# Patient Record
Sex: MALE | Race: WHITE | Employment: OTHER
[De-identification: names, ages, dates, MRNs, and addresses within clinical notes are randomized per-mention and may not be internally consistent; named-entity substitution may affect disease eponyms.]

---

## 2017-01-03 RX ORDER — RANOLAZINE 500 MG/1
500 TABLET, EXTENDED RELEASE ORAL 2 TIMES DAILY
Qty: 180 TABLET | Refills: 3 | Status: ON HOLD | OUTPATIENT
Start: 2017-01-03 | End: 2017-04-06 | Stop reason: HOSPADM

## 2017-01-20 ENCOUNTER — TELEPHONE (OUTPATIENT)
Dept: CARDIOLOGY | Facility: CLINIC | Age: 62
End: 2017-01-20

## 2017-04-04 ENCOUNTER — HOSPITAL ENCOUNTER (OUTPATIENT)
Age: 62
Setting detail: OBSERVATION
Discharge: HOME | End: 2017-04-07
Attending: FAMILY MEDICINE | Admitting: INTERNAL MEDICINE
Payer: OTHER GOVERNMENT

## 2017-04-04 ENCOUNTER — APPOINTMENT (OUTPATIENT)
Dept: GENERAL RADIOLOGY | Age: 62
End: 2017-04-04
Payer: OTHER GOVERNMENT

## 2017-04-04 DIAGNOSIS — R07.9 CHEST PAIN IN ADULT: Primary | ICD-10-CM

## 2017-04-04 LAB
ABSOLUTE EOS #: 0.1 K/UL (ref 0–0.4)
ABSOLUTE LYMPH #: 1.5 K/UL (ref 0.9–2.5)
ABSOLUTE MONO #: 0.6 K/UL (ref 0–1)
ALBUMIN SERPL-MCNC: 3.6 G/DL (ref 3.5–5.2)
ALBUMIN/GLOBULIN RATIO: ABNORMAL (ref 1–2.5)
ALP BLD-CCNC: 62 U/L (ref 40–129)
ALT SERPL-CCNC: 42 U/L (ref 5–41)
AMYLASE: 52 U/L (ref 28–100)
ANION GAP SERPL CALCULATED.3IONS-SCNC: 12 MMOL/L (ref 9–17)
AST SERPL-CCNC: 38 U/L
BASOPHILS # BLD: 0 % (ref 0–2)
BASOPHILS ABSOLUTE: 0 K/UL (ref 0–0.2)
BILIRUB SERPL-MCNC: 0.17 MG/DL (ref 0.3–1.2)
BILIRUBIN URINE: NEGATIVE
BUN BLDV-MCNC: 20 MG/DL (ref 8–23)
BUN/CREAT BLD: 22 (ref 9–20)
CALCIUM SERPL-MCNC: 8.5 MG/DL (ref 8.6–10.4)
CHLORIDE BLD-SCNC: 101 MMOL/L (ref 98–107)
CO2: 27 MMOL/L (ref 20–31)
COLOR: YELLOW
COMMENT UA: NORMAL
CREAT SERPL-MCNC: 0.91 MG/DL (ref 0.7–1.2)
DIFFERENTIAL TYPE: YES
EOSINOPHILS RELATIVE PERCENT: 2 % (ref 0–5)
GFR AFRICAN AMERICAN: >60 ML/MIN
GFR NON-AFRICAN AMERICAN: >60 ML/MIN
GFR SERPL CREATININE-BSD FRML MDRD: ABNORMAL ML/MIN/{1.73_M2}
GFR SERPL CREATININE-BSD FRML MDRD: ABNORMAL ML/MIN/{1.73_M2}
GLUCOSE BLD-MCNC: 91 MG/DL (ref 70–99)
GLUCOSE URINE: NEGATIVE
HCT VFR BLD CALC: 38.6 % (ref 41–53)
HEMOGLOBIN: 12.9 G/DL (ref 13.5–17.5)
KETONES, URINE: NEGATIVE
LEUKOCYTE ESTERASE, URINE: NEGATIVE
LIPASE: 50 U/L (ref 13–60)
LYMPHOCYTES # BLD: 19 % (ref 13–44)
MCH RBC QN AUTO: 30.5 PG (ref 26–34)
MCHC RBC AUTO-ENTMCNC: 33.5 G/DL (ref 31–37)
MCV RBC AUTO: 91.2 FL (ref 80–100)
MONOCYTES # BLD: 7 % (ref 5–9)
NITRITE, URINE: NEGATIVE
PDW BLD-RTO: 16.3 % (ref 12.1–15.2)
PH UA: 7 (ref 5–8)
PLATELET # BLD: 205 K/UL (ref 140–450)
PLATELET ESTIMATE: ABNORMAL
PMV BLD AUTO: ABNORMAL FL (ref 6–12)
POTASSIUM SERPL-SCNC: 4.4 MMOL/L (ref 3.7–5.3)
PROTEIN UA: NEGATIVE
RBC # BLD: 4.24 M/UL (ref 4.5–5.9)
RBC # BLD: ABNORMAL 10*6/UL
SEG NEUTROPHILS: 72 % (ref 39–75)
SEGMENTED NEUTROPHILS ABSOLUTE COUNT: 5.9 K/UL (ref 2.1–6.5)
SODIUM BLD-SCNC: 140 MMOL/L (ref 135–144)
SPECIFIC GRAVITY UA: 1.01 (ref 1–1.03)
TOTAL PROTEIN: 6.2 G/DL (ref 6.4–8.3)
TROPONIN INTERP: NORMAL
TROPONIN T: <0.03 NG/ML
TURBIDITY: CLEAR
URINE HGB: NEGATIVE
UROBILINOGEN, URINE: NORMAL
WBC # BLD: 8.1 K/UL (ref 3.5–11)
WBC # BLD: ABNORMAL 10*3/UL

## 2017-04-04 PROCEDURE — 96372 THER/PROPH/DIAG INJ SC/IM: CPT

## 2017-04-04 PROCEDURE — G0378 HOSPITAL OBSERVATION PER HR: HCPCS

## 2017-04-04 PROCEDURE — 99285 EMERGENCY DEPT VISIT HI MDM: CPT

## 2017-04-04 PROCEDURE — 71010 XR CHEST PORTABLE: CPT

## 2017-04-04 PROCEDURE — 85025 COMPLETE CBC W/AUTO DIFF WBC: CPT

## 2017-04-04 PROCEDURE — 6360000002 HC RX W HCPCS: Performed by: FAMILY MEDICINE

## 2017-04-04 PROCEDURE — 2580000003 HC RX 258: Performed by: INTERNAL MEDICINE

## 2017-04-04 PROCEDURE — 84484 ASSAY OF TROPONIN QUANT: CPT

## 2017-04-04 PROCEDURE — 6370000000 HC RX 637 (ALT 250 FOR IP): Performed by: INTERNAL MEDICINE

## 2017-04-04 PROCEDURE — 6370000000 HC RX 637 (ALT 250 FOR IP): Performed by: FAMILY MEDICINE

## 2017-04-04 PROCEDURE — 36415 COLL VENOUS BLD VENIPUNCTURE: CPT

## 2017-04-04 PROCEDURE — 6360000002 HC RX W HCPCS: Performed by: INTERNAL MEDICINE

## 2017-04-04 PROCEDURE — 96374 THER/PROPH/DIAG INJ IV PUSH: CPT

## 2017-04-04 PROCEDURE — 80053 COMPREHEN METABOLIC PANEL: CPT

## 2017-04-04 PROCEDURE — 81003 URINALYSIS AUTO W/O SCOPE: CPT

## 2017-04-04 PROCEDURE — 82150 ASSAY OF AMYLASE: CPT

## 2017-04-04 PROCEDURE — 94760 N-INVAS EAR/PLS OXIMETRY 1: CPT

## 2017-04-04 PROCEDURE — 93005 ELECTROCARDIOGRAM TRACING: CPT

## 2017-04-04 PROCEDURE — 83690 ASSAY OF LIPASE: CPT

## 2017-04-04 RX ORDER — ONDANSETRON 2 MG/ML
4 INJECTION INTRAMUSCULAR; INTRAVENOUS EVERY 6 HOURS PRN
Status: DISCONTINUED | OUTPATIENT
Start: 2017-04-04 | End: 2017-04-07 | Stop reason: HOSPADM

## 2017-04-04 RX ORDER — FLUTICASONE PROPIONATE 50 MCG
2 SPRAY, SUSPENSION (ML) NASAL DAILY
Status: DISCONTINUED | OUTPATIENT
Start: 2017-04-04 | End: 2017-04-07 | Stop reason: HOSPADM

## 2017-04-04 RX ORDER — ASPIRIN 81 MG/1
324 TABLET, CHEWABLE ORAL ONCE
Status: COMPLETED | OUTPATIENT
Start: 2017-04-04 | End: 2017-04-04

## 2017-04-04 RX ORDER — ATORVASTATIN CALCIUM 20 MG/1
10 TABLET, FILM COATED ORAL NIGHTLY
Status: DISCONTINUED | OUTPATIENT
Start: 2017-04-04 | End: 2017-04-07 | Stop reason: HOSPADM

## 2017-04-04 RX ORDER — SODIUM CHLORIDE 0.9 % (FLUSH) 0.9 %
10 SYRINGE (ML) INJECTION EVERY 12 HOURS SCHEDULED
Status: DISCONTINUED | OUTPATIENT
Start: 2017-04-04 | End: 2017-04-07 | Stop reason: HOSPADM

## 2017-04-04 RX ORDER — RANOLAZINE 500 MG/1
500 TABLET, EXTENDED RELEASE ORAL 2 TIMES DAILY
Status: DISCONTINUED | OUTPATIENT
Start: 2017-04-04 | End: 2017-04-06

## 2017-04-04 RX ORDER — ONDANSETRON 2 MG/ML
4 INJECTION INTRAMUSCULAR; INTRAVENOUS EVERY 30 MIN PRN
Status: DISCONTINUED | OUTPATIENT
Start: 2017-04-04 | End: 2017-04-07 | Stop reason: HOSPADM

## 2017-04-04 RX ORDER — SODIUM CHLORIDE 0.9 % (FLUSH) 0.9 %
10 SYRINGE (ML) INJECTION PRN
Status: DISCONTINUED | OUTPATIENT
Start: 2017-04-04 | End: 2017-04-07 | Stop reason: HOSPADM

## 2017-04-04 RX ORDER — ASPIRIN 81 MG/1
81 TABLET, CHEWABLE ORAL DAILY
Status: DISCONTINUED | OUTPATIENT
Start: 2017-04-04 | End: 2017-04-07 | Stop reason: HOSPADM

## 2017-04-04 RX ORDER — ATENOLOL 25 MG/1
25 TABLET ORAL DAILY
Status: DISCONTINUED | OUTPATIENT
Start: 2017-04-04 | End: 2017-04-06

## 2017-04-04 RX ORDER — MIRTAZAPINE 15 MG/1
30 TABLET, FILM COATED ORAL NIGHTLY
Status: DISCONTINUED | OUTPATIENT
Start: 2017-04-04 | End: 2017-04-07 | Stop reason: HOSPADM

## 2017-04-04 RX ORDER — NITROGLYCERIN 0.4 MG/1
0.4 TABLET SUBLINGUAL ONCE
Status: COMPLETED | OUTPATIENT
Start: 2017-04-04 | End: 2017-04-04

## 2017-04-04 RX ORDER — ACETAMINOPHEN 325 MG/1
650 TABLET ORAL EVERY 4 HOURS PRN
Status: DISCONTINUED | OUTPATIENT
Start: 2017-04-04 | End: 2017-04-07 | Stop reason: HOSPADM

## 2017-04-04 RX ORDER — ALBUTEROL SULFATE 2.5 MG/3ML
2.5 SOLUTION RESPIRATORY (INHALATION) EVERY 6 HOURS PRN
Status: DISCONTINUED | OUTPATIENT
Start: 2017-04-04 | End: 2017-04-07 | Stop reason: HOSPADM

## 2017-04-04 RX ORDER — FUROSEMIDE 40 MG/1
40 TABLET ORAL DAILY
Status: DISCONTINUED | OUTPATIENT
Start: 2017-04-05 | End: 2017-04-07 | Stop reason: HOSPADM

## 2017-04-04 RX ORDER — FUROSEMIDE 20 MG/1
40 TABLET ORAL DAILY
Status: DISCONTINUED | OUTPATIENT
Start: 2017-04-04 | End: 2017-04-04 | Stop reason: SDUPTHER

## 2017-04-04 RX ORDER — PANTOPRAZOLE SODIUM 40 MG/1
40 TABLET, DELAYED RELEASE ORAL
Status: DISCONTINUED | OUTPATIENT
Start: 2017-04-05 | End: 2017-04-07 | Stop reason: HOSPADM

## 2017-04-04 RX ORDER — CETIRIZINE HYDROCHLORIDE 10 MG/1
5 TABLET ORAL DAILY
Status: DISCONTINUED | OUTPATIENT
Start: 2017-04-04 | End: 2017-04-05

## 2017-04-04 RX ORDER — TRAZODONE HYDROCHLORIDE 50 MG/1
50 TABLET ORAL NIGHTLY
Status: DISCONTINUED | OUTPATIENT
Start: 2017-04-04 | End: 2017-04-05

## 2017-04-04 RX ADMIN — Medication 0.4 MG: at 16:06

## 2017-04-04 RX ADMIN — ENOXAPARIN SODIUM 40 MG: 40 INJECTION SUBCUTANEOUS at 21:36

## 2017-04-04 RX ADMIN — CETIRIZINE HYDROCHLORIDE 5 MG: 10 TABLET, FILM COATED ORAL at 21:22

## 2017-04-04 RX ADMIN — ATENOLOL 25 MG: 25 TABLET ORAL at 21:37

## 2017-04-04 RX ADMIN — FUROSEMIDE 40 MG: 20 TABLET ORAL at 15:04

## 2017-04-04 RX ADMIN — ASPIRIN 81 MG CHEWABLE TABLET 324 MG: 81 TABLET CHEWABLE at 13:28

## 2017-04-04 RX ADMIN — TRAZODONE HYDROCHLORIDE 50 MG: 50 TABLET ORAL at 21:18

## 2017-04-04 RX ADMIN — Medication 10 ML: at 21:35

## 2017-04-04 RX ADMIN — ATORVASTATIN CALCIUM 10 MG: 20 TABLET, FILM COATED ORAL at 21:15

## 2017-04-04 RX ADMIN — MIRTAZAPINE 30 MG: 15 TABLET, FILM COATED ORAL at 21:17

## 2017-04-04 RX ADMIN — RANOLAZINE 500 MG: 500 TABLET, FILM COATED, EXTENDED RELEASE ORAL at 21:38

## 2017-04-04 RX ADMIN — ONDANSETRON 4 MG: 2 INJECTION INTRAMUSCULAR; INTRAVENOUS at 13:28

## 2017-04-04 RX ADMIN — VITAMIN D, TAB 1000IU (100/BT) 2000 UNITS: 25 TAB at 21:19

## 2017-04-04 RX ADMIN — NITROGLYCERIN 1 INCH: 20 OINTMENT TOPICAL at 21:36

## 2017-04-04 RX ADMIN — FLUTICASONE PROPIONATE 2 SPRAY: 50 SPRAY, METERED NASAL at 21:15

## 2017-04-04 ASSESSMENT — PAIN DESCRIPTION - PROGRESSION
CLINICAL_PROGRESSION: GRADUALLY IMPROVING
CLINICAL_PROGRESSION: NOT CHANGED

## 2017-04-04 ASSESSMENT — PAIN DESCRIPTION - LOCATION
LOCATION: ABDOMEN;CHEST
LOCATION: CHEST

## 2017-04-04 ASSESSMENT — PAIN SCALES - GENERAL
PAINLEVEL_OUTOF10: 7
PAINLEVEL_OUTOF10: 7
PAINLEVEL_OUTOF10: 6

## 2017-04-04 ASSESSMENT — PAIN DESCRIPTION - DESCRIPTORS
DESCRIPTORS: ACHING;SHARP
DESCRIPTORS: ACHING;SHARP

## 2017-04-04 ASSESSMENT — PAIN DESCRIPTION - FREQUENCY
FREQUENCY: INTERMITTENT
FREQUENCY: INTERMITTENT

## 2017-04-04 ASSESSMENT — PAIN DESCRIPTION - PAIN TYPE
TYPE: ACUTE PAIN
TYPE: ACUTE PAIN

## 2017-04-04 ASSESSMENT — PAIN DESCRIPTION - ORIENTATION: ORIENTATION: LEFT

## 2017-04-04 ASSESSMENT — PAIN DESCRIPTION - ONSET: ONSET: GRADUAL

## 2017-04-05 LAB
ALBUMIN SERPL-MCNC: 3.1 G/DL (ref 3.5–5.2)
ALBUMIN/GLOBULIN RATIO: ABNORMAL (ref 1–2.5)
ALP BLD-CCNC: 55 U/L (ref 40–129)
ALT SERPL-CCNC: 31 U/L (ref 5–41)
ANION GAP SERPL CALCULATED.3IONS-SCNC: 11 MMOL/L (ref 9–17)
AST SERPL-CCNC: 17 U/L
BILIRUB SERPL-MCNC: 0.26 MG/DL (ref 0.3–1.2)
BUN BLDV-MCNC: 14 MG/DL (ref 8–23)
BUN/CREAT BLD: 16 (ref 9–20)
CALCIUM SERPL-MCNC: 8.4 MG/DL (ref 8.6–10.4)
CHLORIDE BLD-SCNC: 102 MMOL/L (ref 98–107)
CO2: 27 MMOL/L (ref 20–31)
CREAT SERPL-MCNC: 0.88 MG/DL (ref 0.7–1.2)
EKG ATRIAL RATE: 62 BPM
EKG P AXIS: 51 DEGREES
EKG P-R INTERVAL: 192 MS
EKG Q-T INTERVAL: 402 MS
EKG QRS DURATION: 86 MS
EKG QTC CALCULATION (BAZETT): 408 MS
EKG R AXIS: 39 DEGREES
EKG T AXIS: 36 DEGREES
EKG VENTRICULAR RATE: 62 BPM
GFR AFRICAN AMERICAN: >60 ML/MIN
GFR NON-AFRICAN AMERICAN: >60 ML/MIN
GFR SERPL CREATININE-BSD FRML MDRD: ABNORMAL ML/MIN/{1.73_M2}
GFR SERPL CREATININE-BSD FRML MDRD: ABNORMAL ML/MIN/{1.73_M2}
GLUCOSE BLD-MCNC: 85 MG/DL (ref 70–99)
HCT VFR BLD CALC: 38.5 % (ref 41–53)
HEMOGLOBIN: 12.6 G/DL (ref 13.5–17.5)
LV EF: 50 %
LVEF MODALITY: NORMAL
MCH RBC QN AUTO: 30.1 PG (ref 26–34)
MCHC RBC AUTO-ENTMCNC: 32.8 G/DL (ref 31–37)
MCV RBC AUTO: 91.9 FL (ref 80–100)
PDW BLD-RTO: 16.9 % (ref 12.1–15.2)
PLATELET # BLD: 186 K/UL (ref 140–450)
PMV BLD AUTO: ABNORMAL FL (ref 6–12)
POTASSIUM SERPL-SCNC: 4.5 MMOL/L (ref 3.7–5.3)
RBC # BLD: 4.19 M/UL (ref 4.5–5.9)
SODIUM BLD-SCNC: 140 MMOL/L (ref 135–144)
TOTAL PROTEIN: 5.5 G/DL (ref 6.4–8.3)
WBC # BLD: 7.2 K/UL (ref 3.5–11)

## 2017-04-05 PROCEDURE — 96372 THER/PROPH/DIAG INJ SC/IM: CPT

## 2017-04-05 PROCEDURE — 93306 TTE W/DOPPLER COMPLETE: CPT

## 2017-04-05 PROCEDURE — 94761 N-INVAS EAR/PLS OXIMETRY MLT: CPT

## 2017-04-05 PROCEDURE — 6360000002 HC RX W HCPCS: Performed by: INTERNAL MEDICINE

## 2017-04-05 PROCEDURE — 80053 COMPREHEN METABOLIC PANEL: CPT

## 2017-04-05 PROCEDURE — G0378 HOSPITAL OBSERVATION PER HR: HCPCS

## 2017-04-05 PROCEDURE — 36415 COLL VENOUS BLD VENIPUNCTURE: CPT

## 2017-04-05 PROCEDURE — 6370000000 HC RX 637 (ALT 250 FOR IP): Performed by: INTERNAL MEDICINE

## 2017-04-05 PROCEDURE — 85027 COMPLETE CBC AUTOMATED: CPT

## 2017-04-05 PROCEDURE — 2580000003 HC RX 258: Performed by: INTERNAL MEDICINE

## 2017-04-05 RX ORDER — TRAZODONE HYDROCHLORIDE 50 MG/1
100 TABLET ORAL NIGHTLY
Status: DISCONTINUED | OUTPATIENT
Start: 2017-04-05 | End: 2017-04-07 | Stop reason: HOSPADM

## 2017-04-05 RX ORDER — CETIRIZINE HYDROCHLORIDE 10 MG/1
10 TABLET ORAL DAILY
Status: DISCONTINUED | OUTPATIENT
Start: 2017-04-06 | End: 2017-04-07 | Stop reason: HOSPADM

## 2017-04-05 RX ORDER — GABAPENTIN 300 MG/1
900 CAPSULE ORAL 3 TIMES DAILY
Status: DISCONTINUED | OUTPATIENT
Start: 2017-04-05 | End: 2017-04-07 | Stop reason: HOSPADM

## 2017-04-05 RX ORDER — TAMSULOSIN HYDROCHLORIDE 0.4 MG/1
0.4 CAPSULE ORAL DAILY
Status: DISCONTINUED | OUTPATIENT
Start: 2017-04-05 | End: 2017-04-07 | Stop reason: HOSPADM

## 2017-04-05 RX ADMIN — ATENOLOL 25 MG: 25 TABLET ORAL at 09:12

## 2017-04-05 RX ADMIN — CETIRIZINE HYDROCHLORIDE 5 MG: 10 TABLET, FILM COATED ORAL at 09:12

## 2017-04-05 RX ADMIN — ATORVASTATIN CALCIUM 10 MG: 20 TABLET, FILM COATED ORAL at 20:48

## 2017-04-05 RX ADMIN — PANTOPRAZOLE SODIUM 40 MG: 40 TABLET, DELAYED RELEASE ORAL at 06:21

## 2017-04-05 RX ADMIN — GABAPENTIN 900 MG: 300 CAPSULE ORAL at 13:33

## 2017-04-05 RX ADMIN — NITROGLYCERIN 1 INCH: 20 OINTMENT TOPICAL at 06:21

## 2017-04-05 RX ADMIN — NITROGLYCERIN 1 INCH: 20 OINTMENT TOPICAL at 11:43

## 2017-04-05 RX ADMIN — NITROGLYCERIN 1 INCH: 20 OINTMENT TOPICAL at 17:52

## 2017-04-05 RX ADMIN — TRAZODONE HYDROCHLORIDE 100 MG: 50 TABLET ORAL at 20:48

## 2017-04-05 RX ADMIN — FLUTICASONE PROPIONATE 2 SPRAY: 50 SPRAY, METERED NASAL at 09:13

## 2017-04-05 RX ADMIN — GABAPENTIN 900 MG: 300 CAPSULE ORAL at 20:48

## 2017-04-05 RX ADMIN — RANOLAZINE 500 MG: 500 TABLET, FILM COATED, EXTENDED RELEASE ORAL at 20:48

## 2017-04-05 RX ADMIN — VITAMIN D, TAB 1000IU (100/BT) 2000 UNITS: 25 TAB at 09:11

## 2017-04-05 RX ADMIN — Medication 10 ML: at 20:50

## 2017-04-05 RX ADMIN — FUROSEMIDE 40 MG: 40 TABLET ORAL at 09:12

## 2017-04-05 RX ADMIN — MIRTAZAPINE 30 MG: 15 TABLET, FILM COATED ORAL at 20:47

## 2017-04-05 RX ADMIN — ASPIRIN 81 MG CHEWABLE TABLET 81 MG: 81 TABLET CHEWABLE at 09:11

## 2017-04-05 RX ADMIN — RANOLAZINE 500 MG: 500 TABLET, FILM COATED, EXTENDED RELEASE ORAL at 09:11

## 2017-04-05 RX ADMIN — ENOXAPARIN SODIUM 40 MG: 40 INJECTION SUBCUTANEOUS at 09:10

## 2017-04-05 RX ADMIN — TAMSULOSIN HYDROCHLORIDE 0.4 MG: 0.4 CAPSULE ORAL at 11:43

## 2017-04-05 RX ADMIN — Medication 10 ML: at 09:14

## 2017-04-05 ASSESSMENT — PAIN DESCRIPTION - LOCATION
LOCATION: CHEST

## 2017-04-05 ASSESSMENT — PAIN DESCRIPTION - PAIN TYPE
TYPE: ACUTE PAIN

## 2017-04-05 ASSESSMENT — PAIN DESCRIPTION - FREQUENCY
FREQUENCY: CONTINUOUS
FREQUENCY: INTERMITTENT
FREQUENCY: INTERMITTENT

## 2017-04-05 ASSESSMENT — PAIN SCALES - GENERAL
PAINLEVEL_OUTOF10: 5
PAINLEVEL_OUTOF10: 0
PAINLEVEL_OUTOF10: 2
PAINLEVEL_OUTOF10: 7

## 2017-04-05 ASSESSMENT — PAIN DESCRIPTION - DESCRIPTORS
DESCRIPTORS: ACHING

## 2017-04-05 ASSESSMENT — PAIN DESCRIPTION - ORIENTATION
ORIENTATION: LEFT
ORIENTATION: LEFT

## 2017-04-06 LAB
DIRECT EXAM: NEGATIVE
DIRECT EXAM: NORMAL
DIRECT EXAM: NORMAL
Lab: NORMAL
SPECIMEN DESCRIPTION: NORMAL
STATUS: NORMAL

## 2017-04-06 PROCEDURE — 96375 TX/PRO/DX INJ NEW DRUG ADDON: CPT

## 2017-04-06 PROCEDURE — 6360000002 HC RX W HCPCS: Performed by: INTERNAL MEDICINE

## 2017-04-06 PROCEDURE — 96376 TX/PRO/DX INJ SAME DRUG ADON: CPT

## 2017-04-06 PROCEDURE — 96372 THER/PROPH/DIAG INJ SC/IM: CPT

## 2017-04-06 PROCEDURE — 87324 CLOSTRIDIUM AG IA: CPT

## 2017-04-06 PROCEDURE — G0378 HOSPITAL OBSERVATION PER HR: HCPCS

## 2017-04-06 PROCEDURE — 6370000000 HC RX 637 (ALT 250 FOR IP): Performed by: INTERNAL MEDICINE

## 2017-04-06 PROCEDURE — 2580000003 HC RX 258: Performed by: INTERNAL MEDICINE

## 2017-04-06 PROCEDURE — 94761 N-INVAS EAR/PLS OXIMETRY MLT: CPT

## 2017-04-06 RX ORDER — RANOLAZINE 1000 MG/1
500 TABLET, EXTENDED RELEASE ORAL 2 TIMES DAILY
Qty: 60 TABLET | Refills: 1 | Status: SHIPPED | OUTPATIENT
Start: 2017-04-06 | End: 2017-04-20 | Stop reason: SDUPTHER

## 2017-04-06 RX ORDER — SPIRONOLACTONE 25 MG/1
25 TABLET ORAL DAILY
Status: DISCONTINUED | OUTPATIENT
Start: 2017-04-06 | End: 2017-04-07 | Stop reason: HOSPADM

## 2017-04-06 RX ORDER — ATENOLOL 25 MG/1
12.5 TABLET ORAL DAILY
Qty: 30 TABLET | Refills: 3 | Status: SHIPPED | OUTPATIENT
Start: 2017-04-06 | End: 2017-12-21 | Stop reason: ALTCHOICE

## 2017-04-06 RX ORDER — ATENOLOL 25 MG/1
12.5 TABLET ORAL DAILY
Status: DISCONTINUED | OUTPATIENT
Start: 2017-04-07 | End: 2017-04-07 | Stop reason: HOSPADM

## 2017-04-06 RX ORDER — SODIUM CHLORIDE 0.9 % (FLUSH) 0.9 %
10 SYRINGE (ML) INJECTION PRN
Status: DISCONTINUED | OUTPATIENT
Start: 2017-04-07 | End: 2017-04-07 | Stop reason: HOSPADM

## 2017-04-06 RX ORDER — RANOLAZINE 500 MG/1
1000 TABLET, EXTENDED RELEASE ORAL 2 TIMES DAILY
Status: DISCONTINUED | OUTPATIENT
Start: 2017-04-06 | End: 2017-04-07 | Stop reason: HOSPADM

## 2017-04-06 RX ADMIN — ATENOLOL 25 MG: 25 TABLET ORAL at 08:14

## 2017-04-06 RX ADMIN — FLUTICASONE PROPIONATE 2 SPRAY: 50 SPRAY, METERED NASAL at 08:46

## 2017-04-06 RX ADMIN — GABAPENTIN 900 MG: 300 CAPSULE ORAL at 08:46

## 2017-04-06 RX ADMIN — Medication 10 ML: at 11:39

## 2017-04-06 RX ADMIN — CETIRIZINE HYDROCHLORIDE 10 MG: 10 TABLET, FILM COATED ORAL at 08:14

## 2017-04-06 RX ADMIN — RANOLAZINE 1000 MG: 500 TABLET, FILM COATED, EXTENDED RELEASE ORAL at 08:16

## 2017-04-06 RX ADMIN — ACETAMINOPHEN 650 MG: 325 TABLET, FILM COATED ORAL at 08:14

## 2017-04-06 RX ADMIN — TAMSULOSIN HYDROCHLORIDE 0.4 MG: 0.4 CAPSULE ORAL at 08:14

## 2017-04-06 RX ADMIN — TRAZODONE HYDROCHLORIDE 100 MG: 50 TABLET ORAL at 21:28

## 2017-04-06 RX ADMIN — HYDROCORTISONE SODIUM SUCCINATE 100 MG: 100 INJECTION, POWDER, FOR SOLUTION INTRAMUSCULAR; INTRAVENOUS at 21:27

## 2017-04-06 RX ADMIN — ENOXAPARIN SODIUM 40 MG: 40 INJECTION SUBCUTANEOUS at 08:46

## 2017-04-06 RX ADMIN — HYDROCORTISONE SODIUM SUCCINATE 100 MG: 100 INJECTION, POWDER, FOR SOLUTION INTRAMUSCULAR; INTRAVENOUS at 11:39

## 2017-04-06 RX ADMIN — GABAPENTIN 900 MG: 300 CAPSULE ORAL at 13:47

## 2017-04-06 RX ADMIN — FUROSEMIDE 40 MG: 40 TABLET ORAL at 08:14

## 2017-04-06 RX ADMIN — Medication 10 ML: at 21:29

## 2017-04-06 RX ADMIN — VITAMIN D, TAB 1000IU (100/BT) 2000 UNITS: 25 TAB at 08:14

## 2017-04-06 RX ADMIN — ACETAMINOPHEN 650 MG: 325 TABLET, FILM COATED ORAL at 12:26

## 2017-04-06 RX ADMIN — GABAPENTIN 900 MG: 300 CAPSULE ORAL at 21:28

## 2017-04-06 RX ADMIN — ASPIRIN 81 MG CHEWABLE TABLET 81 MG: 81 TABLET CHEWABLE at 08:14

## 2017-04-06 RX ADMIN — PANTOPRAZOLE SODIUM 40 MG: 40 TABLET, DELAYED RELEASE ORAL at 06:56

## 2017-04-06 RX ADMIN — ATORVASTATIN CALCIUM 10 MG: 20 TABLET, FILM COATED ORAL at 21:28

## 2017-04-06 RX ADMIN — RANOLAZINE 1000 MG: 500 TABLET, FILM COATED, EXTENDED RELEASE ORAL at 21:27

## 2017-04-06 RX ADMIN — MIRTAZAPINE 30 MG: 15 TABLET, FILM COATED ORAL at 21:28

## 2017-04-06 RX ADMIN — SPIRONOLACTONE 25 MG: 25 TABLET, FILM COATED ORAL at 08:14

## 2017-04-06 RX ADMIN — Medication 10 ML: at 08:14

## 2017-04-06 ASSESSMENT — PAIN SCALES - GENERAL
PAINLEVEL_OUTOF10: 5
PAINLEVEL_OUTOF10: 3
PAINLEVEL_OUTOF10: 0
PAINLEVEL_OUTOF10: 3
PAINLEVEL_OUTOF10: 0
PAINLEVEL_OUTOF10: 3
PAINLEVEL_OUTOF10: 3

## 2017-04-06 ASSESSMENT — PAIN DESCRIPTION - LOCATION: LOCATION: CHEST

## 2017-04-06 ASSESSMENT — PAIN DESCRIPTION - ORIENTATION: ORIENTATION: LEFT

## 2017-04-06 ASSESSMENT — PAIN DESCRIPTION - PROGRESSION: CLINICAL_PROGRESSION: NOT CHANGED

## 2017-04-06 ASSESSMENT — PAIN DESCRIPTION - PAIN TYPE: TYPE: ACUTE PAIN

## 2017-04-06 ASSESSMENT — PAIN DESCRIPTION - DESCRIPTORS: DESCRIPTORS: ACHING

## 2017-04-07 ENCOUNTER — HOSPITAL ENCOUNTER (OUTPATIENT)
Dept: NUCLEAR MEDICINE | Age: 62
Setting detail: OBSERVATION
Discharge: HOME | End: 2017-04-07
Payer: OTHER GOVERNMENT

## 2017-04-07 ENCOUNTER — HOSPITAL ENCOUNTER (OUTPATIENT)
Dept: NUCLEAR MEDICINE | Age: 62
Discharge: HOME | End: 2017-04-07
Payer: OTHER GOVERNMENT

## 2017-04-07 VITALS
TEMPERATURE: 97.9 F | WEIGHT: 231 LBS | HEIGHT: 69 IN | RESPIRATION RATE: 18 BRPM | SYSTOLIC BLOOD PRESSURE: 150 MMHG | BODY MASS INDEX: 34.21 KG/M2 | DIASTOLIC BLOOD PRESSURE: 70 MMHG | OXYGEN SATURATION: 97 % | HEART RATE: 64 BPM

## 2017-04-07 PROCEDURE — 2580000003 HC RX 258: Performed by: INTERNAL MEDICINE

## 2017-04-07 PROCEDURE — 6360000002 HC RX W HCPCS: Performed by: INTERNAL MEDICINE

## 2017-04-07 PROCEDURE — A9500 TC99M SESTAMIBI: HCPCS | Performed by: INTERNAL MEDICINE

## 2017-04-07 PROCEDURE — 6370000000 HC RX 637 (ALT 250 FOR IP): Performed by: INTERNAL MEDICINE

## 2017-04-07 PROCEDURE — 96376 TX/PRO/DX INJ SAME DRUG ADON: CPT

## 2017-04-07 PROCEDURE — 78452 HT MUSCLE IMAGE SPECT MULT: CPT

## 2017-04-07 PROCEDURE — 96372 THER/PROPH/DIAG INJ SC/IM: CPT

## 2017-04-07 PROCEDURE — 3430000000 HC RX DIAGNOSTIC RADIOPHARMACEUTICAL: Performed by: INTERNAL MEDICINE

## 2017-04-07 PROCEDURE — G0378 HOSPITAL OBSERVATION PER HR: HCPCS

## 2017-04-07 PROCEDURE — 93017 CV STRESS TEST TRACING ONLY: CPT

## 2017-04-07 PROCEDURE — 94761 N-INVAS EAR/PLS OXIMETRY MLT: CPT

## 2017-04-07 RX ORDER — METHYLPREDNISOLONE 4 MG/1
TABLET ORAL
Qty: 1 KIT | Refills: 0 | Status: SHIPPED | OUTPATIENT
Start: 2017-04-07 | End: 2017-04-13

## 2017-04-07 RX ADMIN — Medication 10 ML: at 10:55

## 2017-04-07 RX ADMIN — VITAMIN D, TAB 1000IU (100/BT) 2000 UNITS: 25 TAB at 10:54

## 2017-04-07 RX ADMIN — CETIRIZINE HYDROCHLORIDE 10 MG: 10 TABLET, FILM COATED ORAL at 10:54

## 2017-04-07 RX ADMIN — FLUTICASONE PROPIONATE 2 SPRAY: 50 SPRAY, METERED NASAL at 10:59

## 2017-04-07 RX ADMIN — ATENOLOL 12.5 MG: 25 TABLET ORAL at 10:53

## 2017-04-07 RX ADMIN — ENOXAPARIN SODIUM 40 MG: 40 INJECTION SUBCUTANEOUS at 10:54

## 2017-04-07 RX ADMIN — Medication 10 ML: at 08:40

## 2017-04-07 RX ADMIN — SPIRONOLACTONE 25 MG: 25 TABLET, FILM COATED ORAL at 10:53

## 2017-04-07 RX ADMIN — TAMSULOSIN HYDROCHLORIDE 0.4 MG: 0.4 CAPSULE ORAL at 10:54

## 2017-04-07 RX ADMIN — ASPIRIN 81 MG CHEWABLE TABLET 81 MG: 81 TABLET CHEWABLE at 10:53

## 2017-04-07 RX ADMIN — HYDROCORTISONE SODIUM SUCCINATE 100 MG: 100 INJECTION, POWDER, FOR SOLUTION INTRAMUSCULAR; INTRAVENOUS at 10:55

## 2017-04-07 RX ADMIN — PANTOPRAZOLE SODIUM 40 MG: 40 TABLET, DELAYED RELEASE ORAL at 10:54

## 2017-04-07 RX ADMIN — GABAPENTIN 900 MG: 300 CAPSULE ORAL at 10:54

## 2017-04-07 RX ADMIN — TETRAKIS(2-METHOXYISOBUTYLISOCYANIDE)COPPER(I) TETRAFLUOROBORATE 30 MILLICURIE: 1 INJECTION, POWDER, LYOPHILIZED, FOR SOLUTION INTRAVENOUS at 08:42

## 2017-04-07 RX ADMIN — GABAPENTIN 900 MG: 300 CAPSULE ORAL at 15:05

## 2017-04-07 RX ADMIN — FUROSEMIDE 40 MG: 40 TABLET ORAL at 10:55

## 2017-04-07 RX ADMIN — TETRAKIS(2-METHOXYISOBUTYLISOCYANIDE)COPPER(I) TETRAFLUOROBORATE 10 MILLICURIE: 1 INJECTION, POWDER, LYOPHILIZED, FOR SOLUTION INTRAVENOUS at 07:35

## 2017-04-07 RX ADMIN — HYDROCORTISONE SODIUM SUCCINATE 100 MG: 100 INJECTION, POWDER, FOR SOLUTION INTRAMUSCULAR; INTRAVENOUS at 04:03

## 2017-04-07 RX ADMIN — RANOLAZINE 1000 MG: 500 TABLET, FILM COATED, EXTENDED RELEASE ORAL at 10:53

## 2017-04-07 RX ADMIN — REGADENOSON 0.4 MG: 0.08 INJECTION, SOLUTION INTRAVENOUS at 08:41

## 2017-04-07 ASSESSMENT — PAIN DESCRIPTION - DESCRIPTORS: DESCRIPTORS: SHARP

## 2017-04-07 ASSESSMENT — PAIN DESCRIPTION - PAIN TYPE
TYPE: ACUTE PAIN
TYPE: ACUTE PAIN

## 2017-04-07 ASSESSMENT — PAIN SCALES - GENERAL
PAINLEVEL_OUTOF10: 2
PAINLEVEL_OUTOF10: 1

## 2017-04-07 ASSESSMENT — PAIN DESCRIPTION - LOCATION
LOCATION: CHEST
LOCATION: CHEST

## 2017-04-20 RX ORDER — RANOLAZINE 1000 MG/1
500 TABLET, EXTENDED RELEASE ORAL 2 TIMES DAILY
Qty: 180 TABLET | Refills: 3 | Status: SHIPPED | OUTPATIENT
Start: 2017-04-20 | End: 2017-05-10 | Stop reason: SDUPTHER

## 2017-04-25 ENCOUNTER — HOSPITAL ENCOUNTER (EMERGENCY)
Age: 62
Discharge: HOME | End: 2017-04-25
Attending: EMERGENCY MEDICINE
Payer: MEDICAID

## 2017-04-25 ENCOUNTER — APPOINTMENT (OUTPATIENT)
Dept: GENERAL RADIOLOGY | Age: 62
End: 2017-04-25
Payer: MEDICAID

## 2017-04-25 VITALS
RESPIRATION RATE: 18 BRPM | DIASTOLIC BLOOD PRESSURE: 70 MMHG | TEMPERATURE: 99.6 F | WEIGHT: 213.63 LBS | SYSTOLIC BLOOD PRESSURE: 147 MMHG | HEART RATE: 63 BPM | BODY MASS INDEX: 31.55 KG/M2 | OXYGEN SATURATION: 98 %

## 2017-04-25 DIAGNOSIS — Z86.79 HISTORY OF CORONARY ARTERY DISEASE: ICD-10-CM

## 2017-04-25 DIAGNOSIS — R07.9 CHEST PAIN, UNSPECIFIED TYPE: Primary | ICD-10-CM

## 2017-04-25 LAB
ABSOLUTE EOS #: 0.1 K/UL (ref 0–0.4)
ABSOLUTE LYMPH #: 1.3 K/UL (ref 0.9–2.5)
ABSOLUTE MONO #: 0.4 K/UL (ref 0–1)
ANION GAP SERPL CALCULATED.3IONS-SCNC: 13 MMOL/L (ref 9–17)
BASOPHILS # BLD: 0 %
BASOPHILS ABSOLUTE: 0 K/UL (ref 0–0.2)
BUN BLDV-MCNC: 11 MG/DL (ref 8–23)
BUN/CREAT BLD: 14 (ref 9–20)
CALCIUM SERPL-MCNC: 8.6 MG/DL (ref 8.6–10.4)
CHLORIDE BLD-SCNC: 103 MMOL/L (ref 98–107)
CO2: 24 MMOL/L (ref 20–31)
CREAT SERPL-MCNC: 0.8 MG/DL (ref 0.7–1.2)
DIFFERENTIAL TYPE: YES
EOSINOPHILS RELATIVE PERCENT: 3 %
GFR AFRICAN AMERICAN: >60 ML/MIN
GFR NON-AFRICAN AMERICAN: >60 ML/MIN
GFR SERPL CREATININE-BSD FRML MDRD: NORMAL ML/MIN/{1.73_M2}
GFR SERPL CREATININE-BSD FRML MDRD: NORMAL ML/MIN/{1.73_M2}
GLUCOSE BLD-MCNC: 86 MG/DL (ref 70–99)
HCT VFR BLD CALC: 42.9 % (ref 41–53)
HEMOGLOBIN: 14.1 G/DL (ref 13.5–17.5)
LYMPHOCYTES # BLD: 27 %
MCH RBC QN AUTO: 30.2 PG (ref 26–34)
MCHC RBC AUTO-ENTMCNC: 33 G/DL (ref 31–37)
MCV RBC AUTO: 91.4 FL (ref 80–100)
MONOCYTES # BLD: 8 %
PDW BLD-RTO: 15.6 % (ref 12.1–15.2)
PLATELET # BLD: 155 K/UL (ref 140–450)
PLATELET ESTIMATE: ABNORMAL
PMV BLD AUTO: ABNORMAL FL (ref 6–12)
POTASSIUM SERPL-SCNC: 4.1 MMOL/L (ref 3.7–5.3)
RBC # BLD: 4.69 M/UL (ref 4.5–5.9)
RBC # BLD: ABNORMAL 10*6/UL
SEG NEUTROPHILS: 62 %
SEGMENTED NEUTROPHILS ABSOLUTE COUNT: 3 K/UL (ref 2.1–6.5)
SODIUM BLD-SCNC: 140 MMOL/L (ref 135–144)
TROPONIN INTERP: NORMAL
TROPONIN INTERP: NORMAL
TROPONIN T: <0.03 NG/ML
TROPONIN T: <0.03 NG/ML
WBC # BLD: 4.8 K/UL (ref 3.5–11)
WBC # BLD: ABNORMAL 10*3/UL

## 2017-04-25 PROCEDURE — 84484 ASSAY OF TROPONIN QUANT: CPT

## 2017-04-25 PROCEDURE — 80048 BASIC METABOLIC PNL TOTAL CA: CPT

## 2017-04-25 PROCEDURE — 36415 COLL VENOUS BLD VENIPUNCTURE: CPT

## 2017-04-25 PROCEDURE — 94664 DEMO&/EVAL PT USE INHALER: CPT

## 2017-04-25 PROCEDURE — 6370000000 HC RX 637 (ALT 250 FOR IP)

## 2017-04-25 PROCEDURE — 85025 COMPLETE CBC W/AUTO DIFF WBC: CPT

## 2017-04-25 PROCEDURE — 99285 EMERGENCY DEPT VISIT HI MDM: CPT

## 2017-04-25 PROCEDURE — 71010 XR CHEST PORTABLE: CPT

## 2017-04-25 PROCEDURE — 93005 ELECTROCARDIOGRAM TRACING: CPT

## 2017-04-25 PROCEDURE — 6370000000 HC RX 637 (ALT 250 FOR IP): Performed by: EMERGENCY MEDICINE

## 2017-04-25 RX ORDER — ACETAMINOPHEN 500 MG
500 TABLET ORAL ONCE
Status: DISCONTINUED | OUTPATIENT
Start: 2017-04-25 | End: 2017-04-25

## 2017-04-25 RX ORDER — ACETAMINOPHEN 325 MG/1
325 TABLET ORAL EVERY 6 HOURS PRN
Status: ON HOLD | COMMUNITY
End: 2019-04-23

## 2017-04-25 RX ORDER — ACETAMINOPHEN 500 MG
1000 TABLET ORAL ONCE
Status: COMPLETED | OUTPATIENT
Start: 2017-04-25 | End: 2017-04-25

## 2017-04-25 RX ORDER — ACETAMINOPHEN 500 MG
TABLET ORAL
Status: COMPLETED
Start: 2017-04-25 | End: 2017-04-25

## 2017-04-25 RX ORDER — SIMVASTATIN 5 MG
5 TABLET ORAL DAILY
COMMUNITY
End: 2018-10-16 | Stop reason: ALTCHOICE

## 2017-04-25 RX ORDER — ISOSORBIDE MONONITRATE 30 MG/1
30 TABLET, EXTENDED RELEASE ORAL DAILY
Qty: 30 TABLET | Refills: 0 | Status: SHIPPED | OUTPATIENT
Start: 2017-04-25 | End: 2017-05-16 | Stop reason: ALTCHOICE

## 2017-04-25 RX ORDER — IPRATROPIUM BROMIDE AND ALBUTEROL SULFATE 2.5; .5 MG/3ML; MG/3ML
1 SOLUTION RESPIRATORY (INHALATION) ONCE
Status: COMPLETED | OUTPATIENT
Start: 2017-04-25 | End: 2017-04-25

## 2017-04-25 RX ADMIN — Medication 1000 MG: at 11:33

## 2017-04-25 RX ADMIN — IPRATROPIUM BROMIDE AND ALBUTEROL SULFATE 1 AMPULE: .5; 3 SOLUTION RESPIRATORY (INHALATION) at 15:01

## 2017-04-25 RX ADMIN — ACETAMINOPHEN 1000 MG: 500 TABLET ORAL at 11:33

## 2017-04-25 ASSESSMENT — PAIN DESCRIPTION - ONSET: ONSET: SUDDEN

## 2017-04-25 ASSESSMENT — PAIN DESCRIPTION - FREQUENCY: FREQUENCY: CONTINUOUS

## 2017-04-25 ASSESSMENT — PAIN DESCRIPTION - LOCATION: LOCATION: CHEST

## 2017-04-25 ASSESSMENT — PAIN SCALES - GENERAL: PAINLEVEL_OUTOF10: 6

## 2017-04-25 ASSESSMENT — PAIN DESCRIPTION - ORIENTATION: ORIENTATION: LEFT;UPPER

## 2017-04-25 ASSESSMENT — PAIN DESCRIPTION - DESCRIPTORS: DESCRIPTORS: DULL;SHARP

## 2017-04-25 ASSESSMENT — PAIN DESCRIPTION - PAIN TYPE: TYPE: ACUTE PAIN

## 2017-04-26 LAB
EKG ATRIAL RATE: 60 BPM
EKG P AXIS: 53 DEGREES
EKG P-R INTERVAL: 190 MS
EKG Q-T INTERVAL: 422 MS
EKG QRS DURATION: 86 MS
EKG QTC CALCULATION (BAZETT): 422 MS
EKG R AXIS: 39 DEGREES
EKG T AXIS: 30 DEGREES
EKG VENTRICULAR RATE: 60 BPM

## 2017-05-08 ENCOUNTER — TELEPHONE (OUTPATIENT)
Dept: CARDIOLOGY | Age: 62
End: 2017-05-08

## 2017-05-09 ENCOUNTER — TELEPHONE (OUTPATIENT)
Dept: CARDIOLOGY | Age: 62
End: 2017-05-09

## 2017-05-09 ENCOUNTER — HOSPITAL ENCOUNTER (OUTPATIENT)
Dept: PHYSICAL THERAPY | Age: 62
Setting detail: THERAPIES SERIES
Discharge: HOME | End: 2017-05-09
Payer: COMMERCIAL

## 2017-05-09 PROCEDURE — 97162 PT EVAL MOD COMPLEX 30 MIN: CPT

## 2017-05-09 ASSESSMENT — PAIN SCALES - GENERAL: PAINLEVEL_OUTOF10: 4

## 2017-05-10 RX ORDER — RANOLAZINE 500 MG/1
500 TABLET, EXTENDED RELEASE ORAL 2 TIMES DAILY
Qty: 60 TABLET | Refills: 3 | Status: SHIPPED | OUTPATIENT
Start: 2017-05-10 | End: 2017-09-25 | Stop reason: SDUPTHER

## 2017-05-12 ENCOUNTER — HOSPITAL ENCOUNTER (OUTPATIENT)
Dept: PHYSICAL THERAPY | Age: 62
Setting detail: THERAPIES SERIES
Discharge: HOME | End: 2017-05-12
Payer: COMMERCIAL

## 2017-05-12 PROCEDURE — 97113 AQUATIC THERAPY/EXERCISES: CPT

## 2017-05-12 ASSESSMENT — PAIN SCALES - GENERAL: PAINLEVEL_OUTOF10: 7

## 2017-05-16 ENCOUNTER — HOSPITAL ENCOUNTER (EMERGENCY)
Age: 62
Discharge: HOME | End: 2017-05-16
Attending: EMERGENCY MEDICINE
Payer: COMMERCIAL

## 2017-05-16 VITALS
DIASTOLIC BLOOD PRESSURE: 96 MMHG | RESPIRATION RATE: 16 BRPM | WEIGHT: 200 LBS | HEART RATE: 56 BPM | TEMPERATURE: 97.9 F | BODY MASS INDEX: 29.53 KG/M2 | OXYGEN SATURATION: 99 % | SYSTOLIC BLOOD PRESSURE: 157 MMHG

## 2017-05-16 DIAGNOSIS — J01.00 ACUTE NON-RECURRENT MAXILLARY SINUSITIS: Primary | ICD-10-CM

## 2017-05-16 PROCEDURE — 99283 EMERGENCY DEPT VISIT LOW MDM: CPT

## 2017-05-16 RX ORDER — DOXYCYCLINE 100 MG/1
100 CAPSULE ORAL 2 TIMES DAILY
Qty: 20 CAPSULE | Refills: 0 | Status: SHIPPED | OUTPATIENT
Start: 2017-05-16 | End: 2017-05-25 | Stop reason: ALTCHOICE

## 2017-05-16 RX ORDER — PREDNISONE 20 MG/1
40 TABLET ORAL DAILY
Qty: 10 TABLET | Refills: 0 | Status: SHIPPED | OUTPATIENT
Start: 2017-05-16 | End: 2017-05-21

## 2017-05-16 ASSESSMENT — PAIN DESCRIPTION - PAIN TYPE: TYPE: ACUTE PAIN

## 2017-05-16 ASSESSMENT — PAIN DESCRIPTION - DESCRIPTORS: DESCRIPTORS: CONSTANT;PRESSURE

## 2017-05-16 ASSESSMENT — PAIN DESCRIPTION - LOCATION: LOCATION: HEAD

## 2017-05-16 ASSESSMENT — PAIN SCALES - GENERAL: PAINLEVEL_OUTOF10: 8

## 2017-05-17 ENCOUNTER — HOSPITAL ENCOUNTER (OUTPATIENT)
Dept: PHYSICAL THERAPY | Age: 62
Setting detail: THERAPIES SERIES
Discharge: HOME | End: 2017-05-17
Payer: COMMERCIAL

## 2017-05-17 PROCEDURE — 97113 AQUATIC THERAPY/EXERCISES: CPT

## 2017-05-17 ASSESSMENT — PAIN SCALES - GENERAL: PAINLEVEL_OUTOF10: 5

## 2017-05-19 ENCOUNTER — HOSPITAL ENCOUNTER (OUTPATIENT)
Dept: PHYSICAL THERAPY | Age: 62
Setting detail: THERAPIES SERIES
End: 2017-05-19
Payer: COMMERCIAL

## 2017-05-22 ENCOUNTER — HOSPITAL ENCOUNTER (OUTPATIENT)
Dept: PHYSICAL THERAPY | Age: 62
Setting detail: THERAPIES SERIES
Discharge: HOME | End: 2017-05-22
Payer: COMMERCIAL

## 2017-05-22 PROCEDURE — 97113 AQUATIC THERAPY/EXERCISES: CPT

## 2017-05-22 ASSESSMENT — PAIN SCALES - GENERAL: PAINLEVEL_OUTOF10: 7

## 2017-05-24 ENCOUNTER — HOSPITAL ENCOUNTER (OUTPATIENT)
Dept: PHYSICAL THERAPY | Age: 62
Setting detail: THERAPIES SERIES
Discharge: HOME | End: 2017-05-24
Payer: COMMERCIAL

## 2017-05-25 ENCOUNTER — HOSPITAL ENCOUNTER (EMERGENCY)
Age: 62
Discharge: HOME | End: 2017-05-25
Attending: EMERGENCY MEDICINE
Payer: COMMERCIAL

## 2017-05-25 VITALS
HEART RATE: 58 BPM | DIASTOLIC BLOOD PRESSURE: 94 MMHG | OXYGEN SATURATION: 98 % | SYSTOLIC BLOOD PRESSURE: 152 MMHG | TEMPERATURE: 98.1 F | RESPIRATION RATE: 16 BRPM

## 2017-05-25 DIAGNOSIS — J01.01 ACUTE RECURRENT MAXILLARY SINUSITIS: Primary | ICD-10-CM

## 2017-05-25 PROCEDURE — 6370000000 HC RX 637 (ALT 250 FOR IP): Performed by: EMERGENCY MEDICINE

## 2017-05-25 PROCEDURE — 99283 EMERGENCY DEPT VISIT LOW MDM: CPT

## 2017-05-25 RX ORDER — DOXYCYCLINE 100 MG/1
100 CAPSULE ORAL 2 TIMES DAILY
Qty: 20 CAPSULE | Refills: 0 | Status: SHIPPED | OUTPATIENT
Start: 2017-05-25 | End: 2017-07-05 | Stop reason: ALTCHOICE

## 2017-05-25 RX ADMIN — SALINE NASAL SPRAY 1 SPRAY: 1.5 SOLUTION NASAL at 15:30

## 2017-05-25 ASSESSMENT — PAIN SCALES - GENERAL: PAINLEVEL_OUTOF10: 8

## 2017-05-25 ASSESSMENT — PAIN DESCRIPTION - DESCRIPTORS: DESCRIPTORS: PRESSURE

## 2017-05-25 ASSESSMENT — PAIN DESCRIPTION - LOCATION: LOCATION: HEAD

## 2017-05-26 ENCOUNTER — HOSPITAL ENCOUNTER (OUTPATIENT)
Dept: PHYSICAL THERAPY | Age: 62
Setting detail: THERAPIES SERIES
Discharge: HOME | End: 2017-05-26
Payer: COMMERCIAL

## 2017-05-30 ENCOUNTER — HOSPITAL ENCOUNTER (OUTPATIENT)
Dept: PHYSICAL THERAPY | Age: 62
Setting detail: THERAPIES SERIES
Discharge: HOME | End: 2017-05-30
Payer: COMMERCIAL

## 2017-05-30 PROCEDURE — 97110 THERAPEUTIC EXERCISES: CPT

## 2017-05-30 ASSESSMENT — PAIN SCALES - GENERAL: PAINLEVEL_OUTOF10: 8

## 2017-05-31 ENCOUNTER — HOSPITAL ENCOUNTER (OUTPATIENT)
Dept: PHYSICAL THERAPY | Age: 62
Setting detail: THERAPIES SERIES
Discharge: HOME | End: 2017-05-31
Payer: COMMERCIAL

## 2017-05-31 PROCEDURE — 97113 AQUATIC THERAPY/EXERCISES: CPT

## 2017-05-31 ASSESSMENT — PAIN SCALES - GENERAL: PAINLEVEL_OUTOF10: 7

## 2017-06-02 ENCOUNTER — HOSPITAL ENCOUNTER (OUTPATIENT)
Dept: PHYSICAL THERAPY | Age: 62
Setting detail: THERAPIES SERIES
End: 2017-06-02
Payer: COMMERCIAL

## 2017-06-05 ENCOUNTER — HOSPITAL ENCOUNTER (OUTPATIENT)
Dept: PHYSICAL THERAPY | Age: 62
Setting detail: THERAPIES SERIES
Discharge: HOME | End: 2017-06-05
Payer: COMMERCIAL

## 2017-06-05 PROCEDURE — 97113 AQUATIC THERAPY/EXERCISES: CPT

## 2017-06-05 ASSESSMENT — PAIN SCALES - GENERAL: PAINLEVEL_OUTOF10: 7

## 2017-06-07 ENCOUNTER — HOSPITAL ENCOUNTER (OUTPATIENT)
Dept: PHYSICAL THERAPY | Age: 62
Setting detail: THERAPIES SERIES
Discharge: HOME | End: 2017-06-07
Payer: COMMERCIAL

## 2017-06-07 PROCEDURE — 97113 AQUATIC THERAPY/EXERCISES: CPT

## 2017-06-07 ASSESSMENT — PAIN SCALES - GENERAL: PAINLEVEL_OUTOF10: 8

## 2017-06-09 ENCOUNTER — HOSPITAL ENCOUNTER (OUTPATIENT)
Dept: PHYSICAL THERAPY | Age: 62
Setting detail: THERAPIES SERIES
End: 2017-06-09
Payer: COMMERCIAL

## 2017-06-12 ENCOUNTER — HOSPITAL ENCOUNTER (OUTPATIENT)
Dept: PHYSICAL THERAPY | Age: 62
Setting detail: THERAPIES SERIES
Discharge: HOME | End: 2017-06-12
Payer: COMMERCIAL

## 2017-06-12 PROCEDURE — 97113 AQUATIC THERAPY/EXERCISES: CPT

## 2017-06-12 ASSESSMENT — PAIN SCALES - GENERAL: PAINLEVEL_OUTOF10: 9

## 2017-06-14 ENCOUNTER — HOSPITAL ENCOUNTER (OUTPATIENT)
Dept: PHYSICAL THERAPY | Age: 62
Setting detail: THERAPIES SERIES
Discharge: HOME | End: 2017-06-14
Payer: COMMERCIAL

## 2017-06-16 ENCOUNTER — TELEPHONE (OUTPATIENT)
Dept: CARDIOLOGY | Age: 62
End: 2017-06-16

## 2017-06-16 ENCOUNTER — HOSPITAL ENCOUNTER (OUTPATIENT)
Dept: PHYSICAL THERAPY | Age: 62
Setting detail: THERAPIES SERIES
End: 2017-06-16
Payer: COMMERCIAL

## 2017-06-20 ENCOUNTER — HOSPITAL ENCOUNTER (OUTPATIENT)
Dept: PHYSICAL THERAPY | Age: 62
Setting detail: THERAPIES SERIES
Discharge: HOME | End: 2017-06-20
Payer: COMMERCIAL

## 2017-06-20 PROCEDURE — 97113 AQUATIC THERAPY/EXERCISES: CPT

## 2017-06-20 ASSESSMENT — PAIN SCALES - GENERAL: PAINLEVEL_OUTOF10: 9

## 2017-06-22 ENCOUNTER — HOSPITAL ENCOUNTER (OUTPATIENT)
Dept: PHYSICAL THERAPY | Age: 62
Setting detail: THERAPIES SERIES
Discharge: HOME | End: 2017-06-22
Payer: COMMERCIAL

## 2017-06-22 PROCEDURE — 97113 AQUATIC THERAPY/EXERCISES: CPT

## 2017-06-22 ASSESSMENT — PAIN DESCRIPTION - ORIENTATION: ORIENTATION: LEFT

## 2017-06-22 ASSESSMENT — PAIN SCALES - GENERAL: PAINLEVEL_OUTOF10: 9

## 2017-06-22 ASSESSMENT — PAIN DESCRIPTION - LOCATION: LOCATION: HIP

## 2017-06-27 ENCOUNTER — HOSPITAL ENCOUNTER (OUTPATIENT)
Dept: PHYSICAL THERAPY | Age: 62
Setting detail: THERAPIES SERIES
Discharge: HOME | End: 2017-06-27
Payer: COMMERCIAL

## 2017-06-27 PROCEDURE — 97113 AQUATIC THERAPY/EXERCISES: CPT

## 2017-06-27 ASSESSMENT — PAIN SCALES - GENERAL: PAINLEVEL_OUTOF10: 9

## 2017-06-30 ENCOUNTER — HOSPITAL ENCOUNTER (OUTPATIENT)
Dept: PHYSICAL THERAPY | Age: 62
Setting detail: THERAPIES SERIES
Discharge: HOME | End: 2017-06-30
Payer: COMMERCIAL

## 2017-06-30 PROCEDURE — 97113 AQUATIC THERAPY/EXERCISES: CPT

## 2017-06-30 ASSESSMENT — PAIN SCALES - GENERAL: PAINLEVEL_OUTOF10: 7

## 2017-07-05 ENCOUNTER — HOSPITAL ENCOUNTER (OUTPATIENT)
Age: 62
Discharge: HOME | End: 2017-07-05
Payer: COMMERCIAL

## 2017-07-05 ENCOUNTER — OFFICE VISIT (OUTPATIENT)
Dept: FAMILY MEDICINE CLINIC | Age: 62
End: 2017-07-05
Payer: COMMERCIAL

## 2017-07-05 ENCOUNTER — TELEPHONE (OUTPATIENT)
Dept: CARDIOLOGY CLINIC | Age: 62
End: 2017-07-05

## 2017-07-05 VITALS
DIASTOLIC BLOOD PRESSURE: 72 MMHG | WEIGHT: 218 LBS | BODY MASS INDEX: 32.29 KG/M2 | HEIGHT: 69 IN | SYSTOLIC BLOOD PRESSURE: 138 MMHG | HEART RATE: 53 BPM | OXYGEN SATURATION: 95 %

## 2017-07-05 DIAGNOSIS — I25.10 ASHD (ARTERIOSCLEROTIC HEART DISEASE): Primary | Chronic | ICD-10-CM

## 2017-07-05 DIAGNOSIS — E55.9 VITAMIN D DEFICIENCY: ICD-10-CM

## 2017-07-05 DIAGNOSIS — R39.9 LOWER URINARY TRACT SYMPTOMS (LUTS): ICD-10-CM

## 2017-07-05 DIAGNOSIS — F43.10 PTSD (POST-TRAUMATIC STRESS DISORDER): ICD-10-CM

## 2017-07-05 DIAGNOSIS — I25.10 ASHD (ARTERIOSCLEROTIC HEART DISEASE): Chronic | ICD-10-CM

## 2017-07-05 DIAGNOSIS — R94.39 ABNORMAL CARDIOVASCULAR STRESS TEST: ICD-10-CM

## 2017-07-05 DIAGNOSIS — I20.0 UNSTABLE ANGINA PECTORIS (HCC): ICD-10-CM

## 2017-07-05 DIAGNOSIS — I20.0 UNSTABLE ANGINA PECTORIS (HCC): Primary | ICD-10-CM

## 2017-07-05 LAB
ABSOLUTE EOS #: 0.1 K/UL (ref 0–0.4)
ABSOLUTE LYMPH #: 1.2 K/UL (ref 0.9–2.5)
ABSOLUTE MONO #: 0.3 K/UL (ref 0–1)
ALBUMIN SERPL-MCNC: 4 G/DL (ref 3.5–5.2)
ALBUMIN/GLOBULIN RATIO: ABNORMAL (ref 1–2.5)
ALP BLD-CCNC: 81 U/L (ref 40–129)
ALT SERPL-CCNC: 10 U/L (ref 5–41)
ANION GAP SERPL CALCULATED.3IONS-SCNC: 14 MMOL/L (ref 9–17)
AST SERPL-CCNC: 11 U/L
BASOPHILS # BLD: 1 %
BASOPHILS ABSOLUTE: 0 K/UL (ref 0–0.2)
BILIRUB SERPL-MCNC: 0.29 MG/DL (ref 0.3–1.2)
BUN BLDV-MCNC: 14 MG/DL (ref 8–23)
BUN/CREAT BLD: 15 (ref 9–20)
CALCIUM SERPL-MCNC: 9.2 MG/DL (ref 8.6–10.4)
CHLORIDE BLD-SCNC: 101 MMOL/L (ref 98–107)
CHOLESTEROL/HDL RATIO: 3.3
CHOLESTEROL: 131 MG/DL
CO2: 26 MMOL/L (ref 20–31)
CREAT SERPL-MCNC: 0.91 MG/DL (ref 0.7–1.2)
DIFFERENTIAL TYPE: YES
EOSINOPHILS RELATIVE PERCENT: 3 %
GFR AFRICAN AMERICAN: >60 ML/MIN
GFR NON-AFRICAN AMERICAN: >60 ML/MIN
GFR SERPL CREATININE-BSD FRML MDRD: ABNORMAL ML/MIN/{1.73_M2}
GFR SERPL CREATININE-BSD FRML MDRD: ABNORMAL ML/MIN/{1.73_M2}
GLUCOSE BLD-MCNC: 94 MG/DL (ref 70–99)
HCT VFR BLD CALC: 41.8 % (ref 41–53)
HDLC SERPL-MCNC: 40 MG/DL
HEMOGLOBIN: 13.9 G/DL (ref 13.5–17.5)
LDL CHOLESTEROL: 73 MG/DL (ref 0–130)
LYMPHOCYTES # BLD: 24 %
MAGNESIUM: 2.1 MG/DL (ref 1.6–2.6)
MCH RBC QN AUTO: 30.2 PG (ref 26–34)
MCHC RBC AUTO-ENTMCNC: 33.3 G/DL (ref 31–37)
MCV RBC AUTO: 90.6 FL (ref 80–100)
MONOCYTES # BLD: 7 %
PDW BLD-RTO: 16 % (ref 12.1–15.2)
PLATELET # BLD: 168 K/UL (ref 140–450)
PLATELET ESTIMATE: ABNORMAL
PMV BLD AUTO: ABNORMAL FL (ref 6–12)
POTASSIUM SERPL-SCNC: 4.3 MMOL/L (ref 3.7–5.3)
PROSTATE SPECIFIC ANTIGEN: 0.52 UG/L
RBC # BLD: 4.61 M/UL (ref 4.5–5.9)
RBC # BLD: ABNORMAL 10*6/UL
SEG NEUTROPHILS: 65 %
SEGMENTED NEUTROPHILS ABSOLUTE COUNT: 3.3 K/UL (ref 2.1–6.5)
SODIUM BLD-SCNC: 141 MMOL/L (ref 135–144)
TOTAL PROTEIN: 6.6 G/DL (ref 6.4–8.3)
TRIGL SERPL-MCNC: 89 MG/DL
TSH SERPL DL<=0.05 MIU/L-ACNC: 0.87 MIU/L (ref 0.3–5)
VITAMIN D 25-HYDROXY: 31.4 NG/ML (ref 30–100)
VLDLC SERPL CALC-MCNC: ABNORMAL MG/DL (ref 1–30)
WBC # BLD: 5 K/UL (ref 3.5–11)
WBC # BLD: ABNORMAL 10*3/UL

## 2017-07-05 PROCEDURE — 85025 COMPLETE CBC W/AUTO DIFF WBC: CPT

## 2017-07-05 PROCEDURE — 83735 ASSAY OF MAGNESIUM: CPT

## 2017-07-05 PROCEDURE — 84443 ASSAY THYROID STIM HORMONE: CPT

## 2017-07-05 PROCEDURE — 80053 COMPREHEN METABOLIC PANEL: CPT

## 2017-07-05 PROCEDURE — 99203 OFFICE O/P NEW LOW 30 MIN: CPT | Performed by: FAMILY MEDICINE

## 2017-07-05 PROCEDURE — 93005 ELECTROCARDIOGRAM TRACING: CPT

## 2017-07-05 PROCEDURE — 36415 COLL VENOUS BLD VENIPUNCTURE: CPT

## 2017-07-05 PROCEDURE — 80061 LIPID PANEL: CPT

## 2017-07-05 PROCEDURE — 84153 ASSAY OF PSA TOTAL: CPT

## 2017-07-05 PROCEDURE — 82306 VITAMIN D 25 HYDROXY: CPT

## 2017-07-05 RX ORDER — ALBUTEROL SULFATE 2.5 MG/3ML
2.5 SOLUTION RESPIRATORY (INHALATION) EVERY 6 HOURS PRN
Qty: 60 EACH | Refills: 1 | Status: SHIPPED | OUTPATIENT
Start: 2017-07-05 | End: 2017-09-07 | Stop reason: SDUPTHER

## 2017-07-05 RX ORDER — FINASTERIDE 5 MG/1
5 TABLET, FILM COATED ORAL DAILY
Qty: 30 TABLET | Refills: 3 | Status: SHIPPED | OUTPATIENT
Start: 2017-07-05 | End: 2017-11-19 | Stop reason: SDUPTHER

## 2017-07-05 ASSESSMENT — ENCOUNTER SYMPTOMS
GASTROINTESTINAL NEGATIVE: 1
RESPIRATORY NEGATIVE: 1

## 2017-07-05 ASSESSMENT — PATIENT HEALTH QUESTIONNAIRE - PHQ9
SUM OF ALL RESPONSES TO PHQ QUESTIONS 1-9: 0
1. LITTLE INTEREST OR PLEASURE IN DOING THINGS: 0
2. FEELING DOWN, DEPRESSED OR HOPELESS: 0
SUM OF ALL RESPONSES TO PHQ9 QUESTIONS 1 & 2: 0

## 2017-07-11 LAB
EKG ATRIAL RATE: 51 BPM
EKG P AXIS: 41 DEGREES
EKG P-R INTERVAL: 206 MS
EKG Q-T INTERVAL: 458 MS
EKG QRS DURATION: 90 MS
EKG QTC CALCULATION (BAZETT): 422 MS
EKG R AXIS: 7 DEGREES
EKG T AXIS: 3 DEGREES
EKG VENTRICULAR RATE: 51 BPM

## 2017-08-01 ENCOUNTER — TELEPHONE (OUTPATIENT)
Dept: FAMILY MEDICINE CLINIC | Age: 62
End: 2017-08-01

## 2017-08-01 ENCOUNTER — OFFICE VISIT (OUTPATIENT)
Dept: CARDIOLOGY CLINIC | Age: 62
End: 2017-08-01
Payer: COMMERCIAL

## 2017-08-01 VITALS
DIASTOLIC BLOOD PRESSURE: 80 MMHG | OXYGEN SATURATION: 96 % | WEIGHT: 209 LBS | SYSTOLIC BLOOD PRESSURE: 150 MMHG | BODY MASS INDEX: 30.86 KG/M2 | HEART RATE: 60 BPM

## 2017-08-01 DIAGNOSIS — R94.39 ABNORMAL CARDIOVASCULAR STRESS TEST: ICD-10-CM

## 2017-08-01 DIAGNOSIS — Z12.11 SCREENING FOR COLORECTAL CANCER: Primary | ICD-10-CM

## 2017-08-01 DIAGNOSIS — I20.0 UNSTABLE ANGINA PECTORIS (HCC): ICD-10-CM

## 2017-08-01 DIAGNOSIS — Z12.12 SCREENING FOR COLORECTAL CANCER: Primary | ICD-10-CM

## 2017-08-01 DIAGNOSIS — I25.10 ASHD (ARTERIOSCLEROTIC HEART DISEASE): Primary | Chronic | ICD-10-CM

## 2017-08-01 DIAGNOSIS — I20.8 ANGINA EFFORT: ICD-10-CM

## 2017-08-01 PROCEDURE — 99214 OFFICE O/P EST MOD 30 MIN: CPT | Performed by: INTERNAL MEDICINE

## 2017-08-07 ENCOUNTER — HOSPITAL ENCOUNTER (OUTPATIENT)
Dept: CARDIAC REHAB | Age: 62
Setting detail: THERAPIES SERIES
Discharge: HOME | End: 2017-08-07
Payer: COMMERCIAL

## 2017-08-07 VITALS — HEIGHT: 69 IN | BODY MASS INDEX: 30.9 KG/M2 | WEIGHT: 208.6 LBS

## 2017-08-09 ENCOUNTER — HOSPITAL ENCOUNTER (OUTPATIENT)
Dept: CARDIAC REHAB | Age: 62
Setting detail: THERAPIES SERIES
Discharge: HOME | End: 2017-08-09
Payer: COMMERCIAL

## 2017-08-09 VITALS — HEIGHT: 69 IN

## 2017-08-09 PROCEDURE — 93798 PHYS/QHP OP CAR RHAB W/ECG: CPT

## 2017-08-11 ENCOUNTER — HOSPITAL ENCOUNTER (OUTPATIENT)
Dept: CARDIAC REHAB | Age: 62
Setting detail: THERAPIES SERIES
Discharge: HOME | End: 2017-08-11
Payer: COMMERCIAL

## 2017-08-11 PROCEDURE — 93798 PHYS/QHP OP CAR RHAB W/ECG: CPT

## 2017-08-14 ENCOUNTER — HOSPITAL ENCOUNTER (OUTPATIENT)
Dept: CARDIAC REHAB | Age: 62
Setting detail: THERAPIES SERIES
Discharge: HOME | End: 2017-08-14
Payer: COMMERCIAL

## 2017-08-14 PROCEDURE — 93798 PHYS/QHP OP CAR RHAB W/ECG: CPT

## 2017-08-16 ENCOUNTER — HOSPITAL ENCOUNTER (OUTPATIENT)
Dept: CARDIAC REHAB | Age: 62
Setting detail: THERAPIES SERIES
Discharge: HOME | End: 2017-08-16
Payer: COMMERCIAL

## 2017-08-16 VITALS — HEIGHT: 69 IN

## 2017-08-16 PROCEDURE — 93798 PHYS/QHP OP CAR RHAB W/ECG: CPT

## 2017-08-18 ENCOUNTER — HOSPITAL ENCOUNTER (EMERGENCY)
Age: 62
Discharge: HOME | End: 2017-08-18
Attending: EMERGENCY MEDICINE
Payer: OTHER GOVERNMENT

## 2017-08-18 ENCOUNTER — TELEPHONE (OUTPATIENT)
Dept: FAMILY MEDICINE CLINIC | Age: 62
End: 2017-08-18

## 2017-08-18 ENCOUNTER — HOSPITAL ENCOUNTER (OUTPATIENT)
Dept: CARDIAC REHAB | Age: 62
Setting detail: THERAPIES SERIES
Discharge: HOME | End: 2017-08-18
Payer: COMMERCIAL

## 2017-08-18 VITALS
HEART RATE: 63 BPM | DIASTOLIC BLOOD PRESSURE: 83 MMHG | TEMPERATURE: 98.4 F | WEIGHT: 205 LBS | RESPIRATION RATE: 16 BRPM | SYSTOLIC BLOOD PRESSURE: 159 MMHG | BODY MASS INDEX: 30.27 KG/M2 | OXYGEN SATURATION: 99 %

## 2017-08-18 DIAGNOSIS — M25.552 LEFT HIP PAIN: Primary | ICD-10-CM

## 2017-08-18 PROCEDURE — 99282 EMERGENCY DEPT VISIT SF MDM: CPT

## 2017-08-18 RX ORDER — OXYCODONE HYDROCHLORIDE AND ACETAMINOPHEN 5; 325 MG/1; MG/1
1 TABLET ORAL EVERY 8 HOURS PRN
Qty: 6 TABLET | Refills: 0 | Status: SHIPPED | OUTPATIENT
Start: 2017-08-18 | End: 2017-08-20

## 2017-08-18 ASSESSMENT — PAIN DESCRIPTION - LOCATION: LOCATION: HIP

## 2017-08-18 ASSESSMENT — ENCOUNTER SYMPTOMS
RESPIRATORY NEGATIVE: 1
GASTROINTESTINAL NEGATIVE: 1
ALLERGIC/IMMUNOLOGIC NEGATIVE: 1
EYES NEGATIVE: 1

## 2017-08-18 ASSESSMENT — PAIN DESCRIPTION - PROGRESSION: CLINICAL_PROGRESSION: GRADUALLY WORSENING

## 2017-08-18 ASSESSMENT — PAIN DESCRIPTION - FREQUENCY: FREQUENCY: CONTINUOUS

## 2017-08-18 ASSESSMENT — PAIN DESCRIPTION - PAIN TYPE: TYPE: CHRONIC PAIN;ACUTE PAIN

## 2017-08-18 ASSESSMENT — PAIN SCALES - GENERAL: PAINLEVEL_OUTOF10: 8

## 2017-08-18 ASSESSMENT — PAIN DESCRIPTION - DESCRIPTORS: DESCRIPTORS: ACHING

## 2017-08-18 ASSESSMENT — PAIN DESCRIPTION - ORIENTATION: ORIENTATION: LEFT

## 2017-08-21 ENCOUNTER — HOSPITAL ENCOUNTER (OUTPATIENT)
Dept: CARDIAC REHAB | Age: 62
Setting detail: THERAPIES SERIES
Discharge: HOME | End: 2017-08-21
Payer: COMMERCIAL

## 2017-08-21 VITALS — HEIGHT: 69 IN

## 2017-08-21 PROCEDURE — 93798 PHYS/QHP OP CAR RHAB W/ECG: CPT

## 2017-08-23 ENCOUNTER — HOSPITAL ENCOUNTER (OUTPATIENT)
Dept: CARDIAC REHAB | Age: 62
Setting detail: THERAPIES SERIES
Discharge: HOME | End: 2017-08-23
Payer: COMMERCIAL

## 2017-08-23 ENCOUNTER — OFFICE VISIT (OUTPATIENT)
Dept: FAMILY MEDICINE CLINIC | Age: 62
End: 2017-08-23
Payer: COMMERCIAL

## 2017-08-23 ENCOUNTER — INITIAL CONSULT (OUTPATIENT)
Dept: SURGERY | Age: 62
End: 2017-08-23
Payer: COMMERCIAL

## 2017-08-23 VITALS
TEMPERATURE: 98 F | OXYGEN SATURATION: 97 % | SYSTOLIC BLOOD PRESSURE: 134 MMHG | DIASTOLIC BLOOD PRESSURE: 84 MMHG | WEIGHT: 208 LBS | BODY MASS INDEX: 30.72 KG/M2 | HEART RATE: 54 BPM

## 2017-08-23 VITALS
WEIGHT: 208.6 LBS | HEIGHT: 69 IN | SYSTOLIC BLOOD PRESSURE: 108 MMHG | HEART RATE: 60 BPM | RESPIRATION RATE: 18 BRPM | BODY MASS INDEX: 30.9 KG/M2 | DIASTOLIC BLOOD PRESSURE: 58 MMHG

## 2017-08-23 VITALS — BODY MASS INDEX: 30.8 KG/M2 | HEIGHT: 69 IN

## 2017-08-23 DIAGNOSIS — R19.4 CHANGE IN BOWEL HABITS: Primary | ICD-10-CM

## 2017-08-23 DIAGNOSIS — G89.29 CHRONIC LEFT HIP PAIN: Primary | ICD-10-CM

## 2017-08-23 DIAGNOSIS — M25.552 CHRONIC LEFT HIP PAIN: Primary | ICD-10-CM

## 2017-08-23 PROBLEM — J44.9 CHRONIC OBSTRUCTIVE PULMONARY DISEASE (HCC): Status: ACTIVE | Noted: 2017-08-23

## 2017-08-23 PROBLEM — I21.9 MYOCARDIAL INFARCTION (HCC): Status: ACTIVE | Noted: 2017-08-23

## 2017-08-23 PROBLEM — I10 HYPERTENSION: Status: ACTIVE | Noted: 2017-08-23

## 2017-08-23 PROBLEM — E78.5 HYPERLIPIDEMIA: Status: ACTIVE | Noted: 2017-08-23

## 2017-08-23 PROCEDURE — 99204 OFFICE O/P NEW MOD 45 MIN: CPT | Performed by: SURGERY

## 2017-08-23 PROCEDURE — 93798 PHYS/QHP OP CAR RHAB W/ECG: CPT

## 2017-08-23 PROCEDURE — 99213 OFFICE O/P EST LOW 20 MIN: CPT | Performed by: NURSE PRACTITIONER

## 2017-08-23 RX ORDER — OXYCODONE HYDROCHLORIDE AND ACETAMINOPHEN 5; 325 MG/1; MG/1
1 TABLET ORAL EVERY 8 HOURS PRN
Qty: 42 TABLET | Refills: 0 | Status: SHIPPED | OUTPATIENT
Start: 2017-08-23 | End: 2017-09-06

## 2017-08-28 ENCOUNTER — HOSPITAL ENCOUNTER (OUTPATIENT)
Dept: CARDIAC REHAB | Age: 62
Setting detail: THERAPIES SERIES
Discharge: HOME | End: 2017-08-28
Payer: COMMERCIAL

## 2017-08-30 ENCOUNTER — HOSPITAL ENCOUNTER (OUTPATIENT)
Dept: CARDIAC REHAB | Age: 62
Setting detail: THERAPIES SERIES
Discharge: HOME | End: 2017-08-30
Payer: COMMERCIAL

## 2017-09-01 ENCOUNTER — HOSPITAL ENCOUNTER (OUTPATIENT)
Dept: CARDIAC REHAB | Age: 62
Setting detail: THERAPIES SERIES
Discharge: HOME | End: 2017-09-01
Payer: COMMERCIAL

## 2017-09-05 ENCOUNTER — HOSPITAL ENCOUNTER (OUTPATIENT)
Dept: CARDIAC REHAB | Age: 62
Setting detail: THERAPIES SERIES
Discharge: HOME | End: 2017-09-05
Payer: COMMERCIAL

## 2017-09-07 ENCOUNTER — HOSPITAL ENCOUNTER (OUTPATIENT)
Dept: CARDIAC REHAB | Age: 62
Setting detail: THERAPIES SERIES
Discharge: HOME | End: 2017-09-07
Payer: COMMERCIAL

## 2017-09-07 RX ORDER — ALBUTEROL SULFATE 2.5 MG/3ML
SOLUTION RESPIRATORY (INHALATION)
Qty: 150 VIAL | Refills: 1 | Status: SHIPPED | OUTPATIENT
Start: 2017-09-07 | End: 2017-11-08 | Stop reason: SDUPTHER

## 2017-09-08 ENCOUNTER — HOSPITAL ENCOUNTER (OUTPATIENT)
Dept: CARDIAC REHAB | Age: 62
Setting detail: THERAPIES SERIES
End: 2017-09-08
Payer: COMMERCIAL

## 2017-09-11 ENCOUNTER — HOSPITAL ENCOUNTER (OUTPATIENT)
Dept: CARDIAC REHAB | Age: 62
Setting detail: THERAPIES SERIES
Discharge: HOME | End: 2017-09-11
Payer: OTHER GOVERNMENT

## 2017-09-12 ENCOUNTER — HOSPITAL ENCOUNTER (OUTPATIENT)
Dept: CARDIAC REHAB | Age: 62
Setting detail: THERAPIES SERIES
Discharge: HOME | End: 2017-09-12
Payer: COMMERCIAL

## 2017-09-12 PROCEDURE — 93798 PHYS/QHP OP CAR RHAB W/ECG: CPT

## 2017-09-15 ENCOUNTER — APPOINTMENT (OUTPATIENT)
Dept: GENERAL RADIOLOGY | Age: 62
End: 2017-09-15
Payer: COMMERCIAL

## 2017-09-15 ENCOUNTER — HOSPITAL ENCOUNTER (EMERGENCY)
Age: 62
Discharge: HOME | End: 2017-09-15
Attending: EMERGENCY MEDICINE
Payer: COMMERCIAL

## 2017-09-15 VITALS
SYSTOLIC BLOOD PRESSURE: 144 MMHG | RESPIRATION RATE: 16 BRPM | HEART RATE: 62 BPM | TEMPERATURE: 97.6 F | OXYGEN SATURATION: 96 % | DIASTOLIC BLOOD PRESSURE: 93 MMHG

## 2017-09-15 DIAGNOSIS — M25.552 LEFT HIP PAIN: Primary | ICD-10-CM

## 2017-09-15 PROCEDURE — 6360000002 HC RX W HCPCS: Performed by: EMERGENCY MEDICINE

## 2017-09-15 PROCEDURE — 73502 X-RAY EXAM HIP UNI 2-3 VIEWS: CPT

## 2017-09-15 PROCEDURE — 96372 THER/PROPH/DIAG INJ SC/IM: CPT

## 2017-09-15 PROCEDURE — 99283 EMERGENCY DEPT VISIT LOW MDM: CPT

## 2017-09-15 PROCEDURE — 6370000000 HC RX 637 (ALT 250 FOR IP): Performed by: EMERGENCY MEDICINE

## 2017-09-15 RX ORDER — OXYCODONE HYDROCHLORIDE AND ACETAMINOPHEN 5; 325 MG/1; MG/1
1 TABLET ORAL ONCE
Status: COMPLETED | OUTPATIENT
Start: 2017-09-15 | End: 2017-09-15

## 2017-09-15 RX ORDER — KETOROLAC TROMETHAMINE 30 MG/ML
30 INJECTION, SOLUTION INTRAMUSCULAR; INTRAVENOUS ONCE
Status: COMPLETED | OUTPATIENT
Start: 2017-09-15 | End: 2017-09-15

## 2017-09-15 RX ORDER — OXYCODONE HYDROCHLORIDE AND ACETAMINOPHEN 5; 325 MG/1; MG/1
1 TABLET ORAL EVERY 6 HOURS PRN
Qty: 4 TABLET | Refills: 0 | Status: SHIPPED | OUTPATIENT
Start: 2017-09-15 | End: 2017-09-16

## 2017-09-15 RX ADMIN — KETOROLAC TROMETHAMINE 30 MG: 30 INJECTION, SOLUTION INTRAMUSCULAR at 12:31

## 2017-09-15 RX ADMIN — OXYCODONE HYDROCHLORIDE AND ACETAMINOPHEN 1 TABLET: 5; 325 TABLET ORAL at 13:10

## 2017-09-15 ASSESSMENT — PAIN SCALES - GENERAL
PAINLEVEL_OUTOF10: 9
PAINLEVEL_OUTOF10: 7
PAINLEVEL_OUTOF10: 9
PAINLEVEL_OUTOF10: 7

## 2017-09-15 ASSESSMENT — PAIN DESCRIPTION - LOCATION: LOCATION: HIP

## 2017-09-15 ASSESSMENT — PAIN DESCRIPTION - ONSET: ONSET: ON-GOING

## 2017-09-15 ASSESSMENT — ENCOUNTER SYMPTOMS
GASTROINTESTINAL NEGATIVE: 1
EYES NEGATIVE: 1
RESPIRATORY NEGATIVE: 1
ALLERGIC/IMMUNOLOGIC NEGATIVE: 1

## 2017-09-15 ASSESSMENT — PAIN DESCRIPTION - PROGRESSION: CLINICAL_PROGRESSION: NOT CHANGED

## 2017-09-15 ASSESSMENT — PAIN DESCRIPTION - FREQUENCY: FREQUENCY: CONTINUOUS

## 2017-09-15 ASSESSMENT — PAIN DESCRIPTION - PAIN TYPE: TYPE: CHRONIC PAIN

## 2017-09-15 ASSESSMENT — PAIN DESCRIPTION - ORIENTATION: ORIENTATION: LEFT

## 2017-09-15 ASSESSMENT — PAIN DESCRIPTION - DESCRIPTORS: DESCRIPTORS: SHARP;THROBBING

## 2017-09-18 ENCOUNTER — HOSPITAL ENCOUNTER (OUTPATIENT)
Dept: CARDIAC REHAB | Age: 62
Setting detail: THERAPIES SERIES
Discharge: HOME | End: 2017-09-18
Payer: COMMERCIAL

## 2017-09-18 PROCEDURE — 93798 PHYS/QHP OP CAR RHAB W/ECG: CPT

## 2017-09-18 ASSESSMENT — ENCOUNTER SYMPTOMS
NAUSEA: 1
CHOKING: 0
TROUBLE SWALLOWING: 0
BACK PAIN: 0
ABDOMINAL PAIN: 0
SHORTNESS OF BREATH: 0
DIARRHEA: 1
VOMITING: 0
ABDOMINAL DISTENTION: 1
BLOOD IN STOOL: 0
COUGH: 0
SORE THROAT: 0

## 2017-09-21 ENCOUNTER — HOSPITAL ENCOUNTER (OUTPATIENT)
Dept: CARDIAC REHAB | Age: 62
Setting detail: THERAPIES SERIES
Discharge: HOME | End: 2017-09-21
Payer: COMMERCIAL

## 2017-09-21 PROCEDURE — 93798 PHYS/QHP OP CAR RHAB W/ECG: CPT

## 2017-09-25 ENCOUNTER — HOSPITAL ENCOUNTER (OUTPATIENT)
Dept: CARDIAC REHAB | Age: 62
Setting detail: THERAPIES SERIES
End: 2017-09-25
Payer: COMMERCIAL

## 2017-09-25 ENCOUNTER — APPOINTMENT (OUTPATIENT)
Dept: GENERAL RADIOLOGY | Age: 62
End: 2017-09-25
Payer: COMMERCIAL

## 2017-09-25 ENCOUNTER — HOSPITAL ENCOUNTER (EMERGENCY)
Age: 62
Discharge: HOME | End: 2017-09-25
Attending: FAMILY MEDICINE
Payer: COMMERCIAL

## 2017-09-25 VITALS
OXYGEN SATURATION: 99 % | TEMPERATURE: 98.4 F | WEIGHT: 208 LBS | RESPIRATION RATE: 17 BRPM | HEART RATE: 61 BPM | SYSTOLIC BLOOD PRESSURE: 146 MMHG | DIASTOLIC BLOOD PRESSURE: 79 MMHG | BODY MASS INDEX: 30.72 KG/M2

## 2017-09-25 DIAGNOSIS — R07.9 CHEST PAIN, UNSPECIFIED TYPE: Primary | ICD-10-CM

## 2017-09-25 DIAGNOSIS — I25.10 ASHD (ARTERIOSCLEROTIC HEART DISEASE): Chronic | ICD-10-CM

## 2017-09-25 DIAGNOSIS — I10 ESSENTIAL HYPERTENSION: ICD-10-CM

## 2017-09-25 LAB
ABSOLUTE EOS #: 0.2 K/UL (ref 0–0.4)
ABSOLUTE LYMPH #: 1.4 K/UL (ref 1–4.8)
ABSOLUTE MONO #: 0.4 K/UL (ref 0–1)
ALBUMIN SERPL-MCNC: 4.3 G/DL (ref 3.5–5.2)
ALBUMIN/GLOBULIN RATIO: NORMAL (ref 1–2.5)
ALP BLD-CCNC: 82 U/L (ref 40–129)
ALT SERPL-CCNC: 12 U/L (ref 5–41)
ANION GAP SERPL CALCULATED.3IONS-SCNC: 9 MMOL/L (ref 9–17)
AST SERPL-CCNC: 14 U/L
BASOPHILS # BLD: 0 %
BASOPHILS ABSOLUTE: 0 K/UL (ref 0–0.2)
BILIRUB SERPL-MCNC: 0.4 MG/DL (ref 0.3–1.2)
BNP INTERPRETATION: NORMAL
BUN BLDV-MCNC: 14 MG/DL (ref 8–23)
BUN/CREAT BLD: 16 (ref 9–20)
CALCIUM SERPL-MCNC: 9 MG/DL (ref 8.6–10.4)
CHLORIDE BLD-SCNC: 100 MMOL/L (ref 98–107)
CO2: 29 MMOL/L (ref 20–31)
CREAT SERPL-MCNC: 0.86 MG/DL (ref 0.7–1.2)
DIFFERENTIAL TYPE: YES
EOSINOPHILS RELATIVE PERCENT: 3 %
FOLATE: 8.5 NG/ML
GFR AFRICAN AMERICAN: >60 ML/MIN
GFR NON-AFRICAN AMERICAN: >60 ML/MIN
GFR SERPL CREATININE-BSD FRML MDRD: NORMAL ML/MIN/{1.73_M2}
GFR SERPL CREATININE-BSD FRML MDRD: NORMAL ML/MIN/{1.73_M2}
GLUCOSE BLD-MCNC: 83 MG/DL (ref 70–99)
HCT VFR BLD CALC: 44.1 % (ref 41–53)
HEMOGLOBIN: 14.6 G/DL (ref 13.5–17.5)
LYMPHOCYTES # BLD: 27 %
MAGNESIUM: 2 MG/DL (ref 1.6–2.6)
MCH RBC QN AUTO: 30.1 PG (ref 26–34)
MCHC RBC AUTO-ENTMCNC: 33 G/DL (ref 31–37)
MCV RBC AUTO: 91.1 FL (ref 80–100)
MONOCYTES # BLD: 8 %
PDW BLD-RTO: 15.1 % (ref 12.1–15.2)
PLATELET # BLD: 184 K/UL (ref 140–450)
PLATELET ESTIMATE: NORMAL
PMV BLD AUTO: NORMAL FL (ref 6–12)
POTASSIUM SERPL-SCNC: 4.3 MMOL/L (ref 3.7–5.3)
PRO-BNP: 161 PG/ML
RBC # BLD: 4.84 M/UL (ref 4.5–5.9)
RBC # BLD: NORMAL 10*6/UL
SEG NEUTROPHILS: 62 %
SEGMENTED NEUTROPHILS ABSOLUTE COUNT: 3.2 K/UL (ref 2.1–6.5)
SODIUM BLD-SCNC: 138 MMOL/L (ref 135–144)
TOTAL PROTEIN: 6.9 G/DL (ref 6.4–8.3)
TROPONIN INTERP: NORMAL
TROPONIN T: <0.03 NG/ML
VITAMIN B-12: 498 PG/ML (ref 211–946)
WBC # BLD: 5.1 K/UL (ref 3.5–11)
WBC # BLD: NORMAL 10*3/UL

## 2017-09-25 PROCEDURE — 6370000000 HC RX 637 (ALT 250 FOR IP): Performed by: FAMILY MEDICINE

## 2017-09-25 PROCEDURE — 99285 EMERGENCY DEPT VISIT HI MDM: CPT

## 2017-09-25 PROCEDURE — 82746 ASSAY OF FOLIC ACID SERUM: CPT

## 2017-09-25 PROCEDURE — 83735 ASSAY OF MAGNESIUM: CPT

## 2017-09-25 PROCEDURE — 85025 COMPLETE CBC W/AUTO DIFF WBC: CPT

## 2017-09-25 PROCEDURE — 83880 ASSAY OF NATRIURETIC PEPTIDE: CPT

## 2017-09-25 PROCEDURE — 82607 VITAMIN B-12: CPT

## 2017-09-25 PROCEDURE — 93005 ELECTROCARDIOGRAM TRACING: CPT

## 2017-09-25 PROCEDURE — 80053 COMPREHEN METABOLIC PANEL: CPT

## 2017-09-25 PROCEDURE — 71020 XR CHEST STANDARD TWO VW: CPT

## 2017-09-25 PROCEDURE — 94664 DEMO&/EVAL PT USE INHALER: CPT

## 2017-09-25 PROCEDURE — 84484 ASSAY OF TROPONIN QUANT: CPT

## 2017-09-25 RX ORDER — ATENOLOL 25 MG/1
12.5 TABLET ORAL ONCE
Status: COMPLETED | OUTPATIENT
Start: 2017-09-25 | End: 2017-09-25

## 2017-09-25 RX ORDER — RANOLAZINE 500 MG/1
500 TABLET, EXTENDED RELEASE ORAL EVERY 8 HOURS
Qty: 90 TABLET | Refills: 0 | Status: SHIPPED | OUTPATIENT
Start: 2017-09-25 | End: 2018-02-01 | Stop reason: SDUPTHER

## 2017-09-25 RX ORDER — RANOLAZINE 500 MG/1
500 TABLET, EXTENDED RELEASE ORAL ONCE
Status: COMPLETED | OUTPATIENT
Start: 2017-09-25 | End: 2017-09-25

## 2017-09-25 RX ORDER — IPRATROPIUM BROMIDE AND ALBUTEROL SULFATE 2.5; .5 MG/3ML; MG/3ML
1 SOLUTION RESPIRATORY (INHALATION) ONCE
Status: COMPLETED | OUTPATIENT
Start: 2017-09-25 | End: 2017-09-25

## 2017-09-25 RX ORDER — RANOLAZINE 500 MG/1
500 TABLET, EXTENDED RELEASE ORAL 2 TIMES DAILY
Qty: 180 TABLET | Refills: 3 | Status: SHIPPED | OUTPATIENT
Start: 2017-09-25 | End: 2017-09-25

## 2017-09-25 RX ORDER — NITROGLYCERIN 0.4 MG/1
TABLET SUBLINGUAL
Qty: 25 TABLET | Refills: 3 | Status: ON HOLD | OUTPATIENT
Start: 2017-09-25 | End: 2019-04-23

## 2017-09-25 RX ADMIN — ATENOLOL 12.5 MG: 25 TABLET ORAL at 14:58

## 2017-09-25 RX ADMIN — NITROGLYCERIN 1 INCH: 20 OINTMENT TOPICAL at 14:59

## 2017-09-25 RX ADMIN — RANOLAZINE 500 MG: 500 TABLET, FILM COATED, EXTENDED RELEASE ORAL at 14:58

## 2017-09-25 RX ADMIN — IPRATROPIUM BROMIDE AND ALBUTEROL SULFATE 1 AMPULE: .5; 3 SOLUTION RESPIRATORY (INHALATION) at 13:46

## 2017-09-25 ASSESSMENT — PAIN DESCRIPTION - PAIN TYPE: TYPE: ACUTE PAIN

## 2017-09-25 ASSESSMENT — PAIN DESCRIPTION - ONSET: ONSET: ON-GOING

## 2017-09-25 ASSESSMENT — PAIN SCALES - GENERAL: PAINLEVEL_OUTOF10: 5

## 2017-09-25 ASSESSMENT — PAIN DESCRIPTION - LOCATION: LOCATION: CHEST

## 2017-09-25 ASSESSMENT — PAIN DESCRIPTION - DESCRIPTORS: DESCRIPTORS: TIGHTNESS

## 2017-09-25 ASSESSMENT — PAIN DESCRIPTION - ORIENTATION: ORIENTATION: ANTERIOR

## 2017-09-25 ASSESSMENT — PAIN DESCRIPTION - FREQUENCY: FREQUENCY: CONTINUOUS

## 2017-09-26 ENCOUNTER — HOSPITAL ENCOUNTER (OUTPATIENT)
Dept: CARDIAC REHAB | Age: 62
Setting detail: THERAPIES SERIES
Discharge: HOME | End: 2017-09-26
Payer: COMMERCIAL

## 2017-09-26 ENCOUNTER — OFFICE VISIT (OUTPATIENT)
Dept: SURGERY | Age: 62
End: 2017-09-26
Payer: COMMERCIAL

## 2017-09-26 VITALS — WEIGHT: 208 LBS | RESPIRATION RATE: 16 BRPM | HEART RATE: 68 BPM | BODY MASS INDEX: 30.81 KG/M2 | HEIGHT: 69 IN

## 2017-09-26 DIAGNOSIS — R19.4 CHANGE IN BOWEL HABITS: Primary | ICD-10-CM

## 2017-09-26 LAB
EKG ATRIAL RATE: 59 BPM
EKG P AXIS: 33 DEGREES
EKG P-R INTERVAL: 182 MS
EKG Q-T INTERVAL: 436 MS
EKG QRS DURATION: 84 MS
EKG QTC CALCULATION (BAZETT): 431 MS
EKG R AXIS: 5 DEGREES
EKG T AXIS: -2 DEGREES
EKG VENTRICULAR RATE: 59 BPM

## 2017-09-26 PROCEDURE — 99213 OFFICE O/P EST LOW 20 MIN: CPT | Performed by: SURGERY

## 2017-09-26 PROCEDURE — 93798 PHYS/QHP OP CAR RHAB W/ECG: CPT

## 2017-09-26 NOTE — MR AVS SNAPSHOT
After Visit Summary             Mary Sanabria   2017 3:00 PM   Appointment    Description:  Male : 1955   Provider:  Shanice Tilley MD   Department:  [facility] Surgical Specialist - Cassidy Christine              Your Follow-Up and Future Appointments         Below is a list of your follow-up and future appointments. This may not be a complete list as you may have made appointments directly with providers that we are not aware of or your providers may have made some for you. Please call your providers to confirm appointments. It is important to keep your appointments. Please bring your current insurance card, photo ID, co-pay, and all medication bottles to your appointment. If self-pay, payment is expected at the time of service. Your To-Do List     Future Appointments Provider Department Dept Phone    2017 1:00 PM [place] [facility]AP[facility] [address] [facility] [phone]    10/2/2017 1:00 PM [place] CARD REHAB THERAPIST 2 [facility] [phone]    10/3/2017 1:00 PM [place] CARD REHAB THERAPIST 2 [address] [facility] [phone]    10/5/2017 1:00 PM [place] CARD REHAB THERAPIST 2 [address] [facility] [phone]    10/9/2017 1:00 PM [place] CARD REHAB THERAPIST 2 [address] [place] [address] [phone]    10/10/2017 1:00 PM [place] CARD REHAB THERAPIST 2 [facility] [phone]    10/12/2017 9:20 AM DO Courtney Couch 59 [phone]    Please arrive 15 minutes prior to appointment, bring photo ID and insurance card.     10/12/2017 1:00 PM [address] THERAPIST 2 [facility] CARDIAC REHAB [phone]    10/16/2017 1:00 PM [address] THERAPIST 2 [facility] [phone]    10/16/2017 1:00 PM Justen Foster MD [facility] Cardiology Specialist [phone]    10/17/2017 1:00 PM [place] CARD REHAB THERAPIST 2 [address] [facility] [phone]    10/19/2017 1:00 PM [facility] CARD REHAB THERAPIST [address] Pneumococcal Vaccine - Pneumovax for adults aged 19-64 years with: chronic heart disease, chronic lung disease, diabetes mellitus, alcoholism, chronic liver disease, or cigarette smoking. (1 of 1 - PPSV23) 5/13/1974    Colonoscopy 5/13/2005    Zoster Vaccine 5/13/2015    Yearly Flu Vaccine (1) 9/1/2017    Cholesterol Screening 7/5/2022            MyChart Signup           [employer] allows you to send messages to your doctor, view your test results, renew your prescriptions, schedule appointments, view visit notes, and more. How Do I Sign Up? 1. In your Internet browser, go to [email or web address]  2. Click on the Sign Up Now link in the Sign In box. You will see the New Member Sign Up page. 3. Enter your [employer] Access Code exactly as it appears below. You will not need to use this code after youve completed the sign-up process. If you do not sign up before the expiration date, you must request a new code. [employer] Access Code: [email or web address]  Expires: 10/20/2017  7:29 AM    4. Enter your Social Security Number ([id number]) and Date of Birth (mm/dd/yyyy) as indicated and click Submit. You will be taken to the next sign-up page. 5. Create a [employer] ID. This will be your [employer] login ID and cannot be changed, so think of one that is secure and easy to remember. 6. Create a [employer] password. You can change your password at any time. 7. Enter your Password Reset Question and Answer. This can be used at a later time if you forget your password. 8. Enter your e-mail address. You will receive e-mail notification when new information is available in [address] 9. Click Sign Up. You can now view your medical record. Additional Information  If you have questions, please contact the physician practice where you receive care. Remember, [employer] is NOT to be used for urgent needs. For medical emergencies, dial 911. For questions regarding your [employer] account call [phone].  If you have a clinical question, please call your doctor's office.

## 2017-09-28 ENCOUNTER — HOSPITAL ENCOUNTER (OUTPATIENT)
Dept: CARDIAC REHAB | Age: 62
Setting detail: THERAPIES SERIES
End: 2017-09-28
Payer: COMMERCIAL

## 2017-10-04 DIAGNOSIS — R94.31 ABNORMAL EKG: Primary | ICD-10-CM

## 2017-10-09 ENCOUNTER — HOSPITAL ENCOUNTER (OUTPATIENT)
Dept: CARDIAC REHAB | Age: 62
Setting detail: THERAPIES SERIES
Discharge: HOME | End: 2017-10-09
Payer: COMMERCIAL

## 2017-10-09 PROCEDURE — 93798 PHYS/QHP OP CAR RHAB W/ECG: CPT

## 2017-10-10 NOTE — PROGRESS NOTES
Phase II Cardiac Rehab Individualized Treatment Plan-60 Day     Patient Name: Andres Armstrong  Date of Initial Assessment: 10/10/2017  ACCOUNT #: [de-identified]  Diagnosis: Stable Angina Pectoris  Onset Date: 4/11/17  Referring Physician: James Chris  Risk Stratification: Nicole  Session Number: 11   EXERCISE    Stages of Change:   [] pre-contemplation   [x] Action   [] Contemplate   [] Maintainence   [] Prep   [] Relapse          Exercise Prescription:  Mode: [x] TM [] B [x] STP [x] UBE  [x] R 2-3 MODES PER SESSION  Frequency: 3 DAYS / [address]  Duration: 20-60 MIN / SESSION -- 15-20 MIN PER MODE   Intensity: 78-96 BPM HR / RPE 11-15  Progression: Increase 1-2 levels/week or 1-2 min/week to achieve target HR and RPE 11-13.      [x] Angina with Exertion           MSHR: 108   [x] Resistance Training            Weight (lbs):              70% OF 1 RM                       Reps: 8-15    Hypertension:  [x] Yes  [] No  Resting BP: 102/72  Peak Exercise BP: 132/56  [] Med change? no    Intervention:  Home Exercise:  Type: WALKING / BENCH STEPPING  Duration: 20-40 MIN / SESSION  Frequency: 2-3 DAYS PER WEEK   [x] Resistance Training LINDQUIST THERABANDS    Education:   [x] Equipment [place]  [x] Self pulse   [x] Proper use weights/therabands   [x] S/S to report  [x] Low Na Diet    [x] Warm up/ Cool down  [x] BP Medication    [x] RPE Scale   [x] Understand BP   [x] Ex Safety   [x] Exercise specialist class-Home Exercise       Target Goal:   -Individual Exercise Plan  -BP<140/90 or <130/80 if DM   -Aerobic active 30 + minutes 5-7 days per week    Nutrition    Stages of Change:   [] pre-contemplation   [x] Action   [] Contemplate   [] Maintainence   [] Prep   [] Relapse    Lipids:   7/5/17  Total Cholesterol: 131  Triglycerides: 89  HDL: 40  LDL: 73  [] Med Change? NO    Diabetes:  [x] Yes  [] No  Random BS: 51  HbA1c:5.6-5.8 PER PT. / NO VALUE FOUND IN SYSTEM  [] Med Change?  NO    Weight Management:  Wt Goal: 1-2 lbs/wk / LOSS    Intervention:   [x] Dietitian Consult       [x] Nurse/Patient Discussion     [x] Diet Class           [] Referred to Diabetes Education     Education:  [x] S&S hypo/hyperglycemia  [x] Low fat/low cholesterol diet  [x] Weight loss methods      [x] Relate Diabetes/CAD     [x] Eating heart healthy handout    Target Goal:  -LDL-C<100 if triglycerides are > 200  -LDL-C < 70 for high risk patients  -HbA1c < 7%  -BMI < 25   Education    Stages of Change:    [] pre-contemplation   [x] Action   [] Contemplate   [] Maintainence   [] Prep   [] Relapse    Family support: [x] Yes  [] No    Tobacco use: [] Yes  [x] No    Intervention:  [] Referred to smoking cessation counselor     [] Individual education and counseling  [] Tobacco adjunct  [] Informed of education class schedule     Education:   [x] Risk Factors/Modifications  [x] Psychological aspects  [x] Angina    [x] Medications  [x] CHF      [x] Cardiac A&P    Target Goal:  -Complete cessation of tobacco use (if applicable)  -Continued risk factor modifications  -Recognizing signs/symptoms to report  -Proper use of meds    Psychosocial  Stages of Change:    [] pre-contemplation   [x] Action   [] Contemplate   [] Maintainence   [] Prep   [] Relapse    Intervention:   [x] Psych Consult/ [address] / ON-GOING    [x] Uses stress management skills    [] Physician Referral    [x] Stress management class   [] Med Change? NO    Education:    [x] Coping Techniques   [x] Relaxation techniques   [x] S/S of Depression    Target Goal:  -Assess presence or absence of depression using a valid screening tool. -Maximize coping skills.  -Positive support system.     Preventative Medication:   [x] Aspirin       [x] Beta Blockade      [x] Statin or other lipid lowering agent     [] Clopidogrel   [] ACE Inhibitor   [] Other anticoagulation medications     Fall Risk assess: [x] Yes  [] No REMAINS LOW-MOD RISK  Assistive Device:   [] Cane  [x]

## 2017-10-16 ENCOUNTER — OFFICE VISIT (OUTPATIENT)
Dept: CARDIOLOGY CLINIC | Age: 62
End: 2017-10-16
Payer: COMMERCIAL

## 2017-10-16 ENCOUNTER — TELEPHONE (OUTPATIENT)
Dept: CARDIOLOGY CLINIC | Age: 62
End: 2017-10-16

## 2017-10-16 VITALS
HEART RATE: 59 BPM | WEIGHT: 211 LBS | SYSTOLIC BLOOD PRESSURE: 140 MMHG | OXYGEN SATURATION: 99 % | DIASTOLIC BLOOD PRESSURE: 80 MMHG | BODY MASS INDEX: 31.16 KG/M2

## 2017-10-16 DIAGNOSIS — E55.9 VITAMIN D DEFICIENCY DISEASE: ICD-10-CM

## 2017-10-16 DIAGNOSIS — I10 ESSENTIAL HYPERTENSION: ICD-10-CM

## 2017-10-16 DIAGNOSIS — I21.3 ST ELEVATION MYOCARDIAL INFARCTION (STEMI), UNSPECIFIED ARTERY (HCC): ICD-10-CM

## 2017-10-16 DIAGNOSIS — Z95.1 HISTORY OF CORONARY ARTERY BYPASS SURGERY: ICD-10-CM

## 2017-10-16 DIAGNOSIS — I20.0 UNSTABLE ANGINA PECTORIS (HCC): ICD-10-CM

## 2017-10-16 DIAGNOSIS — E78.5 HYPERLIPIDEMIA, UNSPECIFIED HYPERLIPIDEMIA TYPE: Primary | ICD-10-CM

## 2017-10-16 DIAGNOSIS — I25.10 ASHD (ARTERIOSCLEROTIC HEART DISEASE): Chronic | ICD-10-CM

## 2017-10-16 PROCEDURE — G8484 FLU IMMUNIZE NO ADMIN: HCPCS | Performed by: INTERNAL MEDICINE

## 2017-10-16 PROCEDURE — 3017F COLORECTAL CA SCREEN DOC REV: CPT | Performed by: INTERNAL MEDICINE

## 2017-10-16 PROCEDURE — 99213 OFFICE O/P EST LOW 20 MIN: CPT | Performed by: INTERNAL MEDICINE

## 2017-10-16 PROCEDURE — 1036F TOBACCO NON-USER: CPT | Performed by: INTERNAL MEDICINE

## 2017-10-16 PROCEDURE — G8598 ASA/ANTIPLAT THER USED: HCPCS | Performed by: INTERNAL MEDICINE

## 2017-10-16 PROCEDURE — G8427 DOCREV CUR MEDS BY ELIG CLIN: HCPCS | Performed by: INTERNAL MEDICINE

## 2017-10-16 PROCEDURE — G8417 CALC BMI ABV UP PARAM F/U: HCPCS | Performed by: INTERNAL MEDICINE

## 2017-10-16 NOTE — LETTER
Evans Barber M.D. [address] [place] [address] Zechariah Wells   [phone]          2017          Professor Eze [address] [place] [address]      RE:  Raymond Muñoz.  :  1955    Dear Ender Sinclair:    HISTORY OF PRESENT ILLNESS:  I had the pleasure of seeing Mr. Ladonna Gilmore. I saw Mr. Ladonna Gilmore in our office on 10/16/2017. I trust you received my full H and P from 2017. He has been doing well in cardiac rehab. He has had very little chest discomfort and it seems much more atypical at this point. He will have several hours worth of chest pain, but it will be relieved when he stretches a certain way or moves a certain way. He was seen in the emergency room on 2017 for chest pain that he had had for several days, enzymes were negative. We increased his Ranexa to 500 mg t.i.d. and I am seeing him today in followup. He seems about back to his baseline. He has had no PND, orthopnea, or pedal edema. He is pending having colonoscopy. He has had no syncope, near syncope, lightheadedness, or dizziness. He has received a medical card for marijuana and is anxious to start marijuana. He is using that primarily to help him eat. He has occasional shortness of breath when he first wakes up in the morning. He has tightness in his chest almost all the time, which does not seem to change with activity. He has had no syncope, near syncope, lightheadedness, or dizziness during the day. MEDICATIONS:  At this time are Proventil inhaler 2 puffs every 6 hours p.r.n., aspirin 81 mg daily, Tenormin 25 mg half a tablet daily, vitamin D 2000 units daily, Proscar 5 mg daily, Lasix 40 mg daily, Prevacid 30 mg q.i.d., Claritin 10 mg daily, Remeron 15 mg two tablets nightly, Ranexa 500 mg t.i.d., Zocor 5 mg daily, Desyrel 50 mg two tablets nightly.     PHYSICAL EXAMINATION: VITAL SIGNS:  His blood pressure was 140/80 with a heart rate of 59 and regular. Respiratory rate 18. O2 saturation 99%. Weight 211 pounds. GENERAL:  He is a pleasant 61-year-old gentleman. Denied pain. He was oriented to person, place, and time. Answered questions appropriately. SKIN:  No unusual skin changes. HEENT:  The pupils are equally round and reactive to light and accommodation. Extraocular movements were intact. Mucous membranes were dry. NECK:  No JVD. Good carotid pulses. No carotid bruits. No lymphadenopathy or thyromegaly. CARDIOVASCULAR EXAM:  S1 and S2 were normal.  No S3 or S4. Soft systolic blowing type murmur. No diastolic murmur. PMI was normal.  No lifts, thrusts or pericardial friction rub. LUNGS:  Quite clear to auscultation and percussion. ABDOMEN:  Soft and nontender. Good bowel sounds. EXTREMITIES:  Good femoral pulses. Good pedal pulses. No pedal edema. LABORATORY DATA:  Blood work drawn at his ER visit on 09/25/2017, sodium 138, potassium 4.3, BUN 14, creatinine 0.86, GFR greater than 60. Magnesium 2.0. His calcium was 9.0. Troponin less than 0.03. ALT was 12, AST was 14. White count 5.1, hemoglobin 14.6 with a platelet count of 123,764. EKG showed sinus rhythm with mild sinus bradycardia. IMPRESSION:  1. Chest pain, which I think is noncardiac. 2.  Cleared for colonoscopy from my standpoint with holding aspirin 5 days. 3.  Functional class I to II for angina. 4.  Posttraumatic stress syndrome, followed at [place], [place]. 5.  Sleep apnea, on CPAP mask. PLAN:  1. Can proceed with colonoscopy. 2.  Hold aspirin for five days. 3.  I will see him in one year. DISCUSSION:  Mr. Mak Thakkar overall is doing well. He has chest pain, but is fairly atypical and can last for days. It does not seem to be changed with activity. I told him that he can go back to Ranexa 500 mg twice a day.   If he feels no different to the three times a day, then he can remain on twice a day. If he feels better on t.i.d. dosing, then he can always go back to three times a day dosing. I will see him in one year in followup unless a problem would develop. Thank you very much for allowing me the privilege of seeing Mr. Dunia Clark. Any questions on my thoughts, please do not hesitate to contact me.     Sincerely,        Guera Puente    D: 10/16/2017 13:26:14       T: 10/17/2017 1:04:55     [email or web address]  Job#: [id number]     Doc#: [id number]    CC:  Mike Ramsey MD

## 2017-10-19 NOTE — PROGRESS NOTES
he can always go back to three times a day dosing. I will see him in one year in followup unless a problem would develop. Thank you very much for allowing me the privilege of seeing . Zaida Martinez. Any questions on my thoughts, please do not hesitate to contact me.     Sincerely,        Adalberto Smalls    D: 10/16/2017 13:26:14       T: 10/17/2017 1:04:55     [email or web address]  Job#: [id number]     Doc#: [id number]    CC:  Jae Silva MD

## 2017-10-22 ENCOUNTER — HOSPITAL ENCOUNTER (EMERGENCY)
Age: 62
Discharge: HOME | End: 2017-10-22
Attending: FAMILY MEDICINE
Payer: COMMERCIAL

## 2017-10-22 VITALS
DIASTOLIC BLOOD PRESSURE: 80 MMHG | RESPIRATION RATE: 16 BRPM | OXYGEN SATURATION: 95 % | SYSTOLIC BLOOD PRESSURE: 135 MMHG | HEART RATE: 61 BPM | TEMPERATURE: 98.2 F

## 2017-10-22 DIAGNOSIS — M54.32 LEFT SIDED SCIATICA: Primary | ICD-10-CM

## 2017-10-22 DIAGNOSIS — M25.552 CHRONIC LEFT HIP PAIN: ICD-10-CM

## 2017-10-22 DIAGNOSIS — G89.29 CHRONIC LEFT HIP PAIN: ICD-10-CM

## 2017-10-22 PROCEDURE — 6360000002 HC RX W HCPCS: Performed by: FAMILY MEDICINE

## 2017-10-22 PROCEDURE — 99282 EMERGENCY DEPT VISIT SF MDM: CPT

## 2017-10-22 PROCEDURE — 96372 THER/PROPH/DIAG INJ SC/IM: CPT

## 2017-10-22 RX ORDER — IBUPROFEN 800 MG/1
800 TABLET ORAL EVERY 8 HOURS PRN
Qty: 30 TABLET | Refills: 0 | Status: SHIPPED | OUTPATIENT
Start: 2017-10-22 | End: 2018-01-01

## 2017-10-22 RX ORDER — PREDNISONE 20 MG/1
60 TABLET ORAL DAILY
Qty: 15 TABLET | Refills: 0 | Status: SHIPPED | OUTPATIENT
Start: 2017-10-22 | End: 2017-10-27

## 2017-10-22 RX ORDER — TRIAMCINOLONE ACETONIDE 40 MG/ML
80 INJECTION, SUSPENSION INTRA-ARTICULAR; INTRAMUSCULAR ONCE
Status: COMPLETED | OUTPATIENT
Start: 2017-10-22 | End: 2017-10-22

## 2017-10-22 RX ORDER — KETOROLAC TROMETHAMINE 30 MG/ML
60 INJECTION, SOLUTION INTRAMUSCULAR; INTRAVENOUS ONCE
Status: COMPLETED | OUTPATIENT
Start: 2017-10-22 | End: 2017-10-22

## 2017-10-22 RX ORDER — TRAMADOL HYDROCHLORIDE 50 MG/1
50 TABLET ORAL EVERY 6 HOURS PRN
Qty: 12 TABLET | Refills: 0 | Status: SHIPPED | OUTPATIENT
Start: 2017-10-22 | End: 2017-11-01

## 2017-10-22 RX ADMIN — KETOROLAC TROMETHAMINE 60 MG: 30 INJECTION, SOLUTION INTRAMUSCULAR at 13:39

## 2017-10-22 RX ADMIN — TRIAMCINOLONE ACETONIDE 80 MG: 40 INJECTION, SUSPENSION INTRA-ARTICULAR; INTRAMUSCULAR at 13:39

## 2017-10-22 ASSESSMENT — PAIN SCALES - GENERAL
PAINLEVEL_OUTOF10: 8
PAINLEVEL_OUTOF10: 10
PAINLEVEL_OUTOF10: 10

## 2017-10-22 ASSESSMENT — PAIN DESCRIPTION - ORIENTATION: ORIENTATION: LEFT

## 2017-10-22 ASSESSMENT — PAIN DESCRIPTION - LOCATION: LOCATION: HIP

## 2017-10-22 NOTE — ED PROVIDER NOTES
Nancy 1980  eMERGENCY dEPARTMENT eNCOUnter          [facility]       Chief Complaint   Patient presents with    Hip Pain     L hip pain, ongoing (3-4mos). pt due to have hip replacement at some point       Nurses Notes reviewed and I agree except as noted in the HPI. HISTORY OF PRESENT ILLNESS    Jamin Alvarez is a 58 y.o. male who [facility] of complaint of exacerbation of his chronic left hip pain, ongoing for the past 3-4 months. Patient [facility]. Patient describes a 10 out of 10 pain, described as \"constant sharp toothpick\", and indicates an area on the midline aspect of his left superior buttock. Patient denies the pain radiating down his leg. Denies recent trauma falls denies dysuria denies any heavy lifting pushing or pulling. Nothing stopped his pain     REVIEW OF SYSTEMS     Review of Systems   All other systems reviewed and are negative. PAST MEDICAL HISTORY    has a past medical history of Arthritis; Bronchitis with chronic airway obstruction ([facility]); CAD (coronary artery disease); CHF (congestive heart failure) ([facility]); CTS (carpal tunnel syndrome); Diabetes mellitus ([address] Diabetic neuropathy associated with diabetes mellitus due to underlying condition ([address] Exposure to toxic chemical; GERD (gastroesophageal reflux disease); H/O cardiovascular stress test; H/O echocardiogram; Hip pain, left; Hx of cardiac cath; Hyperlipidemia; Hypertension; MI (myocardial infarction); Pneumonia; PTSD (post-traumatic stress disorder); S/P CABG (coronary artery bypass graft); Sleep apnea; and Ventricular fibrillation ([address] SURGICAL HISTORY      has a past surgical history that includes joint replacement; Tonsillectomy; Wrist surgery; Finger surgery; Carpal tunnel release; Pilonidal cyst excision; Coronary artery bypass graft (10/23/15);  Cardiac surgery; Pilonidal cyst excision; Carpal tunnel release (Right); fracture surgery; Finger amputation (Left); and Colonoscopy (2012). CURRENT MEDICATIONS       Previous Medications    ACETAMINOPHEN (TYLENOL) 325 MG TABLET    Take 325 mg by mouth every 6 hours as needed    ALBUTEROL (PROVENTIL) (2.5 MG/3ML) 0.083% NEBULIZER SOLUTION    inhale contents of 1 vial in nebulizer every 6 hours if needed for wheezing    ASPIRIN 81 MG TABLET    Take 81 mg by mouth daily    ATENOLOL (TENORMIN) 25 MG TABLET    Take 0.5 tablets by mouth daily    CHOLECALCIFEROL (VITAMIN D) 2000 UNITS CAPS CAPSULE    Take 1 capsule by mouth daily    FINASTERIDE (PROSCAR) 5 MG TABLET    Take 1 tablet by mouth daily    FLUTICASONE (FLONASE) 50 MCG/ACT NASAL SPRAY    2 sprays by Nasal route daily    FUROSEMIDE (LASIX) 40 MG TABLET    Take 1 tablet by mouth daily    LANSOPRAZOLE (PREVACID SOLUTAB) 30 MG DISINTEGRATING TABLET    Take 30 mg by mouth 4 times daily    LORATADINE (CLARITIN) 10 MG CAPS    Take 10 mg by mouth daily. MIRTAZAPINE (REMERON) 15 MG TABLET    Take 2 tablets by mouth nightly    NITROGLYCERIN (NITROSTAT) 0.4 MG SL TABLET    up to max of 3 total doses. If no relief after 1 dose, call 911. ONDANSETRON (ZOFRAN ODT) 4 MG DISINTEGRATING TABLET    Take 0.5 tablets by mouth every 4 hours as needed for Nausea or Vomiting    RANOLAZINE (RANEXA) 500 MG EXTENDED RELEASE TABLET    Take 1 tablet by mouth every 8 hours    SIMVASTATIN (ZOCOR) 5 MG TABLET    Take 5 mg by mouth daily    TRAZODONE (DESYREL) 50 MG TABLET    Take 100 mg by mouth nightly        ALLERGIES     is allergic to codeine; pcn [penicillins]; sulfa antibiotics; and vicodin [hydrocodone-acetaminophen]. FAMILY HISTORY     is adopted. He was adopted. Family history is unknown by patient. SOCIAL HISTORY      reports that he quit smoking about 11 years ago. He quit smokeless tobacco use about 17 years ago. He reports that he uses drugs, including Marijuana. He reports that he does not drink alcohol.     PHYSICAL EXAM 16   Temp: 98.2 °F (36.8 °C)   TempSrc: Oral   SpO2: 95%     Patient history and physical exam taken at bedside, discussed patient's symptoms and exam findings, discussed clinical diagnosis of left-sided sciatica as well as chronic left hip pain, discussed will give IM injection ketorolac and steroid, discharged with Motrin 800 and steroid burst, tramadol for pain, close follow-up with PCP, acknowledges    FINAL IMPRESSION      1. Left sided sciatica    2. Chronic left hip pain          DISPOSITION/PLAN   Discharge    PATIENT REFERRED TO:  No follow-up provider specified. DISCHARGE MEDICATIONS:  New Prescriptions    IBUPROFEN (ADVIL;MOTRIN) 800 MG TABLET    Take 1 tablet by mouth every 8 hours as needed for Pain    PREDNISONE (DELTASONE) 20 MG TABLET    Take 3 tablets by mouth daily for 5 days    TRAMADOL (ULTRAM) 50 MG TABLET    Take 1 tablet by mouth every 6 hours as needed for Pain           Summation      Patient Course:  Discharge    ED Medications administered this visit:    Medications   ketorolac (TORADOL) injection 60 mg (60 mg Intramuscular Given 10/22/17 1339)   triamcinolone acetonide (KENALOG-40) injection 80 mg (80 mg Intramuscular Given 10/22/17 1339)       New Prescriptions from this visit:    New Prescriptions    IBUPROFEN (ADVIL;MOTRIN) 800 MG TABLET    Take 1 tablet by mouth every 8 hours as needed for Pain    PREDNISONE (DELTASONE) 20 MG TABLET    Take 3 tablets by mouth daily for 5 days    TRAMADOL (ULTRAM) 50 MG TABLET    Take 1 tablet by mouth every 6 hours as needed for Pain       Follow-up:  No follow-up provider specified. Final Impression:   1. Left sided sciatica    2.  Chronic left hip pain               (Please note that portions of this note were completed with a voice recognition program.  Efforts were made to edit the dictations but occasionally words are mis-transcribed.)    MD Lauren Millard MD  10/23/17 2212

## 2017-10-31 ENCOUNTER — OFFICE VISIT (OUTPATIENT)
Dept: SURGERY | Age: 62
End: 2017-10-31
Payer: COMMERCIAL

## 2017-10-31 VITALS
DIASTOLIC BLOOD PRESSURE: 77 MMHG | HEIGHT: 69 IN | WEIGHT: 211 LBS | HEART RATE: 54 BPM | RESPIRATION RATE: 18 BRPM | BODY MASS INDEX: 31.25 KG/M2 | SYSTOLIC BLOOD PRESSURE: 104 MMHG

## 2017-10-31 DIAGNOSIS — R19.4 CHANGE IN BOWEL HABITS: Primary | ICD-10-CM

## 2017-10-31 PROCEDURE — 99213 OFFICE O/P EST LOW 20 MIN: CPT | Performed by: SURGERY

## 2017-11-07 NOTE — PROGRESS NOTES
Phase II Cardiac Rehab Individualized Treatment Plan-Discharge    Patient Name: Lashay Coffman  Date of Initial Assessment: 8/7/17  ACCOUNT #: [de-identified]  Diagnosis: Stable Angina Pectoris  Onset Date: 4/11/17  Referring Physician: Karel Kim  Risk Stratification: High  Session Number: 11    EXERCISE    Stages of Change:   [] pre-contemplation   [] Action   [] Contemplate   [] Maintainence   [] Prep   [x] Relapse          Exercise Prescription:  Mode: [x] TM [] B [x] STP [x] UBE  [x] R 2-3 MODES PER SESSION  Frequency: 3 DAYS / [address]  Duration: 20-60 MIN / SESSION -- 15-20 MIN PER MODE   Intensity: 78-96 BPM HR / RPE 11-15  Progression: Increase 1-2 levels/week or 1-2 min/week to achieve target HR and RPE 11-13.      [x] Angina with Exertion           MSHR: 108   [x] Resistance Training            Weight (lbs):              70% OF 1 RM                       Reps: 8-15    Hypertension:  [x] Yes  [] No  Resting BP: 102/72  Peak Exercise BP: 132/56  [] Med Change? NO    Intervention:  Home Exercise:  Type: WALKING / BENCH STEPPING  Duration: 20-40 MIN / SESSION  Frequency: 2-3 DAYS PER WEEK   [x] Resistance Training GREY THERABANDS    Depression Screening: NOT DONE D/T PT DOES NOT RETURN  [] Have you been feeling sad. ..down in the dumps? [] Have you lost interest in your job, sports, hobbies, friends? [] Do you often feel tired? [] Do you have trouble sleeping or do you sleep too much? [] Have you been gaining or losing weight? [] Do you often feel down on yourself, that everything is your fault? [] Do you have troubled making decisions or concentrating on your work? [] Do you often feel agitated or like you can barely move? [] Do you ever feel that life isn't worth living?    *If greater than 5 symptoms listed,  notified.     Education:   [] Education Goals met NO        Target Goal:   -Individual Exercise Plan  -BP<140/90 or <130/80 if DM   -Aerobic active 30 + minutes 5-7 days per week    Nutrition    Stages of Change:   [] pre-contemplation   [] Action   [] Contemplate   [] Maintainenc   [] Prep   [x] Relapse    Lipids:   NO CHANGES SINCE LAST DRAW  [] Med Change? NO    Diabetes:  [x] Yes  [] No  FBS: 51  HbA1c:5.8 PER PT  [] Med Change? NO  :   [x] BS in range    Weight Management:  Weight: 214.6 LB  Height: 69 IN (3.07)  BMI: 31.8   Wt Goal: 1-2 lbs/wk LOSS  Special Diet: AHA STEP II AND ADA / NON-COMPLIANT    Intervention:   [] Dietitian Consult       [x] Nurse/Patient Discussion     [x] Diet Class           [] Referred to Diabetes Education     Education:  [] Education Goals met NO    Target Goal:  -LDL-C<100 if triglycerides are > 200  -LDL-C < 70 for high risk patients  -HbA1c < 7%  -BMI < 25   Education    Stages of Change:    [] pre-contemplation   [] Action   [] Contemplate   [] Maintainence   [] Prep   [x] Relapse    Knowledge test score: NOT DONE D/T PT DOES NOT RETURN      Family support: [x] Yes  [] No    Tobacco use: [] Yes  [x] No    Intervention:  [] Referred to smoking cessation counselor     [] Individual education and counseling  [] Tobacco adjunct  [] Informed of education class schedule     Education:   [] Education goals met / NO    Target Goal:  -Complete cessation of tobacco use (if applicable)  -Continued risk factor modifications  -Recognizing signs/symptoms to report  -Proper use of meds    Psychosocial  Stages of Change:    [] pre-contemplation   [] Action   [] Contemplate   [] Maintainence   [] Prep   [x] Relapse    Psychosocial Test:  Tool Used:   Score:NOT DONE D/T PT DOES NOT RETURN  Depression screening score:NOT DONE D/T PT DOES NOT RETURN  []Medication change? NO    Education:    [] Education Goals met    Target Goal:  -Assess presence or absence of depression using a valid screening tool. -Maximize coping skills.  -Positive support system.     Preventative Medication:   [x] Aspirin       [x] Beta Blockade      [x] Statin or other lipid lowering agent

## 2017-11-08 ENCOUNTER — HOSPITAL ENCOUNTER (OUTPATIENT)
Dept: CARDIAC REHAB | Age: 62
Discharge: HOME | End: 2017-11-08

## 2017-11-08 RX ORDER — ALBUTEROL SULFATE 2.5 MG/3ML
SOLUTION RESPIRATORY (INHALATION)
Qty: 120 VIAL | Refills: 1 | Status: ON HOLD | OUTPATIENT
Start: 2017-11-08 | End: 2017-12-24

## 2017-11-10 ENCOUNTER — TELEPHONE (OUTPATIENT)
Dept: FAMILY MEDICINE CLINIC | Age: 62
End: 2017-11-10

## 2017-11-10 RX ORDER — NEBULIZER ACCESSORIES
1 KIT MISCELLANEOUS DAILY PRN
Qty: 1 KIT | Refills: 0 | Status: SHIPPED | OUTPATIENT
Start: 2017-11-10 | End: 2017-11-13 | Stop reason: SDUPTHER

## 2017-11-13 RX ORDER — NEBULIZER ACCESSORIES
1 KIT MISCELLANEOUS DAILY PRN
Qty: 1 KIT | Refills: 0 | Status: SHIPPED | OUTPATIENT
Start: 2017-11-13 | End: 2018-01-05

## 2017-11-13 NOTE — TELEPHONE ENCOUNTER
Please verify with [facility] patient states he called there an they need a new prescription sent    [facility]     Parts for nebulizer (encounter in in 11.10.17)      If [facility] they would like to try [id number] medical    Health Maintenance   Topic Date Due    Hepatitis C screen  1955    HIV screen  05/13/1970    DTaP/Tdap/Td vaccine (1 - Tdap) 05/13/1974    Pneumococcal med risk (1 of 1 - PPSV23) 05/13/1974    Colon cancer screen colonoscopy  05/13/2005    Zostavax vaccine  05/13/2015    Flu vaccine (1) 09/01/2017    Lipid screen  07/05/2022             (applicable per patient's age: Cancer Screenings, Depression Screening, Fall Risk Screening, Immunizations)    LDL Cholesterol (mg/dL)   Date Value   07/05/2017 73     AST (U/L)   Date Value   09/25/2017 14     ALT (U/L)   Date Value   09/25/2017 12     BUN (mg/dL)   Date Value   09/25/2017 14      (goal A1C is < 7)   (goal LDL is <100) need 30-50% reduction from baseline     BP Readings from Last 3 Encounters:   10/31/17 104/77   10/22/17 135/80   10/16/17 (!) 140/80    (goal /80)      All Future Testing planned in [facility]:  Lab Frequency Next Occurrence   Stress Test W Pharm Once 12/16/2016   EKG 12 Lead Once 07/05/2017   Comprehensive Metabolic Panel Once 48/28/1117   Lipid Panel Once 08/01/2018   Magnesium Once 08/01/2018   EKG 12 Lead Once 08/01/2018   TSH with Reflex Once 08/01/2018   CBC Auto Differential Once 08/01/2018   Vitamin D 25 Hydroxy Once 08/01/2018   CBC Auto Differential Once 10/16/2018   TSH with Reflex Once 10/16/2018   Comprehensive Metabolic Panel Once 07/76/6215   Vitamin D 25 Hydroxy Once 10/16/2018   Lipid Panel Once 10/16/2018   Magnesium Once 10/16/2018   EKG 12 Lead Once 10/16/2018   XR CHEST STANDARD (2 VW) Once 10/16/2018       Next Visit Date:  Future Appointments  Date Time Provider April Cutler   1/5/2018 10:00 AM DO Jose Barron MED

## 2017-11-20 ENCOUNTER — ANESTHESIA EVENT (OUTPATIENT)
Dept: OPERATING ROOM | Age: 62
End: 2017-11-20
Payer: COMMERCIAL

## 2017-11-20 RX ORDER — FINASTERIDE 5 MG/1
5 TABLET, FILM COATED ORAL DAILY
Qty: 30 TABLET | Refills: 3 | Status: SHIPPED | OUTPATIENT
Start: 2017-11-20 | End: 2018-03-07 | Stop reason: SDUPTHER

## 2017-11-26 NOTE — PROGRESS NOTES
HPI      Mr Derrek Thompson presents to schedule endoscopy. He is a 57 yo WM presenting for evaluation of diarrhea.  This has been present for months.  Worse with meals.  No fevers nor chills.  No recent weight changes.  No travel.  No sick contacts.  Multitude of medical problems.  Previous colonoscopy at [facility] does not know his family history. [facility] uses marijuana regularly.  No tobacco.       Review of Systems   Constitutional: Positive for fatigue. Negative for activity change, appetite change, chills, fever and unexpected weight change. HENT: Negative for nosebleeds, sneezing, sore throat and trouble swallowing.    Eyes: Negative for visual disturbance. Respiratory: Negative for cough, choking and shortness of breath.    Cardiovascular: Negative for chest pain, palpitations and leg swelling. Gastrointestinal: Positive for abdominal distention, diarrhea and nausea. Negative for abdominal pain, blood in stool and vomiting. Genitourinary: Negative for dysuria, flank pain and hematuria. Musculoskeletal: Positive for arthralgias. Negative for back pain, gait problem and myalgias. Allergic/Immunologic: Negative for immunocompromised state. Neurological: Negative for dizziness, seizures, syncope, weakness and headaches. Hematological: Does not bruise/bleed easily.    Psychiatric/Behavioral: Negative for confusion and sleep disturbance.         Past Medical History           Past Medical History:   Diagnosis Date    Arthritis       WENDY KNEE    Bronchitis with chronic airway obstruction (HCC)      CAD (coronary artery disease)      CHF (congestive heart failure) (HCC)      CTS (carpal tunnel syndrome)       RIGHT    Diabetes mellitus ([facility]      Diabetic neuropathy associated with diabetes mellitus due to underlying condition ([facility]      GERD (gastroesophageal reflux disease)      H/O cardiovascular stress test 07/08/2016     Abnormal.  Moderate perfusion defect of mild to moderate intensity in the inferolateral,inferior and inferoapical regions during stress imaging.  Ef 45%. with regional wall motion abnormalities.  H/O echocardiogram 2/17/16     EF >60%. LV wall thickness is mildly increased. Inferoseptal wall is abnormal in its motion not unusual status post open heart surgery.  Normal aortic valve structure with ild aortic regurgitation.  Hip pain, left      Hx of cardiac cath 07/08/2016     LMCA: Normal 0% stenosis. LAD: Chronic occlusion 100%. Lesion on Mid LAD: 100% stenosis. LCx: single stenosis. Lesion on Mid CX: 80% stenosis. RCA: Lesion on Mid RCA: 70% stenosis.  Hyperlipidemia      Hypertension      MI (myocardial infarction) ([facility] 2015    Pneumonia      PTSD (post-traumatic stress disorder)      S/P CABG (coronary artery bypass graft) 10/23/15    Sleep apnea      Ventricular fibrillation ([facility] [address] 10/18/2015     x 2 DURING HEART ATTACK             Past Surgical History             Past Surgical History:   Procedure Laterality Date    CARDIAC SURGERY         10/21/2015 3 vessel CABG    CARPAL TUNNEL RELEASE        CARPAL TUNNEL RELEASE Right      COL[facility]COPY   2012     [facility]    CORONARY ARTERY BYPASS GRAFT   10/23/15     Dr. Tuyet Hui FINGER AMPUTATION Left       TIP OF MIDDLE FINGER    FINGER SURGERY         left middle finger    FRACTURE SURGERY         left wrist    JOINT REPLACEMENT         right knee    PILONIDAL CYST EXCISION         1974    PILONIDAL CYST EXCISION        TONSILLECTOMY        WRIST SURGERY         left wrist             Family History             Family History   Problem Relation Age of Onset    Adopted: Yes    Family history unknown:  Yes             Allergies:  See list     Current Facility-Administered Medications               Current Outpatient Prescriptions   Medication Sig Dispense Refill    finasteride (PROSCAR) 5 MG tablet Take 1 tablet by mouth daily 30 tablet 3    ranolazine (RANEXA) 500 MG extended release tablet Take 1 tablet by mouth 2 times daily 60 tablet 3    simvastatin (ZOCOR) 5 MG tablet Take 5 mg by mouth daily        acetaminophen (TYLENOL) 325 MG tablet Take 325 mg by mouth every 6 hours as needed        atenolol (TENORMIN) 25 MG tablet Take 0.5 tablets by mouth daily 30 tablet 3    mirtazapine (REMERON) 15 MG tablet Take 2 tablets by mouth nightly 14 tablet 3    ondansetron (ZOFRAN ODT) 4 MG disintegrating tablet Take 0.5 tablets by mouth every 4 hours as needed for Nausea or Vomiting 15 tablet 0    traZODone (DESYREL) 50 MG tablet Take 100 mg by mouth nightly         Cholecalciferol (VITAMIN D) 2000 UNITS CAPS capsule Take 1 capsule by mouth daily        furosemide (LASIX) 40 MG tablet Take 1 tablet by mouth daily 30 tablet 3    lansoprazole (PREVACID SOLUTAB) 30 MG disintegrating tablet Take 30 mg by mouth 4 times daily        aspirin 81 MG tablet Take 81 mg by mouth daily        fluticasone (FLONASE) 50 MCG/ACT nasal spray 2 sprays by Nasal route daily        Loratadine (CLARITIN) 10 MG CAPS Take 10 mg by mouth daily.        albuterol (PROVENTIL) (2.5 MG/3ML) 0.083% nebulizer solution inhale contents of 1 vial in nebulizer every 6 hours if needed for wheezing 150 vial 1      No current facility-administered medications for this visit.             Social History   Social History                Social History    Marital status:        Spouse name: N/A    Number of children: N/A    Years of education: N/A                  Social History Main Topics    Smoking status: Former Smoker       Quit date: 8/23/2006    Smokeless tobacco: Former User       Quit date: 8/1/2000    Alcohol use No    Drug use: Yes       Special: Marijuana         Comment: Pt smokes marijuana therapeutically.  Sexual activity: Not Asked              Other Topics Concern    None      Social History Narrative            Objective:   Physical Exam   Constitutional: He is oriented to person, place, and time.  He appears well-developed and well-nourished. HENT:   Head: Normocephalic and atraumatic. Mouth/Throat: Oropharynx is clear and moist.   Eyes: Conjunctivae and EOM are normal. Pupils are equal, round, and reactive to light. No scleral icterus. Neck: Normal range of motion. Neck supple. No JVD present. No tracheal deviation present. Cardiovascular: Normal rate and regular rhythm.    Pulmonary/Chest: Effort normal and breath sounds normal. No respiratory distress. He exhibits no tenderness. Abdominal: Soft. Bowel sounds are normal. He exhibits no distension and no mass. There is no tenderness. There is no rebound and no guarding. Musculoskeletal: Normal range of motion. He exhibits no edema. Lymphadenopathy:     He has no cervical adenopathy. Neurological: He is alert and oriented to person, place, and time. No cranial nerve deficit. Skin: Skin is warm and dry. No rash noted. No erythema. Psychiatric: He has a normal mood and affect. His behavior is normal. Judgment and thought content normal.   Nursing note and vitals reviewed.     XR Chest Portable   Status: Final result   Order Providers   Authorizing Billing   MD Waqas Barlow MD   Signed by   Signed Date/Time   Phone Pager   Marbin Patrick 4/25/2017 13:17 [phone]     [facility] Radi[facility]   Read Date Phone Pager   Marbin Patrick Apr 25, 2017 [phone]     Narrative   EXAMINATION: Chest radiograph.       CLINICAL INFORMATION: Chest pain.       COMPARISON: 4/4/2017.           FINDINGS: Sternotomy wires and mediastinal clips are unchanged. The heart size    is within normal limits. The lungs demonstrate no infiltrate or pleural    effusion. There is no pneumothorax.  No acute bony abnormality is noted.               Impression       No acute cardiopulmonary process.      7/5/2017 12:45 PM - Luis, [facility] Incoming Lab Results From [facility]   Component Results   Component Value Ref Range & Units Status Collected Lab   WBC 5.0 3.5 - 11.0 k/uL Final 07/05/2017 12:20 PM [facility]   RBC 4.61 4.5 - 5.9 m/uL Final 07/05/2017 12:20 PM [facility]   Hemoglobin 13.9 13.5 - 17.5 g/dL Final 07/05/2017 12:20 PM [facility]   Hematocrit 41.8 41 - 53 % Final 07/05/2017 12:20 PM [facility]   MCV 90.6 80 - 100 fL Final 07/05/2017 12:20 PM [facility]   MCH 30.2 26 - 34 pg Final 07/05/2017 12:20 PM [facility]   MCHC 33.3 31 - 37 g/dL Final 07/05/2017 12:20 PM [facility]   RDW 16.0 (H) 12.1 - 15.2 % Final 07/05/2017 12:20 PM [facility]   Platelets 129 885 - 989 k/uL Final 07/05/2017 12:20 PM [facility]                       Assessment:      1.  Change in bowel habits            Plan:      Will proceed with colonoscopy over the next few weeks.  Risks, benefits, alternatives thoroughly reviewed and accepted by Mr Marlys Prader, including remote risk of GI bleeding, perforation, missed lesions, etc.  Requested abstinence from [facility] use until endoscopy complete.

## 2017-11-27 ENCOUNTER — ANESTHESIA (OUTPATIENT)
Dept: OPERATING ROOM | Age: 62
End: 2017-11-27
Payer: COMMERCIAL

## 2017-11-27 ENCOUNTER — HOSPITAL ENCOUNTER (OUTPATIENT)
Age: 62
Setting detail: OUTPATIENT SURGERY
Discharge: HOME | End: 2017-11-27
Attending: SURGERY | Admitting: SURGERY
Payer: COMMERCIAL

## 2017-11-27 VITALS
RESPIRATION RATE: 16 BRPM | SYSTOLIC BLOOD PRESSURE: 142 MMHG | WEIGHT: 200 LBS | DIASTOLIC BLOOD PRESSURE: 73 MMHG | HEART RATE: 55 BPM | TEMPERATURE: 98.1 F | HEIGHT: 68 IN | OXYGEN SATURATION: 95 % | BODY MASS INDEX: 30.31 KG/M2

## 2017-11-27 VITALS
OXYGEN SATURATION: 98 % | SYSTOLIC BLOOD PRESSURE: 122 MMHG | RESPIRATION RATE: 12 BRPM | TEMPERATURE: 98.2 F | DIASTOLIC BLOOD PRESSURE: 65 MMHG

## 2017-11-27 PROBLEM — R19.4 CHANGE IN BOWEL HABITS: Status: ACTIVE | Noted: 2017-11-27

## 2017-11-27 PROBLEM — R19.5 HEME POSITIVE STOOL: Status: ACTIVE | Noted: 2017-11-27

## 2017-11-27 LAB — GLUCOSE BLD-MCNC: 86 MG/DL (ref 65–99)

## 2017-11-27 PROCEDURE — 6370000000 HC RX 637 (ALT 250 FOR IP): Performed by: NURSE ANESTHETIST, CERTIFIED REGISTERED

## 2017-11-27 PROCEDURE — 6360000002 HC RX W HCPCS: Performed by: NURSE ANESTHETIST, CERTIFIED REGISTERED

## 2017-11-27 PROCEDURE — 2500000003 HC RX 250 WO HCPCS: Performed by: NURSE ANESTHETIST, CERTIFIED REGISTERED

## 2017-11-27 PROCEDURE — 3609012400 HC EGD TRANSORAL BIOPSY SINGLE/MULTIPLE: Performed by: SURGERY

## 2017-11-27 PROCEDURE — 87077 CULTURE AEROBIC IDENTIFY: CPT

## 2017-11-27 PROCEDURE — 3700000001 HC ADD 15 MINUTES (ANESTHESIA): Performed by: SURGERY

## 2017-11-27 PROCEDURE — 82947 ASSAY GLUCOSE BLOOD QUANT: CPT

## 2017-11-27 PROCEDURE — 6370000000 HC RX 637 (ALT 250 FOR IP): Performed by: SURGERY

## 2017-11-27 PROCEDURE — 3700000000 HC ANESTHESIA ATTENDED CARE: Performed by: SURGERY

## 2017-11-27 PROCEDURE — 2580000003 HC RX 258: Performed by: SURGERY

## 2017-11-27 PROCEDURE — 3609010300 HC COLONOSCOPY W/BIOPSY SINGLE/MULTIPLE: Performed by: SURGERY

## 2017-11-27 PROCEDURE — 88305 TISSUE EXAM BY PATHOLOGIST: CPT

## 2017-11-27 PROCEDURE — C1773 RET DEV, INSERTABLE: HCPCS | Performed by: SURGERY

## 2017-11-27 PROCEDURE — 7100000011 HC PHASE II RECOVERY - ADDTL 15 MIN: Performed by: SURGERY

## 2017-11-27 PROCEDURE — 7100000010 HC PHASE II RECOVERY - FIRST 15 MIN: Performed by: SURGERY

## 2017-11-27 RX ORDER — DEXAMETHASONE SODIUM PHOSPHATE 4 MG/ML
INJECTION, SOLUTION INTRA-ARTICULAR; INTRALESIONAL; INTRAMUSCULAR; INTRAVENOUS; SOFT TISSUE PRN
Status: DISCONTINUED | OUTPATIENT
Start: 2017-11-27 | End: 2017-11-27 | Stop reason: SDUPTHER

## 2017-11-27 RX ORDER — SODIUM CHLORIDE 0.9 % (FLUSH) 0.9 %
10 SYRINGE (ML) INJECTION EVERY 12 HOURS SCHEDULED
Status: DISCONTINUED | OUTPATIENT
Start: 2017-11-27 | End: 2017-11-27 | Stop reason: HOSPADM

## 2017-11-27 RX ORDER — SODIUM CHLORIDE, SODIUM LACTATE, POTASSIUM CHLORIDE, CALCIUM CHLORIDE 600; 310; 30; 20 MG/100ML; MG/100ML; MG/100ML; MG/100ML
INJECTION, SOLUTION INTRAVENOUS CONTINUOUS
Status: DISCONTINUED | OUTPATIENT
Start: 2017-11-27 | End: 2017-11-27 | Stop reason: HOSPADM

## 2017-11-27 RX ORDER — MIDAZOLAM HYDROCHLORIDE 1 MG/ML
INJECTION INTRAMUSCULAR; INTRAVENOUS PRN
Status: DISCONTINUED | OUTPATIENT
Start: 2017-11-27 | End: 2017-11-27 | Stop reason: SDUPTHER

## 2017-11-27 RX ORDER — ACETAMINOPHEN 325 MG/1
650 TABLET ORAL EVERY 4 HOURS PRN
Status: DISCONTINUED | OUTPATIENT
Start: 2017-11-27 | End: 2017-11-27 | Stop reason: HOSPADM

## 2017-11-27 RX ORDER — 0.9 % SODIUM CHLORIDE 0.9 %
10 VIAL (ML) INJECTION EVERY 12 HOURS SCHEDULED
Status: DISCONTINUED | OUTPATIENT
Start: 2017-11-27 | End: 2017-11-27 | Stop reason: HOSPADM

## 2017-11-27 RX ORDER — GLYCOPYRROLATE 0.2 MG/ML
INJECTION INTRAMUSCULAR; INTRAVENOUS PRN
Status: DISCONTINUED | OUTPATIENT
Start: 2017-11-27 | End: 2017-11-27 | Stop reason: SDUPTHER

## 2017-11-27 RX ORDER — LIDOCAINE HYDROCHLORIDE 10 MG/ML
INJECTION, SOLUTION EPIDURAL; INFILTRATION; INTRACAUDAL; PERINEURAL PRN
Status: DISCONTINUED | OUTPATIENT
Start: 2017-11-27 | End: 2017-11-27 | Stop reason: SDUPTHER

## 2017-11-27 RX ORDER — SODIUM CHLORIDE 0.9 % (FLUSH) 0.9 %
10 SYRINGE (ML) INJECTION PRN
Status: DISCONTINUED | OUTPATIENT
Start: 2017-11-27 | End: 2017-11-27 | Stop reason: HOSPADM

## 2017-11-27 RX ORDER — ONDANSETRON 2 MG/ML
INJECTION INTRAMUSCULAR; INTRAVENOUS PRN
Status: DISCONTINUED | OUTPATIENT
Start: 2017-11-27 | End: 2017-11-27 | Stop reason: SDUPTHER

## 2017-11-27 RX ORDER — FENTANYL CITRATE 50 UG/ML
INJECTION, SOLUTION INTRAMUSCULAR; INTRAVENOUS PRN
Status: DISCONTINUED | OUTPATIENT
Start: 2017-11-27 | End: 2017-11-27 | Stop reason: SDUPTHER

## 2017-11-27 RX ORDER — PROPOFOL 10 MG/ML
INJECTION, EMULSION INTRAVENOUS CONTINUOUS PRN
Status: DISCONTINUED | OUTPATIENT
Start: 2017-11-27 | End: 2017-11-27 | Stop reason: SDUPTHER

## 2017-11-27 RX ORDER — 0.9 % SODIUM CHLORIDE 0.9 %
10 VIAL (ML) INJECTION PRN
Status: DISCONTINUED | OUTPATIENT
Start: 2017-11-27 | End: 2017-11-27 | Stop reason: HOSPADM

## 2017-11-27 RX ORDER — ONDANSETRON 2 MG/ML
4 INJECTION INTRAMUSCULAR; INTRAVENOUS EVERY 6 HOURS PRN
Status: DISCONTINUED | OUTPATIENT
Start: 2017-11-27 | End: 2017-11-27 | Stop reason: HOSPADM

## 2017-11-27 RX ADMIN — GLYCOPYRROLATE 0.2 MG: 0.2 INJECTION, SOLUTION INTRAMUSCULAR; INTRAVENOUS at 10:00

## 2017-11-27 RX ADMIN — DEXAMETHASONE SODIUM PHOSPHATE 4 MG: 4 INJECTION, SOLUTION INTRAMUSCULAR; INTRAVENOUS at 10:10

## 2017-11-27 RX ADMIN — FENTANYL CITRATE 50 MCG: 50 INJECTION INTRAMUSCULAR; INTRAVENOUS at 10:10

## 2017-11-27 RX ADMIN — FENTANYL CITRATE 25 MCG: 50 INJECTION INTRAMUSCULAR; INTRAVENOUS at 10:15

## 2017-11-27 RX ADMIN — PROPOFOL 75 MCG/KG/MIN: 10 INJECTION, EMULSION INTRAVENOUS at 10:10

## 2017-11-27 RX ADMIN — ONDANSETRON 4 MG: 2 INJECTION INTRAMUSCULAR; INTRAVENOUS at 10:10

## 2017-11-27 RX ADMIN — LIDOCAINE HYDROCHLORIDE 15 ML: 20 SOLUTION ORAL; TOPICAL at 10:10

## 2017-11-27 RX ADMIN — LIDOCAINE HYDROCHLORIDE 40 ML: 10 INJECTION, SOLUTION EPIDURAL; INFILTRATION; INTRACAUDAL; PERINEURAL at 10:10

## 2017-11-27 RX ADMIN — SODIUM CHLORIDE, POTASSIUM CHLORIDE, SODIUM LACTATE AND CALCIUM CHLORIDE: 600; 310; 30; 20 INJECTION, SOLUTION INTRAVENOUS at 08:10

## 2017-11-27 RX ADMIN — MIDAZOLAM 2 MG: 1 INJECTION INTRAMUSCULAR; INTRAVENOUS at 10:00

## 2017-11-27 RX ADMIN — FENTANYL CITRATE 25 MCG: 50 INJECTION INTRAMUSCULAR; INTRAVENOUS at 10:20

## 2017-11-27 ASSESSMENT — PAIN SCALES - GENERAL: PAINLEVEL_OUTOF10: 0

## 2017-11-27 ASSESSMENT — COPD QUESTIONNAIRES: CAT_SEVERITY: MODERATE

## 2017-11-27 NOTE — PROGRESS NOTES

## 2017-11-27 NOTE — ANESTHESIA POSTPROCEDURE EVALUATION
Department of Anesthesiology  Postprocedure Note    Patient: Akbar Garcia  MRN: [id number]  YOB: 1955  Date of evaluation: 11/27/2017  Time:  10:55 AM     Procedure Summary     Date:  11/27/17 [facility] [address] OR    Anesthesia Start:  1007 Anesthesia Stop:  7696    Procedures:       COLONOSCOPY (N/A Abdomen)      EGD BIOPSY (N/A ) Diagnosis:  (HEME POS STOOL)    Surgeon:  Rico Arriola MD Responsible Provider:  Peng Lerner CRNA    Anesthesia Type:  MAC ASA Status:  4          Anesthesia Type: MAC    Saira Phase I: Saira Score: 10    Saira Phase II:      Last vitals: Reviewed and per EMR flowsheets.        Anesthesia Post Evaluation    Patient location during evaluation: bedside  Patient participation: complete - patient participated  Level of consciousness: awake  Pain score: 0  Airway patency: patent  Nausea & Vomiting: no nausea and no vomiting  Complications: no  Cardiovascular status: hemodynamically stable  Respiratory status: acceptable  Hydration status: euvolemic

## 2017-11-27 NOTE — PROGRESS NOTES
Up to bathroom with steady gait and voids qs. States pain is tolerable. Taking water. Wants to go home,.  IV removed and dresses. Dressings dry.

## 2017-11-27 NOTE — ANESTHESIA PRE PROCEDURE
Department of Anesthesiology  Preprocedure Note       Name:  Andres Armstrong   Age:  58 y.o.  :  1955                                          MRN:  [id number]         Date:  2017      Surgeon: Irais Shine):  Binta Hubbard MD    Procedure: Procedure(s):  COLONOSCOPY  EGD ESOPHAGOGASTRODUODENOSCOPY    Medications prior to admission:   Prior to Admission medications    Medication Sig Start Date End Date Taking? Authorizing Provider   finasteride (PROSCAR) 5 MG tablet take 1 tablet by mouth daily 17  Yes Ursula Babb DO   ibuprofen (ADVIL;MOTRIN) 800 MG tablet Take 1 tablet by mouth every 8 hours as needed for Pain 10/22/17  Yes Julio Eddy MD   ranolazine (RANEXA) 500 MG extended release tablet Take 1 tablet by mouth every 8 hours 17  Yes Peng Rhoades MD   simvastatin (ZOCOR) 5 MG tablet Take 5 mg by mouth daily   Yes Historical Provider, MD   acetaminophen (TYLENOL) 325 MG tablet Take 325 mg by mouth every 6 hours as needed   Yes Historical Provider, MD   atenolol (TENORMIN) 25 MG tablet Take 0.5 tablets by mouth daily 17  Yes Kiera Ocampo MD   mirtazapine (REMERON) 15 MG tablet Take 2 tablets by mouth nightly 16  Yes Gonzalo Richards DO   traZODone (DESYREL) 50 MG tablet Take 100 mg by mouth nightly    Yes Historical Provider, MD   Cholecalciferol (VITAMIN D) 2000 UNITS CAPS capsule Take 1 capsule by mouth daily   Yes Historical Provider, MD   furosemide (LASIX) 40 MG tablet Take 1 tablet by mouth daily 3/21/16  Yes Hayley Thompson MD   lansoprazole (PREVACID SOLUTAB) 30 MG disintegrating tablet Take 30 mg by mouth 4 times daily   Yes Historical Provider, MD   fluticasone (FLONASE) 50 MCG/ACT nasal spray 2 sprays by Nasal route daily   Yes Historical Provider, MD   Loratadine (CLARITIN) 10 MG CAPS Take 10 mg by mouth daily.    Yes Historical Provider, MD   Respiratory Therapy Supplies (NEBULIZER/TUBING/MOUTHPIECE) KIT 1 kit by Does not apply route daily as needed (as needed) 11/13/17   Rodolfo Hunter, DO   albuterol (PROVENTIL) (2.5 MG/3ML) 0.083% nebulizer solution inhale contents of 1 vial in nebulizer every 6 hours if needed for wheezing 11/8/17   Rodolfo Hunter, DO   nitroGLYCERIN (NITROSTAT) 0.4 MG SL tablet up to max of 3 total doses. If no relief after 1 dose, call 911. Patient taking differently: up to max of 3 total doses. If no relief after 1 dose, call 911. 9/25/17   Varinder Hussein MD   ondansetron (ZOFRAN ODT) 4 MG disintegrating tablet Take 0.5 tablets by mouth every 4 hours as needed for Nausea or Vomiting  Patient taking differently: Take 2 mg by mouth every 4 hours as needed for Nausea or Vomiting  11/29/16   Varinder Hussein MD   aspirin 81 MG tablet Take 81 mg by mouth daily    Historical Provider, MD       Current medications:    Current Facility-Administered Medications   Medication Dose Route Frequency Provider Last Rate Last Dose    lactated ringers infusion   Intravenous Continuous uLis Deal  mL/hr at 11/27/17 0810      sodium chloride (PF) 0.9 % injection 10 mL  10 mL Intravenous 2 times per day Luis Deal MD        sodium chloride (PF) 0.9 % injection 10 mL  10 mL Intravenous PRN Luis Deal MD           Allergies:     Allergies   Allergen Reactions    Codeine     Pcn [Penicillins]     Sulfa Antibiotics     Vicodin [Hydrocodone-Acetaminophen]        Problem List:    Patient Active Problem List   Diagnosis Code    ASHD (arteriosclerotic heart disease) I25.10    PTSD (post-traumatic stress disorder) F43.10    Angina effort ([facility] [address] I20.8    Unstable angina pectoris (HCC) I20.0    Abnormal cardiovascular stress test R94.39    Chest pain R07.9    History of coronary artery bypass surgery Z95.1    Obstructive apnea G47.33    Myocardial infarction I21.9    Hypertension I10    Hyperlipidemia E78.5    Chronic obstructive pulmonary disease (HCC) J44.9    Change in bowel habits R19.4    Heme positive stool R19.5       Past Medical History:        Diagnosis Date    Arthritis     WENDY KNEE    Bronchitis with chronic airway obstruction (HCC)     CAD (coronary artery disease)     CHF (congestive heart failure) (HCC)     CTS (carpal tunnel syndrome)     RIGHT    Diabetes mellitus ([facility]     Diabetic neuropathy associated with diabetes mellitus due to underlying condition ([facility] [address] [facility]     Exposure to toxic chemical     [employer] base-toxic water exposure    GERD (gastroesophageal reflux disease)     H/O cardiovascular stress test 07/08/2016    Abnormal.  Moderate perfusion defect of mild to moderate intensity in the inferolateral,inferior and inferoapical regions during stress imaging. Ef 45%. with regional wall motion abnormalities.  H/O echocardiogram 2/17/16    EF >60%. LV wall thickness is mildly increased. Inferoseptal wall is abnormal in its motion not unusual status post open heart surgery. Normal aortic valve structure with ild aortic regurgitation.  Hip pain, left     Hx of cardiac cath 07/08/2016    LMCA: Normal 0% stenosis. LAD: Chronic occlusion 100%. Lesion on Mid LAD: 100% stenosis. LCx: single stenosis. Lesion on Mid CX: 80% stenosis. RCA: Lesion on Mid RCA: 70% stenosis.     Hyperlipidemia     Hypertension     MI (myocardial infarction) 2015    Pneumonia     PTSD (post-traumatic stress disorder)     S/P CABG (coronary artery bypass graft) 10/23/15    Sleep apnea     Ventricular fibrillation ([facility] [address] 10/18/2015    x 2 DURING HEART ATTACK       Past Surgical History:        Procedure Laterality Date    CARDIAC SURGERY      10/21/2015 3 vessel CABG    CARPAL TUNNEL RELEASE      CARPAL TUNNEL RELEASE Right     [facility]  2012    [facility]    CORONARY ARTERY BYPASS GRAFT  10/23/15    Dr. Brigida Alford      left middle finger    FRACTURE SURGERY      left wrist    JOINT

## 2017-11-28 LAB
DIRECT EXAM: NEGATIVE
DIRECT EXAM: NORMAL
Lab: NORMAL
SPECIMEN DESCRIPTION: NORMAL
SPECIMEN DESCRIPTION: NORMAL
STATUS: NORMAL
SURGICAL PATHOLOGY REPORT: NORMAL

## 2017-11-28 NOTE — OP NOTE
10:58:40       T: 11/27/2017 14:54:05     [email or web address]  Job#: [id number]     Doc#: [id number]    CC:  Criselda Barakat

## 2017-11-29 ENCOUNTER — HOSPITAL ENCOUNTER (EMERGENCY)
Age: 62
Discharge: HOME | End: 2017-11-29
Attending: EMERGENCY MEDICINE
Payer: COMMERCIAL

## 2017-11-29 VITALS
OXYGEN SATURATION: 96 % | SYSTOLIC BLOOD PRESSURE: 170 MMHG | RESPIRATION RATE: 20 BRPM | DIASTOLIC BLOOD PRESSURE: 81 MMHG | HEART RATE: 58 BPM | BODY MASS INDEX: 30.4 KG/M2 | WEIGHT: 197 LBS | TEMPERATURE: 97.7 F

## 2017-11-29 DIAGNOSIS — F13.939 WITHDRAWAL FROM SEDATIVE, HYPNOTIC, OR ANXIOLYTIC DRUG (HCC): Primary | ICD-10-CM

## 2017-11-29 PROCEDURE — 99283 EMERGENCY DEPT VISIT LOW MDM: CPT

## 2017-11-29 RX ORDER — ONDANSETRON 4 MG/1
4 TABLET, ORALLY DISINTEGRATING ORAL EVERY 8 HOURS PRN
Qty: 10 TABLET | Refills: 0 | Status: SHIPPED | OUTPATIENT
Start: 2017-11-29 | End: 2018-01-29 | Stop reason: SDUPTHER

## 2017-11-29 RX ORDER — MIRTAZAPINE 30 MG/1
30 TABLET, FILM COATED ORAL NIGHTLY
Qty: 14 TABLET | Refills: 0 | Status: SHIPPED | OUTPATIENT
Start: 2017-11-29 | End: 2017-12-21 | Stop reason: ALTCHOICE

## 2017-11-29 ASSESSMENT — ENCOUNTER SYMPTOMS
BACK PAIN: 0
SORE THROAT: 0
COUGH: 0
NAUSEA: 1
EYE PAIN: 0
SHORTNESS OF BREATH: 0
EYE REDNESS: 0
ABDOMINAL PAIN: 0
TROUBLE SWALLOWING: 0
COLOR CHANGE: 0
VOMITING: 1
DIARRHEA: 0

## 2017-11-29 ASSESSMENT — PAIN SCALES - GENERAL: PAINLEVEL_OUTOF10: 8

## 2017-11-29 ASSESSMENT — PAIN DESCRIPTION - PAIN TYPE: TYPE: ACUTE PAIN

## 2017-11-29 ASSESSMENT — PAIN DESCRIPTION - ORIENTATION: ORIENTATION: LEFT

## 2017-11-29 ASSESSMENT — PAIN DESCRIPTION - LOCATION: LOCATION: HEAD;HIP

## 2017-12-08 NOTE — TELEPHONE ENCOUNTER
I spoke with Prema Noland at [facility] , rx was filled and sat for 8 days without a  so it was reshelved.   They will refill tubing, patient was notified to

## 2017-12-21 ENCOUNTER — APPOINTMENT (OUTPATIENT)
Dept: GENERAL RADIOLOGY | Age: 62
DRG: 140 | End: 2017-12-21
Payer: COMMERCIAL

## 2017-12-21 ENCOUNTER — HOSPITAL ENCOUNTER (INPATIENT)
Age: 62
LOS: 3 days | Discharge: HOME | DRG: 140 | End: 2017-12-24
Attending: EMERGENCY MEDICINE | Admitting: INTERNAL MEDICINE
Payer: COMMERCIAL

## 2017-12-21 DIAGNOSIS — J20.9 ACUTE BRONCHITIS, UNSPECIFIED ORGANISM: Primary | ICD-10-CM

## 2017-12-21 DIAGNOSIS — R06.02 SHORTNESS OF BREATH: ICD-10-CM

## 2017-12-21 PROBLEM — J44.1 COPD EXACERBATION (HCC): Status: ACTIVE | Noted: 2017-12-21

## 2017-12-21 LAB
ABSOLUTE EOS #: 0 K/UL (ref 0–0.4)
ABSOLUTE IMMATURE GRANULOCYTE: ABNORMAL K/UL (ref 0–0.3)
ABSOLUTE LYMPH #: 0.8 K/UL (ref 1–4.8)
ABSOLUTE MONO #: 0.4 K/UL (ref 0–1)
ANION GAP SERPL CALCULATED.3IONS-SCNC: 16 MMOL/L (ref 9–17)
BASOPHILS # BLD: 0 % (ref 0–2)
BASOPHILS ABSOLUTE: 0 K/UL (ref 0–0.2)
BUN BLDV-MCNC: 12 MG/DL (ref 8–23)
BUN/CREAT BLD: 16 (ref 9–20)
CALCIUM SERPL-MCNC: 9.3 MG/DL (ref 8.6–10.4)
CHLORIDE BLD-SCNC: 102 MMOL/L (ref 98–107)
CO2: 25 MMOL/L (ref 20–31)
CREAT SERPL-MCNC: 0.76 MG/DL (ref 0.7–1.2)
DIFFERENTIAL TYPE: YES
EKG ATRIAL RATE: 65 BPM
EKG P AXIS: 46 DEGREES
EKG P-R INTERVAL: 186 MS
EKG Q-T INTERVAL: 420 MS
EKG QRS DURATION: 80 MS
EKG QTC CALCULATION (BAZETT): 436 MS
EKG R AXIS: 22 DEGREES
EKG T AXIS: 41 DEGREES
EKG VENTRICULAR RATE: 65 BPM
EOSINOPHILS RELATIVE PERCENT: 1 % (ref 0–5)
GFR AFRICAN AMERICAN: >60 ML/MIN
GFR NON-AFRICAN AMERICAN: >60 ML/MIN
GFR SERPL CREATININE-BSD FRML MDRD: NORMAL ML/MIN/{1.73_M2}
GFR SERPL CREATININE-BSD FRML MDRD: NORMAL ML/MIN/{1.73_M2}
GLUCOSE BLD-MCNC: 95 MG/DL (ref 70–99)
HCT VFR BLD CALC: 47 % (ref 41–53)
HEMOGLOBIN: 15.6 G/DL (ref 13.5–17.5)
IMMATURE GRANULOCYTES: ABNORMAL %
LYMPHOCYTES # BLD: 17 % (ref 13–44)
MCH RBC QN AUTO: 31 PG (ref 26–34)
MCHC RBC AUTO-ENTMCNC: 33.2 G/DL (ref 31–37)
MCV RBC AUTO: 93.3 FL (ref 80–100)
MONOCYTES # BLD: 9 % (ref 5–9)
PDW BLD-RTO: 17.1 % (ref 12.1–15.2)
PLATELET # BLD: 165 K/UL (ref 140–450)
PLATELET ESTIMATE: ABNORMAL
PMV BLD AUTO: ABNORMAL FL (ref 6–12)
POTASSIUM SERPL-SCNC: 4.1 MMOL/L (ref 3.7–5.3)
RBC # BLD: 5.03 M/UL (ref 4.5–5.9)
RBC # BLD: ABNORMAL 10*6/UL
SEG NEUTROPHILS: 73 % (ref 39–75)
SEGMENTED NEUTROPHILS ABSOLUTE COUNT: 3.5 K/UL (ref 2.1–6.5)
SODIUM BLD-SCNC: 143 MMOL/L (ref 135–144)
WBC # BLD: 4.7 K/UL (ref 3.5–11)
WBC # BLD: ABNORMAL 10*3/UL

## 2017-12-21 PROCEDURE — 6370000000 HC RX 637 (ALT 250 FOR IP): Performed by: INTERNAL MEDICINE

## 2017-12-21 PROCEDURE — 36415 COLL VENOUS BLD VENIPUNCTURE: CPT

## 2017-12-21 PROCEDURE — 94664 DEMO&/EVAL PT USE INHALER: CPT

## 2017-12-21 PROCEDURE — 6370000000 HC RX 637 (ALT 250 FOR IP): Performed by: EMERGENCY MEDICINE

## 2017-12-21 PROCEDURE — 2580000003 HC RX 258: Performed by: EMERGENCY MEDICINE

## 2017-12-21 PROCEDURE — 99285 EMERGENCY DEPT VISIT HI MDM: CPT

## 2017-12-21 PROCEDURE — 2580000003 HC RX 258: Performed by: INTERNAL MEDICINE

## 2017-12-21 PROCEDURE — 80048 BASIC METABOLIC PNL TOTAL CA: CPT

## 2017-12-21 PROCEDURE — 71020 XR CHEST STANDARD TWO VW: CPT

## 2017-12-21 PROCEDURE — 94761 N-INVAS EAR/PLS OXIMETRY MLT: CPT

## 2017-12-21 PROCEDURE — 85025 COMPLETE CBC W/AUTO DIFF WBC: CPT

## 2017-12-21 PROCEDURE — 87040 BLOOD CULTURE FOR BACTERIA: CPT

## 2017-12-21 PROCEDURE — 6360000002 HC RX W HCPCS: Performed by: INTERNAL MEDICINE

## 2017-12-21 PROCEDURE — 6360000002 HC RX W HCPCS: Performed by: EMERGENCY MEDICINE

## 2017-12-21 PROCEDURE — 94640 AIRWAY INHALATION TREATMENT: CPT

## 2017-12-21 PROCEDURE — 93005 ELECTROCARDIOGRAM TRACING: CPT

## 2017-12-21 PROCEDURE — 1200000000 HC SEMI PRIVATE

## 2017-12-21 RX ORDER — FUROSEMIDE 40 MG/1
40 TABLET ORAL DAILY
Status: DISCONTINUED | OUTPATIENT
Start: 2017-12-21 | End: 2017-12-25 | Stop reason: HOSPADM

## 2017-12-21 RX ORDER — SODIUM CHLORIDE 0.9 % (FLUSH) 0.9 %
10 SYRINGE (ML) INJECTION EVERY 12 HOURS SCHEDULED
Status: DISCONTINUED | OUTPATIENT
Start: 2017-12-21 | End: 2017-12-25 | Stop reason: HOSPADM

## 2017-12-21 RX ORDER — ONDANSETRON 2 MG/ML
4 INJECTION INTRAMUSCULAR; INTRAVENOUS EVERY 6 HOURS PRN
Status: DISCONTINUED | OUTPATIENT
Start: 2017-12-21 | End: 2017-12-25 | Stop reason: HOSPADM

## 2017-12-21 RX ORDER — ASPIRIN 81 MG/1
81 TABLET ORAL DAILY
Status: DISCONTINUED | OUTPATIENT
Start: 2017-12-21 | End: 2017-12-25 | Stop reason: HOSPADM

## 2017-12-21 RX ORDER — ACETAMINOPHEN 325 MG/1
650 TABLET ORAL EVERY 4 HOURS PRN
Status: DISCONTINUED | OUTPATIENT
Start: 2017-12-21 | End: 2017-12-25 | Stop reason: HOSPADM

## 2017-12-21 RX ORDER — TRAZODONE HYDROCHLORIDE 50 MG/1
100 TABLET ORAL NIGHTLY
Status: DISCONTINUED | OUTPATIENT
Start: 2017-12-21 | End: 2017-12-25 | Stop reason: HOSPADM

## 2017-12-21 RX ORDER — MIRTAZAPINE 30 MG/1
45 TABLET, FILM COATED ORAL NIGHTLY
COMMUNITY
End: 2018-06-08 | Stop reason: SDUPTHER

## 2017-12-21 RX ORDER — ATENOLOL 25 MG/1
25 TABLET ORAL DAILY
Status: DISCONTINUED | OUTPATIENT
Start: 2017-12-21 | End: 2017-12-25 | Stop reason: HOSPADM

## 2017-12-21 RX ORDER — ATENOLOL 25 MG/1
25 TABLET ORAL DAILY
COMMUNITY
End: 2019-09-19 | Stop reason: SDUPTHER

## 2017-12-21 RX ORDER — METHYLPREDNISOLONE SODIUM SUCCINATE 40 MG/ML
40 INJECTION, POWDER, LYOPHILIZED, FOR SOLUTION INTRAMUSCULAR; INTRAVENOUS ONCE
Status: COMPLETED | OUTPATIENT
Start: 2017-12-21 | End: 2017-12-21

## 2017-12-21 RX ORDER — FLUTICASONE PROPIONATE 50 MCG
2 SPRAY, SUSPENSION (ML) NASAL DAILY
Status: DISCONTINUED | OUTPATIENT
Start: 2017-12-21 | End: 2017-12-25 | Stop reason: HOSPADM

## 2017-12-21 RX ORDER — IPRATROPIUM BROMIDE AND ALBUTEROL SULFATE 2.5; .5 MG/3ML; MG/3ML
1 SOLUTION RESPIRATORY (INHALATION) ONCE
Status: COMPLETED | OUTPATIENT
Start: 2017-12-21 | End: 2017-12-21

## 2017-12-21 RX ORDER — IBUPROFEN 200 MG
800 TABLET ORAL EVERY 8 HOURS PRN
Status: DISCONTINUED | OUTPATIENT
Start: 2017-12-21 | End: 2017-12-25 | Stop reason: HOSPADM

## 2017-12-21 RX ORDER — MIRTAZAPINE 15 MG/1
45 TABLET, FILM COATED ORAL NIGHTLY
Status: DISCONTINUED | OUTPATIENT
Start: 2017-12-21 | End: 2017-12-25 | Stop reason: HOSPADM

## 2017-12-21 RX ORDER — PANTOPRAZOLE SODIUM 40 MG/1
40 TABLET, DELAYED RELEASE ORAL DAILY
Status: DISCONTINUED | OUTPATIENT
Start: 2017-12-21 | End: 2017-12-25 | Stop reason: HOSPADM

## 2017-12-21 RX ORDER — LEVOFLOXACIN 5 MG/ML
500 INJECTION, SOLUTION INTRAVENOUS EVERY 24 HOURS
Status: DISCONTINUED | OUTPATIENT
Start: 2017-12-21 | End: 2017-12-25 | Stop reason: HOSPADM

## 2017-12-21 RX ORDER — SIMVASTATIN 10 MG
5 TABLET ORAL NIGHTLY
Status: DISCONTINUED | OUTPATIENT
Start: 2017-12-21 | End: 2017-12-25 | Stop reason: HOSPADM

## 2017-12-21 RX ORDER — RANOLAZINE 500 MG/1
1000 TABLET, EXTENDED RELEASE ORAL 2 TIMES DAILY
Status: DISCONTINUED | OUTPATIENT
Start: 2017-12-21 | End: 2017-12-25 | Stop reason: HOSPADM

## 2017-12-21 RX ORDER — IPRATROPIUM BROMIDE AND ALBUTEROL SULFATE 2.5; .5 MG/3ML; MG/3ML
1 SOLUTION RESPIRATORY (INHALATION)
Status: DISCONTINUED | OUTPATIENT
Start: 2017-12-21 | End: 2017-12-25 | Stop reason: HOSPADM

## 2017-12-21 RX ORDER — SODIUM CHLORIDE 0.9 % (FLUSH) 0.9 %
10 SYRINGE (ML) INJECTION PRN
Status: DISCONTINUED | OUTPATIENT
Start: 2017-12-21 | End: 2017-12-25 | Stop reason: HOSPADM

## 2017-12-21 RX ORDER — METHYLPREDNISOLONE SODIUM SUCCINATE 125 MG/2ML
60 INJECTION, POWDER, LYOPHILIZED, FOR SOLUTION INTRAMUSCULAR; INTRAVENOUS EVERY 8 HOURS
Status: DISCONTINUED | OUTPATIENT
Start: 2017-12-21 | End: 2017-12-22

## 2017-12-21 RX ORDER — FINASTERIDE 5 MG/1
5 TABLET, FILM COATED ORAL DAILY
Status: DISCONTINUED | OUTPATIENT
Start: 2017-12-21 | End: 2017-12-25 | Stop reason: HOSPADM

## 2017-12-21 RX ORDER — GUAIFENESIN 600 MG/1
600 TABLET, EXTENDED RELEASE ORAL 2 TIMES DAILY
Status: DISCONTINUED | OUTPATIENT
Start: 2017-12-21 | End: 2017-12-25 | Stop reason: HOSPADM

## 2017-12-21 RX ORDER — ALBUTEROL SULFATE 2.5 MG/3ML
2.5 SOLUTION RESPIRATORY (INHALATION)
Status: DISCONTINUED | OUTPATIENT
Start: 2017-12-21 | End: 2017-12-25 | Stop reason: HOSPADM

## 2017-12-21 RX ADMIN — Medication 10 ML: at 16:40

## 2017-12-21 RX ADMIN — CEFTRIAXONE 1 G: 1 INJECTION, POWDER, FOR SOLUTION INTRAMUSCULAR; INTRAVENOUS at 13:02

## 2017-12-21 RX ADMIN — IPRATROPIUM BROMIDE AND ALBUTEROL SULFATE 1 AMPULE: .5; 3 SOLUTION RESPIRATORY (INHALATION) at 15:56

## 2017-12-21 RX ADMIN — ATENOLOL 25 MG: 25 TABLET ORAL at 16:41

## 2017-12-21 RX ADMIN — ENOXAPARIN SODIUM 40 MG: 40 INJECTION SUBCUTANEOUS at 16:40

## 2017-12-21 RX ADMIN — TRAZODONE HYDROCHLORIDE 100 MG: 50 TABLET ORAL at 21:12

## 2017-12-21 RX ADMIN — RANOLAZINE 1000 MG: 500 TABLET, FILM COATED, EXTENDED RELEASE ORAL at 21:11

## 2017-12-21 RX ADMIN — IPRATROPIUM BROMIDE AND ALBUTEROL SULFATE 1 AMPULE: .5; 3 SOLUTION RESPIRATORY (INHALATION) at 20:21

## 2017-12-21 RX ADMIN — SIMVASTATIN 5 MG: 10 TABLET, FILM COATED ORAL at 21:11

## 2017-12-21 RX ADMIN — METHYLPREDNISOLONE SODIUM SUCCINATE 60 MG: 125 INJECTION, POWDER, FOR SOLUTION INTRAMUSCULAR; INTRAVENOUS at 21:15

## 2017-12-21 RX ADMIN — FINASTERIDE 5 MG: 5 TABLET, FILM COATED ORAL at 16:52

## 2017-12-21 RX ADMIN — GUAIFENESIN 600 MG: 600 TABLET, EXTENDED RELEASE ORAL at 21:11

## 2017-12-21 RX ADMIN — IPRATROPIUM BROMIDE AND ALBUTEROL SULFATE 1 AMPULE: .5; 3 SOLUTION RESPIRATORY (INHALATION) at 11:23

## 2017-12-21 RX ADMIN — GUAIFENESIN 600 MG: 600 TABLET, EXTENDED RELEASE ORAL at 16:41

## 2017-12-21 RX ADMIN — RANOLAZINE 1000 MG: 500 TABLET, FILM COATED, EXTENDED RELEASE ORAL at 16:40

## 2017-12-21 RX ADMIN — MIRTAZAPINE 45 MG: 15 TABLET, FILM COATED ORAL at 21:11

## 2017-12-21 RX ADMIN — ASPIRIN 81 MG: 81 TABLET, COATED ORAL at 21:11

## 2017-12-21 RX ADMIN — ACETAMINOPHEN 650 MG: 325 TABLET, FILM COATED ORAL at 16:41

## 2017-12-21 RX ADMIN — LEVOFLOXACIN 500 MG: 5 INJECTION, SOLUTION INTRAVENOUS at 16:40

## 2017-12-21 RX ADMIN — FLUTICASONE PROPIONATE 2 SPRAY: 50 SPRAY, METERED NASAL at 16:40

## 2017-12-21 RX ADMIN — PANTOPRAZOLE SODIUM 40 MG: 40 TABLET, DELAYED RELEASE ORAL at 16:41

## 2017-12-21 RX ADMIN — METHYLPREDNISOLONE SODIUM SUCCINATE 40 MG: 40 INJECTION, POWDER, FOR SOLUTION INTRAMUSCULAR; INTRAVENOUS at 13:02

## 2017-12-21 RX ADMIN — Medication 10 ML: at 21:15

## 2017-12-21 RX ADMIN — VITAMIN D, TAB 1000IU (100/BT) 2000 UNITS: 25 TAB at 16:40

## 2017-12-21 ASSESSMENT — PAIN SCALES - GENERAL
PAINLEVEL_OUTOF10: 6
PAINLEVEL_OUTOF10: 2
PAINLEVEL_OUTOF10: 3

## 2017-12-21 ASSESSMENT — PAIN DESCRIPTION - LOCATION
LOCATION: CHEST
LOCATION: HIP

## 2017-12-21 ASSESSMENT — PAIN DESCRIPTION - PAIN TYPE
TYPE: ACUTE PAIN
TYPE: CHRONIC PAIN

## 2017-12-21 ASSESSMENT — PAIN DESCRIPTION - ORIENTATION: ORIENTATION: LEFT

## 2017-12-21 NOTE — ED PROVIDER NOTES
infarction) 2015    Pneumonia     PTSD (post-traumatic stress disorder)     S/P CABG (coronary artery bypass graft) 10/23/15    Sleep apnea     Ventricular fibrillation ([address] 10/18/2015    x 2 DURING HEART ATTACK       SURGICAL HISTORY    Past Surgical History:   Procedure Laterality Date    CARDIAC SURGERY      10/21/2015 3 vessel CABG    CARPAL TUNNEL RELEASE      CARPAL TUNNEL RELEASE Right     [facility]  2012    [facility]    COLONOSCOPY  11/27/2017    CORONARY ARTERY BYPASS GRAFT  10/23/15    Dr. Aury Rodriguez      left middle finger    FRACTURE SURGERY      left wrist    JOINT REPLACEMENT      right knee    PILONIDAL CYST EXCISION      1974    PILONIDAL CYST EXCISION      [facility] COLONOSCOPY W/BIOPSY SINGLE/MULTIPLE N/A 11/27/2017    COLONOSCOPY WITH BIOPSY/ polypectomy performed by Waldo Ledbetter MD at [address] [facility] EGD TRANSORAL BIOPSY SINGLE/MULTIPLE N/A 11/27/2017    EGD BIOPSY performed by Waldo Ledbetter MD at [address] [facility]      left wrist       CURRENT MEDICATIONS    Current Outpatient Rx   Medication Sig Dispense Refill    mirtazapine (REMERON) 30 MG tablet Take 45 mg by mouth nightly      atenolol (TENORMIN) 25 MG tablet Take 25 mg by mouth daily      ondansetron (ZOFRAN ODT) 4 MG disintegrating tablet Take 1 tablet by mouth every 8 hours as needed for Nausea or Vomiting 10 tablet 0    finasteride (PROSCAR) 5 MG tablet take 1 tablet by mouth daily 30 tablet 3    Respiratory Therapy Supplies (NEBULIZER/TUBING/MOUTHPIECE) KIT 1 kit by Does not apply route daily as needed (as needed) 1 kit 0    albuterol (PROVENTIL) (2.5 MG/3ML) 0.083% nebulizer solution inhale contents of 1 vial in nebulizer every 6 hours if needed for wheezing 120 vial 1    ibuprofen (ADVIL;MOTRIN) 800 MG tablet Take 1 tablet by mouth every 8 hours as needed for Pain 30 tablet 0    ranolazine (RANEXA) 500 MG extended release tablet Take 1 tablet by mouth every 8 hours (Patient taking differently: Take 1,000 mg by mouth 2 times daily ) 90 tablet 0    simvastatin (ZOCOR) 5 MG tablet Take 5 mg by mouth daily      acetaminophen (TYLENOL) 325 MG tablet Take 325 mg by mouth every 6 hours as needed      traZODone (DESYREL) 50 MG tablet Take 100 mg by mouth nightly       Cholecalciferol (VITAMIN D) 2000 UNITS CAPS capsule Take 1 capsule by mouth daily      furosemide (LASIX) 40 MG tablet Take 1 tablet by mouth daily 30 tablet 3    lansoprazole (PREVACID SOLUTAB) 30 MG disintegrating tablet Take 30 mg by mouth 4 times daily      aspirin 81 MG tablet Take 81 mg by mouth daily      fluticasone (FLONASE) 50 MCG/ACT nasal spray 2 sprays by Nasal route daily      Loratadine (CLARITIN) 10 MG CAPS Take 10 mg by mouth daily.  nitroGLYCERIN (NITROSTAT) 0.4 MG SL tablet up to max of 3 total doses. If no relief after 1 dose, call 911. (Patient taking differently: up to max of 3 total doses. If no relief after 1 dose, call 911.) 25 tablet 3       ALLERGIES    Allergies   Allergen Reactions    Codeine     Pcn [Penicillins]     Sulfa Antibiotics     Vicodin [Hydrocodone-Acetaminophen]        FAMILY HISTORY    Family History   Problem Relation Age of Onset    Adopted: Yes    Family history unknown: Yes       SOCIAL HISTORY    Social History     Social History    Marital status:      Spouse name: N/A    Number of children: N/A    Years of education: N/A     Social History Main Topics    Smoking status: Former Smoker     Quit date: 8/23/2006    Smokeless tobacco: Former User     Quit date: 8/1/2000    Alcohol use No    Drug use: Yes     Frequency: 1.0 time per week     Types: Marijuana      Comment: Pt smokes marijuana therapeutically.     Sexual activity: Not Asked     Other Topics Concern    None     Social History Narrative    None       REVIEW OF SYSTEMS    Constitutional:  Denies fever, chills, weight loss or weakness   Eyes:  Denies photophobia or discharge   HENT:  Denies sore throat or ear pain   Respiratory:  Positive for congestion And cough. Cardiovascular:  Denies chest pain, palpitations or swelling   GI:  Denies abdominal pain, nausea, vomiting, or diarrhea   Musculoskeletal:  Denies back pain   Skin:  Denies rash   Neurologic:  Denies headache, focal weakness or sensory changes   Endocrine:  Denies polyuria or polydypsia   Lymphatic:  Denies swollen glands   Psychiatric:  Denies depression, suicidal ideation or homicidal ideation   All systems negative except as marked. PHYSICAL EXAM    VITAL SIGNS: BP (!) 187/87   Pulse 67   Temp 99.3 °F (37.4 °C) (Oral)   Resp 17   Ht 5' 9\" (1.753 m)   Wt 195 lb (88.5 kg)   SpO2 93%   BMI 28.80 kg/m²    Constitutional:  Well developed, Well nourished, No acute distress, Non-toxic appearance. HENT:  Normocephalic, Atraumatic, Bilateral external ears normal, Oropharynx moist, No oral exudates, Nose normal. Neck- Normal range of motion, No tenderness, Supple, No stridor. Eyes:  PERRL, EOMI, Conjunctiva normal, No discharge. Respiratory:  Normal breath sounds, No respiratory distress, No wheezing, No chest tenderness. Cardiovascular:  Normal heart rate, Normal rhythm, No murmurs, No rubs, No gallops. GI:  Bowel sounds normal, Soft, No tenderness, No masses, No pulsatile masses. : External genitalia appear normal, No masses or lesions. No discharge. No CVA tenderness. Musculoskeletal:  Intact distal pulses, No edema, No tenderness, No cyanosis, No clubbing. Good range of motion in all major joints. No tenderness to palpation or major deformities noted. Back- No tenderness. Integument:  Warm, Dry, No erythema, No rash. Lymphatic:  No lymphadenopathy noted. Neurologic:  Alert & oriented x 3, Normal motor function, Normal sensory function, No focal deficits noted.    Psychiatric:  Affect normal, Judgment normal, Mood normal.     EKG

## 2017-12-21 NOTE — ED TRIAGE NOTES
List of medications patient is currently taking is complete. Source of medications in list are patient.

## 2017-12-22 LAB
ANION GAP SERPL CALCULATED.3IONS-SCNC: 16 MMOL/L (ref 9–17)
BUN BLDV-MCNC: 14 MG/DL (ref 8–23)
BUN/CREAT BLD: 22 (ref 9–20)
CALCIUM SERPL-MCNC: 9 MG/DL (ref 8.6–10.4)
CHLORIDE BLD-SCNC: 97 MMOL/L (ref 98–107)
CO2: 20 MMOL/L (ref 20–31)
CREAT SERPL-MCNC: 0.65 MG/DL (ref 0.7–1.2)
GFR AFRICAN AMERICAN: >60 ML/MIN
GFR NON-AFRICAN AMERICAN: >60 ML/MIN
GFR SERPL CREATININE-BSD FRML MDRD: ABNORMAL ML/MIN/{1.73_M2}
GFR SERPL CREATININE-BSD FRML MDRD: ABNORMAL ML/MIN/{1.73_M2}
GLUCOSE BLD-MCNC: 136 MG/DL (ref 70–99)
POTASSIUM SERPL-SCNC: 3.5 MMOL/L (ref 3.7–5.3)
SODIUM BLD-SCNC: 133 MMOL/L (ref 135–144)

## 2017-12-22 PROCEDURE — 6360000002 HC RX W HCPCS: Performed by: INTERNAL MEDICINE

## 2017-12-22 PROCEDURE — 94640 AIRWAY INHALATION TREATMENT: CPT

## 2017-12-22 PROCEDURE — 6370000000 HC RX 637 (ALT 250 FOR IP): Performed by: INTERNAL MEDICINE

## 2017-12-22 PROCEDURE — 2580000003 HC RX 258: Performed by: INTERNAL MEDICINE

## 2017-12-22 PROCEDURE — 36415 COLL VENOUS BLD VENIPUNCTURE: CPT

## 2017-12-22 PROCEDURE — 1200000000 HC SEMI PRIVATE

## 2017-12-22 PROCEDURE — 80048 BASIC METABOLIC PNL TOTAL CA: CPT

## 2017-12-22 RX ORDER — METHYLPREDNISOLONE SODIUM SUCCINATE 40 MG/ML
40 INJECTION, POWDER, LYOPHILIZED, FOR SOLUTION INTRAMUSCULAR; INTRAVENOUS EVERY 12 HOURS
Status: DISCONTINUED | OUTPATIENT
Start: 2017-12-22 | End: 2017-12-25 | Stop reason: HOSPADM

## 2017-12-22 RX ORDER — HYDRALAZINE HYDROCHLORIDE 20 MG/ML
10 INJECTION INTRAMUSCULAR; INTRAVENOUS ONCE
Status: COMPLETED | OUTPATIENT
Start: 2017-12-22 | End: 2017-12-22

## 2017-12-22 RX ADMIN — LEVOFLOXACIN 500 MG: 5 INJECTION, SOLUTION INTRAVENOUS at 15:30

## 2017-12-22 RX ADMIN — HYDRALAZINE HYDROCHLORIDE 10 MG: 20 INJECTION INTRAMUSCULAR; INTRAVENOUS at 21:05

## 2017-12-22 RX ADMIN — RANOLAZINE 1000 MG: 500 TABLET, FILM COATED, EXTENDED RELEASE ORAL at 21:04

## 2017-12-22 RX ADMIN — IPRATROPIUM BROMIDE AND ALBUTEROL SULFATE 1 AMPULE: .5; 3 SOLUTION RESPIRATORY (INHALATION) at 22:02

## 2017-12-22 RX ADMIN — MIRTAZAPINE 45 MG: 15 TABLET, FILM COATED ORAL at 22:19

## 2017-12-22 RX ADMIN — IPRATROPIUM BROMIDE AND ALBUTEROL SULFATE 1 AMPULE: .5; 3 SOLUTION RESPIRATORY (INHALATION) at 18:14

## 2017-12-22 RX ADMIN — GUAIFENESIN 600 MG: 600 TABLET, EXTENDED RELEASE ORAL at 21:04

## 2017-12-22 RX ADMIN — FLUTICASONE PROPIONATE 2 SPRAY: 50 SPRAY, METERED NASAL at 09:12

## 2017-12-22 RX ADMIN — GUAIFENESIN 600 MG: 600 TABLET, EXTENDED RELEASE ORAL at 09:11

## 2017-12-22 RX ADMIN — TRAZODONE HYDROCHLORIDE 100 MG: 50 TABLET ORAL at 22:19

## 2017-12-22 RX ADMIN — ENOXAPARIN SODIUM 40 MG: 40 INJECTION SUBCUTANEOUS at 09:11

## 2017-12-22 RX ADMIN — RANOLAZINE 1000 MG: 500 TABLET, FILM COATED, EXTENDED RELEASE ORAL at 09:11

## 2017-12-22 RX ADMIN — FINASTERIDE 5 MG: 5 TABLET, FILM COATED ORAL at 09:11

## 2017-12-22 RX ADMIN — FUROSEMIDE 40 MG: 40 TABLET ORAL at 09:11

## 2017-12-22 RX ADMIN — Medication 10 ML: at 04:31

## 2017-12-22 RX ADMIN — SIMVASTATIN 5 MG: 10 TABLET, FILM COATED ORAL at 22:19

## 2017-12-22 RX ADMIN — METHYLPREDNISOLONE SODIUM SUCCINATE 40 MG: 40 INJECTION, POWDER, FOR SOLUTION INTRAMUSCULAR; INTRAVENOUS at 16:43

## 2017-12-22 RX ADMIN — METHYLPREDNISOLONE SODIUM SUCCINATE 60 MG: 125 INJECTION, POWDER, FOR SOLUTION INTRAMUSCULAR; INTRAVENOUS at 04:29

## 2017-12-22 RX ADMIN — ALBUTEROL SULFATE 2.5 MG: 2.5 SOLUTION RESPIRATORY (INHALATION) at 04:15

## 2017-12-22 RX ADMIN — Medication 10 ML: at 21:05

## 2017-12-22 RX ADMIN — Medication 10 ML: at 09:11

## 2017-12-22 RX ADMIN — VITAMIN D, TAB 1000IU (100/BT) 2000 UNITS: 25 TAB at 09:11

## 2017-12-22 RX ADMIN — Medication 10 ML: at 15:31

## 2017-12-22 RX ADMIN — Medication 10 ML: at 16:43

## 2017-12-22 RX ADMIN — ASPIRIN 81 MG: 81 TABLET, COATED ORAL at 09:11

## 2017-12-22 RX ADMIN — ATENOLOL 25 MG: 25 TABLET ORAL at 09:11

## 2017-12-22 RX ADMIN — IPRATROPIUM BROMIDE AND ALBUTEROL SULFATE 1 AMPULE: .5; 3 SOLUTION RESPIRATORY (INHALATION) at 12:58

## 2017-12-22 RX ADMIN — IBUPROFEN 800 MG: 200 TABLET, FILM COATED ORAL at 21:03

## 2017-12-22 RX ADMIN — PANTOPRAZOLE SODIUM 40 MG: 40 TABLET, DELAYED RELEASE ORAL at 06:10

## 2017-12-22 RX ADMIN — IPRATROPIUM BROMIDE AND ALBUTEROL SULFATE 1 AMPULE: .5; 3 SOLUTION RESPIRATORY (INHALATION) at 08:53

## 2017-12-22 ASSESSMENT — PAIN SCALES - GENERAL
PAINLEVEL_OUTOF10: 6
PAINLEVEL_OUTOF10: 7
PAINLEVEL_OUTOF10: 6
PAINLEVEL_OUTOF10: 2

## 2017-12-22 ASSESSMENT — PAIN DESCRIPTION - ORIENTATION
ORIENTATION: LEFT

## 2017-12-22 ASSESSMENT — PAIN DESCRIPTION - LOCATION
LOCATION: HIP
LOCATION: CHEST
LOCATION: HIP
LOCATION: HIP

## 2017-12-22 ASSESSMENT — PAIN DESCRIPTION - PAIN TYPE
TYPE: CHRONIC PAIN

## 2017-12-22 NOTE — PLAN OF CARE
Problem: Activity Intolerance:  Goal: Ability to tolerate increased activity will improve  Ability to tolerate increased activity will improve   Outcome: Met This Shift  Ambulates 1 lap in gil with  Wheeled walker.  Tolerates with minimal SOB

## 2017-12-22 NOTE — H&P
perfusion defect of mild to moderate intensity in the inferolateral,inferior and inferoapical regions during stress imaging. Ef 45%. with regional wall motion abnormalities.  H/O echocardiogram 2/17/16    EF >60%. LV wall thickness is mildly increased. Inferoseptal wall is abnormal in its motion not unusual status post open heart surgery. Normal aortic valve structure with ild aortic regurgitation.  Hip pain, left     Hx of cardiac cath 07/08/2016    LMCA: Normal 0% stenosis. LAD: Chronic occlusion 100%. Lesion on Mid LAD: 100% stenosis. LCx: single stenosis. Lesion on Mid CX: 80% stenosis. RCA: Lesion on Mid RCA: 70% stenosis.  Hyperlipidemia     Hypertension     MI (myocardial infarction) 2015    Pneumonia     PTSD (post-traumatic stress disorder)     S/P CABG (coronary artery bypass graft) 10/23/15    Sleep apnea     [address] [address] 10/18/2015    x 2 DURING HEART ATTACK       Past Surgical History:        Procedure Laterality Date    CARDIAC SURGERY      10/21/2015 3 vessel CABG    CARPAL TUNNEL RELEASE      CARPAL TUNNEL RELEASE Right     [facility]  2012    [facility]    COLONOSCOPY  11/27/2017    CORONARY ARTERY BYPASS GRAFT  10/23/15    Dr. Lawson Glenn      left middle finger    FRACTURE SURGERY      left wrist    JOINT REPLACEMENT      right knee    PILONIDAL CYST EXCISION      1974    PILONIDAL CYST EXCISION      AL COLONOSCOPY W/BIOPSY SINGLE/MULTIPLE N/A 11/27/2017    COLONOSCOPY WITH BIOPSY/ polypectomy performed by Mei Foster MD at [address] [facility] EGD TRANSORAL BIOPSY SINGLE/MULTIPLE N/A 11/27/2017    EGD BIOPSY performed by Mei Foster MD at [facility]      left wrist       Home Medications:   No current facility-administered medications on file prior to encounter.       Current Outpatient Prescriptions on File Prior to Encounter Medication Sig Dispense Refill    ondansetron (ZOFRAN ODT) 4 MG disintegrating tablet Take 1 tablet by mouth every 8 hours as needed for Nausea or Vomiting 10 tablet 0    finasteride (PROSCAR) 5 MG tablet take 1 tablet by mouth daily 30 tablet 3    Respiratory Therapy Supplies (NEBULIZER/TUBING/MOUTHPIECE) KIT 1 kit by Does not apply route daily as needed (as needed) 1 kit 0    albuterol (PROVENTIL) (2.5 MG/3ML) 0.083% nebulizer solution inhale contents of 1 vial in nebulizer every 6 hours if needed for wheezing 120 vial 1    ibuprofen (ADVIL;MOTRIN) 800 MG tablet Take 1 tablet by mouth every 8 hours as needed for Pain 30 tablet 0    ranolazine (RANEXA) 500 MG extended release tablet Take 1 tablet by mouth every 8 hours (Patient taking differently: Take 1,000 mg by mouth 2 times daily ) 90 tablet 0    simvastatin (ZOCOR) 5 MG tablet Take 5 mg by mouth daily      acetaminophen (TYLENOL) 325 MG tablet Take 325 mg by mouth every 6 hours as needed      traZODone (DESYREL) 50 MG tablet Take 100 mg by mouth nightly       Cholecalciferol (VITAMIN D) 2000 UNITS CAPS capsule Take 1 capsule by mouth daily      furosemide (LASIX) 40 MG tablet Take 1 tablet by mouth daily 30 tablet 3    lansoprazole (PREVACID SOLUTAB) 30 MG disintegrating tablet Take 30 mg by mouth 4 times daily      aspirin 81 MG tablet Take 81 mg by mouth daily      fluticasone (FLONASE) 50 MCG/ACT nasal spray 2 sprays by Nasal route daily      Loratadine (CLARITIN) 10 MG CAPS Take 10 mg by mouth daily.  nitroGLYCERIN (NITROSTAT) 0.4 MG SL tablet up to max of 3 total doses. If no relief after 1 dose, call 911. (Patient taking differently: up to max of 3 total doses. If no relief after 1 dose, call 911.) 25 tablet 3       Allergies:  Codeine; Pcn [penicillins]; Sulfa antibiotics; and Vicodin [hydrocodone-acetaminophen]    Social History:    reports that he quit smoking about 11 years ago. He quit smokeless tobacco use about 17 years ago. Musculoskeletal: normal range of motion, no joint swelling, deformity or tenderness  Neurological: alert, oriented, normal speech, no focal findings or movement disorder noted    Review of Labs and Diagnostic Testing:    Recent Results (from the past 24 hour(s))   EKG 12 lead    Collection Time: 12/21/17 11:15 AM   Result Value Ref Range    Ventricular Rate 65 BPM    Atrial Rate 65 BPM    P-R Interval 186 ms    QRS Duration 80 ms    Q-T Interval 420 ms    QTc Calculation (Bazett) 436 ms    P Axis 46 degrees    R Axis 22 degrees    T Axis 41 degrees   CBC Auto Differential    Collection Time: 12/21/17 11:37 AM   Result Value Ref Range    WBC 4.7 3.5 - 11.0 k/uL    RBC 5.03 4.5 - 5.9 m/uL    Hemoglobin 15.6 13.5 - 17.5 g/dL    Hematocrit 47.0 41 - 53 %    MCV 93.3 80 - 100 fL    MCH 31.0 26 - 34 pg    MCHC 33.2 31 - 37 g/dL    RDW 17.1 (H) 12.1 - 15.2 %    Platelets 974 787 - 598 k/uL    MPV NOT REPORTED 6.0 - 12.0 fL    Differential Type YES     Seg Neutrophils 73 39 - 75 %    Lymphocytes 17 13 - 44 %    Monocytes 9 5 - 9 %    Eosinophils % 1 0 - 5 %    Basophils 0 0 - 2 %    Immature Granulocytes NOT REPORTED 0 %    Segs Absolute 3.50 2.1 - 6.5 k/uL    Absolute Lymph # 0.80 (L) 1.0 - 4.8 k/uL    Absolute Mono # 0.40 0.0 - 1.0 k/uL    Absolute Eos # 0.00 0.0 - 0.4 k/uL    Basophils # 0.00 0.0 - 0.2 k/uL    Absolute Immature Granulocyte NOT REPORTED 0.00 - 0.30 k/uL    WBC Morphology NOT REPORTED     RBC Morphology NOT REPORTED     Platelet Estimate NOT REPORTED    Basic Metabolic Panel    Collection Time: 12/21/17 11:37 AM   Result Value Ref Range    Glucose 95 70 - 99 mg/dL    BUN 12 8 - 23 mg/dL    CREATININE 0.76 0.70 - 1.20 mg/dL    Bun/Cre Ratio 16 9 - 20    Calcium 9.3 8.6 - 10.4 mg/dL    Sodium 143 135 - 144 mmol/L    Potassium 4.1 3.7 - 5.3 mmol/L    Chloride 102 98 - 107 mmol/L    CO2 25 20 - 31 mmol/L    Anion Gap 16 9 - 17 mmol/L    GFR Non-African American >60 >60 mL/min    GFR African American >60 >60 mL/min    GFR Comment          GFR Staging NOT REPORTED        Radiology:     Xr Chest Standard (2 Vw)    Result Date: 12/21/2017  COMPARISON: Chest 9/25/2017. 2 views chest. FINDINGS: Mild emphysematous change, previous sternotomy and heart size slightly enlarged, stable. No failure, pneumonia or effusion. Previous CABG with mild cardiomegaly and emphysema, stable. EKG: Shows normal sinus rhythm, rate 65, no acute changes    Assessment/ Plan:    1. Acute exacerbation of COPD and acute bronchitis - the patient is on IV steroids,  IV Levaquin, frequent nebulizers, Mucinex, he is improving, will taper down IV steroids, anticipating discharge home tomorrow  2. Hypertension uncontrolled - on atenolol , monitor BP  3. History of coronary artery disease, status post CABGx 3 on 10/23/15 by Dr. Ac Warren - asymptomatic, on aspirin, simvastatin, Ranexa  4. PTSD  5. Obstructive  sleep apnea -patient using BiPAP at home at nights  6. Chronic left hip pain      Medical Necessity: Inpatient admission is appropriate for this patient secondary to the need of  IV steroids, IV antibiotics, frequent nebulizers    Estimated length of stay: 2  days. The beneficiary may reasonably be expected to be discharged or transferred to a hospital within 96 hours after admission.     DVT prophylaxis:   [x] Lovenox   [] SCDs   [] SQ Heparin   [] Encourage ambulation, low risk for DVT, no chemical or mechanical    prophylaxis necessary      [] Already on Anticoagulation    Anticipated Disposition upon discharge:   [x] Home   [] Home with Home Health   [] Garrett Vang   [] [address]      Electronically signed by Niles Duval MD on 12/22/2017 at 5:44 AM

## 2017-12-22 NOTE — PLAN OF CARE
Problem: Nutrition  Goal: Optimal nutrition therapy  Outcome: Ongoing  Nutrition Problem: Inadequate oral intake (pta)  Intervention: Food and/or Nutrient Delivery: Continue current diet  Nutritional Goals: PO >75% meals

## 2017-12-23 PROCEDURE — 1200000000 HC SEMI PRIVATE

## 2017-12-23 PROCEDURE — 6370000000 HC RX 637 (ALT 250 FOR IP): Performed by: INTERNAL MEDICINE

## 2017-12-23 PROCEDURE — 94640 AIRWAY INHALATION TREATMENT: CPT

## 2017-12-23 PROCEDURE — 94761 N-INVAS EAR/PLS OXIMETRY MLT: CPT

## 2017-12-23 PROCEDURE — 2580000003 HC RX 258: Performed by: INTERNAL MEDICINE

## 2017-12-23 PROCEDURE — 6360000002 HC RX W HCPCS: Performed by: INTERNAL MEDICINE

## 2017-12-23 RX ORDER — POTASSIUM CHLORIDE 750 MG/1
40 TABLET, FILM COATED, EXTENDED RELEASE ORAL ONCE
Status: COMPLETED | OUTPATIENT
Start: 2017-12-23 | End: 2017-12-23

## 2017-12-23 RX ADMIN — RANOLAZINE 1000 MG: 500 TABLET, FILM COATED, EXTENDED RELEASE ORAL at 22:10

## 2017-12-23 RX ADMIN — METHYLPREDNISOLONE SODIUM SUCCINATE 40 MG: 40 INJECTION, POWDER, FOR SOLUTION INTRAMUSCULAR; INTRAVENOUS at 16:43

## 2017-12-23 RX ADMIN — GUAIFENESIN 600 MG: 600 TABLET, EXTENDED RELEASE ORAL at 08:47

## 2017-12-23 RX ADMIN — IPRATROPIUM BROMIDE AND ALBUTEROL SULFATE 1 AMPULE: .5; 3 SOLUTION RESPIRATORY (INHALATION) at 20:56

## 2017-12-23 RX ADMIN — VITAMIN D, TAB 1000IU (100/BT) 2000 UNITS: 25 TAB at 08:46

## 2017-12-23 RX ADMIN — FUROSEMIDE 40 MG: 40 TABLET ORAL at 08:46

## 2017-12-23 RX ADMIN — RANOLAZINE 1000 MG: 500 TABLET, FILM COATED, EXTENDED RELEASE ORAL at 08:46

## 2017-12-23 RX ADMIN — ATENOLOL 25 MG: 25 TABLET ORAL at 08:46

## 2017-12-23 RX ADMIN — SIMVASTATIN 5 MG: 10 TABLET, FILM COATED ORAL at 22:11

## 2017-12-23 RX ADMIN — IPRATROPIUM BROMIDE AND ALBUTEROL SULFATE 1 AMPULE: .5; 3 SOLUTION RESPIRATORY (INHALATION) at 16:49

## 2017-12-23 RX ADMIN — Medication 10 ML: at 03:39

## 2017-12-23 RX ADMIN — Medication 10 ML: at 08:46

## 2017-12-23 RX ADMIN — ASPIRIN 81 MG: 81 TABLET, COATED ORAL at 08:46

## 2017-12-23 RX ADMIN — LEVOFLOXACIN 500 MG: 5 INJECTION, SOLUTION INTRAVENOUS at 14:25

## 2017-12-23 RX ADMIN — FINASTERIDE 5 MG: 5 TABLET, FILM COATED ORAL at 08:47

## 2017-12-23 RX ADMIN — IPRATROPIUM BROMIDE AND ALBUTEROL SULFATE 1 AMPULE: .5; 3 SOLUTION RESPIRATORY (INHALATION) at 09:45

## 2017-12-23 RX ADMIN — METHYLPREDNISOLONE SODIUM SUCCINATE 40 MG: 40 INJECTION, POWDER, FOR SOLUTION INTRAMUSCULAR; INTRAVENOUS at 03:39

## 2017-12-23 RX ADMIN — IPRATROPIUM BROMIDE AND ALBUTEROL SULFATE 1 AMPULE: .5; 3 SOLUTION RESPIRATORY (INHALATION) at 13:08

## 2017-12-23 RX ADMIN — ENOXAPARIN SODIUM 40 MG: 40 INJECTION SUBCUTANEOUS at 08:46

## 2017-12-23 RX ADMIN — TRAZODONE HYDROCHLORIDE 100 MG: 50 TABLET ORAL at 22:11

## 2017-12-23 RX ADMIN — Medication 10 ML: at 16:43

## 2017-12-23 RX ADMIN — FLUTICASONE PROPIONATE 2 SPRAY: 50 SPRAY, METERED NASAL at 08:47

## 2017-12-23 RX ADMIN — MIRTAZAPINE 45 MG: 15 TABLET, FILM COATED ORAL at 22:11

## 2017-12-23 RX ADMIN — POTASSIUM CHLORIDE 40 MEQ: 750 TABLET, FILM COATED, EXTENDED RELEASE ORAL at 13:55

## 2017-12-23 RX ADMIN — Medication 10 ML: at 22:11

## 2017-12-23 RX ADMIN — Medication 10 ML: at 14:26

## 2017-12-23 RX ADMIN — GUAIFENESIN 600 MG: 600 TABLET, EXTENDED RELEASE ORAL at 22:11

## 2017-12-23 RX ADMIN — PANTOPRAZOLE SODIUM 40 MG: 40 TABLET, DELAYED RELEASE ORAL at 05:51

## 2017-12-23 ASSESSMENT — PAIN DESCRIPTION - PAIN TYPE: TYPE: CHRONIC PAIN

## 2017-12-23 ASSESSMENT — PAIN DESCRIPTION - ORIENTATION: ORIENTATION: LEFT

## 2017-12-23 ASSESSMENT — PAIN DESCRIPTION - LOCATION: LOCATION: HIP

## 2017-12-23 ASSESSMENT — PAIN SCALES - GENERAL: PAINLEVEL_OUTOF10: 6

## 2017-12-23 NOTE — PLAN OF CARE
Problem: Falls - Risk of  Goal: Absence of falls  Outcome: Met This Shift      Problem:  Activity Intolerance:  Goal: Ability to tolerate increased activity will improve  Ability to tolerate increased activity will improve   Outcome: Met This Shift      Problem: Airway Clearance - Ineffective:  Goal: Ability to maintain a clear airway will improve  Ability to maintain a clear airway will improve   Outcome: Met This Shift

## 2017-12-24 VITALS
HEART RATE: 71 BPM | HEIGHT: 69 IN | SYSTOLIC BLOOD PRESSURE: 146 MMHG | BODY MASS INDEX: 29.36 KG/M2 | RESPIRATION RATE: 18 BRPM | TEMPERATURE: 97.9 F | OXYGEN SATURATION: 96 % | DIASTOLIC BLOOD PRESSURE: 88 MMHG | WEIGHT: 198.2 LBS

## 2017-12-24 PROCEDURE — 6370000000 HC RX 637 (ALT 250 FOR IP): Performed by: INTERNAL MEDICINE

## 2017-12-24 PROCEDURE — 2580000003 HC RX 258: Performed by: INTERNAL MEDICINE

## 2017-12-24 PROCEDURE — 94640 AIRWAY INHALATION TREATMENT: CPT

## 2017-12-24 PROCEDURE — 6360000002 HC RX W HCPCS: Performed by: INTERNAL MEDICINE

## 2017-12-24 PROCEDURE — 94761 N-INVAS EAR/PLS OXIMETRY MLT: CPT

## 2017-12-24 RX ORDER — ALBUTEROL SULFATE 2.5 MG/3ML
2.5 SOLUTION RESPIRATORY (INHALATION) 4 TIMES DAILY
Qty: 120 VIAL | Refills: 1 | Status: SHIPPED | OUTPATIENT
Start: 2017-12-24 | End: 2018-07-27 | Stop reason: SDUPTHER

## 2017-12-24 RX ORDER — PREDNISONE 20 MG/1
TABLET ORAL
Qty: 6 TABLET | Refills: 0 | Status: SHIPPED | OUTPATIENT
Start: 2017-12-24 | End: 2018-01-01 | Stop reason: ALTCHOICE

## 2017-12-24 RX ADMIN — FLUTICASONE PROPIONATE 2 SPRAY: 50 SPRAY, METERED NASAL at 08:52

## 2017-12-24 RX ADMIN — Medication 10 ML: at 05:06

## 2017-12-24 RX ADMIN — ASPIRIN 81 MG: 81 TABLET, COATED ORAL at 08:52

## 2017-12-24 RX ADMIN — GUAIFENESIN 600 MG: 600 TABLET, EXTENDED RELEASE ORAL at 08:52

## 2017-12-24 RX ADMIN — IPRATROPIUM BROMIDE AND ALBUTEROL SULFATE 1 AMPULE: .5; 3 SOLUTION RESPIRATORY (INHALATION) at 13:14

## 2017-12-24 RX ADMIN — IPRATROPIUM BROMIDE AND ALBUTEROL SULFATE 1 AMPULE: .5; 3 SOLUTION RESPIRATORY (INHALATION) at 08:57

## 2017-12-24 RX ADMIN — FUROSEMIDE 40 MG: 40 TABLET ORAL at 08:52

## 2017-12-24 RX ADMIN — VITAMIN D, TAB 1000IU (100/BT) 2000 UNITS: 25 TAB at 08:52

## 2017-12-24 RX ADMIN — ATENOLOL 25 MG: 25 TABLET ORAL at 08:52

## 2017-12-24 RX ADMIN — RANOLAZINE 1000 MG: 500 TABLET, FILM COATED, EXTENDED RELEASE ORAL at 08:52

## 2017-12-24 RX ADMIN — ENOXAPARIN SODIUM 40 MG: 40 INJECTION SUBCUTANEOUS at 08:52

## 2017-12-24 RX ADMIN — METHYLPREDNISOLONE SODIUM SUCCINATE 40 MG: 40 INJECTION, POWDER, FOR SOLUTION INTRAMUSCULAR; INTRAVENOUS at 05:06

## 2017-12-24 RX ADMIN — FINASTERIDE 5 MG: 5 TABLET, FILM COATED ORAL at 08:52

## 2017-12-24 RX ADMIN — PANTOPRAZOLE SODIUM 40 MG: 40 TABLET, DELAYED RELEASE ORAL at 05:06

## 2017-12-24 RX ADMIN — Medication 10 ML: at 08:52

## 2017-12-24 ASSESSMENT — PAIN DESCRIPTION - PAIN TYPE
TYPE: CHRONIC PAIN
TYPE: CHRONIC PAIN

## 2017-12-24 ASSESSMENT — PAIN DESCRIPTION - ORIENTATION
ORIENTATION: LEFT
ORIENTATION: LEFT

## 2017-12-24 ASSESSMENT — PAIN SCALES - GENERAL
PAINLEVEL_OUTOF10: 4
PAINLEVEL_OUTOF10: 6

## 2017-12-24 ASSESSMENT — PAIN DESCRIPTION - LOCATION
LOCATION: HIP
LOCATION: HIP

## 2017-12-24 NOTE — PROGRESS NOTES
Pt awake in bed. Assessment and vital signs complete. Pt complains of 6/10 chronic pains and declines intervention. Pt made aware to inform nurse if intervention is needed. Pt agreeable. Pt denies any needs. Call light in reach.
Pt educated on COPD and medications. Questions answered.
Pt made aware of discharge orders. Verbalizes understanding. IV discontinued with catheter intact. Pt states his family will be here around 200 to take him home. Pt requesting to shower. Shower chair in shower for patient, states he will shower before lunch. Denies further needs at this time. Call light within reach.
SHREYA purchased pt walker through [address] Friday follow up note:     Pt plans to d/c to:      [x] Home   [] Home with Home Care    [] SNF/ECF    [] Hospice    Pt will need on d/c:    [x]  No new needs    []  DME     []  Meds Filled     Necessary Notes:
ALKPHOS 82 09/25/2017    AST 14 09/25/2017    ALT 12 09/25/2017    LABGLOM >60 12/22/2017    GFRAA >60 12/22/2017     No results found for: LABA1C  No results found for: EAG    Estimated Intake vs Estimated Needs: Intake Meets Needs    Nutrition Risk Level: Moderate, Low    PO intakes improved from prior week. Weight relatively stable in last month, but has been higher. Reports that weight loss overall intentional.  Denies educational needs.       Nutrition Interventions:   Continue current diet  Continued Inpatient Monitoring, Coordination of Care, Education Not Indicated (prior knowledge)    Nutrition Evaluation:   · Evaluation: Goals set   · Goals: PO >75% meals    · Monitoring: Meal Intake, Pertinent Labs, Weight    Electronically signed by Maxim Matta RD, REFUGIO on 12/22/17 at 11:03 AM    Contact Number: [phone]

## 2017-12-24 NOTE — PLAN OF CARE
Problem: Falls - Risk of  Goal: Absence of falls  Outcome: Met This Shift  No falls this shift - will continue to monitor. Problem:  Activity Intolerance:  Goal: Ability to tolerate increased activity will improve  Ability to tolerate increased activity will improve   Outcome: Met This Shift      Problem: Airway Clearance - Ineffective:  Goal: Ability to maintain a clear airway will improve  Ability to maintain a clear airway will improve   Outcome: Ongoing

## 2017-12-24 NOTE — DISCHARGE SUMMARY
1 tablet by mouth every 8 hours as needed for Nausea or Vomiting             predniSONE (DELTASONE) 20 MG tablet  2 tabs po daily x 2 days, 1 tab po daily x 2 days             ranolazine (RANEXA) 500 MG extended release tablet  Take 1 tablet by mouth every 8 hours             Respiratory Therapy Supplies (NEBULIZER/TUBING/MOUTHPIECE) KIT  1 kit by Does not apply route daily as needed (as needed)             simvastatin (ZOCOR) 5 MG tablet  Take 5 mg by mouth daily             traZODone (DESYREL) 50 MG tablet  Take 100 mg by mouth nightly                  Diet:  DIET CARDIAC; Carb Control: 4 carbs/meal (approximate 1800 kcals/day)    Activity:  Activity as tolerated (Patient may move about with assist as indicated or with supervision.)    Follow-up:  in 1 weeks with Isabel Lara MD,      Physical Exam:    Yaneth Gillette for the past 24 hrs:   BP Temp Temp src Pulse Resp SpO2 Height Weight   12/24/17 0855 - - - - - 96 % - -   12/24/17 0745 (!) 146/88 97.9 °F (36.6 °C) Oral 71 18 96 % - -   12/24/17 0546 - - - - - 97 % - -   12/24/17 0500 (!) 148/88 97.8 °F (36.6 °C) Oral 57 18 97 % - -   12/24/17 0015 117/64 97.9 °F (36.6 °C) Oral 72 18 92 % 5' 9\" (1.753 m) 198 lb 3.2 oz (89.9 kg)   12/23/17 2058 - - - - - 95 % - -   12/23/17 2030 (!) 134/92 98.6 °F (37 °C) Oral 69 18 93 % - -   12/23/17 1650 - - - - - 95 % - -   12/23/17 1630 (!) 142/81 98.2 °F (36.8 °C) Oral 65 18 94 % - -   12/23/17 1310 (!) 149/86 98.2 °F (36.8 °C) Oral 73 18 95 % - -   12/23/17 0945 - - - - - 93 % - -     General Appearance: alert and oriented to person, place and time, in no acute distress  Cardiovascular: normal rate, regular rhythm, normal S1 and S2, 1/6 systolic ejection m  Pulmonary/Chest: Scattered bilateral wheezes and rhonchi, diminished breath sounds, no respiratory distress  Abdomen: soft, non-tender, non-distended, normal bowel sounds   Extremities: no cyanosis, clubbing or edema, pedal pulses 2+   Skin: warm and dry, no rash or erythema,       Hospital Course: The patient is a 55-year-old Man presented to the emergency room complaining of worsening shortness of breath, sore throat, productive with yellowish sputum cough. He was evaluated in the emergency room. His blood pressure was elevated 117/79, he had low-grade fever 99.5, oxygen saturation was 94%, he had a lot of wheezing. He was treated in the emergency room but did not feel better and for this reason was deemed appropriate for admission. He was treated with frequent nebulizers, IV Solu Medrol, IV Levaquin, Mucinex. We gradually tapered down steroids. Jeanie Denis significantly improved, he is able to ambulate in his room without feeling short of breath. He is afebrile, vitals relatively stable, his appetite is good, he tolerates activities. We will discharge him home with tapered dose prednisone.   He is to follow-up with PCP in 10 days    Disposition: home    Condition: Stable    Time Spent: 32 minutes      Electronically signed by Claudia Meier MD on 12/24/2017 at 9:28 AM  Discharging Hospitalist

## 2017-12-27 LAB
CULTURE: NORMAL
Lab: NORMAL
SPECIMEN DESCRIPTION: NORMAL
STATUS: NORMAL

## 2018-01-01 ENCOUNTER — HOSPITAL ENCOUNTER (EMERGENCY)
Age: 63
Discharge: HOME | End: 2018-01-01
Attending: FAMILY MEDICINE
Payer: COMMERCIAL

## 2018-01-01 VITALS
DIASTOLIC BLOOD PRESSURE: 73 MMHG | HEART RATE: 62 BPM | OXYGEN SATURATION: 95 % | SYSTOLIC BLOOD PRESSURE: 104 MMHG | RESPIRATION RATE: 20 BRPM | TEMPERATURE: 99 F

## 2018-01-01 DIAGNOSIS — K29.00 ACUTE GASTRITIS, PRESENCE OF BLEEDING UNSPECIFIED, UNSPECIFIED GASTRITIS TYPE: Primary | ICD-10-CM

## 2018-01-01 LAB
ABSOLUTE EOS #: 0.2 K/UL (ref 0–0.4)
ABSOLUTE IMMATURE GRANULOCYTE: ABNORMAL K/UL (ref 0–0.3)
ABSOLUTE LYMPH #: 1.6 K/UL (ref 1–4.8)
ABSOLUTE MONO #: 0.7 K/UL (ref 0–1)
ALBUMIN SERPL-MCNC: 3.4 G/DL (ref 3.5–5.2)
ALBUMIN/GLOBULIN RATIO: ABNORMAL (ref 1–2.5)
ALP BLD-CCNC: 68 U/L (ref 40–129)
ALT SERPL-CCNC: 14 U/L (ref 5–41)
ANION GAP SERPL CALCULATED.3IONS-SCNC: 10 MMOL/L (ref 9–17)
AST SERPL-CCNC: 13 U/L
BASOPHILS # BLD: 0 % (ref 0–2)
BASOPHILS ABSOLUTE: 0 K/UL (ref 0–0.2)
BILIRUB SERPL-MCNC: 0.19 MG/DL (ref 0.3–1.2)
BUN BLDV-MCNC: 11 MG/DL (ref 8–23)
BUN/CREAT BLD: ABNORMAL (ref 9–20)
CALCIUM SERPL-MCNC: 9.2 MG/DL (ref 8.6–10.4)
CHLORIDE BLD-SCNC: 98 MMOL/L (ref 98–107)
CO2: 31 MMOL/L (ref 20–31)
CREAT SERPL-MCNC: 0.92 MG/DL (ref 0.7–1.2)
DIFFERENTIAL TYPE: YES
EOSINOPHILS RELATIVE PERCENT: 2 % (ref 0–5)
GFR AFRICAN AMERICAN: >60 ML/MIN
GFR NON-AFRICAN AMERICAN: >60 ML/MIN
GFR SERPL CREATININE-BSD FRML MDRD: ABNORMAL ML/MIN/{1.73_M2}
GFR SERPL CREATININE-BSD FRML MDRD: ABNORMAL ML/MIN/{1.73_M2}
GLUCOSE BLD-MCNC: 102 MG/DL (ref 70–99)
HCT VFR BLD CALC: 42.6 % (ref 41–53)
HEMOGLOBIN: 14 G/DL (ref 13.5–17.5)
IMMATURE GRANULOCYTES: ABNORMAL %
LIPASE: 99 U/L (ref 13–60)
LYMPHOCYTES # BLD: 21 % (ref 13–44)
MCH RBC QN AUTO: 31.1 PG (ref 26–34)
MCHC RBC AUTO-ENTMCNC: 32.9 G/DL (ref 31–37)
MCV RBC AUTO: 94.4 FL (ref 80–100)
MONOCYTES # BLD: 10 % (ref 5–9)
PDW BLD-RTO: 16.3 % (ref 12.1–15.2)
PLATELET # BLD: 252 K/UL (ref 140–450)
PLATELET ESTIMATE: ABNORMAL
PMV BLD AUTO: ABNORMAL FL (ref 6–12)
POTASSIUM SERPL-SCNC: 4.5 MMOL/L (ref 3.7–5.3)
RBC # BLD: 4.51 M/UL (ref 4.5–5.9)
RBC # BLD: ABNORMAL 10*6/UL
SEG NEUTROPHILS: 67 % (ref 39–75)
SEGMENTED NEUTROPHILS ABSOLUTE COUNT: 4.8 K/UL (ref 2.1–6.5)
SODIUM BLD-SCNC: 139 MMOL/L (ref 135–144)
TOTAL PROTEIN: 6.1 G/DL (ref 6.4–8.3)
WBC # BLD: 7.3 K/UL (ref 3.5–11)
WBC # BLD: ABNORMAL 10*3/UL

## 2018-01-01 PROCEDURE — 85025 COMPLETE CBC W/AUTO DIFF WBC: CPT

## 2018-01-01 PROCEDURE — S0028 INJECTION, FAMOTIDINE, 20 MG: HCPCS | Performed by: FAMILY MEDICINE

## 2018-01-01 PROCEDURE — 80053 COMPREHEN METABOLIC PANEL: CPT

## 2018-01-01 PROCEDURE — 96374 THER/PROPH/DIAG INJ IV PUSH: CPT

## 2018-01-01 PROCEDURE — 6360000002 HC RX W HCPCS: Performed by: FAMILY MEDICINE

## 2018-01-01 PROCEDURE — 83690 ASSAY OF LIPASE: CPT

## 2018-01-01 PROCEDURE — 96375 TX/PRO/DX INJ NEW DRUG ADDON: CPT

## 2018-01-01 PROCEDURE — 99284 EMERGENCY DEPT VISIT MOD MDM: CPT

## 2018-01-01 PROCEDURE — 2500000003 HC RX 250 WO HCPCS: Performed by: FAMILY MEDICINE

## 2018-01-01 PROCEDURE — 6370000000 HC RX 637 (ALT 250 FOR IP): Performed by: FAMILY MEDICINE

## 2018-01-01 RX ORDER — PANTOPRAZOLE SODIUM 40 MG/1
40 TABLET, DELAYED RELEASE ORAL ONCE
Status: COMPLETED | OUTPATIENT
Start: 2018-01-01 | End: 2018-01-01

## 2018-01-01 RX ORDER — ONDANSETRON 2 MG/ML
4 INJECTION INTRAMUSCULAR; INTRAVENOUS ONCE
Status: COMPLETED | OUTPATIENT
Start: 2018-01-01 | End: 2018-01-01

## 2018-01-01 RX ADMIN — Medication 30 ML: at 00:43

## 2018-01-01 RX ADMIN — FAMOTIDINE 20 MG: 10 INJECTION INTRAVENOUS at 00:43

## 2018-01-01 RX ADMIN — ONDANSETRON 4 MG: 2 INJECTION INTRAMUSCULAR; INTRAVENOUS at 00:42

## 2018-01-01 RX ADMIN — PANTOPRAZOLE SODIUM 40 MG: 40 TABLET, DELAYED RELEASE ORAL at 00:42

## 2018-01-01 ASSESSMENT — PAIN SCALES - GENERAL: PAINLEVEL_OUTOF10: 10

## 2018-01-01 NOTE — ED NOTES
[facility] pharmacy calls. Prevacid ordered by Dr. Jewel Mckoy requires a precert per pts. Insurance. Protonix 40mg P.O. Daily ordered by Dr. Tamica Dickinson.      Lonny Markham RN  01/01/18 0289

## 2018-01-01 NOTE — ED PROVIDER NOTES
Hyperlipidemia     Hypertension     MI (myocardial infarction) 2015    Pneumonia     PTSD (post-traumatic stress disorder)     S/P CABG (coronary artery bypass graft) 10/23/15    Sleep apnea     Ventricular fibrillation ([facility] [address] 10/18/2015    x 2 DURING HEART ATTACK       SURGICAL HISTORY    Past Surgical History:   Procedure Laterality Date    CARDIAC SURGERY      10/21/2015 3 vessel CABG    CARPAL TUNNEL RELEASE      CARPAL TUNNEL RELEASE Right     [facility]  2012    [facility]    COLONOSCOPY  11/27/2017    CORONARY ARTERY BYPASS GRAFT  10/23/15    Dr. Cleve Pat      left middle finger    FRACTURE SURGERY      left wrist    JOINT REPLACEMENT      right knee    PILONIDAL CYST EXCISION      1974    PILONIDAL CYST EXCISION      [place] COLONOSCOPY W/BIOPSY SINGLE/MULTIPLE N/A 11/27/2017    COLONOSCOPY WITH BIOPSY/ polypectomy performed by Mukesh Oconnor MD at [address] [facility] EGD TRANSORAL BIOPSY SINGLE/MULTIPLE N/A 11/27/2017    EGD BIOPSY performed by Mukesh Oconnor MD at [facility]      left wrist       CURRENT MEDICATIONS    Current Outpatient Rx   Medication Sig Dispense Refill    lansoprazole (PREVACID SOLUTAB) 30 MG disintegrating tablet Take 1 tablet by mouth 4 times daily 120 tablet 0    mirtazapine (REMERON) 30 MG tablet Take 45 mg by mouth nightly      atenolol (TENORMIN) 25 MG tablet Take 25 mg by mouth daily      ondansetron (ZOFRAN ODT) 4 MG disintegrating tablet Take 1 tablet by mouth every 8 hours as needed for Nausea or Vomiting 10 tablet 0    finasteride (PROSCAR) 5 MG tablet take 1 tablet by mouth daily 30 tablet 3    ranolazine (RANEXA) 500 MG extended release tablet Take 1 tablet by mouth every 8 hours (Patient taking differently: Take 1,000 mg by mouth 2 times daily ) 90 tablet 0    nitroGLYCERIN (NITROSTAT) 0.4 MG SL tablet up to max of 3 total doses.  If no relief after 1 dose, call 911. (Patient taking differently: up to max of 3 total doses. If no relief after 1 dose, call 911.) 25 tablet 3    simvastatin (ZOCOR) 5 MG tablet Take 5 mg by mouth daily      acetaminophen (TYLENOL) 325 MG tablet Take 325 mg by mouth every 6 hours as needed      traZODone (DESYREL) 50 MG tablet Take 100 mg by mouth nightly       Cholecalciferol (VITAMIN D) 2000 UNITS CAPS capsule Take 1 capsule by mouth daily      furosemide (LASIX) 40 MG tablet Take 1 tablet by mouth daily 30 tablet 3    aspirin 81 MG tablet Take 81 mg by mouth daily      Loratadine (CLARITIN) 10 MG CAPS Take 10 mg by mouth daily.  albuterol (PROVENTIL) (2.5 MG/3ML) 0.083% nebulizer solution Take 3 mLs by nebulization 4 times daily 120 vial 1    Misc. Devices (ROLLER WALKER) MISC 1 each by Does not apply route daily 1 each 0    Respiratory Therapy Supplies (NEBULIZER/TUBING/MOUTHPIECE) KIT 1 kit by Does not apply route daily as needed (as needed) 1 kit 0    fluticasone (FLONASE) 50 MCG/ACT nasal spray 2 sprays by Nasal route daily         ALLERGIES    Allergies   Allergen Reactions    Codeine     Pcn [Penicillins]     Sulfa Antibiotics     Vicodin [Hydrocodone-Acetaminophen]        FAMILY HISTORY    Family History   Problem Relation Age of Onset    Adopted: Yes    Family history unknown: Yes       SOCIAL HISTORY    Social History     Social History    Marital status:      Spouse name: N/A    Number of children: N/A    Years of education: N/A     Social History Main Topics    Smoking status: Former Smoker     Quit date: 8/23/2006    Smokeless tobacco: Former User     Quit date: 8/1/2000    Alcohol use No    Drug use: Yes     Frequency: 1.0 time per week     Types: Marijuana      Comment: Pt smokes marijuana therapeutically.     Sexual activity: Not Asked     Other Topics Concern    None     Social History Narrative    None       REVIEW OF SYSTEMS    Constitutional:  Denies fever, Chest 9/25/2017. 2 views chest. FINDINGS: Mild emphysematous change, previous sternotomy and heart size slightly enlarged, stable. No failure, pneumonia or effusion. Previous CABG with mild cardiomegaly and emphysema, stable. PROCEDURES        Labs  Labs Reviewed   CBC WITH AUTO DIFFERENTIAL - Abnormal; Notable for the following:        Result Value    RDW 16.3 (*)     Monocytes 10 (*)     All other components within normal limits   COMPREHENSIVE METABOLIC PANEL - Abnormal; Notable for the following:     Glucose 102 (*)     Total Bilirubin 0.19 (*)     Total Protein 6.1 (*)     Alb 3.4 (*)     All other components within normal limits   LIPASE - Abnormal; Notable for the following:     Lipase 99 (*)     All other components within normal limits             Summation      Patient Course: 80-year-old male with a history of gastritis who has been out of his Prevacid for one month and was recently placed on Motrin for arthritis. He is having epigastric pain he is treated with GI cocktail Pepcid and Zofran and Protonix. This pain is relieved. He is discharged with a refill on his Prevacid to follow up with his primary care doctor. ED Medications administered this visit:    Medications   famotidine (PEPCID) injection 20 mg (20 mg Intravenous Given 1/1/18 0043)   gi cocktail 30 mL (30 mLs Oral Given 1/1/18 0043)   ondansetron (ZOFRAN) injection 4 mg (4 mg Intravenous Given 1/1/18 0042)   pantoprazole (PROTONIX) tablet 40 mg (40 mg Oral Given 1/1/18 0042)       New Prescriptions from this visit:    Discharge Medication List as of 1/1/2018  1:37 AM          Follow-up:  Vanessa Cano MD  [address] [facility]  [address] [place] [address]  [phone]    In 3 days          Final Impression:   1.  Acute gastritis, presence of bleeding unspecified, unspecified gastritis type               (Please note that portions of this note were completed with a voice recognition program.  Efforts were made to edit

## 2018-01-05 ENCOUNTER — OFFICE VISIT (OUTPATIENT)
Dept: FAMILY MEDICINE CLINIC | Age: 63
End: 2018-01-05
Payer: COMMERCIAL

## 2018-01-05 VITALS
HEIGHT: 69 IN | SYSTOLIC BLOOD PRESSURE: 136 MMHG | WEIGHT: 204 LBS | BODY MASS INDEX: 30.21 KG/M2 | DIASTOLIC BLOOD PRESSURE: 80 MMHG

## 2018-01-05 DIAGNOSIS — I25.10 ASHD (ARTERIOSCLEROTIC HEART DISEASE): Primary | ICD-10-CM

## 2018-01-05 DIAGNOSIS — K21.9 GASTROESOPHAGEAL REFLUX DISEASE WITHOUT ESOPHAGITIS: ICD-10-CM

## 2018-01-05 DIAGNOSIS — M79.89 FOOT SWELLING: ICD-10-CM

## 2018-01-05 DIAGNOSIS — F43.10 PTSD (POST-TRAUMATIC STRESS DISORDER): ICD-10-CM

## 2018-01-05 PROCEDURE — G8427 DOCREV CUR MEDS BY ELIG CLIN: HCPCS | Performed by: FAMILY MEDICINE

## 2018-01-05 PROCEDURE — 1036F TOBACCO NON-USER: CPT | Performed by: FAMILY MEDICINE

## 2018-01-05 PROCEDURE — G8484 FLU IMMUNIZE NO ADMIN: HCPCS | Performed by: FAMILY MEDICINE

## 2018-01-05 PROCEDURE — 99214 OFFICE O/P EST MOD 30 MIN: CPT | Performed by: FAMILY MEDICINE

## 2018-01-05 PROCEDURE — G8598 ASA/ANTIPLAT THER USED: HCPCS | Performed by: FAMILY MEDICINE

## 2018-01-05 PROCEDURE — 1111F DSCHRG MED/CURRENT MED MERGE: CPT | Performed by: FAMILY MEDICINE

## 2018-01-05 PROCEDURE — 3017F COLORECTAL CA SCREEN DOC REV: CPT | Performed by: FAMILY MEDICINE

## 2018-01-05 PROCEDURE — G8417 CALC BMI ABV UP PARAM F/U: HCPCS | Performed by: FAMILY MEDICINE

## 2018-01-05 ASSESSMENT — PATIENT HEALTH QUESTIONNAIRE - PHQ9
1. LITTLE INTEREST OR PLEASURE IN DOING THINGS: 0
2. FEELING DOWN, DEPRESSED OR HOPELESS: 0
SUM OF ALL RESPONSES TO PHQ9 QUESTIONS 1 & 2: 0
SUM OF ALL RESPONSES TO PHQ QUESTIONS 1-9: 0

## 2018-01-05 NOTE — PROGRESS NOTES
Loratadine (CLARITIN) 10 MG CAPS Take 10 mg by mouth daily. Historical Provider, MD        Allergies:       Codeine; Pcn [penicillins]; Sulfa antibiotics; and Vicodin [hydrocodone-acetaminophen]    Social History:     Tobacco:    reports that he quit smoking about 11 years ago. He quit smokeless tobacco use about 17 years ago. Alcohol:      reports that he does not drink alcohol. Drug Use:  reports that he uses drugs, including Marijuana, about 1 time per week. Family History:     Family History   Problem Relation Age of Onset    Adopted: Yes    Family history unknown: Yes       Review of Systems:     Positive and Negative as described in HPI    Review of Systems   Constitutional: Negative. Cardiovascular: Negative. Neurological: Negative. Physical Exam:     Vitals:  /80   Ht 5' 9\" (1.753 m)   Wt 204 lb (92.5 kg)   BMI 30.13 kg/m²   Physical Exam   Constitutional: He is oriented to person, place, and time. He appears well-developed and well-nourished. He is cooperative. HENT:   Right Ear: Hearing and tympanic membrane normal.   Left Ear: Hearing and tympanic membrane normal.   Nose: Nose normal.   Mouth/Throat: Oropharynx is clear and moist and mucous membranes are normal. Normal dentition. Eyes: Conjunctivae and EOM are normal. Pupils are equal, round, and reactive to light. Neck: No thyroid mass and no thyromegaly present. Cardiovascular: Normal rate, regular rhythm, S1 normal and S2 normal.    No murmur heard. 1+ pitting edema of left foot. No edema of right foot. Wearing athletic-type compression stockings. Left DP and PT pulses 2+. No skin lesions or color change. Pulmonary/Chest: Effort normal and breath sounds normal.   Abdominal: Soft. Bowel sounds are normal. There is no hepatosplenomegaly. There is no tenderness. Musculoskeletal:   Muscles of normal tone and bulk. Normal gait. Lymphadenopathy:     He has no cervical adenopathy.    Neurological: He is

## 2018-01-29 RX ORDER — ONDANSETRON 4 MG/1
4 TABLET, ORALLY DISINTEGRATING ORAL EVERY 8 HOURS PRN
Qty: 10 TABLET | Refills: 0 | Status: SHIPPED | OUTPATIENT
Start: 2018-01-29 | End: 2018-03-28 | Stop reason: SDUPTHER

## 2018-02-01 RX ORDER — RANOLAZINE 500 MG/1
500 TABLET, EXTENDED RELEASE ORAL 3 TIMES DAILY
Qty: 90 TABLET | Refills: 11 | Status: SHIPPED | OUTPATIENT
Start: 2018-02-01 | End: 2018-09-17 | Stop reason: SDUPTHER

## 2018-03-07 ENCOUNTER — OFFICE VISIT (OUTPATIENT)
Dept: FAMILY MEDICINE CLINIC | Age: 63
End: 2018-03-07
Payer: COMMERCIAL

## 2018-03-07 VITALS
WEIGHT: 204 LBS | SYSTOLIC BLOOD PRESSURE: 136 MMHG | BODY MASS INDEX: 30.21 KG/M2 | HEIGHT: 69 IN | DIASTOLIC BLOOD PRESSURE: 80 MMHG

## 2018-03-07 DIAGNOSIS — M25.511 ACUTE PAIN OF RIGHT SHOULDER: Primary | ICD-10-CM

## 2018-03-07 DIAGNOSIS — R05.8 PRODUCTIVE COUGH: ICD-10-CM

## 2018-03-07 PROCEDURE — 1036F TOBACCO NON-USER: CPT | Performed by: FAMILY MEDICINE

## 2018-03-07 PROCEDURE — G8417 CALC BMI ABV UP PARAM F/U: HCPCS | Performed by: FAMILY MEDICINE

## 2018-03-07 PROCEDURE — G8598 ASA/ANTIPLAT THER USED: HCPCS | Performed by: FAMILY MEDICINE

## 2018-03-07 PROCEDURE — 3017F COLORECTAL CA SCREEN DOC REV: CPT | Performed by: FAMILY MEDICINE

## 2018-03-07 PROCEDURE — 20610 DRAIN/INJ JOINT/BURSA W/O US: CPT | Performed by: FAMILY MEDICINE

## 2018-03-07 PROCEDURE — G8484 FLU IMMUNIZE NO ADMIN: HCPCS | Performed by: FAMILY MEDICINE

## 2018-03-07 PROCEDURE — G8427 DOCREV CUR MEDS BY ELIG CLIN: HCPCS | Performed by: FAMILY MEDICINE

## 2018-03-07 PROCEDURE — 99214 OFFICE O/P EST MOD 30 MIN: CPT | Performed by: FAMILY MEDICINE

## 2018-03-07 RX ORDER — TRIAMCINOLONE ACETONIDE 40 MG/ML
40 INJECTION, SUSPENSION INTRA-ARTICULAR; INTRAMUSCULAR ONCE
Status: DISCONTINUED | OUTPATIENT
Start: 2018-03-07 | End: 2018-03-07

## 2018-03-07 RX ORDER — LEVOFLOXACIN 500 MG/1
500 TABLET, FILM COATED ORAL DAILY
Qty: 7 TABLET | Refills: 0 | Status: SHIPPED | OUTPATIENT
Start: 2018-03-07 | End: 2018-03-14

## 2018-03-07 RX ORDER — TRIAMCINOLONE ACETONIDE 40 MG/ML
40 INJECTION, SUSPENSION INTRA-ARTICULAR; INTRAMUSCULAR ONCE
Status: COMPLETED | OUTPATIENT
Start: 2018-03-07 | End: 2018-03-07

## 2018-03-07 RX ORDER — FINASTERIDE 5 MG/1
5 TABLET, FILM COATED ORAL DAILY
Qty: 30 TABLET | Refills: 5 | Status: SHIPPED | OUTPATIENT
Start: 2018-03-07 | End: 2018-10-07 | Stop reason: SDUPTHER

## 2018-03-07 RX ADMIN — TRIAMCINOLONE ACETONIDE 40 MG: 40 INJECTION, SUSPENSION INTRA-ARTICULAR; INTRAMUSCULAR at 09:31

## 2018-03-07 ASSESSMENT — ENCOUNTER SYMPTOMS: GASTROINTESTINAL NEGATIVE: 1

## 2018-03-07 NOTE — LETTER
Here are instructions for you to release your VA records to your medical provider for viewing:       Patient instructions for Premium* [employer] account holders: If you are a Premium* [employer] account frankiln, we invite you to Connect Your Docs through the [employer]. Heres how to sign up today to begin securely sharing your health records:   1. To get started, go to [place] and log in.   2. [address]. 3. Scroll to the Health Records row and select VA Health Record Sharing. 4. Log in first to your account or register for an account. 5. Select Manage My Authorizations and Preferences and then select Start Authorization. 6. Accept the terms and conditions (you must click Yes to continue), then select Save and Continue. 7. Deshawn the box and select Sign. You will then be prompted to re-enter your username and password. 8. Finally, click Reauthenticate. (This next step may take a couple of minutes to process.)     If you have technical difficulties, please call [phone] for assistance. Once you have completed these steps, VA will be able to share your health data with non-VA health care providers who participate in the [employer] Exchange. With your VA health data viewable to both [place] and non-VA health care providers, clinicians are able to avoid unnecessary or duplicate tests and procedures. In an emergency, [facility]. Exchange provides clinicians access to real-time health information when and where they need it most. Also, this access reduces the need for you to request and carry paper medical records from one health care provider to another. If you or your non-VA health care provider would like to learn more about this program, go to www.va.gov/VLER.

## 2018-03-07 NOTE — PROGRESS NOTES
mouth daily 3/7/18  Yes Eston Sox, DO   levofloxacin (LEVAQUIN) 500 MG tablet Take 1 tablet by mouth daily for 7 days 3/7/18 3/14/18 Yes Eston Sox, DO   ranolazine (RANEXA) 500 MG extended release tablet Take 1 tablet by mouth three times daily 2/1/18  Yes Jose Lemus MD   ondansetron (ZOFRAN ODT) 4 MG disintegrating tablet Take 1 tablet by mouth every 8 hours as needed for Nausea or Vomiting 1/29/18  Yes Eston Sox, DO   lansoprazole (PREVACID SOLUTAB) 30 MG disintegrating tablet Take 1 tablet by mouth 4 times daily 1/1/18  Yes Meera Mendoza MD   albuterol (PROVENTIL) (2.5 MG/3ML) 0.083% nebulizer solution Take 3 mLs by nebulization 4 times daily 12/24/17  Yes Jolynn Lee MD   mirtazapine (REMERON) 30 MG tablet Take 45 mg by mouth nightly   Yes Historical Provider, MD   atenolol (TENORMIN) 25 MG tablet Take 25 mg by mouth daily   Yes Historical Provider, MD   nitroGLYCERIN (NITROSTAT) 0.4 MG SL tablet up to max of 3 total doses. If no relief after 1 dose, call 911. Patient taking differently: up to max of 3 total doses. If no relief after 1 dose, call 911. 9/25/17  Yes Meera Mendoza MD   simvastatin (ZOCOR) 5 MG tablet Take 5 mg by mouth daily   Yes Historical Provider, MD   acetaminophen (TYLENOL) 325 MG tablet Take 325 mg by mouth every 6 hours as needed   Yes Historical Provider, MD   traZODone (DESYREL) 50 MG tablet Take 100 mg by mouth nightly    Yes Historical Provider, MD   Cholecalciferol (VITAMIN D) 2000 UNITS CAPS capsule Take 1 capsule by mouth daily   Yes Historical Provider, MD   furosemide (LASIX) 40 MG tablet Take 1 tablet by mouth daily 3/21/16  Yes Armand Hong MD   aspirin 81 MG tablet Take 81 mg by mouth daily   Yes Historical Provider, MD   fluticasone (FLONASE) 50 MCG/ACT nasal spray 2 sprays by Nasal route daily   Yes Historical Provider, MD   Loratadine (CLARITIN) 10 MG CAPS Take 10 mg by mouth daily.    Yes Historical Provider, MD

## 2018-03-26 ENCOUNTER — OFFICE VISIT (OUTPATIENT)
Dept: FAMILY MEDICINE CLINIC | Age: 63
End: 2018-03-26
Payer: COMMERCIAL

## 2018-03-26 VITALS
WEIGHT: 206 LBS | DIASTOLIC BLOOD PRESSURE: 64 MMHG | BODY MASS INDEX: 30.51 KG/M2 | HEIGHT: 69 IN | SYSTOLIC BLOOD PRESSURE: 110 MMHG

## 2018-03-26 DIAGNOSIS — M79.18 PAIN OF RIGHT DELTOID: Primary | ICD-10-CM

## 2018-03-26 DIAGNOSIS — R05.8 PRODUCTIVE COUGH: ICD-10-CM

## 2018-03-26 PROCEDURE — G8417 CALC BMI ABV UP PARAM F/U: HCPCS | Performed by: FAMILY MEDICINE

## 2018-03-26 PROCEDURE — G8484 FLU IMMUNIZE NO ADMIN: HCPCS | Performed by: FAMILY MEDICINE

## 2018-03-26 PROCEDURE — 3017F COLORECTAL CA SCREEN DOC REV: CPT | Performed by: FAMILY MEDICINE

## 2018-03-26 PROCEDURE — G8598 ASA/ANTIPLAT THER USED: HCPCS | Performed by: FAMILY MEDICINE

## 2018-03-26 PROCEDURE — G8427 DOCREV CUR MEDS BY ELIG CLIN: HCPCS | Performed by: FAMILY MEDICINE

## 2018-03-26 PROCEDURE — 1036F TOBACCO NON-USER: CPT | Performed by: FAMILY MEDICINE

## 2018-03-26 PROCEDURE — 99213 OFFICE O/P EST LOW 20 MIN: CPT | Performed by: FAMILY MEDICINE

## 2018-03-26 ASSESSMENT — PATIENT HEALTH QUESTIONNAIRE - PHQ9
2. FEELING DOWN, DEPRESSED OR HOPELESS: 0
SUM OF ALL RESPONSES TO PHQ QUESTIONS 1-9: 0
SUM OF ALL RESPONSES TO PHQ9 QUESTIONS 1 & 2: 0
1. LITTLE INTEREST OR PLEASURE IN DOING THINGS: 0

## 2018-03-26 NOTE — PROGRESS NOTES
DARIO Medrano DO   lansoprazole (PREVACID SOLUTAB) 30 MG disintegrating tablet Take 1 tablet by mouth 4 times daily 1/1/18   Aguila Coreas MD   albuterol (PROVENTIL) (2.5 MG/3ML) 0.083% nebulizer solution Take 3 mLs by nebulization 4 times daily 12/24/17   Melida Hogan MD   mirtazapine (REMERON) 30 MG tablet Take 45 mg by mouth nightly    Historical Provider, MD   atenolol (TENORMIN) 25 MG tablet Take 25 mg by mouth daily    Historical Provider, MD   nitroGLYCERIN (NITROSTAT) 0.4 MG SL tablet up to max of 3 total doses. If no relief after 1 dose, call 911. Patient taking differently: up to max of 3 total doses. If no relief after 1 dose, call 911. 9/25/17   Aguila Coreas MD   simvastatin (ZOCOR) 5 MG tablet Take 5 mg by mouth daily    Historical Provider, MD   acetaminophen (TYLENOL) 325 MG tablet Take 325 mg by mouth every 6 hours as needed    Historical Provider, MD   traZODone (DESYREL) 50 MG tablet Take 100 mg by mouth nightly     Historical Provider, MD   Cholecalciferol (VITAMIN D) 2000 UNITS CAPS capsule Take 1 capsule by mouth daily    Historical Provider, MD   furosemide (LASIX) 40 MG tablet Take 1 tablet by mouth daily 3/21/16   Lisa Zhang MD   aspirin 81 MG tablet Take 81 mg by mouth daily    Historical Provider, MD   fluticasone (FLONASE) 50 MCG/ACT nasal spray 2 sprays by Nasal route daily    Historical Provider, MD   Loratadine (CLARITIN) 10 MG CAPS Take 10 mg by mouth daily. Historical Provider, MD        Allergies:       Codeine; Pcn [penicillins]; Sulfa antibiotics; and Vicodin [hydrocodone-acetaminophen]    Social History:     Tobacco:    reports that he quit smoking about 11 years ago. He quit smokeless tobacco use about 17 years ago. Alcohol:      reports that he does not drink alcohol. Drug Use:  reports that he uses drugs, including Marijuana, about 1 time per week. Family History:     Family History   Problem Relation Age of Onset    Adopted:  Yes    Family

## 2018-03-26 NOTE — PATIENT INSTRUCTIONS
SURVEY:    You may be receiving a survey from [employer] regarding your visit today. Please complete the survey to enable us to provide the highest quality of care to you and your family. If you cannot score us as very good on any question, please call the office to discuss how we could have made your experience exceptional.     Thank you.

## 2018-03-28 ENCOUNTER — TELEPHONE (OUTPATIENT)
Dept: FAMILY MEDICINE CLINIC | Age: 63
End: 2018-03-28

## 2018-03-28 RX ORDER — ONDANSETRON 4 MG/1
4 TABLET, ORALLY DISINTEGRATING ORAL EVERY 8 HOURS PRN
Qty: 10 TABLET | Refills: 0 | Status: SHIPPED | OUTPATIENT
Start: 2018-03-28 | End: 2018-05-16 | Stop reason: SDUPTHER

## 2018-03-28 NOTE — TELEPHONE ENCOUNTER
disorder)     Angina effort ([facility] [address]     Unstable angina pectoris (HCC)     Abnormal cardiovascular stress test     Chest pain     History of coronary artery bypass surgery     Obstructive apnea     Myocardial infarction     Hypertension     Hyperlipidemia     Chronic obstructive pulmonary disease ([facility] [address]     Change in bowel habits     Heme positive stool     COPD exacerbation ([facility] [address]

## 2018-05-02 ENCOUNTER — OFFICE VISIT (OUTPATIENT)
Dept: FAMILY MEDICINE CLINIC | Age: 63
End: 2018-05-02
Payer: COMMERCIAL

## 2018-05-02 VITALS
SYSTOLIC BLOOD PRESSURE: 110 MMHG | BODY MASS INDEX: 29.92 KG/M2 | HEIGHT: 69 IN | WEIGHT: 202 LBS | DIASTOLIC BLOOD PRESSURE: 64 MMHG

## 2018-05-02 DIAGNOSIS — M54.12 CERVICAL RADICULOPATHY: Primary | ICD-10-CM

## 2018-05-02 DIAGNOSIS — J44.9 CHRONIC OBSTRUCTIVE PULMONARY DISEASE, UNSPECIFIED COPD TYPE (HCC): ICD-10-CM

## 2018-05-02 PROCEDURE — G8926 SPIRO NO PERF OR DOC: HCPCS | Performed by: FAMILY MEDICINE

## 2018-05-02 PROCEDURE — G8427 DOCREV CUR MEDS BY ELIG CLIN: HCPCS | Performed by: FAMILY MEDICINE

## 2018-05-02 PROCEDURE — 3023F SPIROM DOC REV: CPT | Performed by: FAMILY MEDICINE

## 2018-05-02 PROCEDURE — G8598 ASA/ANTIPLAT THER USED: HCPCS | Performed by: FAMILY MEDICINE

## 2018-05-02 PROCEDURE — 3017F COLORECTAL CA SCREEN DOC REV: CPT | Performed by: FAMILY MEDICINE

## 2018-05-02 PROCEDURE — G8417 CALC BMI ABV UP PARAM F/U: HCPCS | Performed by: FAMILY MEDICINE

## 2018-05-02 PROCEDURE — 1036F TOBACCO NON-USER: CPT | Performed by: FAMILY MEDICINE

## 2018-05-02 PROCEDURE — 99213 OFFICE O/P EST LOW 20 MIN: CPT | Performed by: FAMILY MEDICINE

## 2018-05-02 ASSESSMENT — PATIENT HEALTH QUESTIONNAIRE - PHQ9
1. LITTLE INTEREST OR PLEASURE IN DOING THINGS: 0
2. FEELING DOWN, DEPRESSED OR HOPELESS: 0
SUM OF ALL RESPONSES TO PHQ QUESTIONS 1-9: 0
SUM OF ALL RESPONSES TO PHQ9 QUESTIONS 1 & 2: 0

## 2018-05-06 ASSESSMENT — ENCOUNTER SYMPTOMS: RESPIRATORY NEGATIVE: 1

## 2018-05-10 ENCOUNTER — HOSPITAL ENCOUNTER (OUTPATIENT)
Dept: MRI IMAGING | Age: 63
Discharge: HOME | End: 2018-05-12
Payer: COMMERCIAL

## 2018-05-10 DIAGNOSIS — M54.12 CERVICAL RADICULOPATHY: ICD-10-CM

## 2018-05-10 PROCEDURE — 72141 MRI NECK SPINE W/O DYE: CPT

## 2018-05-11 ENCOUNTER — TELEPHONE (OUTPATIENT)
Dept: FAMILY MEDICINE CLINIC | Age: 63
End: 2018-05-11

## 2018-05-11 DIAGNOSIS — M48.02 CERVICAL STENOSIS OF SPINAL CANAL: Primary | ICD-10-CM

## 2018-05-11 DIAGNOSIS — G95.9 CERVICAL MYELOPATHY (HCC): ICD-10-CM

## 2018-05-16 RX ORDER — ONDANSETRON 4 MG/1
4 TABLET, ORALLY DISINTEGRATING ORAL EVERY 8 HOURS PRN
Qty: 10 TABLET | Refills: 1 | Status: SHIPPED | OUTPATIENT
Start: 2018-05-16 | End: 2018-08-06 | Stop reason: SDUPTHER

## 2018-06-08 RX ORDER — MIRTAZAPINE 30 MG/1
45 TABLET, FILM COATED ORAL NIGHTLY
Qty: 30 TABLET | Refills: 0 | Status: SHIPPED | OUTPATIENT
Start: 2018-06-08 | End: 2019-03-04 | Stop reason: SDUPTHER

## 2018-07-30 RX ORDER — ALBUTEROL SULFATE 2.5 MG/3ML
SOLUTION RESPIRATORY (INHALATION)
Qty: 360 ML | Refills: 1 | Status: ON HOLD | OUTPATIENT
Start: 2018-07-30 | End: 2019-04-23

## 2018-08-06 RX ORDER — ONDANSETRON 4 MG/1
4 TABLET, ORALLY DISINTEGRATING ORAL EVERY 8 HOURS PRN
Qty: 10 TABLET | Refills: 1 | Status: SHIPPED | OUTPATIENT
Start: 2018-08-06 | End: 2018-10-16 | Stop reason: ALTCHOICE

## 2018-08-06 NOTE — TELEPHONE ENCOUNTER
Patient asking for a new script for Zofran for nausea - patient uses [facility] Maintenance   Topic Date Due    Hepatitis C screen  1955    HIV screen  05/13/1970    DTaP/Tdap/Td vaccine (1 - Tdap) 05/13/1974    Pneumococcal med risk (1 of 1 - PPSV23) 05/13/1974    Diabetes screen  05/13/1995    Shingles Vaccine (1 of 2 - 2 Dose Series) 05/13/2005    Flu vaccine (1) 09/01/2018    Lipid screen  07/05/2022    Colon cancer screen colonoscopy  11/27/2027             (applicable per patient's age: Cancer Screenings, Depression Screening, Fall Risk Screening, Immunizations)    LDL Cholesterol (mg/dL)   Date Value   07/05/2017 73     AST (U/L)   Date Value   01/01/2018 13     ALT (U/L)   Date Value   01/01/2018 14     BUN (mg/dL)   Date Value   01/01/2018 11      (goal A1C is < 7)   (goal LDL is <100) need 30-50% reduction from baseline     BP Readings from Last 3 Encounters:   05/02/18 110/64   03/26/18 110/64   03/07/18 136/80    (goal /80)      All Future Testing planned in [facility]:  Lab Frequency Next Occurrence   CBC Auto Differential Once 10/16/2018   TSH with Reflex Once 10/16/2018   Comprehensive Metabolic Panel Once 45/51/3636   Vitamin D 25 Hydroxy Once 10/16/2018   Lipid Panel Once 10/16/2018   Magnesium Once 10/16/2018   EKG 12 Lead Once 10/16/2018   XR CHEST STANDARD (2 VW) Once 10/16/2018       Next Visit Date:  Future Appointments  Date Time Provider April Cutler   8/22/2018 9:20 AM DO Rima Suarez [facility]   10/8/2018 1:00 PM Jim Schwartz MD Luis Pollen W            Patient Active Problem List:     ASHD (arteriosclerotic heart disease)     PTSD (post-traumatic stress disorder)     Angina effort ([address]     Unstable angina pectoris (HCC)     Abnormal cardiovascular stress test     Chest pain     History of coronary artery bypass surgery     Obstructive apnea     Myocardial infarction [facility])     Hypertension     Hyperlipidemia     Chronic obstructive

## 2018-08-27 ENCOUNTER — TELEPHONE (OUTPATIENT)
Dept: CARDIOLOGY CLINIC | Age: 63
End: 2018-08-27

## 2018-08-27 NOTE — LETTER
[address] [facility] CARDIOLOGY SPECIALIST  [address] [address]  Dept: [phone]  Dept Fax: [phone]          8/27/18      To Whom it May Concern: In my opinion, from a cardiology standpoint, Natividad Montgomery is currently taking  Ranexa 500 mg three times a day for Angina. If you have any questions, please feel free to call me at [phone].     Sincerely,          Lulú Huffman M.D.

## 2018-08-31 ENCOUNTER — OFFICE VISIT (OUTPATIENT)
Dept: FAMILY MEDICINE CLINIC | Age: 63
End: 2018-08-31
Payer: COMMERCIAL

## 2018-08-31 VITALS
WEIGHT: 212 LBS | HEIGHT: 69 IN | SYSTOLIC BLOOD PRESSURE: 110 MMHG | BODY MASS INDEX: 31.4 KG/M2 | DIASTOLIC BLOOD PRESSURE: 64 MMHG

## 2018-08-31 DIAGNOSIS — S68.119S TRAUMATIC AMPUTATION OF FINGER WITHOUT COMPLICATION, SEQUELA (HCC): ICD-10-CM

## 2018-08-31 DIAGNOSIS — M79.89 LEG SWELLING: Primary | ICD-10-CM

## 2018-08-31 DIAGNOSIS — R39.11 URINARY HESITANCY: ICD-10-CM

## 2018-08-31 DIAGNOSIS — G95.9 CERVICAL MYELOPATHY (HCC): ICD-10-CM

## 2018-08-31 DIAGNOSIS — K21.9 GASTROESOPHAGEAL REFLUX DISEASE WITHOUT ESOPHAGITIS: ICD-10-CM

## 2018-08-31 DIAGNOSIS — R19.7 INTERMITTENT DIARRHEA: ICD-10-CM

## 2018-08-31 PROCEDURE — G8427 DOCREV CUR MEDS BY ELIG CLIN: HCPCS | Performed by: FAMILY MEDICINE

## 2018-08-31 PROCEDURE — G8598 ASA/ANTIPLAT THER USED: HCPCS | Performed by: FAMILY MEDICINE

## 2018-08-31 PROCEDURE — 1036F TOBACCO NON-USER: CPT | Performed by: FAMILY MEDICINE

## 2018-08-31 PROCEDURE — 99214 OFFICE O/P EST MOD 30 MIN: CPT | Performed by: FAMILY MEDICINE

## 2018-08-31 PROCEDURE — G8417 CALC BMI ABV UP PARAM F/U: HCPCS | Performed by: FAMILY MEDICINE

## 2018-08-31 PROCEDURE — 3017F COLORECTAL CA SCREEN DOC REV: CPT | Performed by: FAMILY MEDICINE

## 2018-08-31 RX ORDER — FINASTERIDE 5 MG/1
5 TABLET, FILM COATED ORAL DAILY
Qty: 30 TABLET | Refills: 5 | Status: CANCELLED | OUTPATIENT
Start: 2018-08-31

## 2018-08-31 RX ORDER — TERAZOSIN 2 MG/1
2 CAPSULE ORAL NIGHTLY
Qty: 30 CAPSULE | Refills: 3 | Status: SHIPPED | OUTPATIENT
Start: 2018-08-31 | End: 2019-02-08 | Stop reason: SDUPTHER

## 2018-08-31 ASSESSMENT — PATIENT HEALTH QUESTIONNAIRE - PHQ9
SUM OF ALL RESPONSES TO PHQ9 QUESTIONS 1 & 2: 0
SUM OF ALL RESPONSES TO PHQ QUESTIONS 1-9: 0
SUM OF ALL RESPONSES TO PHQ QUESTIONS 1-9: 0
1. LITTLE INTEREST OR PLEASURE IN DOING THINGS: 0
2. FEELING DOWN, DEPRESSED OR HOPELESS: 0

## 2018-08-31 NOTE — PATIENT INSTRUCTIONS
SURVEY:    You may be receiving a survey from [employer] regarding your visit today. Please complete the survey to enable us to provide the highest quality of care to you and your family. If you cannot score us as very good on any question, please call the office to discuss how we could have made your experience exceptional.     Thank you.

## 2018-08-31 NOTE — PROGRESS NOTES
Name: Claire Dewitt  : 1955         Chief Complaint:     Chief Complaint   Patient presents with    Hypertension    Hyperlipidemia    COPD       History of Present Illness:      Claire Dewitt is a 61 y.o.  male who presents with Hypertension; Hyperlipidemia; and COPD      HPI     ann leg and foot swelling. Staying in a hotel right now, moved out of appt because door couldn't lock anymore, then later also found out there was black mold in the apartment. Hopefully new place in about 2 wks. In the hotel only has a fridge and microwave, limited cooking and admits too much sodium. Not very active which has also contributed to leg swelling. Will ride bike today. States compliance with lasix 40 mg daily. C/o stomach trouble. Diarrhea for a couple days and then goes away. Stomach pills help. Pt and  trying to get VA to pay for testing to see what his gastro problems are since it affects his disability pay. Intermittent bad abd pain at times, which he thinks he could be d/t water purification kits used out in field or contaminated water at [place]. Problems started 2-3 yrs after discharge from [facility]. Prevacid helps. Memory lapses which have gotten worse over the yrs and he r/t PTSD and  exposures. R deltoid spasms and pain. Missed NS appt but is still having radicular symptoms in RUE, weakness, pain that woke him from sleep this morning. Numbness at times. Urination changed, on proscar daily found self unable to urinate about 6 wks ago, so he cut back to taking it twice a wk and now is doing better. Loves water, drinks a lot of it. Had nausea with flomax in the past and burning with urination. Doesn't think he's ever been on prazosin for nightmare or any other similar med. L middle finger traumatically amputated in the 1970s. For a long time would have a spicule grow out of the center, would cut it off and it would bleed, but that doesn't happen anymore.      Past Medical FRACTURE SURGERY      left wrist    JOINT REPLACEMENT      right knee    PILONIDAL CYST EXCISION      1974    PILONIDAL CYST EXCISION      [place] COLONOSCOPY W/BIOPSY SINGLE/MULTIPLE N/A 11/27/2017    COLONOSCOPY WITH BIOPSY/ polypectomy performed by Henry Mistry MD at [address] [facility] EGD TRANSORAL BIOPSY SINGLE/MULTIPLE N/A 11/27/2017    EGD BIOPSY performed by Henry Mistry MD at [facility]      left wrist        Medications:       Prior to Admission medications    Medication Sig Start Date End Date Taking? Authorizing Provider   terazosin (HYTRIN) 2 MG capsule Take 1 capsule by mouth nightly 8/31/18  Yes Ally Almeida DO   ondansetron (ZOFRAN ODT) 4 MG disintegrating tablet Take 1 tablet by mouth every 8 hours as needed for Nausea or Vomiting 8/6/18  Yes Neftaly Sharma MD   albuterol (PROVENTIL) (2.5 MG/3ML) 0.083% nebulizer solution inhale contents of 1 vial in nebulizer four times a day 7/30/18  Yes Ally Almeida DO   mirtazapine (REMERON) 30 MG tablet Take 1.5 tablets by mouth nightly 6/8/18  Yes Ally Almeida DO   finasteride (PROSCAR) 5 MG tablet Take 1 tablet by mouth daily 3/7/18  Yes Ally Almeida DO   ranolazine (RANEXA) 500 MG extended release tablet Take 1 tablet by mouth three times daily 2/1/18  Yes Chelle Lopez MD   lansoprazole (PREVACID SOLUTAB) 30 MG disintegrating tablet Take 1 tablet by mouth 4 times daily 1/1/18  Yes Mariya Johnson MD   atenolol (TENORMIN) 25 MG tablet Take 25 mg by mouth daily   Yes Historical Provider, MD   nitroGLYCERIN (NITROSTAT) 0.4 MG SL tablet up to max of 3 total doses. If no relief after 1 dose, call 911. Patient taking differently: up to max of 3 total doses.  If no relief after 1 dose, call 911. 9/25/17  Yes Mariya Johnson MD   simvastatin (ZOCOR) 5 MG tablet Take 5 mg by mouth daily   Yes Historical Provider, MD   acetaminophen (TYLENOL) 325 MG tablet Take 325 mg by mouth every 6 hours as needed   Yes Historical Provider, MD   traZODone (DESYREL) 50 MG tablet Take 100 mg by mouth nightly    Yes Historical Provider, MD   Cholecalciferol (VITAMIN D) 2000 UNITS CAPS capsule Take 1 capsule by mouth daily   Yes Historical Provider, MD   furosemide (LASIX) 40 MG tablet Take 1 tablet by mouth daily 3/21/16  Yes Dahlia Gilmore MD   aspirin 81 MG tablet Take 81 mg by mouth daily   Yes Historical Provider, MD   fluticasone (FLONASE) 50 MCG/ACT nasal spray 2 sprays by Nasal route daily   Yes Historical Provider, MD   Loratadine (CLARITIN) 10 MG CAPS Take 10 mg by mouth daily. Yes Historical Provider, MD        Allergies:       Codeine; Pcn [penicillins]; Sulfa antibiotics; and Vicodin [hydrocodone-acetaminophen]    Social History:     Tobacco:    reports that he quit smoking about 12 years ago. His smoking use included Cigarettes. He has a 19.00 pack-year smoking history. He quit smokeless tobacco use about 18 years ago. Alcohol:      reports that he does not drink alcohol. Drug Use:  reports that he uses drugs, including Marijuana, about 1 time per week. Family History:     Family History   Problem Relation Age of Onset    Adopted: Yes    Family history unknown: Yes       Review of Systems:     Positive and Negative as described in HPI    Review of Systems   Constitutional: Negative. HENT: Negative. Respiratory: Negative. Physical Exam:     Vitals:  /64   Ht 5' 9\" (1.753 m)   Wt 212 lb (96.2 kg)   BMI 31.31 kg/m²   Physical Exam   Constitutional: He is oriented to person, place, and time. He appears well-developed and well-nourished. No distress. Eyes: Conjunctivae and EOM are normal.   Cardiovascular: Normal rate, regular rhythm, normal heart sounds and intact distal pulses. 1+ pitting edema ann distal legs and ankles   Pulmonary/Chest: Effort normal and breath sounds normal.   Abdominal: Soft. Bowel sounds are normal. He exhibits no distension. There is no tenderness. 9/3/2018 at 7:55 PM   [facility]  [facility] PRIMARY CARE [address] [phone]  Dept: [phone]

## 2018-09-03 ASSESSMENT — ENCOUNTER SYMPTOMS: RESPIRATORY NEGATIVE: 1

## 2018-09-17 ENCOUNTER — TELEPHONE (OUTPATIENT)
Dept: CARDIOLOGY CLINIC | Age: 63
End: 2018-09-17

## 2018-09-17 RX ORDER — RANOLAZINE 500 MG/1
500 TABLET, EXTENDED RELEASE ORAL 3 TIMES DAILY
Qty: 90 TABLET | Refills: 11 | Status: SHIPPED | OUTPATIENT
Start: 2018-09-17 | End: 2018-10-31 | Stop reason: DRUGHIGH

## 2018-09-17 NOTE — TELEPHONE ENCOUNTER
[facility] called stating patients prescription that was called in, in February is now 7 months old and they need a script called in for Renexa 500MG 3 times daily. Script sent to pharmacy.

## 2018-09-18 ENCOUNTER — TELEPHONE (OUTPATIENT)
Dept: FAMILY MEDICINE CLINIC | Age: 63
End: 2018-09-18

## 2018-10-08 RX ORDER — FINASTERIDE 5 MG/1
TABLET, FILM COATED ORAL
Qty: 30 TABLET | Refills: 5 | Status: SHIPPED | OUTPATIENT
Start: 2018-10-08 | End: 2018-10-16 | Stop reason: ALTCHOICE

## 2018-10-12 ENCOUNTER — TELEPHONE (OUTPATIENT)
Dept: FAMILY MEDICINE CLINIC | Age: 63
End: 2018-10-12

## 2018-10-12 DIAGNOSIS — J44.1 COPD EXACERBATION (HCC): Primary | ICD-10-CM

## 2018-10-12 DIAGNOSIS — J44.1 COPD EXACERBATION (HCC): ICD-10-CM

## 2018-10-12 RX ORDER — NEBULIZER ACCESSORIES
KIT MISCELLANEOUS
Qty: 1 KIT | Refills: 0 | Status: ON HOLD | OUTPATIENT
Start: 2018-10-12 | End: 2019-04-23

## 2018-10-12 RX ORDER — NEBULIZER ACCESSORIES
KIT MISCELLANEOUS
Qty: 1 KIT | Refills: 0 | Status: SHIPPED | OUTPATIENT
Start: 2018-10-12 | End: 2018-10-12 | Stop reason: SDUPTHER

## 2018-10-15 ENCOUNTER — TELEPHONE (OUTPATIENT)
Dept: FAMILY MEDICINE CLINIC | Age: 63
End: 2018-10-15

## 2018-10-16 ENCOUNTER — HOSPITAL ENCOUNTER (EMERGENCY)
Age: 63
Discharge: ANOTHER HOSPITAL | End: 2018-10-16
Attending: FAMILY MEDICINE
Payer: COMMERCIAL

## 2018-10-16 ENCOUNTER — APPOINTMENT (OUTPATIENT)
Dept: GENERAL RADIOLOGY | Age: 63
End: 2018-10-16
Payer: COMMERCIAL

## 2018-10-16 VITALS
WEIGHT: 200 LBS | OXYGEN SATURATION: 93 % | HEIGHT: 69 IN | HEART RATE: 65 BPM | TEMPERATURE: 98.5 F | RESPIRATION RATE: 17 BRPM | BODY MASS INDEX: 29.62 KG/M2 | DIASTOLIC BLOOD PRESSURE: 64 MMHG | SYSTOLIC BLOOD PRESSURE: 123 MMHG

## 2018-10-16 DIAGNOSIS — I20.0 UNSTABLE ANGINA (HCC): Primary | ICD-10-CM

## 2018-10-16 LAB
ABSOLUTE EOS #: 0.1 K/UL (ref 0–0.4)
ABSOLUTE IMMATURE GRANULOCYTE: NORMAL K/UL (ref 0–0.3)
ABSOLUTE LYMPH #: 1 K/UL (ref 1–4.8)
ABSOLUTE MONO #: 0.4 K/UL (ref 0–1)
ANION GAP SERPL CALCULATED.3IONS-SCNC: 14 MMOL/L (ref 9–17)
BASOPHILS # BLD: 0 % (ref 0–2)
BASOPHILS ABSOLUTE: 0 K/UL (ref 0–0.2)
BNP INTERPRETATION: NORMAL
BUN BLDV-MCNC: 15 MG/DL (ref 8–23)
BUN/CREAT BLD: 18 (ref 9–20)
CALCIUM SERPL-MCNC: 9.8 MG/DL (ref 8.6–10.4)
CHLORIDE BLD-SCNC: 98 MMOL/L (ref 98–107)
CO2: 26 MMOL/L (ref 20–31)
CREAT SERPL-MCNC: 0.85 MG/DL (ref 0.7–1.2)
DIFFERENTIAL TYPE: YES
EOSINOPHILS RELATIVE PERCENT: 2 % (ref 0–5)
GFR AFRICAN AMERICAN: >60 ML/MIN
GFR NON-AFRICAN AMERICAN: >60 ML/MIN
GFR SERPL CREATININE-BSD FRML MDRD: NORMAL ML/MIN/{1.73_M2}
GFR SERPL CREATININE-BSD FRML MDRD: NORMAL ML/MIN/{1.73_M2}
GLUCOSE BLD-MCNC: 87 MG/DL (ref 70–99)
HCT VFR BLD CALC: 48.7 % (ref 41–53)
HEMOGLOBIN: 15.7 G/DL (ref 13.5–17.5)
IMMATURE GRANULOCYTES: NORMAL %
INR BLD: 1.1
LYMPHOCYTES # BLD: 17 % (ref 13–44)
MCH RBC QN AUTO: 29.5 PG (ref 26–34)
MCHC RBC AUTO-ENTMCNC: 32.3 G/DL (ref 31–37)
MCV RBC AUTO: 91.3 FL (ref 80–100)
MONOCYTES # BLD: 7 % (ref 5–9)
NRBC AUTOMATED: NORMAL PER 100 WBC
PDW BLD-RTO: 15 % (ref 12.1–15.2)
PLATELET # BLD: 209 K/UL (ref 140–450)
PLATELET ESTIMATE: NORMAL
PMV BLD AUTO: NORMAL FL (ref 6–12)
POTASSIUM SERPL-SCNC: 3.9 MMOL/L (ref 3.7–5.3)
PRO-BNP: 128 PG/ML
PROTHROMBIN TIME: 10.4 SEC (ref 9–11.6)
RBC # BLD: 5.34 M/UL (ref 4.5–5.9)
RBC # BLD: NORMAL 10*6/UL
SEG NEUTROPHILS: 74 % (ref 39–75)
SEGMENTED NEUTROPHILS ABSOLUTE COUNT: 4.6 K/UL (ref 2.1–6.5)
SODIUM BLD-SCNC: 138 MMOL/L (ref 135–144)
TROPONIN INTERP: NORMAL
TROPONIN INTERP: NORMAL
TROPONIN T: <0.03 NG/ML
TROPONIN T: <0.03 NG/ML
WBC # BLD: 6.2 K/UL (ref 3.5–11)
WBC # BLD: NORMAL 10*3/UL

## 2018-10-16 PROCEDURE — 99285 EMERGENCY DEPT VISIT HI MDM: CPT

## 2018-10-16 PROCEDURE — 96372 THER/PROPH/DIAG INJ SC/IM: CPT

## 2018-10-16 PROCEDURE — 6370000000 HC RX 637 (ALT 250 FOR IP): Performed by: FAMILY MEDICINE

## 2018-10-16 PROCEDURE — 94640 AIRWAY INHALATION TREATMENT: CPT

## 2018-10-16 PROCEDURE — 80048 BASIC METABOLIC PNL TOTAL CA: CPT

## 2018-10-16 PROCEDURE — 83880 ASSAY OF NATRIURETIC PEPTIDE: CPT

## 2018-10-16 PROCEDURE — 96374 THER/PROPH/DIAG INJ IV PUSH: CPT

## 2018-10-16 PROCEDURE — 84484 ASSAY OF TROPONIN QUANT: CPT

## 2018-10-16 PROCEDURE — 85610 PROTHROMBIN TIME: CPT

## 2018-10-16 PROCEDURE — 6360000002 HC RX W HCPCS: Performed by: FAMILY MEDICINE

## 2018-10-16 PROCEDURE — 71045 X-RAY EXAM CHEST 1 VIEW: CPT

## 2018-10-16 PROCEDURE — 93005 ELECTROCARDIOGRAM TRACING: CPT

## 2018-10-16 PROCEDURE — 36415 COLL VENOUS BLD VENIPUNCTURE: CPT

## 2018-10-16 PROCEDURE — 85025 COMPLETE CBC W/AUTO DIFF WBC: CPT

## 2018-10-16 RX ORDER — AMOXICILLIN 250 MG
1 CAPSULE ORAL 2 TIMES DAILY PRN
COMMUNITY
End: 2019-03-05 | Stop reason: SDUPTHER

## 2018-10-16 RX ORDER — GABAPENTIN 300 MG/1
900 CAPSULE ORAL 3 TIMES DAILY PRN
Status: ON HOLD | COMMUNITY
End: 2019-04-23

## 2018-10-16 RX ORDER — ONDANSETRON 2 MG/ML
4 INJECTION INTRAMUSCULAR; INTRAVENOUS ONCE
Status: COMPLETED | OUTPATIENT
Start: 2018-10-16 | End: 2018-10-16

## 2018-10-16 RX ORDER — IBUPROFEN 600 MG/1
600 TABLET ORAL EVERY 8 HOURS PRN
COMMUNITY
End: 2019-05-11 | Stop reason: ALTCHOICE

## 2018-10-16 RX ORDER — IPRATROPIUM BROMIDE AND ALBUTEROL SULFATE 2.5; .5 MG/3ML; MG/3ML
1 SOLUTION RESPIRATORY (INHALATION) ONCE
Status: COMPLETED | OUTPATIENT
Start: 2018-10-16 | End: 2018-10-16

## 2018-10-16 RX ADMIN — ENOXAPARIN SODIUM 90 MG: 100 INJECTION SUBCUTANEOUS at 15:36

## 2018-10-16 RX ADMIN — ONDANSETRON 4 MG: 2 INJECTION INTRAMUSCULAR; INTRAVENOUS at 12:15

## 2018-10-16 RX ADMIN — IPRATROPIUM BROMIDE AND ALBUTEROL SULFATE 1 AMPULE: .5; 3 SOLUTION RESPIRATORY (INHALATION) at 16:49

## 2018-10-16 ASSESSMENT — PAIN SCALES - GENERAL: PAINLEVEL_OUTOF10: 8

## 2018-10-16 ASSESSMENT — PAIN DESCRIPTION - LOCATION: LOCATION: CHEST

## 2018-10-16 ASSESSMENT — PAIN DESCRIPTION - PAIN TYPE: TYPE: ACUTE PAIN

## 2018-10-17 LAB
EKG ATRIAL RATE: 60 BPM
EKG ATRIAL RATE: 66 BPM
EKG P AXIS: 21 DEGREES
EKG P AXIS: 35 DEGREES
EKG P-R INTERVAL: 206 MS
EKG P-R INTERVAL: 210 MS
EKG Q-T INTERVAL: 434 MS
EKG Q-T INTERVAL: 448 MS
EKG QRS DURATION: 82 MS
EKG QRS DURATION: 86 MS
EKG QTC CALCULATION (BAZETT): 448 MS
EKG QTC CALCULATION (BAZETT): 454 MS
EKG R AXIS: 12 DEGREES
EKG R AXIS: 13 DEGREES
EKG T AXIS: -3 DEGREES
EKG VENTRICULAR RATE: 60 BPM
EKG VENTRICULAR RATE: 66 BPM

## 2018-10-23 RX ORDER — ONDANSETRON 4 MG/1
4 TABLET, ORALLY DISINTEGRATING ORAL EVERY 8 HOURS PRN
Qty: 10 TABLET | Refills: 1 | Status: SHIPPED | OUTPATIENT
Start: 2018-10-23 | End: 2019-03-14 | Stop reason: SDUPTHER

## 2018-10-30 ENCOUNTER — OFFICE VISIT (OUTPATIENT)
Dept: FAMILY MEDICINE CLINIC | Age: 63
End: 2018-10-30
Payer: COMMERCIAL

## 2018-10-30 VITALS
WEIGHT: 208 LBS | SYSTOLIC BLOOD PRESSURE: 136 MMHG | DIASTOLIC BLOOD PRESSURE: 80 MMHG | HEIGHT: 69 IN | BODY MASS INDEX: 30.81 KG/M2

## 2018-10-30 DIAGNOSIS — R07.89 CHEST WALL PAIN: ICD-10-CM

## 2018-10-30 DIAGNOSIS — I20.9 ANGINA PECTORIS (HCC): Primary | ICD-10-CM

## 2018-10-30 DIAGNOSIS — Z09 HOSPITAL DISCHARGE FOLLOW-UP: ICD-10-CM

## 2018-10-30 DIAGNOSIS — M48.02 CERVICAL STENOSIS OF SPINE: ICD-10-CM

## 2018-10-30 DIAGNOSIS — M54.12 CHRONIC CERVICAL RADICULOPATHY: ICD-10-CM

## 2018-10-30 PROCEDURE — G8427 DOCREV CUR MEDS BY ELIG CLIN: HCPCS | Performed by: FAMILY MEDICINE

## 2018-10-30 PROCEDURE — 3017F COLORECTAL CA SCREEN DOC REV: CPT | Performed by: FAMILY MEDICINE

## 2018-10-30 PROCEDURE — G8484 FLU IMMUNIZE NO ADMIN: HCPCS | Performed by: FAMILY MEDICINE

## 2018-10-30 PROCEDURE — G8598 ASA/ANTIPLAT THER USED: HCPCS | Performed by: FAMILY MEDICINE

## 2018-10-30 PROCEDURE — 1036F TOBACCO NON-USER: CPT | Performed by: FAMILY MEDICINE

## 2018-10-30 PROCEDURE — 99214 OFFICE O/P EST MOD 30 MIN: CPT | Performed by: FAMILY MEDICINE

## 2018-10-30 PROCEDURE — G8417 CALC BMI ABV UP PARAM F/U: HCPCS | Performed by: FAMILY MEDICINE

## 2018-10-30 RX ORDER — ATORVASTATIN CALCIUM 20 MG/1
TABLET, FILM COATED ORAL
Refills: 0 | COMMUNITY
Start: 2018-10-17 | End: 2018-12-06 | Stop reason: SDUPTHER

## 2018-10-30 ASSESSMENT — PATIENT HEALTH QUESTIONNAIRE - PHQ9
SUM OF ALL RESPONSES TO PHQ QUESTIONS 1-9: 0
1. LITTLE INTEREST OR PLEASURE IN DOING THINGS: 0
SUM OF ALL RESPONSES TO PHQ9 QUESTIONS 1 & 2: 0
2. FEELING DOWN, DEPRESSED OR HOPELESS: 0
SUM OF ALL RESPONSES TO PHQ QUESTIONS 1-9: 0

## 2018-10-30 NOTE — PROGRESS NOTES
Name: Rupali Kohler  : 1955         Chief Complaint:     Chief Complaint   Patient presents with    Follow-Up from Hospital    Chest Pain       History of Present Illness:      Rupali Kohler is a 61 y.o.  male who presents with Follow-Up from Hospital and Chest Pain      HPI     F/u from hospitalization at [facility] 10/[facility]- for chest pain. Pain had felt like a bad toothache, then once in a while sharp twinges, would last a minute or two and then go away. Still has a bad \"numbing feeling\" and tightness just to L of sternum. Worse with getting upset. Finds also that he gets tired out and sore if he rides bike too long. Can ride for about 5 minutes, walk for a few minutes, then able to get back on the bike again. Trouble for about the past 6 wks. Pt wonders if some of his trouble may be d/t gas leak in the hotel where he's been staying. Does have a tender spot on the L chest but it doesn't reproduce the exertional pain he gets. Once in a while sharp pain L midaxillary line where he says he had \"a pericardial rub\" years ago. Pain does bother him during the night. On ranexa 500 mg TID (says he had previously been on QID). Still having trouble with neck. Can't get transportation to [place] but probably can get a ride to [place].      Past Medical History:     Past Medical History:   Diagnosis Date    Arthritis     WENDY KNEE    Bronchitis with chronic airway obstruction (HCC)     CAD (coronary artery disease)     CHF (congestive heart failure) (HCC)     CTS (carpal tunnel syndrome)     RIGHT    Diabetes mellitus ([facility]     Diabetic neuropathy associated with diabetes mellitus due to underlying condition ([facility]     Exposure to toxic chemical     [employer]-toxic water exposure    GERD (gastroesophageal reflux disease)     H/O cardiovascular stress test 2016    Abnormal.  Moderate perfusion defect of mild to moderate intensity in the inferolateral,inferior and 10/30/18  Yes Radha Riveras, DO   ranolazine (RANEXA) 1000 MG extended release tablet Take 1 tablet by mouth 2 times daily 10/31/18   Radha Riveras, DO   atorvastatin (LIPITOR) 20 MG tablet TAKE 1 TABLET BY MOUTH EVERY DAY 10/17/18   Historical Provider, MD   Respiratory Therapy Supplies (FULL KIT NEBULIZER SET) MISC use EVERY 4 HOURS AS NEEDED for shortness of breath 10/15/18   Historical Provider, MD   ondansetron (ZOFRAN ODT) 4 MG disintegrating tablet Take 1 tablet by mouth every 8 hours as needed for Nausea or Vomiting 10/23/18   Radha Riveras, DO   ibuprofen (ADVIL;MOTRIN) 600 MG tablet Take 600 mg by mouth every 8 hours as needed for Pain    Historical Provider, MD   gabapentin (NEURONTIN) 300 MG capsule Take 900 mg by mouth 3 times daily. Judythe Layer Historical Provider, MD   senna-docusate (PERICOLACE) 8.6-50 MG per tablet Take 1 tablet by mouth 2 times daily as needed for Constipation    Historical Provider, MD   Respiratory Therapy Supplies (NEBULIZER/TUBING/MOUTHPIECE) KIT Tubing and mouth piece, use with nebulizer every 4 hours as needed for SOB 10/12/18   Radha Riveras, DO   lansoprazole (PREVACID SOLUTAB) 30 MG disintegrating tablet Take 1 tablet by mouth 4 times daily for 14 days 9/18/18 10/2/18  Radha Riveras, DO   terazosin (HYTRIN) 2 MG capsule Take 1 capsule by mouth nightly 8/31/18   Radha Riveras, DO   albuterol (PROVENTIL) (2.5 MG/3ML) 0.083% nebulizer solution inhale contents of 1 vial in nebulizer four times a day 7/30/18   Radha Fines, DO   mirtazapine (REMERON) 30 MG tablet Take 1.5 tablets by mouth nightly 6/8/18   Radha Riveras, DO   atenolol (TENORMIN) 25 MG tablet Take 25 mg by mouth daily    Historical Provider, MD   nitroGLYCERIN (NITROSTAT) 0.4 MG SL tablet up to max of 3 total doses. If no relief after 1 dose, call 911. Patient taking differently: up to max of 3 total doses.  If no relief after 1 dose, call 911. 9/25/17   Kadeem Shaikh MD   acetaminophen

## 2018-10-31 ENCOUNTER — TELEPHONE (OUTPATIENT)
Dept: FAMILY MEDICINE CLINIC | Age: 63
End: 2018-10-31

## 2018-10-31 DIAGNOSIS — I20.8 STABLE ANGINA (HCC): Primary | ICD-10-CM

## 2018-10-31 DIAGNOSIS — I25.10 ASHD (ARTERIOSCLEROTIC HEART DISEASE): Chronic | ICD-10-CM

## 2018-10-31 RX ORDER — RANOLAZINE 1000 MG/1
1000 TABLET, EXTENDED RELEASE ORAL 2 TIMES DAILY
Qty: 60 TABLET | Refills: 1 | Status: SHIPPED | OUTPATIENT
Start: 2018-10-31 | End: 2019-02-23 | Stop reason: ALTCHOICE

## 2018-11-01 PROBLEM — R19.4 CHANGE IN BOWEL HABITS: Status: RESOLVED | Noted: 2017-11-27 | Resolved: 2018-11-01

## 2018-11-01 PROBLEM — R19.5 HEME POSITIVE STOOL: Status: RESOLVED | Noted: 2017-11-27 | Resolved: 2018-11-01

## 2018-11-01 PROBLEM — R07.9 CHEST PAIN: Status: RESOLVED | Noted: 2017-04-04 | Resolved: 2018-11-01

## 2018-11-01 PROBLEM — J44.1 COPD EXACERBATION (HCC): Status: RESOLVED | Noted: 2017-12-21 | Resolved: 2018-11-01

## 2018-11-01 PROBLEM — I21.9 MYOCARDIAL INFARCTION (HCC): Status: RESOLVED | Noted: 2017-08-23 | Resolved: 2018-11-01

## 2018-11-01 ASSESSMENT — ENCOUNTER SYMPTOMS: GASTROINTESTINAL NEGATIVE: 1

## 2018-12-06 ENCOUNTER — TELEPHONE (OUTPATIENT)
Dept: FAMILY MEDICINE CLINIC | Age: 63
End: 2018-12-06

## 2018-12-06 DIAGNOSIS — Z13.220 LIPID SCREENING: ICD-10-CM

## 2018-12-06 DIAGNOSIS — R53.83 FATIGUE, UNSPECIFIED TYPE: ICD-10-CM

## 2018-12-06 DIAGNOSIS — J44.9 CHRONIC OBSTRUCTIVE PULMONARY DISEASE, UNSPECIFIED COPD TYPE (HCC): Primary | ICD-10-CM

## 2018-12-06 DIAGNOSIS — E55.9 VITAMIN D DEFICIENCY: ICD-10-CM

## 2018-12-06 DIAGNOSIS — I10 HYPERTENSION, UNSPECIFIED TYPE: Primary | ICD-10-CM

## 2018-12-06 RX ORDER — ATORVASTATIN CALCIUM 20 MG/1
TABLET, FILM COATED ORAL
Qty: 30 TABLET | Refills: 5 | Status: SHIPPED | OUTPATIENT
Start: 2018-12-06 | End: 2021-06-01

## 2018-12-06 NOTE — TELEPHONE ENCOUNTER
lipitor v20 mg    [facility]   Topic Date Due    Hepatitis C screen  1955    HIV screen  05/13/1970    DTaP/Tdap/Td vaccine (1 - Tdap) 05/13/1974    Pneumococcal med risk (1 of 1 - PPSV23) 05/13/1974    Shingles Vaccine (1 of 2 - 2 Dose Series) 05/13/2005    Flu vaccine (1) 09/01/2018    Lipid screen  07/05/2022    Colon cancer screen colonoscopy  11/27/2027             (applicable per patient's age: Cancer Screenings, Depression Screening, Fall Risk Screening, Immunizations)    LDL Cholesterol (mg/dL)   Date Value   07/05/2017 73     AST (U/L)   Date Value   01/01/2018 13     ALT (U/L)   Date Value   01/01/2018 14     BUN (mg/dL)   Date Value   10/16/2018 15      (goal A1C is < 7)   (goal LDL is <100) need 30-50% reduction from baseline     BP Readings from Last 3 Encounters:   10/30/18 136/80   10/16/18 123/64   08/31/18 110/64    (goal /80)      All Future Testing planned in [facility]:  Lab Frequency Next Occurrence   NM MYOCARDIAL SPECT REST EXERCISE OR RX Once 12/15/2018       Next Visit Date:  Future Appointments  Date Time Provider April Cutler   12/11/2018 10:30 AM MD Markell Odom [facility]            Patient Active Problem List:     ASHD (arteriosclerotic heart disease)     PTSD (post-traumatic stress disorder)     History of coronary artery bypass surgery     Obstructive apnea     Hypertension     Hyperlipidemia     Chronic obstructive pulmonary disease ([facility]

## 2018-12-06 NOTE — TELEPHONE ENCOUNTER
Renew lipitor      Last visit:  10/30/2018  Next Visit Date:  Future Appointments  Date Time Provider April Cutler   12/11/2018 10:30 AM Fidelina Li MD [facility]     Last Med refill:    Medication List:  Prior to Admission medications    Medication Sig Start Date End Date Taking? Authorizing Provider   ranolazine (RANEXA) 1000 MG extended release tablet Take 1 tablet by mouth 2 times daily 10/31/18   Alvino Valles DO   atorvastatin (LIPITOR) 20 MG tablet TAKE 1 TABLET BY MOUTH EVERY DAY 10/17/18   Historical Provider, MD   Respiratory Therapy Supplies (FULL KIT NEBULIZER SET) MISC use EVERY 4 HOURS AS NEEDED for shortness of breath 10/15/18   Historical Provider, MD   tiotropium (Pepper Large) 18 MCG inhalation capsule Inhale 1 capsule into the lungs daily 10/30/18   Alvino Valles DO   ondansetron (ZOFRAN ODT) 4 MG disintegrating tablet Take 1 tablet by mouth every 8 hours as needed for Nausea or Vomiting 10/23/18   Alvino Valles DO   ibuprofen (ADVIL;MOTRIN) 600 MG tablet Take 600 mg by mouth every 8 hours as needed for Pain    Historical Provider, MD   gabapentin (NEURONTIN) 300 MG capsule Take 900 mg by mouth 3 times daily. Christopher Fear     Historical Provider, MD   senna-docusate (PERICOLACE) 8.6-50 MG per tablet Take 1 tablet by mouth 2 times daily as needed for Constipation    Historical Provider, MD   Respiratory Therapy Supplies (NEBULIZER/TUBING/MOUTHPIECE) KIT Tubing and mouth piece, use with nebulizer every 4 hours as needed for SOB 10/12/18   Alvino Valles DO   lansoprazole (PREVACID SOLUTAB) 30 MG disintegrating tablet Take 1 tablet by mouth 4 times daily for 14 days 9/18/18 10/2/18  Alvino Valles DO   terazosin (HYTRIN) 2 MG capsule Take 1 capsule by mouth nightly 8/31/18   Alvino Valles DO   albuterol (PROVENTIL) (2.5 MG/3ML) 0.083% nebulizer solution inhale contents of 1 vial in nebulizer four times a day 7/30/18   Alvino Valles DO   mirtazapine (REMERON) 30 MG

## 2018-12-07 RX ORDER — NEBULIZER ACCESSORIES
EACH MISCELLANEOUS
Qty: 1 EACH | Refills: 1 | Status: ON HOLD | OUTPATIENT
Start: 2018-12-07 | End: 2019-04-23

## 2018-12-17 ENCOUNTER — HOSPITAL ENCOUNTER (OUTPATIENT)
Age: 63
Discharge: HOME | End: 2018-12-17
Payer: COMMERCIAL

## 2018-12-17 ENCOUNTER — OFFICE VISIT (OUTPATIENT)
Dept: CARDIOLOGY CLINIC | Age: 63
End: 2018-12-17
Payer: COMMERCIAL

## 2018-12-17 ENCOUNTER — HOSPITAL ENCOUNTER (OUTPATIENT)
Dept: GENERAL RADIOLOGY | Age: 63
Discharge: HOME | End: 2018-12-19
Payer: COMMERCIAL

## 2018-12-17 ENCOUNTER — HOSPITAL ENCOUNTER (OUTPATIENT)
Age: 63
Discharge: HOME | End: 2018-12-19
Payer: COMMERCIAL

## 2018-12-17 VITALS
DIASTOLIC BLOOD PRESSURE: 70 MMHG | BODY MASS INDEX: 33.37 KG/M2 | SYSTOLIC BLOOD PRESSURE: 130 MMHG | WEIGHT: 226 LBS | OXYGEN SATURATION: 98 % | HEART RATE: 60 BPM

## 2018-12-17 DIAGNOSIS — I10 HYPERTENSION, UNSPECIFIED TYPE: ICD-10-CM

## 2018-12-17 DIAGNOSIS — Z95.1 HISTORY OF CORONARY ARTERY BYPASS SURGERY: ICD-10-CM

## 2018-12-17 DIAGNOSIS — E55.9 VITAMIN D DEFICIENCY: ICD-10-CM

## 2018-12-17 DIAGNOSIS — E78.5 HYPERLIPIDEMIA, UNSPECIFIED HYPERLIPIDEMIA TYPE: ICD-10-CM

## 2018-12-17 DIAGNOSIS — J44.9 CHRONIC OBSTRUCTIVE PULMONARY DISEASE, UNSPECIFIED COPD TYPE (HCC): ICD-10-CM

## 2018-12-17 DIAGNOSIS — R07.9 CHEST PAIN, UNSPECIFIED TYPE: ICD-10-CM

## 2018-12-17 DIAGNOSIS — E55.9 VITAMIN D DEFICIENCY DISEASE: ICD-10-CM

## 2018-12-17 DIAGNOSIS — Z13.220 LIPID SCREENING: ICD-10-CM

## 2018-12-17 DIAGNOSIS — I10 ESSENTIAL HYPERTENSION: Primary | ICD-10-CM

## 2018-12-17 DIAGNOSIS — R53.83 FATIGUE, UNSPECIFIED TYPE: ICD-10-CM

## 2018-12-17 LAB
ABSOLUTE EOS #: 0.2 K/UL (ref 0–0.4)
ABSOLUTE IMMATURE GRANULOCYTE: ABNORMAL K/UL (ref 0–0.3)
ABSOLUTE LYMPH #: 1.4 K/UL (ref 1–4.8)
ABSOLUTE MONO #: 0.4 K/UL (ref 0–1)
ALBUMIN SERPL-MCNC: 4.4 G/DL (ref 3.5–5.2)
ALBUMIN/GLOBULIN RATIO: ABNORMAL (ref 1–2.5)
ALP BLD-CCNC: 86 U/L (ref 40–129)
ALT SERPL-CCNC: 10 U/L (ref 5–41)
ANION GAP SERPL CALCULATED.3IONS-SCNC: 10 MMOL/L (ref 9–17)
AST SERPL-CCNC: 11 U/L
BASOPHILS # BLD: 0 % (ref 0–2)
BASOPHILS ABSOLUTE: 0 K/UL (ref 0–0.2)
BILIRUB SERPL-MCNC: 0.23 MG/DL (ref 0.3–1.2)
BUN BLDV-MCNC: 19 MG/DL (ref 8–23)
BUN/CREAT BLD: 21 (ref 9–20)
CALCIUM SERPL-MCNC: 9.5 MG/DL (ref 8.6–10.4)
CHLORIDE BLD-SCNC: 100 MMOL/L (ref 98–107)
CHOLESTEROL/HDL RATIO: 2.9
CHOLESTEROL: 155 MG/DL
CO2: 27 MMOL/L (ref 20–31)
CREAT SERPL-MCNC: 0.92 MG/DL (ref 0.7–1.2)
DIFFERENTIAL TYPE: YES
EKG ATRIAL RATE: 59 BPM
EKG P AXIS: 35 DEGREES
EKG P-R INTERVAL: 216 MS
EKG Q-T INTERVAL: 424 MS
EKG QRS DURATION: 88 MS
EKG QTC CALCULATION (BAZETT): 419 MS
EKG R AXIS: 12 DEGREES
EKG T AXIS: 18 DEGREES
EKG VENTRICULAR RATE: 59 BPM
EOSINOPHILS RELATIVE PERCENT: 4 % (ref 0–5)
GFR AFRICAN AMERICAN: >60 ML/MIN
GFR NON-AFRICAN AMERICAN: >60 ML/MIN
GFR SERPL CREATININE-BSD FRML MDRD: ABNORMAL ML/MIN/{1.73_M2}
GFR SERPL CREATININE-BSD FRML MDRD: ABNORMAL ML/MIN/{1.73_M2}
GLUCOSE BLD-MCNC: 96 MG/DL (ref 70–99)
HCT VFR BLD CALC: 42.9 % (ref 41–53)
HDLC SERPL-MCNC: 54 MG/DL
HEMOGLOBIN: 14.1 G/DL (ref 13.5–17.5)
IMMATURE GRANULOCYTES: ABNORMAL %
LDL CHOLESTEROL: 93 MG/DL (ref 0–130)
LYMPHOCYTES # BLD: 23 % (ref 13–44)
MAGNESIUM: 2.1 MG/DL (ref 1.6–2.6)
MCH RBC QN AUTO: 29.6 PG (ref 26–34)
MCHC RBC AUTO-ENTMCNC: 32.9 G/DL (ref 31–37)
MCV RBC AUTO: 89.9 FL (ref 80–100)
MONOCYTES # BLD: 6 % (ref 5–9)
NRBC AUTOMATED: ABNORMAL PER 100 WBC
PATIENT FASTING?: YES
PDW BLD-RTO: 15.5 % (ref 12.1–15.2)
PLATELET # BLD: 204 K/UL (ref 140–450)
PLATELET ESTIMATE: ABNORMAL
PMV BLD AUTO: ABNORMAL FL (ref 6–12)
POTASSIUM SERPL-SCNC: 4.9 MMOL/L (ref 3.7–5.3)
RBC # BLD: 4.77 M/UL (ref 4.5–5.9)
RBC # BLD: ABNORMAL 10*6/UL
SEG NEUTROPHILS: 67 % (ref 39–75)
SEGMENTED NEUTROPHILS ABSOLUTE COUNT: 4 K/UL (ref 2.1–6.5)
SODIUM BLD-SCNC: 137 MMOL/L (ref 135–144)
TOTAL PROTEIN: 7.3 G/DL (ref 6.4–8.3)
TRIGL SERPL-MCNC: 39 MG/DL
TSH SERPL DL<=0.05 MIU/L-ACNC: 0.89 MIU/L (ref 0.3–5)
VITAMIN D 25-HYDROXY: 46.1 NG/ML (ref 30–100)
VLDLC SERPL CALC-MCNC: NORMAL MG/DL (ref 1–30)
WBC # BLD: 6 K/UL (ref 3.5–11)
WBC # BLD: ABNORMAL 10*3/UL

## 2018-12-17 PROCEDURE — 36415 COLL VENOUS BLD VENIPUNCTURE: CPT

## 2018-12-17 PROCEDURE — 83735 ASSAY OF MAGNESIUM: CPT

## 2018-12-17 PROCEDURE — G8428 CUR MEDS NOT DOCUMENT: HCPCS | Performed by: INTERNAL MEDICINE

## 2018-12-17 PROCEDURE — 93005 ELECTROCARDIOGRAM TRACING: CPT

## 2018-12-17 PROCEDURE — 1036F TOBACCO NON-USER: CPT | Performed by: INTERNAL MEDICINE

## 2018-12-17 PROCEDURE — G8484 FLU IMMUNIZE NO ADMIN: HCPCS | Performed by: INTERNAL MEDICINE

## 2018-12-17 PROCEDURE — G8598 ASA/ANTIPLAT THER USED: HCPCS | Performed by: INTERNAL MEDICINE

## 2018-12-17 PROCEDURE — 84443 ASSAY THYROID STIM HORMONE: CPT

## 2018-12-17 PROCEDURE — 82306 VITAMIN D 25 HYDROXY: CPT

## 2018-12-17 PROCEDURE — 71046 X-RAY EXAM CHEST 2 VIEWS: CPT

## 2018-12-17 PROCEDURE — 3023F SPIROM DOC REV: CPT | Performed by: INTERNAL MEDICINE

## 2018-12-17 PROCEDURE — 80053 COMPREHEN METABOLIC PANEL: CPT

## 2018-12-17 PROCEDURE — G8417 CALC BMI ABV UP PARAM F/U: HCPCS | Performed by: INTERNAL MEDICINE

## 2018-12-17 PROCEDURE — 3017F COLORECTAL CA SCREEN DOC REV: CPT | Performed by: INTERNAL MEDICINE

## 2018-12-17 PROCEDURE — 99214 OFFICE O/P EST MOD 30 MIN: CPT | Performed by: INTERNAL MEDICINE

## 2018-12-17 PROCEDURE — 85025 COMPLETE CBC W/AUTO DIFF WBC: CPT

## 2018-12-17 PROCEDURE — G8926 SPIRO NO PERF OR DOC: HCPCS | Performed by: INTERNAL MEDICINE

## 2018-12-17 PROCEDURE — 80061 LIPID PANEL: CPT

## 2018-12-17 RX ORDER — SPIRONOLACTONE 25 MG/1
25 TABLET ORAL DAILY
Qty: 30 TABLET | Refills: 11 | Status: ON HOLD | OUTPATIENT
Start: 2018-12-17 | End: 2019-04-25 | Stop reason: HOSPADM

## 2018-12-17 NOTE — LETTER
Neurontin 300 mg 3 tablets t.i.d., Prevacid q.i.d., Remeron 30 mg nightly, Ranexa 1000 mg b.i.d., Hytrin 2 mg nightly, Spiriva daily, Desyrel 50 mg 2 tablets nightly. PAST MEDICAL HISTORY:  1.  Wrist surgery many years ago. 2.  Tonsillectomy. 3.  Right knee replacement many years ago. 4.  Coronary artery bypass surgery as stated previously. 5.  He has sleep apnea, on a CPAP mask. 6.  He has PTSD from being in [facility].  7.  He does have cervical stenosis from a neck injury when he was in the service. FAMILY HISTORY:  No significant heart disease in his family. SOCIAL HISTORY:  He is 61years old, , one son in [facility]. Quit smoking 20 years ago. Does not drink alcohol. Again, he has posttraumatic stress syndrome. He rides a bike. His living conditions are rather poor with him being in an apartment that was found to have extensive black mold. The owner, who happens to be in the same [employer] as Mr. Nilda Javier, did not wish to treat the mold and therefore, he moved out into the hotel behind [employer]. There was a gas leak in his hotel that was eventually fixed. He is looking for an apartment now. It is rather stressful for him and his wife. REVIEW OF SYSTEMS:  Cardiac as above. Other systems reviewed including constitutional, eyes, ears, nose and throat, cardiovascular, respiratory, GI, , musculoskeletal, integumentary, neurologic, psychiatric, endocrine, hematologic, and allergic/immunologic and are negative except for what is described above. He has chronic mild shortness of breath. He has had some increased edema in both lower extremities. PHYSICAL EXAMINATION:  VITAL SIGNS:  His blood pressure was 130/70 with a heart rate of 60 and regular. Respiratory rate 18. O2 saturation 98%. Weight 226 pounds. GENERAL:  He is a pleasant 70-year-old gentleman. Denied pain. He was oriented to person, place and time. Answered questions appropriately. SKIN:  No unusual skin changes. HEENT:  The pupils are equally round and intact. Mucous membranes were dry. NECK:  No JVD. Good carotid pulses. No carotid bruits. No lymphadenopathy or thyromegaly. CARDIOVASCULAR EXAM:  S1 and S2 were normal.  No S3 or S4. Soft systolic blowing type murmur. No diastolic murmur. PMI was normal.  No lift, thrust, or pericardial friction rub. LUNGS:  Quite clear to auscultation and percussion. ABDOMEN:  Soft and nontender. Good bowel sounds. EXTREMITIES:  Good femoral pulses. Good pedal pulses. He had mild pedal edema. Skin was warm and dry. No calf tenderness. Nail beds pink. Good cap refill. PULSES:  Bilateral symmetrical radial, brachial and carotid pulses. No carotid bruits. Good femoral and pedal pulses. NEUROLOGIC EXAM:  Within normal limits. PSYCHIATRIC EXAM:  Within normal limits. LABORATORY DATA:  From today, sodium 137, potassium 4.9, BUN 19, creatinine 0.92, GFR was 60. Magnesium 2.1, calcium 9.5. Cholesterol 155 with an HDL of 54, LDL 93, triglycerides 39, ALT was 10, AST was 11. TSH 0.89. Vitamin D 46.1. White count 6.0, hemoglobin 14.1 with a platelet count 332,823. His EKG showed sinus rhythm with old anterior myocardial infarction with nonspecific ST changes, no change from previous EKGs. Echocardiogram on 10/17/2018 showed normal LV function, EF 55% to 60% with mild diastolic dysfunction. IMPRESSION:  1. Recent hospitalization for chest pain in [place] on 10/16/2018, with negative cardiac workup and normal LV function, EF 55%. 2.  Coronary artery disease. 3.  Anterior myocardial infarction on 10/18/2015, with a catheterization showing 99% OM branch of the circumflex with thrombus, 70% to 75% LAD, 70% RCA, EF of 50%. 4.  Bypass surgery by Adelaida Nelson on 10/23/2015 with LIMA to the LAD, a vein graft to the OM and a vein graft to the right coronary artery. 5.  Normal LV function, EF of 55% to 60%. 6.  Abnormal Cardiolite stress test showing inferolateral ischemia in 2017, which fit with his anatomy. 7.  Sleep apnea, on a CPAP mask. 8.  Posttraumatic stress syndrome, followed at [place], [place]. 9.  Pending cervical disc surgery by Dr. Alfred Biswas in [place]. 10.  Hypertension, well controlled. 11.  Hyperlipidemia, well controlled. 12.  Mild edema. PLAN:  1. Add Aldactone 25 mg daily to his regimen. 2.  Lexiscan Cardiolite stress test with a Chem-7 in 1 week to make sure his BUN, creatinine, and potassium are okay. 3.  If his stress test is unchanged, he would be cleared for cervical disc surgery. DISCUSSION:  Mr. Melany Benson had very atypical chest pain. It was pleuritic when he was kept overnight in the hospital and he had no EKG changes or troponin elevation. I think that it was noncardiac. He does have mild edema, and I will add Aldactone 25 mg daily. He does have normal LV function, EF of 55% to 60%. Because of his pending cervical disc disease, I will repeat a Lexiscan Cardiolite stress test.  He cannot walk a treadmill. Therefore, we will do the Blinda Drea. If this is unchanged, then he would be cleared for cervical disc surgery by Dr. Alfred Biswas. I will plan on seeing him in 1 year. I will bring him back if there is any change in his [address] [place] [address] stress test.    Thank you very much for allowing me the privilege of seeing Mr. Melany Benson. If you have any questions on my thoughts, please do not hesitate to contact me.     Sincerely,        Henry White    D: 12/18/2018 4:29:17     T: 12/18/2018 6:31:34     [id number]  Job#: [id number]   Doc#: [id number]

## 2018-12-20 NOTE — PROGRESS NOTES
Felipe Salazar M.D. [address] [place] [address] [address] [place] [address]  [phone]        2018        Chidi Albarran,   [address] [address]    RE:   Bharath Lee  :  1955    Dear Dr. Yvon Pedro:    CHIEF COMPLAINT:  1. Chest pain. 2.  Coronary artery disease. 3.  Hypertension. 4.  Recent hospitalization on 10/16/2018 for chest pain in [facility] with no bump in troponin and no change in EKGs. HISTORY OF PRESENT ILLNESS:  I had the pleasure of seeing Mr. Maria Luisa Martinez in our office on 2018. He is a pleasant 60-year-old gentleman who had myocardial infarction on 10/18/2015. While being loaded in the helicopter, he had ventricular fibrillation and was defibrillated. He was transferred to [facility] where he had emergency cardiac catheterization, which showed 99% disease in the OM branch of the circumflex with thrombus, 70% to 75% proximal LAD, 70% RCA with an EF of 50%. Aortic balloon pump was placed, and he had coronary artery bypass surgery by Dr. Arin Strong on 10/23/2015 with LIMA to the LAD, a vein graft to the OM and a vein graft to the distal right coronary artery. He had another catheterization on 2016 because of chest pain. The LAD was occluded, circumflex had 80%, right coronary artery had 70%, vein graft to the OM was patent, vein graft to the right coronary artery was patent, LIMA was patent to the LAD, and EF 55% to 60%. He was treated medically. He has had several hospitalizations for atypical chest discomfort. His stress test on 2017 showed inferior wall defect consistent with ischemia, consistent with his coronary artery anatomy. I saw him on , and at that time, he was having mild chest discomfort. We placed him on full medical therapy and in cardiac rehab. He did well in rehab and his chest pain resolved.   His energy level was about at baseline and I felt his chest pain

## 2019-01-04 ENCOUNTER — APPOINTMENT (OUTPATIENT)
Dept: ULTRASOUND IMAGING | Age: 64
End: 2019-01-04
Payer: COMMERCIAL

## 2019-01-04 ENCOUNTER — HOSPITAL ENCOUNTER (EMERGENCY)
Age: 64
Discharge: HOME | End: 2019-01-04
Attending: FAMILY MEDICINE
Payer: COMMERCIAL

## 2019-01-04 VITALS
DIASTOLIC BLOOD PRESSURE: 90 MMHG | HEART RATE: 60 BPM | TEMPERATURE: 98.1 F | BODY MASS INDEX: 32.92 KG/M2 | WEIGHT: 222.3 LBS | OXYGEN SATURATION: 99 % | SYSTOLIC BLOOD PRESSURE: 145 MMHG | HEIGHT: 69 IN | RESPIRATION RATE: 18 BRPM

## 2019-01-04 DIAGNOSIS — N43.3 RIGHT HYDROCELE: ICD-10-CM

## 2019-01-04 DIAGNOSIS — N45.1 RIGHT EPIDIDYMITIS: Primary | ICD-10-CM

## 2019-01-04 LAB
-: ABNORMAL
AMORPHOUS: ABNORMAL
BACTERIA: ABNORMAL
BILIRUBIN URINE: NEGATIVE
CASTS UA: ABNORMAL /LPF
COLOR: YELLOW
COMMENT UA: ABNORMAL
CRYSTALS, UA: ABNORMAL /HPF
EPITHELIAL CELLS UA: ABNORMAL /HPF
GLUCOSE URINE: NEGATIVE
KETONES, URINE: NEGATIVE
LEUKOCYTE ESTERASE, URINE: ABNORMAL
MUCUS: ABNORMAL
NITRITE, URINE: NEGATIVE
OTHER OBSERVATIONS UA: ABNORMAL
PH UA: 5 (ref 5–8)
PROTEIN UA: ABNORMAL
RBC UA: ABNORMAL /HPF (ref 0–2)
RENAL EPITHELIAL, UA: ABNORMAL /HPF
SPECIFIC GRAVITY UA: 1.02 (ref 1–1.03)
TRICHOMONAS: ABNORMAL
TURBIDITY: CLEAR
URINE HGB: NEGATIVE
UROBILINOGEN, URINE: NORMAL
WBC UA: ABNORMAL /HPF
YEAST: ABNORMAL

## 2019-01-04 PROCEDURE — 81001 URINALYSIS AUTO W/SCOPE: CPT

## 2019-01-04 PROCEDURE — 99284 EMERGENCY DEPT VISIT MOD MDM: CPT

## 2019-01-04 PROCEDURE — 93976 VASCULAR STUDY: CPT

## 2019-01-04 RX ORDER — LEVOFLOXACIN 500 MG/1
500 TABLET, FILM COATED ORAL DAILY
Qty: 10 TABLET | Refills: 0 | Status: SHIPPED | OUTPATIENT
Start: 2019-01-04 | End: 2019-01-14

## 2019-01-04 RX ORDER — ONDANSETRON 4 MG/1
4 TABLET, ORALLY DISINTEGRATING ORAL EVERY 4 HOURS PRN
Qty: 10 TABLET | Refills: 1 | Status: SHIPPED | OUTPATIENT
Start: 2019-01-04 | End: 2019-01-25 | Stop reason: SDUPTHER

## 2019-01-04 ASSESSMENT — PAIN DESCRIPTION - PAIN TYPE: TYPE: ACUTE PAIN

## 2019-01-04 ASSESSMENT — PAIN SCALES - GENERAL: PAINLEVEL_OUTOF10: 10

## 2019-01-04 ASSESSMENT — PAIN SCALES - WONG BAKER: WONGBAKER_NUMERICALRESPONSE: 10

## 2019-01-04 ASSESSMENT — PAIN DESCRIPTION - FREQUENCY: FREQUENCY: CONTINUOUS

## 2019-01-04 ASSESSMENT — PAIN DESCRIPTION - LOCATION: LOCATION: GROIN

## 2019-01-08 ENCOUNTER — HOSPITAL ENCOUNTER (OUTPATIENT)
Age: 64
Discharge: HOME | End: 2019-01-10
Payer: COMMERCIAL

## 2019-01-08 ENCOUNTER — HOSPITAL ENCOUNTER (OUTPATIENT)
Age: 64
Discharge: HOME | End: 2019-01-08
Payer: COMMERCIAL

## 2019-01-08 ENCOUNTER — TELEPHONE (OUTPATIENT)
Dept: CARDIOLOGY CLINIC | Age: 64
End: 2019-01-08

## 2019-01-08 ENCOUNTER — HOSPITAL ENCOUNTER (OUTPATIENT)
Dept: GENERAL RADIOLOGY | Age: 64
Discharge: HOME | End: 2019-01-10
Payer: COMMERCIAL

## 2019-01-08 DIAGNOSIS — E78.5 HYPERLIPIDEMIA, UNSPECIFIED HYPERLIPIDEMIA TYPE: ICD-10-CM

## 2019-01-08 DIAGNOSIS — J44.9 CHRONIC OBSTRUCTIVE PULMONARY DISEASE, UNSPECIFIED COPD TYPE (HCC): ICD-10-CM

## 2019-01-08 DIAGNOSIS — E55.9 VITAMIN D DEFICIENCY DISEASE: ICD-10-CM

## 2019-01-08 DIAGNOSIS — R07.9 CHEST PAIN, UNSPECIFIED TYPE: ICD-10-CM

## 2019-01-08 DIAGNOSIS — Z95.1 HISTORY OF CORONARY ARTERY BYPASS SURGERY: ICD-10-CM

## 2019-01-08 DIAGNOSIS — I10 ESSENTIAL HYPERTENSION: ICD-10-CM

## 2019-01-08 LAB
ANION GAP SERPL CALCULATED.3IONS-SCNC: 12 MMOL/L (ref 9–17)
BUN BLDV-MCNC: 15 MG/DL (ref 8–23)
BUN/CREAT BLD: 14 (ref 9–20)
CALCIUM SERPL-MCNC: 9.6 MG/DL (ref 8.6–10.4)
CHLORIDE BLD-SCNC: 94 MMOL/L (ref 98–107)
CO2: 28 MMOL/L (ref 20–31)
CREAT SERPL-MCNC: 1.1 MG/DL (ref 0.7–1.2)
GFR AFRICAN AMERICAN: >60 ML/MIN
GFR NON-AFRICAN AMERICAN: >60 ML/MIN
GFR SERPL CREATININE-BSD FRML MDRD: ABNORMAL ML/MIN/{1.73_M2}
GFR SERPL CREATININE-BSD FRML MDRD: ABNORMAL ML/MIN/{1.73_M2}
GLUCOSE BLD-MCNC: 94 MG/DL (ref 70–99)
PATIENT FASTING?: NO
POTASSIUM SERPL-SCNC: 5.1 MMOL/L (ref 3.7–5.3)
SODIUM BLD-SCNC: 134 MMOL/L (ref 135–144)

## 2019-01-08 PROCEDURE — 71046 X-RAY EXAM CHEST 2 VIEWS: CPT

## 2019-01-08 PROCEDURE — 80048 BASIC METABOLIC PNL TOTAL CA: CPT

## 2019-01-08 PROCEDURE — 36415 COLL VENOUS BLD VENIPUNCTURE: CPT

## 2019-01-25 ENCOUNTER — APPOINTMENT (OUTPATIENT)
Dept: GENERAL RADIOLOGY | Age: 64
End: 2019-01-25
Payer: COMMERCIAL

## 2019-01-25 ENCOUNTER — HOSPITAL ENCOUNTER (EMERGENCY)
Age: 64
Discharge: HOME | End: 2019-01-25
Attending: FAMILY MEDICINE
Payer: COMMERCIAL

## 2019-01-25 VITALS
BODY MASS INDEX: 31.1 KG/M2 | WEIGHT: 210 LBS | SYSTOLIC BLOOD PRESSURE: 128 MMHG | TEMPERATURE: 98.8 F | OXYGEN SATURATION: 97 % | HEIGHT: 69 IN | RESPIRATION RATE: 17 BRPM | DIASTOLIC BLOOD PRESSURE: 78 MMHG | HEART RATE: 59 BPM

## 2019-01-25 DIAGNOSIS — R07.9 CHEST PAIN IN ADULT: Primary | ICD-10-CM

## 2019-01-25 LAB
ABSOLUTE EOS #: 0.2 K/UL (ref 0–0.4)
ABSOLUTE IMMATURE GRANULOCYTE: ABNORMAL K/UL (ref 0–0.3)
ABSOLUTE LYMPH #: 1.3 K/UL (ref 1–4.8)
ABSOLUTE MONO #: 0.4 K/UL (ref 0–1)
ALBUMIN SERPL-MCNC: 4.7 G/DL (ref 3.5–5.2)
ALBUMIN/GLOBULIN RATIO: ABNORMAL (ref 1–2.5)
ALP BLD-CCNC: 96 U/L (ref 40–129)
ALT SERPL-CCNC: 14 U/L (ref 5–41)
ANION GAP SERPL CALCULATED.3IONS-SCNC: 14 MMOL/L (ref 9–17)
AST SERPL-CCNC: 16 U/L
BASOPHILS # BLD: 0 % (ref 0–2)
BASOPHILS ABSOLUTE: 0 K/UL (ref 0–0.2)
BILIRUB SERPL-MCNC: 0.45 MG/DL (ref 0.3–1.2)
BILIRUBIN URINE: NEGATIVE
BUN BLDV-MCNC: 10 MG/DL (ref 8–23)
BUN/CREAT BLD: 10 (ref 9–20)
CALCIUM SERPL-MCNC: 10 MG/DL (ref 8.6–10.4)
CHLORIDE BLD-SCNC: 96 MMOL/L (ref 98–107)
CO2: 28 MMOL/L (ref 20–31)
COLOR: YELLOW
COMMENT UA: NORMAL
CREAT SERPL-MCNC: 1.01 MG/DL (ref 0.7–1.2)
DIFFERENTIAL TYPE: YES
EKG ATRIAL RATE: 55 BPM
EKG P AXIS: 29 DEGREES
EKG P-R INTERVAL: 218 MS
EKG Q-T INTERVAL: 440 MS
EKG QRS DURATION: 86 MS
EKG QTC CALCULATION (BAZETT): 420 MS
EKG R AXIS: 20 DEGREES
EKG T AXIS: 3 DEGREES
EKG VENTRICULAR RATE: 55 BPM
EOSINOPHILS RELATIVE PERCENT: 4 % (ref 0–5)
GFR AFRICAN AMERICAN: >60 ML/MIN
GFR NON-AFRICAN AMERICAN: >60 ML/MIN
GFR SERPL CREATININE-BSD FRML MDRD: ABNORMAL ML/MIN/{1.73_M2}
GFR SERPL CREATININE-BSD FRML MDRD: ABNORMAL ML/MIN/{1.73_M2}
GLUCOSE BLD-MCNC: 102 MG/DL (ref 70–99)
GLUCOSE URINE: NEGATIVE
HCT VFR BLD CALC: 49.5 % (ref 41–53)
HEMOGLOBIN: 16 G/DL (ref 13.5–17.5)
IMMATURE GRANULOCYTES: ABNORMAL %
KETONES, URINE: NEGATIVE
LEUKOCYTE ESTERASE, URINE: NEGATIVE
LYMPHOCYTES # BLD: 29 % (ref 13–44)
MCH RBC QN AUTO: 29.4 PG (ref 26–34)
MCHC RBC AUTO-ENTMCNC: 32.4 G/DL (ref 31–37)
MCV RBC AUTO: 90.9 FL (ref 80–100)
MONOCYTES # BLD: 9 % (ref 5–9)
NITRITE, URINE: NEGATIVE
NRBC AUTOMATED: ABNORMAL PER 100 WBC
PDW BLD-RTO: 15.3 % (ref 12.1–15.2)
PH UA: 8 (ref 5–8)
PLATELET # BLD: 200 K/UL (ref 140–450)
PLATELET ESTIMATE: ABNORMAL
PMV BLD AUTO: ABNORMAL FL (ref 6–12)
POTASSIUM SERPL-SCNC: 4.5 MMOL/L (ref 3.7–5.3)
PROTEIN UA: NEGATIVE
RBC # BLD: 5.44 M/UL (ref 4.5–5.9)
RBC # BLD: ABNORMAL 10*6/UL
SEG NEUTROPHILS: 58 % (ref 39–75)
SEGMENTED NEUTROPHILS ABSOLUTE COUNT: 2.7 K/UL (ref 2.1–6.5)
SODIUM BLD-SCNC: 138 MMOL/L (ref 135–144)
SPECIFIC GRAVITY UA: 1.01 (ref 1–1.03)
TOTAL PROTEIN: 7.6 G/DL (ref 6.4–8.3)
TROPONIN INTERP: NORMAL
TROPONIN INTERP: NORMAL
TROPONIN T: <0.03 NG/ML
TROPONIN T: <0.03 NG/ML
TROPONIN, HIGH SENSITIVITY: NORMAL NG/L (ref 0–22)
TROPONIN, HIGH SENSITIVITY: NORMAL NG/L (ref 0–22)
TURBIDITY: CLEAR
URINE HGB: NEGATIVE
UROBILINOGEN, URINE: NORMAL
WBC # BLD: 4.7 K/UL (ref 3.5–11)
WBC # BLD: ABNORMAL 10*3/UL

## 2019-01-25 PROCEDURE — 93005 ELECTROCARDIOGRAM TRACING: CPT

## 2019-01-25 PROCEDURE — 84484 ASSAY OF TROPONIN QUANT: CPT

## 2019-01-25 PROCEDURE — 81003 URINALYSIS AUTO W/O SCOPE: CPT

## 2019-01-25 PROCEDURE — 99285 EMERGENCY DEPT VISIT HI MDM: CPT

## 2019-01-25 PROCEDURE — 85025 COMPLETE CBC W/AUTO DIFF WBC: CPT

## 2019-01-25 PROCEDURE — 71045 X-RAY EXAM CHEST 1 VIEW: CPT

## 2019-01-25 PROCEDURE — 2580000003 HC RX 258: Performed by: FAMILY MEDICINE

## 2019-01-25 PROCEDURE — 6370000000 HC RX 637 (ALT 250 FOR IP): Performed by: FAMILY MEDICINE

## 2019-01-25 PROCEDURE — 36415 COLL VENOUS BLD VENIPUNCTURE: CPT

## 2019-01-25 PROCEDURE — 80053 COMPREHEN METABOLIC PANEL: CPT

## 2019-01-25 RX ORDER — ISOSORBIDE MONONITRATE 30 MG/1
15 TABLET, EXTENDED RELEASE ORAL DAILY
Qty: 30 TABLET | Refills: 1 | Status: SHIPPED | OUTPATIENT
Start: 2019-01-25 | End: 2019-03-12 | Stop reason: SDUPTHER

## 2019-01-25 RX ORDER — ASPIRIN 81 MG/1
243 TABLET, CHEWABLE ORAL ONCE
Status: COMPLETED | OUTPATIENT
Start: 2019-01-25 | End: 2019-01-25

## 2019-01-25 RX ORDER — SODIUM CHLORIDE 9 MG/ML
INJECTION, SOLUTION INTRAVENOUS CONTINUOUS
Status: DISCONTINUED | OUTPATIENT
Start: 2019-01-25 | End: 2019-01-25 | Stop reason: HOSPADM

## 2019-01-25 RX ADMIN — SODIUM CHLORIDE: 9 INJECTION, SOLUTION INTRAVENOUS at 14:24

## 2019-01-25 RX ADMIN — ASPIRIN 81 MG 243 MG: 81 TABLET ORAL at 14:24

## 2019-01-25 ASSESSMENT — PAIN DESCRIPTION - LOCATION: LOCATION: CHEST

## 2019-01-25 ASSESSMENT — PAIN DESCRIPTION - PAIN TYPE: TYPE: ACUTE PAIN

## 2019-01-25 ASSESSMENT — PAIN SCALES - GENERAL
PAINLEVEL_OUTOF10: 5
PAINLEVEL_OUTOF10: 8

## 2019-01-25 ASSESSMENT — HEART SCORE: ECG: 1

## 2019-01-28 ENCOUNTER — APPOINTMENT (OUTPATIENT)
Dept: CT IMAGING | Age: 64
End: 2019-01-28
Payer: COMMERCIAL

## 2019-01-28 ENCOUNTER — CARE COORDINATION (OUTPATIENT)
Dept: CARE COORDINATION | Age: 64
End: 2019-01-28

## 2019-01-28 ENCOUNTER — HOSPITAL ENCOUNTER (EMERGENCY)
Age: 64
Discharge: ANOTHER HOSPITAL | End: 2019-01-28
Attending: FAMILY MEDICINE
Payer: COMMERCIAL

## 2019-01-28 ENCOUNTER — APPOINTMENT (OUTPATIENT)
Dept: GENERAL RADIOLOGY | Age: 64
End: 2019-01-28
Payer: COMMERCIAL

## 2019-01-28 VITALS
DIASTOLIC BLOOD PRESSURE: 74 MMHG | HEIGHT: 69 IN | RESPIRATION RATE: 13 BRPM | SYSTOLIC BLOOD PRESSURE: 109 MMHG | WEIGHT: 205 LBS | TEMPERATURE: 98.5 F | HEART RATE: 60 BPM | OXYGEN SATURATION: 93 % | BODY MASS INDEX: 30.36 KG/M2

## 2019-01-28 DIAGNOSIS — R29.898 LUE WEAKNESS: ICD-10-CM

## 2019-01-28 DIAGNOSIS — I20.0 UNSTABLE ANGINA (HCC): Primary | ICD-10-CM

## 2019-01-28 LAB
ABSOLUTE EOS #: 0.3 K/UL (ref 0–0.4)
ABSOLUTE IMMATURE GRANULOCYTE: ABNORMAL K/UL (ref 0–0.3)
ABSOLUTE LYMPH #: 1.7 K/UL (ref 1–4.8)
ABSOLUTE MONO #: 0.6 K/UL (ref 0–1)
ANION GAP SERPL CALCULATED.3IONS-SCNC: 10 MMOL/L (ref 9–17)
BASOPHILS # BLD: 0 % (ref 0–2)
BASOPHILS ABSOLUTE: 0 K/UL (ref 0–0.2)
BNP INTERPRETATION: NORMAL
BUN BLDV-MCNC: 18 MG/DL (ref 8–23)
BUN/CREAT BLD: 17 (ref 9–20)
CALCIUM SERPL-MCNC: 9.7 MG/DL (ref 8.6–10.4)
CHLORIDE BLD-SCNC: 103 MMOL/L (ref 98–107)
CO2: 24 MMOL/L (ref 20–31)
CREAT SERPL-MCNC: 1.09 MG/DL (ref 0.7–1.2)
DIFFERENTIAL TYPE: YES
EOSINOPHILS RELATIVE PERCENT: 4 % (ref 0–5)
GFR AFRICAN AMERICAN: >60 ML/MIN
GFR NON-AFRICAN AMERICAN: >60 ML/MIN
GFR SERPL CREATININE-BSD FRML MDRD: NORMAL ML/MIN/{1.73_M2}
GFR SERPL CREATININE-BSD FRML MDRD: NORMAL ML/MIN/{1.73_M2}
GLUCOSE BLD-MCNC: 98 MG/DL (ref 70–99)
HCT VFR BLD CALC: 48.3 % (ref 41–53)
HEMOGLOBIN: 15.8 G/DL (ref 13.5–17.5)
IMMATURE GRANULOCYTES: ABNORMAL %
INR BLD: 1.1
LYMPHOCYTES # BLD: 24 % (ref 13–44)
MCH RBC QN AUTO: 29.7 PG (ref 26–34)
MCHC RBC AUTO-ENTMCNC: 32.7 G/DL (ref 31–37)
MCV RBC AUTO: 90.6 FL (ref 80–100)
MONOCYTES # BLD: 8 % (ref 5–9)
NRBC AUTOMATED: ABNORMAL PER 100 WBC
PDW BLD-RTO: 15.8 % (ref 12.1–15.2)
PLATELET # BLD: 190 K/UL (ref 140–450)
PLATELET ESTIMATE: ABNORMAL
PMV BLD AUTO: ABNORMAL FL (ref 6–12)
POTASSIUM SERPL-SCNC: 4.8 MMOL/L (ref 3.7–5.3)
PRO-BNP: 138 PG/ML
PROTHROMBIN TIME: 10.3 SEC (ref 9–11.6)
RBC # BLD: 5.32 M/UL (ref 4.5–5.9)
RBC # BLD: ABNORMAL 10*6/UL
SEG NEUTROPHILS: 64 % (ref 39–75)
SEGMENTED NEUTROPHILS ABSOLUTE COUNT: 4.6 K/UL (ref 2.1–6.5)
SODIUM BLD-SCNC: 137 MMOL/L (ref 135–144)
TROPONIN INTERP: NORMAL
TROPONIN INTERP: NORMAL
TROPONIN T: <0.03 NG/ML
TROPONIN T: <0.03 NG/ML
TROPONIN, HIGH SENSITIVITY: NORMAL NG/L (ref 0–22)
TROPONIN, HIGH SENSITIVITY: NORMAL NG/L (ref 0–22)
WBC # BLD: 7.2 K/UL (ref 3.5–11)
WBC # BLD: ABNORMAL 10*3/UL

## 2019-01-28 PROCEDURE — 85610 PROTHROMBIN TIME: CPT

## 2019-01-28 PROCEDURE — 85025 COMPLETE CBC W/AUTO DIFF WBC: CPT

## 2019-01-28 PROCEDURE — 70450 CT HEAD/BRAIN W/O DYE: CPT

## 2019-01-28 PROCEDURE — 71045 X-RAY EXAM CHEST 1 VIEW: CPT

## 2019-01-28 PROCEDURE — 6360000002 HC RX W HCPCS: Performed by: FAMILY MEDICINE

## 2019-01-28 PROCEDURE — 83880 ASSAY OF NATRIURETIC PEPTIDE: CPT

## 2019-01-28 PROCEDURE — 84484 ASSAY OF TROPONIN QUANT: CPT

## 2019-01-28 PROCEDURE — 36415 COLL VENOUS BLD VENIPUNCTURE: CPT

## 2019-01-28 PROCEDURE — 80048 BASIC METABOLIC PNL TOTAL CA: CPT

## 2019-01-28 PROCEDURE — 93005 ELECTROCARDIOGRAM TRACING: CPT

## 2019-01-28 PROCEDURE — 99285 EMERGENCY DEPT VISIT HI MDM: CPT

## 2019-01-28 PROCEDURE — 6370000000 HC RX 637 (ALT 250 FOR IP): Performed by: FAMILY MEDICINE

## 2019-01-28 PROCEDURE — 96374 THER/PROPH/DIAG INJ IV PUSH: CPT

## 2019-01-28 RX ORDER — ASPIRIN 81 MG/1
324 TABLET, CHEWABLE ORAL ONCE
Status: DISCONTINUED | OUTPATIENT
Start: 2019-01-28 | End: 2019-01-29 | Stop reason: HOSPADM

## 2019-01-28 RX ORDER — ONDANSETRON 2 MG/ML
4 INJECTION INTRAMUSCULAR; INTRAVENOUS ONCE
Status: COMPLETED | OUTPATIENT
Start: 2019-01-28 | End: 2019-01-28

## 2019-01-28 RX ADMIN — ONDANSETRON 4 MG: 2 INJECTION INTRAMUSCULAR; INTRAVENOUS at 20:39

## 2019-01-28 RX ADMIN — NITROGLYCERIN 0.5 INCH: 20 OINTMENT TOPICAL at 22:21

## 2019-01-28 ASSESSMENT — PAIN DESCRIPTION - PAIN TYPE: TYPE: ACUTE PAIN

## 2019-01-28 ASSESSMENT — PAIN SCALES - GENERAL: PAINLEVEL_OUTOF10: 9

## 2019-01-28 ASSESSMENT — PAIN DESCRIPTION - DIRECTION: RADIATING_TOWARDS: LEFT ARM AND BACK

## 2019-01-28 ASSESSMENT — PAIN DESCRIPTION - LOCATION: LOCATION: CHEST

## 2019-01-28 ASSESSMENT — PAIN DESCRIPTION - PROGRESSION: CLINICAL_PROGRESSION: NOT CHANGED

## 2019-01-28 ASSESSMENT — PAIN - FUNCTIONAL ASSESSMENT: PAIN_FUNCTIONAL_ASSESSMENT: PREVENTS OR INTERFERES SOME ACTIVE ACTIVITIES AND ADLS

## 2019-01-28 ASSESSMENT — PAIN DESCRIPTION - FREQUENCY: FREQUENCY: INTERMITTENT

## 2019-01-28 ASSESSMENT — PAIN DESCRIPTION - DESCRIPTORS: DESCRIPTORS: SHARP;SPASM

## 2019-01-28 ASSESSMENT — PAIN DESCRIPTION - ONSET: ONSET: SUDDEN

## 2019-01-29 ENCOUNTER — TELEPHONE (OUTPATIENT)
Dept: CARDIOLOGY CLINIC | Age: 64
End: 2019-01-29

## 2019-01-29 LAB
EKG ATRIAL RATE: 61 BPM
EKG ATRIAL RATE: 63 BPM
EKG P AXIS: 16 DEGREES
EKG P AXIS: 34 DEGREES
EKG P-R INTERVAL: 198 MS
EKG P-R INTERVAL: 204 MS
EKG Q-T INTERVAL: 424 MS
EKG Q-T INTERVAL: 426 MS
EKG QRS DURATION: 86 MS
EKG QRS DURATION: 90 MS
EKG QTC CALCULATION (BAZETT): 426 MS
EKG QTC CALCULATION (BAZETT): 435 MS
EKG R AXIS: -2 DEGREES
EKG R AXIS: 26 DEGREES
EKG T AXIS: 12 DEGREES
EKG T AXIS: 7 DEGREES
EKG VENTRICULAR RATE: 61 BPM
EKG VENTRICULAR RATE: 63 BPM

## 2019-01-29 ASSESSMENT — HEART SCORE: ECG: 1

## 2019-02-08 RX ORDER — ALBUTEROL SULFATE 2.5 MG/3ML
2.5 SOLUTION RESPIRATORY (INHALATION) 4 TIMES DAILY
Qty: 120 VIAL | Refills: 1 | Status: SHIPPED | OUTPATIENT
Start: 2019-02-08 | End: 2019-03-28 | Stop reason: SDUPTHER

## 2019-02-08 RX ORDER — TERAZOSIN 2 MG/1
2 CAPSULE ORAL NIGHTLY
Qty: 30 CAPSULE | Refills: 3 | Status: ON HOLD | OUTPATIENT
Start: 2019-02-08 | End: 2019-04-25 | Stop reason: HOSPADM

## 2019-02-08 RX ORDER — ONDANSETRON 4 MG/1
4 TABLET, ORALLY DISINTEGRATING ORAL EVERY 4 HOURS PRN
Qty: 10 TABLET | Refills: 1 | Status: SHIPPED | OUTPATIENT
Start: 2019-02-08 | End: 2019-02-23 | Stop reason: ALTCHOICE

## 2019-02-12 ENCOUNTER — TELEPHONE (OUTPATIENT)
Dept: CARDIOLOGY CLINIC | Age: 64
End: 2019-02-12

## 2019-02-23 ENCOUNTER — APPOINTMENT (OUTPATIENT)
Dept: GENERAL RADIOLOGY | Age: 64
End: 2019-02-23
Payer: COMMERCIAL

## 2019-02-23 ENCOUNTER — HOSPITAL ENCOUNTER (EMERGENCY)
Age: 64
Discharge: ANOTHER HOSPITAL | End: 2019-02-23
Attending: FAMILY MEDICINE
Payer: COMMERCIAL

## 2019-02-23 ENCOUNTER — HOSPITAL ENCOUNTER (INPATIENT)
Age: 64
LOS: 3 days | Discharge: HOME | DRG: 198 | End: 2019-02-26
Attending: INTERNAL MEDICINE | Admitting: INTERNAL MEDICINE
Payer: COMMERCIAL

## 2019-02-23 VITALS
SYSTOLIC BLOOD PRESSURE: 100 MMHG | TEMPERATURE: 98 F | BODY MASS INDEX: 30.36 KG/M2 | DIASTOLIC BLOOD PRESSURE: 56 MMHG | HEART RATE: 66 BPM | OXYGEN SATURATION: 95 % | WEIGHT: 205 LBS | RESPIRATION RATE: 13 BRPM | HEIGHT: 69 IN

## 2019-02-23 DIAGNOSIS — I20.0 UNSTABLE ANGINA (HCC): Primary | ICD-10-CM

## 2019-02-23 LAB
ABSOLUTE EOS #: 0.3 K/UL (ref 0–0.4)
ABSOLUTE IMMATURE GRANULOCYTE: ABNORMAL K/UL (ref 0–0.3)
ABSOLUTE LYMPH #: 1.2 K/UL (ref 1–4.8)
ABSOLUTE MONO #: 0.4 K/UL (ref 0–1)
ALBUMIN SERPL-MCNC: 4.4 G/DL (ref 3.5–5.2)
ALBUMIN/GLOBULIN RATIO: ABNORMAL (ref 1–2.5)
ALP BLD-CCNC: 94 U/L (ref 40–129)
ALT SERPL-CCNC: 17 U/L (ref 5–41)
ANION GAP SERPL CALCULATED.3IONS-SCNC: 12 MMOL/L (ref 9–17)
AST SERPL-CCNC: 16 U/L
BASOPHILS # BLD: 0 % (ref 0–2)
BASOPHILS ABSOLUTE: 0 K/UL (ref 0–0.2)
BILIRUB SERPL-MCNC: 0.62 MG/DL (ref 0.3–1.2)
BILIRUBIN URINE: NEGATIVE
BUN BLDV-MCNC: 12 MG/DL (ref 8–23)
BUN/CREAT BLD: 10 (ref 9–20)
CALCIUM SERPL-MCNC: 9.3 MG/DL (ref 8.6–10.4)
CHLORIDE BLD-SCNC: 100 MMOL/L (ref 98–107)
CO2: 26 MMOL/L (ref 20–31)
COLOR: YELLOW
COMMENT UA: NORMAL
CREAT SERPL-MCNC: 1.19 MG/DL (ref 0.7–1.2)
DIFFERENTIAL TYPE: YES
EOSINOPHILS RELATIVE PERCENT: 4 % (ref 0–5)
GFR AFRICAN AMERICAN: >60 ML/MIN
GFR NON-AFRICAN AMERICAN: >60 ML/MIN
GFR SERPL CREATININE-BSD FRML MDRD: ABNORMAL ML/MIN/{1.73_M2}
GFR SERPL CREATININE-BSD FRML MDRD: ABNORMAL ML/MIN/{1.73_M2}
GLUCOSE BLD-MCNC: 110 MG/DL (ref 70–99)
GLUCOSE BLD-MCNC: 84 MG/DL (ref 75–110)
GLUCOSE URINE: NEGATIVE
HCT VFR BLD CALC: 46.2 % (ref 41–53)
HEMOGLOBIN: 15.1 G/DL (ref 13.5–17.5)
IMMATURE GRANULOCYTES: ABNORMAL %
INR BLD: 1
KETONES, URINE: NEGATIVE
LEUKOCYTE ESTERASE, URINE: NEGATIVE
LYMPHOCYTES # BLD: 19 % (ref 13–44)
MCH RBC QN AUTO: 29.5 PG (ref 26–34)
MCHC RBC AUTO-ENTMCNC: 32.6 G/DL (ref 31–37)
MCV RBC AUTO: 90.5 FL (ref 80–100)
MONOCYTES # BLD: 7 % (ref 5–9)
NITRITE, URINE: NEGATIVE
NRBC AUTOMATED: ABNORMAL PER 100 WBC
PARTIAL THROMBOPLASTIN TIME: 26.4 SEC (ref 21–33)
PDW BLD-RTO: 16.1 % (ref 12.1–15.2)
PH UA: 6 (ref 5–8)
PLATELET # BLD: 215 K/UL (ref 140–450)
PLATELET ESTIMATE: ABNORMAL
PMV BLD AUTO: ABNORMAL FL (ref 6–12)
POTASSIUM SERPL-SCNC: 4.2 MMOL/L (ref 3.7–5.3)
PROTEIN UA: NEGATIVE
PROTHROMBIN TIME: 9.9 SEC (ref 9–11.6)
RBC # BLD: 5.1 M/UL (ref 4.5–5.9)
RBC # BLD: ABNORMAL 10*6/UL
SEG NEUTROPHILS: 70 % (ref 39–75)
SEGMENTED NEUTROPHILS ABSOLUTE COUNT: 4.3 K/UL (ref 2.1–6.5)
SODIUM BLD-SCNC: 138 MMOL/L (ref 135–144)
SPECIFIC GRAVITY UA: 1.01 (ref 1–1.03)
TOTAL PROTEIN: 7.2 G/DL (ref 6.4–8.3)
TROPONIN INTERP: NORMAL
TROPONIN T: <0.03 NG/ML
TROPONIN T: <0.03 NG/ML
TROPONIN T: NORMAL NG/ML
TROPONIN, HIGH SENSITIVITY: 17 NG/L (ref 0–22)
TROPONIN, HIGH SENSITIVITY: NORMAL NG/L (ref 0–22)
TROPONIN, HIGH SENSITIVITY: NORMAL NG/L (ref 0–22)
TURBIDITY: CLEAR
URINE HGB: NEGATIVE
UROBILINOGEN, URINE: NORMAL
WBC # BLD: 6.3 K/UL (ref 3.5–11)
WBC # BLD: ABNORMAL 10*3/UL

## 2019-02-23 PROCEDURE — 2500000003 HC RX 250 WO HCPCS: Performed by: NURSE PRACTITIONER

## 2019-02-23 PROCEDURE — 71045 X-RAY EXAM CHEST 1 VIEW: CPT

## 2019-02-23 PROCEDURE — 84484 ASSAY OF TROPONIN QUANT: CPT

## 2019-02-23 PROCEDURE — 36415 COLL VENOUS BLD VENIPUNCTURE: CPT

## 2019-02-23 PROCEDURE — 85025 COMPLETE CBC W/AUTO DIFF WBC: CPT

## 2019-02-23 PROCEDURE — 85730 THROMBOPLASTIN TIME PARTIAL: CPT

## 2019-02-23 PROCEDURE — 6370000000 HC RX 637 (ALT 250 FOR IP): Performed by: NURSE PRACTITIONER

## 2019-02-23 PROCEDURE — 82947 ASSAY GLUCOSE BLOOD QUANT: CPT

## 2019-02-23 PROCEDURE — 80053 COMPREHEN METABOLIC PANEL: CPT

## 2019-02-23 PROCEDURE — 85610 PROTHROMBIN TIME: CPT

## 2019-02-23 PROCEDURE — 96375 TX/PRO/DX INJ NEW DRUG ADDON: CPT

## 2019-02-23 PROCEDURE — 99285 EMERGENCY DEPT VISIT HI MDM: CPT

## 2019-02-23 PROCEDURE — 96374 THER/PROPH/DIAG INJ IV PUSH: CPT

## 2019-02-23 PROCEDURE — 96372 THER/PROPH/DIAG INJ SC/IM: CPT

## 2019-02-23 PROCEDURE — 2580000003 HC RX 258: Performed by: NURSE PRACTITIONER

## 2019-02-23 PROCEDURE — 81003 URINALYSIS AUTO W/O SCOPE: CPT

## 2019-02-23 PROCEDURE — 93005 ELECTROCARDIOGRAM TRACING: CPT

## 2019-02-23 PROCEDURE — 2060000000 HC ICU INTERMEDIATE R&B

## 2019-02-23 PROCEDURE — 6360000002 HC RX W HCPCS: Performed by: FAMILY MEDICINE

## 2019-02-23 PROCEDURE — 6370000000 HC RX 637 (ALT 250 FOR IP): Performed by: FAMILY MEDICINE

## 2019-02-23 RX ORDER — SODIUM CHLORIDE 0.9 % (FLUSH) 0.9 %
10 SYRINGE (ML) INJECTION PRN
Status: CANCELLED | OUTPATIENT
Start: 2019-02-23

## 2019-02-23 RX ORDER — ISOSORBIDE MONONITRATE 30 MG/1
15 TABLET, EXTENDED RELEASE ORAL DAILY
Status: CANCELLED | OUTPATIENT
Start: 2019-02-23

## 2019-02-23 RX ORDER — RANOLAZINE 500 MG/1
500 TABLET, EXTENDED RELEASE ORAL 3 TIMES DAILY
Status: DISCONTINUED | OUTPATIENT
Start: 2019-02-23 | End: 2019-02-27 | Stop reason: HOSPADM

## 2019-02-23 RX ORDER — ALBUTEROL SULFATE 2.5 MG/3ML
2.5 SOLUTION RESPIRATORY (INHALATION) EVERY 6 HOURS PRN
Status: CANCELLED | OUTPATIENT
Start: 2019-02-23

## 2019-02-23 RX ORDER — RANOLAZINE 500 MG/1
1000 TABLET, FILM COATED, EXTENDED RELEASE ORAL 2 TIMES DAILY
Refills: 1 | COMMUNITY
Start: 2019-01-23 | End: 2020-04-28 | Stop reason: SDUPTHER

## 2019-02-23 RX ORDER — ATORVASTATIN CALCIUM 20 MG/1
40 TABLET, FILM COATED ORAL NIGHTLY
Status: CANCELLED | OUTPATIENT
Start: 2019-02-23

## 2019-02-23 RX ORDER — RANOLAZINE 500 MG/1
500 TABLET, EXTENDED RELEASE ORAL 3 TIMES DAILY
Status: CANCELLED | OUTPATIENT
Start: 2019-02-23

## 2019-02-23 RX ORDER — ATENOLOL 25 MG/1
25 TABLET ORAL DAILY
Status: DISCONTINUED | OUTPATIENT
Start: 2019-02-24 | End: 2019-02-27 | Stop reason: HOSPADM

## 2019-02-23 RX ORDER — DEXTROSE, SODIUM CHLORIDE, AND POTASSIUM CHLORIDE 5; .45; .15 G/100ML; G/100ML; G/100ML
INJECTION INTRAVENOUS CONTINUOUS
Status: DISCONTINUED | OUTPATIENT
Start: 2019-02-23 | End: 2019-02-24

## 2019-02-23 RX ORDER — AMINOPHYLLINE DIHYDRATE 25 MG/ML
50 INJECTION, SOLUTION INTRAVENOUS
Status: DISCONTINUED | OUTPATIENT
Start: 2019-02-23 | End: 2019-02-23

## 2019-02-23 RX ORDER — ASPIRIN 81 MG/1
81 TABLET, CHEWABLE ORAL DAILY
Status: CANCELLED | OUTPATIENT
Start: 2019-02-24

## 2019-02-23 RX ORDER — MIRTAZAPINE 15 MG/1
45 TABLET, FILM COATED ORAL NIGHTLY
Status: CANCELLED | OUTPATIENT
Start: 2019-02-23

## 2019-02-23 RX ORDER — ONDANSETRON 2 MG/ML
4 INJECTION INTRAMUSCULAR; INTRAVENOUS EVERY 6 HOURS PRN
Status: DISCONTINUED | OUTPATIENT
Start: 2019-02-23 | End: 2019-02-27 | Stop reason: HOSPADM

## 2019-02-23 RX ORDER — FLUTICASONE PROPIONATE 50 MCG
2 SPRAY, SUSPENSION (ML) NASAL DAILY
Status: DISCONTINUED | OUTPATIENT
Start: 2019-02-24 | End: 2019-02-27 | Stop reason: HOSPADM

## 2019-02-23 RX ORDER — ATORVASTATIN CALCIUM 20 MG/1
20 TABLET, FILM COATED ORAL DAILY
Status: DISCONTINUED | OUTPATIENT
Start: 2019-02-24 | End: 2019-02-24

## 2019-02-23 RX ORDER — AMINOPHYLLINE DIHYDRATE 25 MG/ML
100 INJECTION, SOLUTION INTRAVENOUS
Status: DISCONTINUED | OUTPATIENT
Start: 2019-02-23 | End: 2019-02-23

## 2019-02-23 RX ORDER — ALBUTEROL SULFATE 2.5 MG/3ML
2.5 SOLUTION RESPIRATORY (INHALATION)
Status: DISCONTINUED | OUTPATIENT
Start: 2019-02-23 | End: 2019-02-24

## 2019-02-23 RX ORDER — SODIUM CHLORIDE 0.9 % (FLUSH) 0.9 %
10 SYRINGE (ML) INJECTION EVERY 12 HOURS SCHEDULED
Status: DISCONTINUED | OUTPATIENT
Start: 2019-02-23 | End: 2019-02-27 | Stop reason: HOSPADM

## 2019-02-23 RX ORDER — POTASSIUM CHLORIDE 7.45 MG/ML
10 INJECTION INTRAVENOUS PRN
Status: DISCONTINUED | OUTPATIENT
Start: 2019-02-23 | End: 2019-02-27 | Stop reason: HOSPADM

## 2019-02-23 RX ORDER — DOXAZOSIN 2 MG/1
2 TABLET ORAL DAILY
Status: DISCONTINUED | OUTPATIENT
Start: 2019-02-24 | End: 2019-02-27 | Stop reason: HOSPADM

## 2019-02-23 RX ORDER — DOXAZOSIN 2 MG/1
2 TABLET ORAL DAILY
Status: CANCELLED | OUTPATIENT
Start: 2019-02-23

## 2019-02-23 RX ORDER — MORPHINE SULFATE 4 MG/ML
2 INJECTION, SOLUTION INTRAMUSCULAR; INTRAVENOUS ONCE
Status: COMPLETED | OUTPATIENT
Start: 2019-02-23 | End: 2019-02-23

## 2019-02-23 RX ORDER — ASPIRIN 81 MG/1
324 TABLET, CHEWABLE ORAL ONCE
Status: DISCONTINUED | OUTPATIENT
Start: 2019-02-23 | End: 2019-02-27 | Stop reason: HOSPADM

## 2019-02-23 RX ORDER — ONDANSETRON 2 MG/ML
4 INJECTION INTRAMUSCULAR; INTRAVENOUS ONCE
Status: COMPLETED | OUTPATIENT
Start: 2019-02-23 | End: 2019-02-23

## 2019-02-23 RX ORDER — ATENOLOL 25 MG/1
25 TABLET ORAL DAILY
Status: CANCELLED | OUTPATIENT
Start: 2019-02-23

## 2019-02-23 RX ORDER — SPIRONOLACTONE 25 MG/1
25 TABLET ORAL DAILY
Status: DISCONTINUED | OUTPATIENT
Start: 2019-02-24 | End: 2019-02-27 | Stop reason: HOSPADM

## 2019-02-23 RX ORDER — GABAPENTIN 300 MG/1
900 CAPSULE ORAL 3 TIMES DAILY
Status: CANCELLED | OUTPATIENT
Start: 2019-02-23

## 2019-02-23 RX ORDER — POTASSIUM CHLORIDE 20MEQ/15ML
40 LIQUID (ML) ORAL PRN
Status: DISCONTINUED | OUTPATIENT
Start: 2019-02-23 | End: 2019-02-27 | Stop reason: HOSPADM

## 2019-02-23 RX ORDER — TRAZODONE HYDROCHLORIDE 50 MG/1
100 TABLET ORAL NIGHTLY
Status: CANCELLED | OUTPATIENT
Start: 2019-02-23

## 2019-02-23 RX ORDER — ATORVASTATIN CALCIUM 20 MG/1
1 TABLET, FILM COATED ORAL DAILY
Status: CANCELLED | OUTPATIENT
Start: 2019-02-23

## 2019-02-23 RX ORDER — SPIRONOLACTONE 25 MG/1
25 TABLET ORAL DAILY
Status: CANCELLED | OUTPATIENT
Start: 2019-02-23

## 2019-02-23 RX ORDER — 0.9 % SODIUM CHLORIDE 0.9 %
10 VIAL (ML) INJECTION PRN
Status: DISCONTINUED | OUTPATIENT
Start: 2019-02-23 | End: 2019-02-23

## 2019-02-23 RX ORDER — TRAZODONE HYDROCHLORIDE 100 MG/1
100 TABLET ORAL NIGHTLY
Status: DISCONTINUED | OUTPATIENT
Start: 2019-02-23 | End: 2019-02-27 | Stop reason: HOSPADM

## 2019-02-23 RX ORDER — POTASSIUM CHLORIDE 20 MEQ/1
40 TABLET, EXTENDED RELEASE ORAL PRN
Status: DISCONTINUED | OUTPATIENT
Start: 2019-02-23 | End: 2019-02-27 | Stop reason: HOSPADM

## 2019-02-23 RX ORDER — FUROSEMIDE 20 MG/1
40 TABLET ORAL DAILY
Status: CANCELLED | OUTPATIENT
Start: 2019-02-23

## 2019-02-23 RX ORDER — ONDANSETRON 2 MG/ML
4 INJECTION INTRAMUSCULAR; INTRAVENOUS EVERY 6 HOURS PRN
Status: CANCELLED | OUTPATIENT
Start: 2019-02-23

## 2019-02-23 RX ORDER — GABAPENTIN 300 MG/1
900 CAPSULE ORAL 3 TIMES DAILY PRN
Status: DISCONTINUED | OUTPATIENT
Start: 2019-02-23 | End: 2019-02-27 | Stop reason: HOSPADM

## 2019-02-23 RX ORDER — MAGNESIUM SULFATE 1 G/100ML
1 INJECTION INTRAVENOUS PRN
Status: DISCONTINUED | OUTPATIENT
Start: 2019-02-23 | End: 2019-02-27 | Stop reason: HOSPADM

## 2019-02-23 RX ORDER — ISOSORBIDE MONONITRATE 30 MG/1
15 TABLET, EXTENDED RELEASE ORAL DAILY
Status: DISCONTINUED | OUTPATIENT
Start: 2019-02-24 | End: 2019-02-27 | Stop reason: HOSPADM

## 2019-02-23 RX ORDER — NITROGLYCERIN 0.4 MG/1
0.4 TABLET SUBLINGUAL EVERY 5 MIN PRN
Status: DISCONTINUED | OUTPATIENT
Start: 2019-02-23 | End: 2019-02-27 | Stop reason: HOSPADM

## 2019-02-23 RX ORDER — SODIUM CHLORIDE 0.9 % (FLUSH) 0.9 %
10 SYRINGE (ML) INJECTION PRN
Status: DISCONTINUED | OUTPATIENT
Start: 2019-02-23 | End: 2019-02-27 | Stop reason: HOSPADM

## 2019-02-23 RX ORDER — SODIUM CHLORIDE 0.9 % (FLUSH) 0.9 %
10 SYRINGE (ML) INJECTION EVERY 12 HOURS SCHEDULED
Status: CANCELLED | OUTPATIENT
Start: 2019-02-23

## 2019-02-23 RX ORDER — FUROSEMIDE 40 MG/1
40 TABLET ORAL DAILY
Status: DISCONTINUED | OUTPATIENT
Start: 2019-02-24 | End: 2019-02-27 | Stop reason: HOSPADM

## 2019-02-23 RX ADMIN — ENOXAPARIN SODIUM 90 MG: 100 INJECTION SUBCUTANEOUS at 13:09

## 2019-02-23 RX ADMIN — NITROGLYCERIN 0.5 INCH: 20 OINTMENT TOPICAL at 10:20

## 2019-02-23 RX ADMIN — MORPHINE SULFATE 2 MG: 4 INJECTION, SOLUTION INTRAMUSCULAR; INTRAVENOUS at 08:52

## 2019-02-23 RX ADMIN — POTASSIUM CHLORIDE, DEXTROSE MONOHYDRATE AND SODIUM CHLORIDE: 150; 5; 450 INJECTION, SOLUTION INTRAVENOUS at 22:26

## 2019-02-23 RX ADMIN — TRAZODONE HYDROCHLORIDE 100 MG: 100 TABLET ORAL at 22:31

## 2019-02-23 RX ADMIN — RANOLAZINE 500 MG: 500 TABLET, FILM COATED, EXTENDED RELEASE ORAL at 22:28

## 2019-02-23 RX ADMIN — ONDANSETRON 4 MG: 2 INJECTION, SOLUTION INTRAMUSCULAR; INTRAVENOUS at 08:52

## 2019-02-23 RX ADMIN — MIRTAZAPINE 45 MG: 30 TABLET, FILM COATED ORAL at 22:27

## 2019-02-23 RX ADMIN — Medication 10 ML: at 22:31

## 2019-02-23 ASSESSMENT — PAIN DESCRIPTION - ORIENTATION
ORIENTATION: LEFT
ORIENTATION: LEFT
ORIENTATION: UPPER;LEFT

## 2019-02-23 ASSESSMENT — PAIN DESCRIPTION - FREQUENCY: FREQUENCY: CONTINUOUS

## 2019-02-23 ASSESSMENT — PAIN DESCRIPTION - PAIN TYPE
TYPE: ACUTE PAIN
TYPE: ACUTE PAIN

## 2019-02-23 ASSESSMENT — PAIN SCALES - GENERAL
PAINLEVEL_OUTOF10: 8
PAINLEVEL_OUTOF10: 5
PAINLEVEL_OUTOF10: 7
PAINLEVEL_OUTOF10: 5

## 2019-02-23 ASSESSMENT — PAIN DESCRIPTION - LOCATION
LOCATION: CHEST

## 2019-02-23 ASSESSMENT — PAIN DESCRIPTION - ONSET: ONSET: ON-GOING

## 2019-02-23 ASSESSMENT — PAIN DESCRIPTION - DESCRIPTORS
DESCRIPTORS: PRESSURE
DESCRIPTORS: PRESSURE

## 2019-02-24 PROBLEM — R07.2 PRECORDIAL PAIN: Status: ACTIVE | Noted: 2017-04-04

## 2019-02-24 LAB
ALBUMIN SERPL-MCNC: 3.6 G/DL (ref 3.5–5.2)
ALBUMIN/GLOBULIN RATIO: 1.4 (ref 1–2.5)
ALP BLD-CCNC: 86 U/L (ref 40–129)
ALT SERPL-CCNC: 15 U/L (ref 5–41)
ANION GAP SERPL CALCULATED.3IONS-SCNC: 11 MMOL/L (ref 9–17)
AST SERPL-CCNC: 13 U/L
BILIRUB SERPL-MCNC: 0.55 MG/DL (ref 0.3–1.2)
BNP INTERPRETATION: NORMAL
BUN BLDV-MCNC: 10 MG/DL (ref 8–23)
BUN/CREAT BLD: ABNORMAL (ref 9–20)
CALCIUM SERPL-MCNC: 8.3 MG/DL (ref 8.6–10.4)
CHLORIDE BLD-SCNC: 100 MMOL/L (ref 98–107)
CHOLESTEROL/HDL RATIO: 3.1
CHOLESTEROL: 107 MG/DL
CO2: 23 MMOL/L (ref 20–31)
CREAT SERPL-MCNC: 0.97 MG/DL (ref 0.7–1.2)
EKG ATRIAL RATE: 65 BPM
EKG P AXIS: 43 DEGREES
EKG P-R INTERVAL: 214 MS
EKG Q-T INTERVAL: 416 MS
EKG QRS DURATION: 86 MS
EKG QTC CALCULATION (BAZETT): 432 MS
EKG R AXIS: 29 DEGREES
EKG T AXIS: 26 DEGREES
EKG VENTRICULAR RATE: 65 BPM
GFR AFRICAN AMERICAN: >60 ML/MIN
GFR NON-AFRICAN AMERICAN: >60 ML/MIN
GFR SERPL CREATININE-BSD FRML MDRD: ABNORMAL ML/MIN/{1.73_M2}
GFR SERPL CREATININE-BSD FRML MDRD: ABNORMAL ML/MIN/{1.73_M2}
GLUCOSE BLD-MCNC: 106 MG/DL (ref 70–99)
GLUCOSE BLD-MCNC: 109 MG/DL (ref 75–110)
GLUCOSE BLD-MCNC: 140 MG/DL (ref 75–110)
GLUCOSE BLD-MCNC: 86 MG/DL (ref 75–110)
GLUCOSE BLD-MCNC: 92 MG/DL (ref 75–110)
HCT VFR BLD CALC: 41.7 % (ref 40.7–50.3)
HDLC SERPL-MCNC: 35 MG/DL
HEMOGLOBIN: 13.8 G/DL (ref 13–17)
LDL CHOLESTEROL: 53 MG/DL (ref 0–130)
MAGNESIUM: 2 MG/DL (ref 1.6–2.6)
MCH RBC QN AUTO: 30.1 PG (ref 25.2–33.5)
MCHC RBC AUTO-ENTMCNC: 33.1 G/DL (ref 28.4–34.8)
MCV RBC AUTO: 91 FL (ref 82.6–102.9)
NRBC AUTOMATED: 0 PER 100 WBC
PDW BLD-RTO: 15 % (ref 11.8–14.4)
PLATELET # BLD: 179 K/UL (ref 138–453)
PMV BLD AUTO: 9.6 FL (ref 8.1–13.5)
POTASSIUM SERPL-SCNC: 4.3 MMOL/L (ref 3.7–5.3)
PRO-BNP: 36 PG/ML
RBC # BLD: 4.58 M/UL (ref 4.21–5.77)
SODIUM BLD-SCNC: 134 MMOL/L (ref 135–144)
TOTAL PROTEIN: 6.1 G/DL (ref 6.4–8.3)
TRIGL SERPL-MCNC: 93 MG/DL
TROPONIN INTERP: NORMAL
TROPONIN T: NORMAL NG/ML
TROPONIN, HIGH SENSITIVITY: 20 NG/L (ref 0–22)
VLDLC SERPL CALC-MCNC: ABNORMAL MG/DL (ref 1–30)
WBC # BLD: 5.1 K/UL (ref 3.5–11.3)

## 2019-02-24 PROCEDURE — 80061 LIPID PANEL: CPT

## 2019-02-24 PROCEDURE — 80053 COMPREHEN METABOLIC PANEL: CPT

## 2019-02-24 PROCEDURE — 84484 ASSAY OF TROPONIN QUANT: CPT

## 2019-02-24 PROCEDURE — 83735 ASSAY OF MAGNESIUM: CPT

## 2019-02-24 PROCEDURE — 2060000000 HC ICU INTERMEDIATE R&B

## 2019-02-24 PROCEDURE — 93005 ELECTROCARDIOGRAM TRACING: CPT

## 2019-02-24 PROCEDURE — 6370000000 HC RX 637 (ALT 250 FOR IP): Performed by: NURSE PRACTITIONER

## 2019-02-24 PROCEDURE — 2580000003 HC RX 258: Performed by: NURSE PRACTITIONER

## 2019-02-24 PROCEDURE — 36415 COLL VENOUS BLD VENIPUNCTURE: CPT

## 2019-02-24 PROCEDURE — 94760 N-INVAS EAR/PLS OXIMETRY 1: CPT

## 2019-02-24 PROCEDURE — 85027 COMPLETE CBC AUTOMATED: CPT

## 2019-02-24 PROCEDURE — 6370000000 HC RX 637 (ALT 250 FOR IP): Performed by: INTERNAL MEDICINE

## 2019-02-24 PROCEDURE — 6360000002 HC RX W HCPCS: Performed by: NURSE PRACTITIONER

## 2019-02-24 PROCEDURE — 94640 AIRWAY INHALATION TREATMENT: CPT

## 2019-02-24 PROCEDURE — 82947 ASSAY GLUCOSE BLOOD QUANT: CPT

## 2019-02-24 PROCEDURE — 83880 ASSAY OF NATRIURETIC PEPTIDE: CPT

## 2019-02-24 PROCEDURE — 99222 1ST HOSP IP/OBS MODERATE 55: CPT | Performed by: INTERNAL MEDICINE

## 2019-02-24 RX ORDER — ATORVASTATIN CALCIUM 40 MG/1
40 TABLET, FILM COATED ORAL DAILY
Status: DISCONTINUED | OUTPATIENT
Start: 2019-02-25 | End: 2019-02-27 | Stop reason: HOSPADM

## 2019-02-24 RX ORDER — ASPIRIN 81 MG/1
81 TABLET ORAL DAILY
Status: DISCONTINUED | OUTPATIENT
Start: 2019-02-25 | End: 2019-02-27 | Stop reason: HOSPADM

## 2019-02-24 RX ORDER — ALBUTEROL SULFATE 2.5 MG/3ML
2.5 SOLUTION RESPIRATORY (INHALATION) EVERY 6 HOURS PRN
Status: DISCONTINUED | OUTPATIENT
Start: 2019-02-24 | End: 2019-02-27 | Stop reason: HOSPADM

## 2019-02-24 RX ORDER — ACETAMINOPHEN 325 MG/1
650 TABLET ORAL EVERY 4 HOURS PRN
Status: DISCONTINUED | OUTPATIENT
Start: 2019-02-24 | End: 2019-02-27 | Stop reason: HOSPADM

## 2019-02-24 RX ADMIN — RANOLAZINE 500 MG: 500 TABLET, FILM COATED, EXTENDED RELEASE ORAL at 15:18

## 2019-02-24 RX ADMIN — TIOTROPIUM BROMIDE 18 MCG: 18 CAPSULE ORAL; RESPIRATORY (INHALATION) at 09:11

## 2019-02-24 RX ADMIN — ATENOLOL 25 MG: 25 TABLET ORAL at 09:18

## 2019-02-24 RX ADMIN — Medication 10 ML: at 22:18

## 2019-02-24 RX ADMIN — TRAZODONE HYDROCHLORIDE 100 MG: 100 TABLET ORAL at 22:18

## 2019-02-24 RX ADMIN — Medication 10 ML: at 09:19

## 2019-02-24 RX ADMIN — ENOXAPARIN SODIUM 40 MG: 40 INJECTION SUBCUTANEOUS at 09:18

## 2019-02-24 RX ADMIN — SPIRONOLACTONE 25 MG: 25 TABLET ORAL at 09:18

## 2019-02-24 RX ADMIN — RANOLAZINE 500 MG: 500 TABLET, FILM COATED, EXTENDED RELEASE ORAL at 09:18

## 2019-02-24 RX ADMIN — RANOLAZINE 500 MG: 500 TABLET, FILM COATED, EXTENDED RELEASE ORAL at 22:17

## 2019-02-24 RX ADMIN — DOXAZOSIN 2 MG: 2 TABLET ORAL at 09:18

## 2019-02-24 RX ADMIN — FLUTICASONE PROPIONATE 2 SPRAY: 50 SPRAY, METERED NASAL at 09:22

## 2019-02-24 RX ADMIN — VITAMIN D, TAB 1000IU (100/BT) 2000 UNITS: 25 TAB at 09:18

## 2019-02-24 RX ADMIN — ISOSORBIDE MONONITRATE 15 MG: 30 TABLET ORAL at 09:18

## 2019-02-24 RX ADMIN — MIRTAZAPINE 45 MG: 30 TABLET, FILM COATED ORAL at 23:42

## 2019-02-24 RX ADMIN — FUROSEMIDE 40 MG: 40 TABLET ORAL at 09:18

## 2019-02-24 RX ADMIN — ACETAMINOPHEN 650 MG: 325 TABLET ORAL at 15:18

## 2019-02-25 ENCOUNTER — APPOINTMENT (OUTPATIENT)
Dept: NUCLEAR MEDICINE | Age: 64
DRG: 198 | End: 2019-02-25
Attending: INTERNAL MEDICINE
Payer: COMMERCIAL

## 2019-02-25 LAB
GLUCOSE BLD-MCNC: 101 MG/DL (ref 75–110)
GLUCOSE BLD-MCNC: 102 MG/DL (ref 75–110)
GLUCOSE BLD-MCNC: 141 MG/DL (ref 75–110)
LV EF: 54 %
LV EF: 65 %
LVEF MODALITY: NORMAL
LVEF MODALITY: NORMAL

## 2019-02-25 PROCEDURE — 93017 CV STRESS TEST TRACING ONLY: CPT | Performed by: NURSE PRACTITIONER

## 2019-02-25 PROCEDURE — 6360000002 HC RX W HCPCS: Performed by: INTERNAL MEDICINE

## 2019-02-25 PROCEDURE — 94761 N-INVAS EAR/PLS OXIMETRY MLT: CPT

## 2019-02-25 PROCEDURE — 6370000000 HC RX 637 (ALT 250 FOR IP): Performed by: INTERNAL MEDICINE

## 2019-02-25 PROCEDURE — 6370000000 HC RX 637 (ALT 250 FOR IP): Performed by: NURSE PRACTITIONER

## 2019-02-25 PROCEDURE — A9500 TC99M SESTAMIBI: HCPCS | Performed by: INTERNAL MEDICINE

## 2019-02-25 PROCEDURE — 3430000000 HC RX DIAGNOSTIC RADIOPHARMACEUTICAL: Performed by: INTERNAL MEDICINE

## 2019-02-25 PROCEDURE — 78452 HT MUSCLE IMAGE SPECT MULT: CPT

## 2019-02-25 PROCEDURE — 2060000000 HC ICU INTERMEDIATE R&B

## 2019-02-25 PROCEDURE — 93306 TTE W/DOPPLER COMPLETE: CPT

## 2019-02-25 PROCEDURE — 2580000003 HC RX 258: Performed by: INTERNAL MEDICINE

## 2019-02-25 PROCEDURE — 94640 AIRWAY INHALATION TREATMENT: CPT

## 2019-02-25 PROCEDURE — 6360000002 HC RX W HCPCS: Performed by: NURSE PRACTITIONER

## 2019-02-25 PROCEDURE — 99232 SBSQ HOSP IP/OBS MODERATE 35: CPT | Performed by: INTERNAL MEDICINE

## 2019-02-25 PROCEDURE — 82947 ASSAY GLUCOSE BLOOD QUANT: CPT

## 2019-02-25 RX ORDER — METOPROLOL TARTRATE 5 MG/5ML
2.5 INJECTION INTRAVENOUS PRN
Status: DISCONTINUED | OUTPATIENT
Start: 2019-02-25 | End: 2019-02-25 | Stop reason: SDUPTHER

## 2019-02-25 RX ORDER — 0.9 % SODIUM CHLORIDE 0.9 %
10 VIAL (ML) INJECTION PRN
Status: ACTIVE | OUTPATIENT
Start: 2019-02-25 | End: 2019-02-26

## 2019-02-25 RX ORDER — NITROGLYCERIN 0.4 MG/1
0.4 TABLET SUBLINGUAL EVERY 5 MIN PRN
Status: DISCONTINUED | OUTPATIENT
Start: 2019-02-25 | End: 2019-02-25 | Stop reason: SDUPTHER

## 2019-02-25 RX ORDER — AMINOPHYLLINE DIHYDRATE 25 MG/ML
100 INJECTION, SOLUTION INTRAVENOUS
Status: DISCONTINUED | OUTPATIENT
Start: 2019-02-25 | End: 2019-02-25

## 2019-02-25 RX ORDER — SODIUM CHLORIDE 0.9 % (FLUSH) 0.9 %
10 SYRINGE (ML) INJECTION PRN
Status: DISCONTINUED | OUTPATIENT
Start: 2019-02-25 | End: 2019-02-27 | Stop reason: HOSPADM

## 2019-02-25 RX ORDER — NITROGLYCERIN 0.4 MG/1
0.4 TABLET SUBLINGUAL EVERY 5 MIN PRN
Status: DISCONTINUED | OUTPATIENT
Start: 2019-02-25 | End: 2019-02-25

## 2019-02-25 RX ORDER — SODIUM CHLORIDE 9 MG/ML
50 INJECTION, SOLUTION INTRAVENOUS ONCE
Status: DISCONTINUED | OUTPATIENT
Start: 2019-02-25 | End: 2019-02-25 | Stop reason: SDUPTHER

## 2019-02-25 RX ORDER — SODIUM CHLORIDE 0.9 % (FLUSH) 0.9 %
10 SYRINGE (ML) INJECTION PRN
Status: DISCONTINUED | OUTPATIENT
Start: 2019-02-25 | End: 2019-02-25 | Stop reason: SDUPTHER

## 2019-02-25 RX ORDER — AMINOPHYLLINE DIHYDRATE 25 MG/ML
100 INJECTION, SOLUTION INTRAVENOUS
Status: DISCONTINUED | OUTPATIENT
Start: 2019-02-25 | End: 2019-02-25 | Stop reason: SDUPTHER

## 2019-02-25 RX ORDER — BISACODYL 10 MG
10 SUPPOSITORY, RECTAL RECTAL DAILY PRN
Status: DISCONTINUED | OUTPATIENT
Start: 2019-02-25 | End: 2019-02-27 | Stop reason: HOSPADM

## 2019-02-25 RX ORDER — METOPROLOL TARTRATE 5 MG/5ML
2.5 INJECTION INTRAVENOUS PRN
Status: DISCONTINUED | OUTPATIENT
Start: 2019-02-25 | End: 2019-02-25

## 2019-02-25 RX ORDER — DOCUSATE SODIUM 100 MG/1
100 CAPSULE, LIQUID FILLED ORAL 3 TIMES DAILY
Status: DISCONTINUED | OUTPATIENT
Start: 2019-02-25 | End: 2019-02-27 | Stop reason: HOSPADM

## 2019-02-25 RX ORDER — AMINOPHYLLINE DIHYDRATE 25 MG/ML
50 INJECTION, SOLUTION INTRAVENOUS
Status: DISCONTINUED | OUTPATIENT
Start: 2019-02-25 | End: 2019-02-25

## 2019-02-25 RX ORDER — SODIUM CHLORIDE 0.9 % (FLUSH) 0.9 %
10 SYRINGE (ML) INJECTION PRN
Status: DISCONTINUED | OUTPATIENT
Start: 2019-02-25 | End: 2019-02-25

## 2019-02-25 RX ORDER — AMINOPHYLLINE DIHYDRATE 25 MG/ML
100 INJECTION, SOLUTION INTRAVENOUS
Status: COMPLETED | OUTPATIENT
Start: 2019-02-25 | End: 2019-02-25

## 2019-02-25 RX ORDER — SODIUM CHLORIDE 9 MG/ML
INJECTION, SOLUTION INTRAVENOUS ONCE
Status: DISCONTINUED | OUTPATIENT
Start: 2019-02-25 | End: 2019-02-25

## 2019-02-25 RX ADMIN — DOXAZOSIN 2 MG: 2 TABLET ORAL at 14:44

## 2019-02-25 RX ADMIN — FUROSEMIDE 40 MG: 40 TABLET ORAL at 14:44

## 2019-02-25 RX ADMIN — DESMOPRESSIN ACETATE 40 MG: 0.2 TABLET ORAL at 14:44

## 2019-02-25 RX ADMIN — TETRAKIS(2-METHOXYISOBUTYLISOCYANIDE)COPPER(I) TETRAFLUOROBORATE 43 MILLICURIE: 1 INJECTION, POWDER, LYOPHILIZED, FOR SOLUTION INTRAVENOUS at 11:03

## 2019-02-25 RX ADMIN — SPIRONOLACTONE 25 MG: 25 TABLET ORAL at 14:44

## 2019-02-25 RX ADMIN — RANOLAZINE 500 MG: 500 TABLET, FILM COATED, EXTENDED RELEASE ORAL at 14:44

## 2019-02-25 RX ADMIN — SODIUM CHLORIDE, PRESERVATIVE FREE 10 ML: 5 INJECTION INTRAVENOUS at 07:45

## 2019-02-25 RX ADMIN — ASPIRIN 81 MG: 81 TABLET ORAL at 14:42

## 2019-02-25 RX ADMIN — REGADENOSON 0.4 MG: 0.08 INJECTION, SOLUTION INTRAVENOUS at 11:02

## 2019-02-25 RX ADMIN — MIRTAZAPINE 45 MG: 30 TABLET, FILM COATED ORAL at 22:17

## 2019-02-25 RX ADMIN — TETRAKIS(2-METHOXYISOBUTYLISOCYANIDE)COPPER(I) TETRAFLUOROBORATE 15 MILLICURIE: 1 INJECTION, POWDER, LYOPHILIZED, FOR SOLUTION INTRAVENOUS at 07:45

## 2019-02-25 RX ADMIN — SODIUM CHLORIDE, PRESERVATIVE FREE 10 ML: 5 INJECTION INTRAVENOUS at 11:03

## 2019-02-25 RX ADMIN — TIOTROPIUM BROMIDE 18 MCG: 18 CAPSULE ORAL; RESPIRATORY (INHALATION) at 08:10

## 2019-02-25 RX ADMIN — DOCUSATE SODIUM 100 MG: 100 CAPSULE, LIQUID FILLED ORAL at 21:00

## 2019-02-25 RX ADMIN — FLUTICASONE PROPIONATE 2 SPRAY: 50 SPRAY, METERED NASAL at 14:46

## 2019-02-25 RX ADMIN — Medication 10 ML: at 10:55

## 2019-02-25 RX ADMIN — RANOLAZINE 500 MG: 500 TABLET, FILM COATED, EXTENDED RELEASE ORAL at 21:00

## 2019-02-25 RX ADMIN — DOCUSATE SODIUM 100 MG: 100 CAPSULE, LIQUID FILLED ORAL at 17:50

## 2019-02-25 RX ADMIN — ENOXAPARIN SODIUM 40 MG: 40 INJECTION SUBCUTANEOUS at 14:46

## 2019-02-25 RX ADMIN — ISOSORBIDE MONONITRATE 15 MG: 30 TABLET ORAL at 14:42

## 2019-02-25 RX ADMIN — TRAZODONE HYDROCHLORIDE 100 MG: 100 TABLET ORAL at 22:18

## 2019-02-25 RX ADMIN — VITAMIN D, TAB 1000IU (100/BT) 2000 UNITS: 25 TAB at 14:45

## 2019-02-25 RX ADMIN — ATENOLOL 25 MG: 25 TABLET ORAL at 14:43

## 2019-02-25 RX ADMIN — AMINOPHYLLINE 100 MG: 25 INJECTION, SOLUTION INTRAVENOUS at 11:17

## 2019-02-25 ASSESSMENT — PAIN SCALES - GENERAL: PAINLEVEL_OUTOF10: 0

## 2019-02-26 VITALS
HEIGHT: 69 IN | HEART RATE: 56 BPM | WEIGHT: 215.17 LBS | SYSTOLIC BLOOD PRESSURE: 100 MMHG | RESPIRATION RATE: 19 BRPM | TEMPERATURE: 98.5 F | OXYGEN SATURATION: 99 % | BODY MASS INDEX: 31.87 KG/M2 | DIASTOLIC BLOOD PRESSURE: 64 MMHG

## 2019-02-26 LAB
ABSOLUTE EOS #: 0.23 K/UL (ref 0–0.44)
ABSOLUTE IMMATURE GRANULOCYTE: <0.03 K/UL (ref 0–0.3)
ABSOLUTE LYMPH #: 1.52 K/UL (ref 1.1–3.7)
ABSOLUTE MONO #: 0.62 K/UL (ref 0.1–1.2)
ANION GAP SERPL CALCULATED.3IONS-SCNC: 11 MMOL/L (ref 9–17)
BASOPHILS # BLD: 0 % (ref 0–2)
BASOPHILS ABSOLUTE: <0.03 K/UL (ref 0–0.2)
BUN BLDV-MCNC: 18 MG/DL (ref 8–23)
BUN/CREAT BLD: ABNORMAL (ref 9–20)
CALCIUM SERPL-MCNC: 8.8 MG/DL (ref 8.6–10.4)
CHLORIDE BLD-SCNC: 101 MMOL/L (ref 98–107)
CO2: 21 MMOL/L (ref 20–31)
CREAT SERPL-MCNC: 1.06 MG/DL (ref 0.7–1.2)
DIFFERENTIAL TYPE: ABNORMAL
EOSINOPHILS RELATIVE PERCENT: 4 % (ref 1–4)
GFR AFRICAN AMERICAN: >60 ML/MIN
GFR NON-AFRICAN AMERICAN: >60 ML/MIN
GFR SERPL CREATININE-BSD FRML MDRD: ABNORMAL ML/MIN/{1.73_M2}
GFR SERPL CREATININE-BSD FRML MDRD: ABNORMAL ML/MIN/{1.73_M2}
GLUCOSE BLD-MCNC: 130 MG/DL (ref 75–110)
GLUCOSE BLD-MCNC: 82 MG/DL (ref 75–110)
GLUCOSE BLD-MCNC: 94 MG/DL (ref 70–99)
HCT VFR BLD CALC: 42.1 % (ref 40.7–50.3)
HEMOGLOBIN: 13.8 G/DL (ref 13–17)
IMMATURE GRANULOCYTES: 0 %
LYMPHOCYTES # BLD: 28 % (ref 24–43)
MAGNESIUM: 2.1 MG/DL (ref 1.6–2.6)
MCH RBC QN AUTO: 30.2 PG (ref 25.2–33.5)
MCHC RBC AUTO-ENTMCNC: 32.8 G/DL (ref 28.4–34.8)
MCV RBC AUTO: 92.1 FL (ref 82.6–102.9)
MONOCYTES # BLD: 11 % (ref 3–12)
NRBC AUTOMATED: 0 PER 100 WBC
PDW BLD-RTO: 15.4 % (ref 11.8–14.4)
PLATELET # BLD: 175 K/UL (ref 138–453)
PLATELET ESTIMATE: ABNORMAL
PMV BLD AUTO: 10 FL (ref 8.1–13.5)
POTASSIUM SERPL-SCNC: 4.3 MMOL/L (ref 3.7–5.3)
RBC # BLD: 4.57 M/UL (ref 4.21–5.77)
RBC # BLD: ABNORMAL 10*6/UL
SEG NEUTROPHILS: 57 % (ref 36–65)
SEGMENTED NEUTROPHILS ABSOLUTE COUNT: 3.02 K/UL (ref 1.5–8.1)
SODIUM BLD-SCNC: 133 MMOL/L (ref 135–144)
WBC # BLD: 5.4 K/UL (ref 3.5–11.3)
WBC # BLD: ABNORMAL 10*3/UL

## 2019-02-26 PROCEDURE — 83735 ASSAY OF MAGNESIUM: CPT

## 2019-02-26 PROCEDURE — 6370000000 HC RX 637 (ALT 250 FOR IP): Performed by: NURSE PRACTITIONER

## 2019-02-26 PROCEDURE — 94640 AIRWAY INHALATION TREATMENT: CPT

## 2019-02-26 PROCEDURE — 6370000000 HC RX 637 (ALT 250 FOR IP): Performed by: INTERNAL MEDICINE

## 2019-02-26 PROCEDURE — 80048 BASIC METABOLIC PNL TOTAL CA: CPT

## 2019-02-26 PROCEDURE — 6360000002 HC RX W HCPCS: Performed by: NURSE PRACTITIONER

## 2019-02-26 PROCEDURE — 85025 COMPLETE CBC W/AUTO DIFF WBC: CPT

## 2019-02-26 PROCEDURE — 94761 N-INVAS EAR/PLS OXIMETRY MLT: CPT

## 2019-02-26 PROCEDURE — 36415 COLL VENOUS BLD VENIPUNCTURE: CPT

## 2019-02-26 PROCEDURE — 82947 ASSAY GLUCOSE BLOOD QUANT: CPT

## 2019-02-26 PROCEDURE — 99239 HOSP IP/OBS DSCHRG MGMT >30: CPT | Performed by: INTERNAL MEDICINE

## 2019-02-26 RX ORDER — IBUPROFEN 600 MG/1
600 TABLET ORAL EVERY 6 HOURS PRN
Status: DISCONTINUED | OUTPATIENT
Start: 2019-02-26 | End: 2019-02-27 | Stop reason: HOSPADM

## 2019-02-26 RX ADMIN — FUROSEMIDE 40 MG: 40 TABLET ORAL at 09:10

## 2019-02-26 RX ADMIN — ATENOLOL 25 MG: 25 TABLET ORAL at 09:11

## 2019-02-26 RX ADMIN — ENOXAPARIN SODIUM 40 MG: 40 INJECTION SUBCUTANEOUS at 09:10

## 2019-02-26 RX ADMIN — FLUTICASONE PROPIONATE 2 SPRAY: 50 SPRAY, METERED NASAL at 09:11

## 2019-02-26 RX ADMIN — IBUPROFEN 600 MG: 600 TABLET, FILM COATED ORAL at 09:08

## 2019-02-26 RX ADMIN — TIOTROPIUM BROMIDE 18 MCG: 18 CAPSULE ORAL; RESPIRATORY (INHALATION) at 08:13

## 2019-02-26 RX ADMIN — ISOSORBIDE MONONITRATE 15 MG: 30 TABLET ORAL at 09:09

## 2019-02-26 RX ADMIN — VITAMIN D, TAB 1000IU (100/BT) 2000 UNITS: 25 TAB at 09:08

## 2019-02-26 RX ADMIN — SPIRONOLACTONE 25 MG: 25 TABLET ORAL at 11:32

## 2019-02-26 RX ADMIN — DESMOPRESSIN ACETATE 40 MG: 0.2 TABLET ORAL at 09:08

## 2019-02-26 RX ADMIN — RANOLAZINE 500 MG: 500 TABLET, FILM COATED, EXTENDED RELEASE ORAL at 09:09

## 2019-02-26 RX ADMIN — DOXAZOSIN 2 MG: 2 TABLET ORAL at 09:08

## 2019-02-26 RX ADMIN — ASPIRIN 81 MG: 81 TABLET ORAL at 09:09

## 2019-02-26 ASSESSMENT — PAIN SCALES - GENERAL
PAINLEVEL_OUTOF10: 0
PAINLEVEL_OUTOF10: 4

## 2019-02-27 LAB
EKG ATRIAL RATE: 56 BPM
EKG ATRIAL RATE: 60 BPM
EKG P AXIS: 34 DEGREES
EKG P AXIS: 36 DEGREES
EKG P-R INTERVAL: 210 MS
EKG P-R INTERVAL: 210 MS
EKG Q-T INTERVAL: 438 MS
EKG Q-T INTERVAL: 460 MS
EKG QRS DURATION: 88 MS
EKG QRS DURATION: 88 MS
EKG QTC CALCULATION (BAZETT): 438 MS
EKG QTC CALCULATION (BAZETT): 443 MS
EKG R AXIS: 16 DEGREES
EKG R AXIS: 18 DEGREES
EKG T AXIS: 11 DEGREES
EKG T AXIS: 20 DEGREES
EKG VENTRICULAR RATE: 56 BPM
EKG VENTRICULAR RATE: 60 BPM

## 2019-03-04 ENCOUNTER — TELEPHONE (OUTPATIENT)
Dept: FAMILY MEDICINE CLINIC | Age: 64
End: 2019-03-04

## 2019-03-04 RX ORDER — MIRTAZAPINE 30 MG/1
45 TABLET, FILM COATED ORAL NIGHTLY
Qty: 30 TABLET | Refills: 0 | Status: SHIPPED | OUTPATIENT
Start: 2019-03-04 | End: 2019-04-22 | Stop reason: SDUPTHER

## 2019-03-05 RX ORDER — AMOXICILLIN 250 MG
1 CAPSULE ORAL 2 TIMES DAILY PRN
Qty: 60 TABLET | Refills: 5 | Status: ON HOLD | OUTPATIENT
Start: 2019-03-05 | End: 2019-04-23

## 2019-03-06 ENCOUNTER — TELEPHONE (OUTPATIENT)
Dept: FAMILY MEDICINE CLINIC | Age: 64
End: 2019-03-06

## 2019-03-06 RX ORDER — PANTOPRAZOLE SODIUM 40 MG/1
40 TABLET, DELAYED RELEASE ORAL
Qty: 30 TABLET | Refills: 5 | Status: SHIPPED | OUTPATIENT
Start: 2019-03-06 | End: 2022-05-23

## 2019-03-12 ENCOUNTER — OFFICE VISIT (OUTPATIENT)
Dept: FAMILY MEDICINE CLINIC | Age: 64
End: 2019-03-12
Payer: COMMERCIAL

## 2019-03-12 VITALS
DIASTOLIC BLOOD PRESSURE: 70 MMHG | BODY MASS INDEX: 32.58 KG/M2 | HEIGHT: 69 IN | HEART RATE: 60 BPM | SYSTOLIC BLOOD PRESSURE: 128 MMHG | WEIGHT: 220 LBS

## 2019-03-12 DIAGNOSIS — I20.9 ANGINA PECTORIS (HCC): Primary | ICD-10-CM

## 2019-03-12 DIAGNOSIS — Z09 HOSPITAL DISCHARGE FOLLOW-UP: ICD-10-CM

## 2019-03-12 DIAGNOSIS — Z63.0 MARITAL STRESS: ICD-10-CM

## 2019-03-12 PROCEDURE — 1111F DSCHRG MED/CURRENT MED MERGE: CPT | Performed by: FAMILY MEDICINE

## 2019-03-12 PROCEDURE — 1036F TOBACCO NON-USER: CPT | Performed by: FAMILY MEDICINE

## 2019-03-12 PROCEDURE — 3017F COLORECTAL CA SCREEN DOC REV: CPT | Performed by: FAMILY MEDICINE

## 2019-03-12 PROCEDURE — G8598 ASA/ANTIPLAT THER USED: HCPCS | Performed by: FAMILY MEDICINE

## 2019-03-12 PROCEDURE — 99214 OFFICE O/P EST MOD 30 MIN: CPT | Performed by: FAMILY MEDICINE

## 2019-03-12 PROCEDURE — G8427 DOCREV CUR MEDS BY ELIG CLIN: HCPCS | Performed by: FAMILY MEDICINE

## 2019-03-12 PROCEDURE — G8417 CALC BMI ABV UP PARAM F/U: HCPCS | Performed by: FAMILY MEDICINE

## 2019-03-12 PROCEDURE — G8484 FLU IMMUNIZE NO ADMIN: HCPCS | Performed by: FAMILY MEDICINE

## 2019-03-12 RX ORDER — ISOSORBIDE MONONITRATE 30 MG/1
30 TABLET, EXTENDED RELEASE ORAL DAILY
Qty: 30 TABLET | Refills: 5 | Status: SHIPPED | OUTPATIENT
Start: 2019-03-12 | End: 2019-04-21

## 2019-03-12 RX ORDER — ALBUTEROL SULFATE 90 UG/1
2 AEROSOL, METERED RESPIRATORY (INHALATION) EVERY 4 HOURS PRN
Qty: 1 INHALER | Refills: 2 | Status: SHIPPED | OUTPATIENT
Start: 2019-03-12 | End: 2019-08-10 | Stop reason: SDUPTHER

## 2019-03-12 RX ORDER — SILDENAFIL CITRATE 20 MG/1
40-60 TABLET ORAL DAILY PRN
Qty: 20 TABLET | Refills: 0 | Status: ON HOLD | OUTPATIENT
Start: 2019-03-12 | End: 2019-04-23

## 2019-03-12 SDOH — SOCIAL STABILITY - SOCIAL INSECURITY: PROBLEMS IN RELATIONSHIP WITH SPOUSE OR PARTNER: Z63.0

## 2019-03-12 ASSESSMENT — ENCOUNTER SYMPTOMS: GASTROINTESTINAL NEGATIVE: 1

## 2019-03-12 ASSESSMENT — PATIENT HEALTH QUESTIONNAIRE - PHQ9
SUM OF ALL RESPONSES TO PHQ QUESTIONS 1-9: 0
1. LITTLE INTEREST OR PLEASURE IN DOING THINGS: 0
SUM OF ALL RESPONSES TO PHQ QUESTIONS 1-9: 0
2. FEELING DOWN, DEPRESSED OR HOPELESS: 0
SUM OF ALL RESPONSES TO PHQ9 QUESTIONS 1 & 2: 0

## 2019-03-13 ENCOUNTER — TELEPHONE (OUTPATIENT)
Dept: FAMILY MEDICINE CLINIC | Age: 64
End: 2019-03-13

## 2019-03-14 RX ORDER — ONDANSETRON 4 MG/1
4 TABLET, ORALLY DISINTEGRATING ORAL EVERY 8 HOURS PRN
Qty: 10 TABLET | Refills: 2 | Status: ON HOLD | OUTPATIENT
Start: 2019-03-14 | End: 2019-04-23

## 2019-03-18 ENCOUNTER — HOSPITAL ENCOUNTER (EMERGENCY)
Age: 64
Discharge: HOME | End: 2019-03-18
Attending: EMERGENCY MEDICINE
Payer: COMMERCIAL

## 2019-03-18 ENCOUNTER — APPOINTMENT (OUTPATIENT)
Dept: GENERAL RADIOLOGY | Age: 64
End: 2019-03-18
Payer: COMMERCIAL

## 2019-03-18 VITALS
RESPIRATION RATE: 17 BRPM | SYSTOLIC BLOOD PRESSURE: 139 MMHG | HEIGHT: 69 IN | DIASTOLIC BLOOD PRESSURE: 61 MMHG | BODY MASS INDEX: 33.47 KG/M2 | HEART RATE: 52 BPM | OXYGEN SATURATION: 95 % | WEIGHT: 226 LBS | TEMPERATURE: 97.6 F

## 2019-03-18 DIAGNOSIS — R20.2 ARM PARESTHESIA, LEFT: Primary | ICD-10-CM

## 2019-03-18 DIAGNOSIS — R07.89 ATYPICAL CHEST PAIN: ICD-10-CM

## 2019-03-18 LAB
ABSOLUTE EOS #: 0.3 K/UL (ref 0–0.4)
ABSOLUTE IMMATURE GRANULOCYTE: ABNORMAL K/UL (ref 0–0.3)
ABSOLUTE LYMPH #: 1.5 K/UL (ref 1–4.8)
ABSOLUTE MONO #: 0.5 K/UL (ref 0–1)
ALBUMIN SERPL-MCNC: 4.5 G/DL (ref 3.5–5.2)
ALBUMIN/GLOBULIN RATIO: ABNORMAL (ref 1–2.5)
ALP BLD-CCNC: 79 U/L (ref 40–129)
ALT SERPL-CCNC: 19 U/L (ref 5–41)
ANION GAP SERPL CALCULATED.3IONS-SCNC: 12 MMOL/L (ref 9–17)
AST SERPL-CCNC: 14 U/L
BASOPHILS # BLD: 0 % (ref 0–2)
BASOPHILS ABSOLUTE: 0 K/UL (ref 0–0.2)
BILIRUB SERPL-MCNC: 0.38 MG/DL (ref 0.3–1.2)
BILIRUBIN URINE: NEGATIVE
BUN BLDV-MCNC: 13 MG/DL (ref 8–23)
BUN/CREAT BLD: 13 (ref 9–20)
CALCIUM SERPL-MCNC: 9 MG/DL (ref 8.6–10.4)
CHLORIDE BLD-SCNC: 99 MMOL/L (ref 98–107)
CO2: 25 MMOL/L (ref 20–31)
COLOR: YELLOW
COMMENT UA: NORMAL
CREAT SERPL-MCNC: 1.02 MG/DL (ref 0.7–1.2)
DIFFERENTIAL TYPE: YES
EOSINOPHILS RELATIVE PERCENT: 5 % (ref 0–5)
GFR AFRICAN AMERICAN: >60 ML/MIN
GFR NON-AFRICAN AMERICAN: >60 ML/MIN
GFR SERPL CREATININE-BSD FRML MDRD: ABNORMAL ML/MIN/{1.73_M2}
GFR SERPL CREATININE-BSD FRML MDRD: ABNORMAL ML/MIN/{1.73_M2}
GLUCOSE BLD-MCNC: 100 MG/DL (ref 70–99)
GLUCOSE URINE: NEGATIVE
HCT VFR BLD CALC: 42 % (ref 41–53)
HEMOGLOBIN: 14 G/DL (ref 13.5–17.5)
IMMATURE GRANULOCYTES: ABNORMAL %
KETONES, URINE: NEGATIVE
LEUKOCYTE ESTERASE, URINE: NEGATIVE
LYMPHOCYTES # BLD: 25 % (ref 13–44)
MCH RBC QN AUTO: 30.2 PG (ref 26–34)
MCHC RBC AUTO-ENTMCNC: 33.3 G/DL (ref 31–37)
MCV RBC AUTO: 90.5 FL (ref 80–100)
MONOCYTES # BLD: 9 % (ref 5–9)
NITRITE, URINE: NEGATIVE
NRBC AUTOMATED: ABNORMAL PER 100 WBC
PDW BLD-RTO: 16.4 % (ref 12.1–15.2)
PH UA: 5 (ref 5–8)
PLATELET # BLD: 218 K/UL (ref 140–450)
PLATELET ESTIMATE: ABNORMAL
PMV BLD AUTO: ABNORMAL FL (ref 6–12)
POTASSIUM SERPL-SCNC: 4.3 MMOL/L (ref 3.7–5.3)
PROTEIN UA: NEGATIVE
RBC # BLD: 4.65 M/UL (ref 4.5–5.9)
RBC # BLD: ABNORMAL 10*6/UL
SEG NEUTROPHILS: 61 % (ref 39–75)
SEGMENTED NEUTROPHILS ABSOLUTE COUNT: 3.7 K/UL (ref 2.1–6.5)
SODIUM BLD-SCNC: 136 MMOL/L (ref 135–144)
SPECIFIC GRAVITY UA: 1.01 (ref 1–1.03)
TOTAL PROTEIN: 7 G/DL (ref 6.4–8.3)
TROPONIN INTERP: NORMAL
TROPONIN INTERP: NORMAL
TROPONIN T: <0.03 NG/ML
TROPONIN T: <0.03 NG/ML
TROPONIN, HIGH SENSITIVITY: NORMAL NG/L (ref 0–22)
TROPONIN, HIGH SENSITIVITY: NORMAL NG/L (ref 0–22)
TURBIDITY: CLEAR
URINE HGB: NEGATIVE
UROBILINOGEN, URINE: NORMAL
WBC # BLD: 6 K/UL (ref 3.5–11)
WBC # BLD: ABNORMAL 10*3/UL

## 2019-03-18 PROCEDURE — 93005 ELECTROCARDIOGRAM TRACING: CPT

## 2019-03-18 PROCEDURE — 36415 COLL VENOUS BLD VENIPUNCTURE: CPT

## 2019-03-18 PROCEDURE — 6360000002 HC RX W HCPCS: Performed by: EMERGENCY MEDICINE

## 2019-03-18 PROCEDURE — 96375 TX/PRO/DX INJ NEW DRUG ADDON: CPT

## 2019-03-18 PROCEDURE — 85025 COMPLETE CBC W/AUTO DIFF WBC: CPT

## 2019-03-18 PROCEDURE — 99285 EMERGENCY DEPT VISIT HI MDM: CPT

## 2019-03-18 PROCEDURE — 80053 COMPREHEN METABOLIC PANEL: CPT

## 2019-03-18 PROCEDURE — 71046 X-RAY EXAM CHEST 2 VIEWS: CPT

## 2019-03-18 PROCEDURE — 81003 URINALYSIS AUTO W/O SCOPE: CPT

## 2019-03-18 PROCEDURE — 84484 ASSAY OF TROPONIN QUANT: CPT

## 2019-03-18 PROCEDURE — 96374 THER/PROPH/DIAG INJ IV PUSH: CPT

## 2019-03-18 RX ORDER — DIPHENHYDRAMINE HYDROCHLORIDE 50 MG/ML
12.5 INJECTION INTRAMUSCULAR; INTRAVENOUS ONCE
Status: COMPLETED | OUTPATIENT
Start: 2019-03-18 | End: 2019-03-18

## 2019-03-18 RX ORDER — MORPHINE SULFATE 4 MG/ML
4 INJECTION, SOLUTION INTRAMUSCULAR; INTRAVENOUS ONCE
Status: COMPLETED | OUTPATIENT
Start: 2019-03-18 | End: 2019-03-18

## 2019-03-18 RX ADMIN — DIPHENHYDRAMINE HYDROCHLORIDE 12.5 MG: 50 INJECTION, SOLUTION INTRAMUSCULAR; INTRAVENOUS at 18:31

## 2019-03-18 RX ADMIN — MORPHINE SULFATE 4 MG: 4 INJECTION, SOLUTION INTRAMUSCULAR; INTRAVENOUS at 18:31

## 2019-03-18 ASSESSMENT — PAIN DESCRIPTION - PAIN TYPE
TYPE: ACUTE PAIN
TYPE: CHRONIC PAIN;ACUTE PAIN

## 2019-03-18 ASSESSMENT — PAIN SCALES - GENERAL
PAINLEVEL_OUTOF10: 5
PAINLEVEL_OUTOF10: 10
PAINLEVEL_OUTOF10: 10

## 2019-03-18 ASSESSMENT — PAIN DESCRIPTION - LOCATION
LOCATION: HEAD;CHEST
LOCATION: CHEST;HEAD

## 2019-03-19 ENCOUNTER — CARE COORDINATION (OUTPATIENT)
Dept: CARE COORDINATION | Age: 64
End: 2019-03-19

## 2019-03-19 LAB
EKG ATRIAL RATE: 56 BPM
EKG P AXIS: 30 DEGREES
EKG P-R INTERVAL: 210 MS
EKG Q-T INTERVAL: 444 MS
EKG QRS DURATION: 86 MS
EKG QTC CALCULATION (BAZETT): 428 MS
EKG R AXIS: 6 DEGREES
EKG T AXIS: 14 DEGREES
EKG VENTRICULAR RATE: 56 BPM

## 2019-03-28 RX ORDER — ALBUTEROL SULFATE 2.5 MG/3ML
SOLUTION RESPIRATORY (INHALATION)
Qty: 300 ML | Refills: 1 | Status: SHIPPED | OUTPATIENT
Start: 2019-03-28 | End: 2019-04-02 | Stop reason: SDUPTHER

## 2019-04-02 ENCOUNTER — OFFICE VISIT (OUTPATIENT)
Dept: CARDIOLOGY CLINIC | Age: 64
End: 2019-04-02
Payer: COMMERCIAL

## 2019-04-02 VITALS
HEART RATE: 61 BPM | WEIGHT: 229 LBS | BODY MASS INDEX: 33.82 KG/M2 | DIASTOLIC BLOOD PRESSURE: 70 MMHG | OXYGEN SATURATION: 100 % | SYSTOLIC BLOOD PRESSURE: 140 MMHG

## 2019-04-02 DIAGNOSIS — R06.02 SOB (SHORTNESS OF BREATH): Primary | ICD-10-CM

## 2019-04-02 PROCEDURE — 3017F COLORECTAL CA SCREEN DOC REV: CPT | Performed by: INTERNAL MEDICINE

## 2019-04-02 PROCEDURE — 99214 OFFICE O/P EST MOD 30 MIN: CPT | Performed by: INTERNAL MEDICINE

## 2019-04-02 PROCEDURE — G8598 ASA/ANTIPLAT THER USED: HCPCS | Performed by: INTERNAL MEDICINE

## 2019-04-02 PROCEDURE — G8417 CALC BMI ABV UP PARAM F/U: HCPCS | Performed by: INTERNAL MEDICINE

## 2019-04-02 PROCEDURE — G8427 DOCREV CUR MEDS BY ELIG CLIN: HCPCS | Performed by: INTERNAL MEDICINE

## 2019-04-02 PROCEDURE — 1036F TOBACCO NON-USER: CPT | Performed by: INTERNAL MEDICINE

## 2019-04-02 NOTE — LETTER
FAMILY HISTORY:  No significant heart disease in the family. SOCIAL HISTORY:  He is 61years old, , 1 son in [facility]. Quit smoking 28 years ago. Does not drink alcohol. He has posttraumatic stress syndrome. Rides a bike although he is doing no activity at this time. He is waiting to move in the next 2 weeks. REVIEW OF SYSTEMS:  Cardiac as above. Other systems reviewed including constitutional, eyes, ears, nose and throat, cardiovascular, respiratory, GI, , musculoskeletal, integumentary, neurologic, psychiatric, endocrine, hematologic and allergic/immunologic and are negative except for what is described above. PHYSICAL EXAMINATION:  VITAL SIGNS:  His blood pressure was 140/70 with a heart rate of 61 and regular. Respiratory rate 18. O2 saturation 100%. Weight 229 pounds. GENERAL:  He is a pleasant 59-year-old gentleman. Denied pain. He was oriented to person, place and time. Answered questions appropriately. SKIN:  No unusual skin changes. HEENT:  The pupils are equally round and reactive to light and accommodation. Extraocular movements were intact. Mucous membranes were dry. NECK:  No JVD. Good carotid pulses. No carotid bruits. No lymphadenopathy or thyromegaly. CARDIOVASCULAR EXAM:  S1 and S2 were normal.  No S3 or S4. Soft systolic blowing type murmur. No diastolic murmur. PMI was normal.  No lift, thrust, or pericardial friction rub. LUNGS:  Quite clear to auscultation and percussion. ABDOMEN:  Soft and nontender. Good bowel sounds. EXTREMITIES:  Good femoral pulses. Good pedal pulses. No pedal edema. Skin was warm and dry. No calf tenderness. Nail beds pink. Good cap refill. PULSES:  Bilateral symmetrical radial, brachial and carotid pulses. No carotid bruits. Good femoral and pedal pulses. NEUROLOGIC EXAM:  Within normal limits. PSYCHIATRIC EXAM:  Within normal limits.     LABORATORY DATA:  From 03/18/2019, sodium was 136, potassium 4.3, BUN 13, creatinine 1.02, GFR greater than 60, calcium was 9.0. Troponin less than 0.03. ALT was 19, AST was 14. White count 6.0,  hemoglobin 14.0 with a platelet count 882,460. EKG showed in the emergency room sinus rhythm with an old anterior myocardial infarction with no change from previous EKGs. Stress test at [facility] on 02/25/2019, did not show any ischemia or infarct. Echocardiogram showed normal LV size and function, EF of 54% with mild diastolic dysfunction and mild mitral regurgitation. IMPRESSION:  1. Atypical chest pain with almost continuous tightness. 2.  However, could be having shortness of breath and mild chest pain as an anginal equivalent. 3.  Coronary artery disease. 4.  Anterior myocardial infarction on 10/18/2015, with a catheterization showing 99% OM branch of the circumflex with thrombus,  70% to 75% proximal LAD, 70% RCA, EF of 50%. 5.  Bypass surgery by Dr. Varun Gutierrez on 10/23/2015, with LIMA to the LAD, vein graft to the OM and a vein graft to the right coronary artery. 6.  Normal LV function, EF of 54%. 7.  History of unremarkable Lexiscan Cardiolite stress test on 02/25/2019, at [facility] with no reversible ischemia or infarct. 8.  Normal EF of 54% on an echocardiogram on 02/25/2019.  9.  Posttraumatic stress syndrome, followed at [place], [place]. 10.  Sleep apnea, on a CPAP mask. 11.  Possible pending cervical disk surgery by Dr. Phil Metz in [place], with him being cleared for surgery. 12.  Hypertension, fairly well controlled. 13.  Hyperlipidemia, well controlled. PLAN:  1. Consider cardiac rehab for his angina. 2.  Cleared for cervical disk disease if surgery is necessary. 3.  No change in medications. 4.  See him for his scheduled appointment in December. DISCUSSION:  Mr. Zelda Rene has a history of significant coronary artery disease. He does have chest pain.   I do not think that all of this angina

## 2019-04-02 NOTE — PROGRESS NOTES
Ov Dr. Dennys Villasenor for  hospital follow up   Was in [facility] in Feb chest pain and left arm pain   Stress test was done no cath   Was in er again 03/18/2019 for chest pain   And arm pain again   He thinks it is d/t not having medical   Maria[place]ana-had to void [place] card   To try to get [place] waiting on card  C/o still having chest pain   C/o some lightheadedness    No changes  Suggest cardiac rehab  Let us know when ready and   Will set up

## 2019-04-05 NOTE — PROGRESS NOTES
Santiago High M.D. [address] [place] [address]  [phone]        2019        Manuel Johnson, DO  [address] [address]    RE:   Nicolas Doctor  :  1955    Dear Dr. Lay Santillan:    CHIEF COMPLAINT:  1. Chest pain. 2.  Coronary artery disease. 3.  Hospitalization for chest pain in [facility] on . HISTORY OF PRESENT ILLNESS:  I had the pleasure of seeing Mr. Williamson Running in our office on 2019. He is a pleasant 77-year-old gentleman, who had myocardial infarction on 10/18/2015. While being loaded in the helicopter, he had ventricular fibrillation and was defibrillated. He was transferred to [facility], where he had emergency cardiac catheterization that showed 99% disease in the OM branch of the circumflex with thrombus, 70% to 75% proximal LAD, 70% RCA with an EF of 50%. Aortic balloon pump was placed and he had bypass surgery by Dr. Aye Hill on 10/23/2015, with a LIMA to the LAD, vein graft to the OM and a vein graft to the distal right coronary artery. He had another catheterization on 2016, because of chest pain. His LAD was occluded. Circumflex had 80% disease. Right coronary artery had 70% disease. The vein grafts were all patent as well as the LIMA patent to the LAD, EF of 55% to 60% and he was treated medically. He has had long history then of atypical chest pain. He was hospitalized on 10/16/2018, in [facility] and he was discharged with no testing. He developed chest pain again on 2019, and was transferred to [facility]. His enzymes were negative. An EKG was unchanged. He did have a stress test that did not show any ischemia or infarction. His EF was 65%. An echocardiogram on 2019, also was normal with an EF of 54% with mild diastolic dysfunction and mild mitral regurgitation.   He was discharged with no recommendation for a cardiac catheterization. I saw him today in followup. He continues to have almost continuous chest tightness. He is extremely inactive because it is \"cold outside. \"  However, there is no change in his chest tightness with activity. Energy level was low. He attributes this to not having marijuana, which he has used for the last 2-1/2 years. He is hoping to get it \"morally and ethically\" through [address] [facility]. He has had no syncope or near syncope. He has chronic shortness of breath. Denies any palpitations. He does have neck pain and has seen Dr. Paul Hnery in [place] regarding cervical spinal stenosis. CARDIAC RISK FACTORS:  Known CAD:  Positive. Bypass Surgery:  Positive. Peripheral Vascular Disease:  Positive. Diabetes:  Negative. Hypertension:  Positive. Hyperlipidemia:  Positive. Smoking:  Negative except for marijuana. MEDICATIONS AT THIS TIME:  He is on Tylenol p.r.n., Proventil inhaler p.r.n., aspirin 81 mg daily, Tenormin 25 mg daily, Lipitor 20 mg daily, vitamin D 2000 units daily, Lasix 40 mg daily, Neurontin 300 mg t.i.d. p.r.n, Imdur 30 mg daily, Remeron 30 mg p.r.n., Zofran p.r.n., Protonix 40 mg daily, Ranexa 500 mg daily, Revatio 20 mg as needed (has been instructed by Dr. Thais Gonzalez not to use the nitroglycerin on the same day), Aldactone 25 mg daily, Hytrin 2 mg nightly, Spiriva 2 puffs daily, Desyrel 100 mg nightly. PAST MEDICAL HISTORY:  1.  Wrist surgery many years ago. 2.  Tonsillectomy. 3.  Right knee replaced many years ago. 4.  Bypass surgery, as stated previously. 5.  Sleep apnea, on a CPAP mask. 6.  Posttraumatic stress syndrome from being in [facility].  7.  Cervical stenosis from neck injury. 8.  Hyperlipidemia. 9.  COPD. 10.  Arthritis in his left hip. 11.  GERD. 12.  Carpal tunnel disease. 13.  He had pilonidal cyst incisions. 14.  Left middle finger surgery. FAMILY HISTORY:  No significant heart disease in the family.     SOCIAL HISTORY:  He is 61 AST was 14. White count 6.0,  hemoglobin 14.0 with a platelet count 006,729. EKG showed in the emergency room sinus rhythm with an old anterior myocardial infarction with no change from previous EKGs. Stress test at [facility]. [facility] on 02/25/2019, did not show any ischemia or infarct. Echocardiogram showed normal LV size and function, EF of 54% with mild diastolic dysfunction and mild mitral regurgitation. IMPRESSION:  1. Atypical chest pain with almost continuous tightness. 2.  However, could be having shortness of breath and mild chest pain as an anginal equivalent. 3.  Coronary artery disease. 4.  Anterior myocardial infarction on 10/18/2015, with a catheterization showing 99% OM branch of the circumflex with thrombus,  70% to 75% proximal LAD, 70% RCA, EF of 50%. 5.  Bypass surgery by Dr. Caitlyn Loomis on 10/23/2015, with LIMA to the LAD, vein graft to the OM and a vein graft to the right coronary artery. 6.  Normal LV function, EF of 54%. 7.  History of unremarkable Lexiscan Cardiolite stress test on 02/25/2019, at [facility]. [facility] with no reversible ischemia or infarct. 8.  Normal EF of 54% on an echocardiogram on 02/25/2019.  9.  Posttraumatic stress syndrome, followed at [place], [place]. 10.  Sleep apnea, on a CPAP mask. 11.  Possible pending cervical disk surgery by Dr. Sherry Herrera in [place], with him being cleared for surgery. 12.  Hypertension, fairly well controlled. 13.  Hyperlipidemia, well controlled. PLAN:  1. Consider cardiac rehab for his angina. 2.  Cleared for cervical disk disease if surgery is necessary. 3.  No change in medications. 4.  See him for his scheduled appointment in December. DISCUSSION:  Mr. Marlene Garces has a history of significant coronary artery disease. He does have chest pain. I do not think that all of this angina although he might be having a small amount of angina.   His majority of chest pain, which is there continuously, I think, is

## 2019-04-15 ENCOUNTER — APPOINTMENT (OUTPATIENT)
Dept: CT IMAGING | Age: 64
End: 2019-04-15
Payer: COMMERCIAL

## 2019-04-15 ENCOUNTER — HOSPITAL ENCOUNTER (EMERGENCY)
Age: 64
Discharge: HOME | End: 2019-04-15
Attending: FAMILY MEDICINE
Payer: COMMERCIAL

## 2019-04-15 VITALS
HEART RATE: 55 BPM | HEIGHT: 69 IN | SYSTOLIC BLOOD PRESSURE: 102 MMHG | DIASTOLIC BLOOD PRESSURE: 69 MMHG | TEMPERATURE: 98.3 F | RESPIRATION RATE: 20 BRPM | OXYGEN SATURATION: 95 % | WEIGHT: 215 LBS | BODY MASS INDEX: 31.84 KG/M2

## 2019-04-15 DIAGNOSIS — R51.9 ACUTE NONINTRACTABLE HEADACHE, UNSPECIFIED HEADACHE TYPE: Primary | ICD-10-CM

## 2019-04-15 DIAGNOSIS — R05.9 COUGH: ICD-10-CM

## 2019-04-15 PROCEDURE — 6360000002 HC RX W HCPCS: Performed by: FAMILY MEDICINE

## 2019-04-15 PROCEDURE — 96374 THER/PROPH/DIAG INJ IV PUSH: CPT

## 2019-04-15 PROCEDURE — 99284 EMERGENCY DEPT VISIT MOD MDM: CPT

## 2019-04-15 PROCEDURE — 96375 TX/PRO/DX INJ NEW DRUG ADDON: CPT

## 2019-04-15 PROCEDURE — 70450 CT HEAD/BRAIN W/O DYE: CPT

## 2019-04-15 RX ORDER — METOCLOPRAMIDE HYDROCHLORIDE 5 MG/ML
10 INJECTION INTRAMUSCULAR; INTRAVENOUS ONCE
Status: COMPLETED | OUTPATIENT
Start: 2019-04-15 | End: 2019-04-15

## 2019-04-15 RX ORDER — KETOROLAC TROMETHAMINE 30 MG/ML
30 INJECTION, SOLUTION INTRAMUSCULAR; INTRAVENOUS ONCE
Status: COMPLETED | OUTPATIENT
Start: 2019-04-15 | End: 2019-04-15

## 2019-04-15 RX ORDER — DIPHENHYDRAMINE HYDROCHLORIDE 50 MG/ML
12.5 INJECTION INTRAMUSCULAR; INTRAVENOUS ONCE
Status: COMPLETED | OUTPATIENT
Start: 2019-04-15 | End: 2019-04-15

## 2019-04-15 RX ADMIN — METOCLOPRAMIDE 10 MG: 5 INJECTION, SOLUTION INTRAMUSCULAR; INTRAVENOUS at 20:09

## 2019-04-15 RX ADMIN — KETOROLAC TROMETHAMINE 30 MG: 30 INJECTION, SOLUTION INTRAMUSCULAR at 20:50

## 2019-04-15 RX ADMIN — DIPHENHYDRAMINE HYDROCHLORIDE 12.5 MG: 50 INJECTION, SOLUTION INTRAMUSCULAR; INTRAVENOUS at 20:09

## 2019-04-15 ASSESSMENT — PAIN DESCRIPTION - PAIN TYPE: TYPE: ACUTE PAIN

## 2019-04-15 ASSESSMENT — PAIN SCALES - GENERAL
PAINLEVEL_OUTOF10: 8
PAINLEVEL_OUTOF10: 5
PAINLEVEL_OUTOF10: 10

## 2019-04-15 ASSESSMENT — PAIN DESCRIPTION - LOCATION: LOCATION: HEAD

## 2019-04-15 ASSESSMENT — PAIN DESCRIPTION - FREQUENCY: FREQUENCY: CONTINUOUS

## 2019-04-15 ASSESSMENT — PAIN DESCRIPTION - DESCRIPTORS: DESCRIPTORS: ACHING

## 2019-04-15 ASSESSMENT — PAIN DESCRIPTION - ONSET: ONSET: AWAKENED FROM SLEEP

## 2019-04-15 NOTE — ED PROVIDER NOTES
[facility]  eMERGENCY dEPARTMENT eNCOUnter          [facility]       Chief Complaint   Patient presents with    Headache     pt states has never had a headache as bad as this. Pt alert and oriented x 3. Walked from gurney to cart without difficulty. Nurses Notes reviewed and I agree except as noted in the HPI. HISTORY OF PRESENT ILLNESS    Rosa Gastelum is a 61 y.o. male who presents to the emergency room via EMS from local hotel where patient resides, patient complaining of headache, onset this morning when he woke at 0600 hours, states prior to 2.5 hours prior he did try to eat for taking 3 Zofran, did not relieve his headache. Denies trauma or falls or head strike. Thinks he has some vision changes that his vision appears fuzzier on the edges. Denies being on blood thinners. Patient rates his overall headache 9 out of 10    REVIEW OF SYSTEMS     Review of Systems   All other systems reviewed and are negative. PAST MEDICAL HISTORY    has a past medical history of Arthritis, Bronchitis with chronic airway obstruction (HCC), CAD (coronary artery disease), CHF (congestive heart failure) ([address] CTS (carpal tunnel syndrome), Diabetes mellitus ([address] Diabetic neuropathy associated with diabetes mellitus due to underlying condition ([address] Exposure to toxic chemical, GERD (gastroesophageal reflux disease), H/O cardiovascular stress test, H/O echocardiogram, Hip pain, left, Hx of cardiac cath, Hyperlipidemia, Hypertension, MI (myocardial infarction) ([address] Pneumonia, PTSD (post-traumatic stress disorder), S/P CABG (coronary artery bypass graft), Sleep apnea, and Ventricular fibrillation ([address] SURGICAL HISTORY      has a past surgical history that includes joint replacement; Tonsillectomy; Wrist surgery; Finger surgery; Carpal tunnel release; Pilonidal cyst excision; Coronary artery bypass graft (10/23/15);  Cardiac surgery; Pilonidal cyst excision; Carpal tunnel release (Right); fracture surgery; Finger amputation (Left); Colonoscopy (2012); Colonoscopy (11/27/2017); pr colonoscopy w/biopsy single/multiple (N/A, 11/27/2017); and pr egd transoral biopsy single/multiple (N/A, 11/27/2017). CURRENT MEDICATIONS       Discharge Medication List as of 4/15/2019  9:43 PM      CONTINUE these medications which have NOT CHANGED    Details   ondansetron (ZOFRAN ODT) 4 MG disintegrating tablet Take 1 tablet by mouth every 8 hours as needed for Nausea or Vomiting, Disp-10 tablet, R-2Normal      isosorbide mononitrate (IMDUR) 30 MG extended release tablet Take 1 tablet by mouth daily, Disp-30 tablet, R-5Normal      tiotropium (SPIRIVA RESPIMAT) 2.5 MCG/ACT AERS inhaler Inhale 2 puffs into the lungs daily, Disp-1 Inhaler, R-5Normal      pantoprazole (PROTONIX) 40 MG tablet Take 1 tablet by mouth every morning (before breakfast), Disp-30 tablet, R-5Normal      senna-docusate (PERICOLACE) 8.6-50 MG per tablet Take 1 tablet by mouth 2 times daily as needed for Constipation, Disp-60 tablet, R-5Normal      mirtazapine (REMERON) 30 MG tablet Take 1.5 tablets by mouth nightly, Disp-30 tablet, R-0Normal      RANEXA 500 MG extended release tablet Take 500 mg by mouth 3 times daily, R-1, DAWHistorical Med      terazosin (HYTRIN) 2 MG capsule Take 1 capsule by mouth nightly, Disp-30 capsule, R-3Normal      spironolactone (ALDACTONE) 25 MG tablet Take 1 tablet by mouth daily, Disp-30 tablet, R-11Normal      atorvastatin (LIPITOR) 20 MG tablet TAKE 1 TABLET BY MOUTH EVERY DAY, Disp-30 tablet, R-5Normal      gabapentin (NEURONTIN) 300 MG capsule Take 900 mg by mouth 3 times daily as needed. Madi Argueta Historical Med      albuterol (PROVENTIL) (2.5 MG/3ML) 0.083% nebulizer solution inhale contents of 1 vial in nebulizer four times a day, Disp-360 mL, R-1Normal      atenolol (TENORMIN) 25 MG tablet Take 25 mg by mouth dailyHistorical Med      acetaminophen (TYLENOL) 325 MG tablet Take 325 mg by mouth every 6 hours as neededHistorical Med      traZODone (DESYREL) 50 MG tablet Take 100 mg by mouth nightly Historical Med      Cholecalciferol (VITAMIN D) 2000 UNITS CAPS capsule Take 1 capsule by mouth daily      furosemide (LASIX) 40 MG tablet Take 1 tablet by mouth daily, Disp-30 tablet, R-3      aspirin 81 MG tablet Take 81 mg by mouth daily      fluticasone (FLONASE) 50 MCG/ACT nasal spray 2 sprays by Nasal route daily      albuterol sulfate HFA (PROAIR HFA) 108 (90 Base) MCG/ACT inhaler Inhale 2 puffs into the lungs every 4 hours as needed for Wheezing or Shortness of Breath, Disp-1 Inhaler, R-2Normal      sildenafil (REVATIO) 20 MG tablet Take 2-3 tablets by mouth daily as needed (as needed for ED. do not take if you've taken imdur the same day.), Disp-20 tablet, R-0Normal      !! Respiratory Therapy Supplies (ADULT MASK) MISC Disp-1 each, R-1, NormalUse every 4-6 hours as needed for sob      !! Respiratory Therapy Supplies (FULL KIT NEBULIZER SET) MISC R-0, Historical Med      ibuprofen (ADVIL;MOTRIN) 600 MG tablet Take 600 mg by mouth every 8 hours as needed for PainHistorical Med      Respiratory Therapy Supplies (NEBULIZER/TUBING/MOUTHPIECE) KIT Disp-1 kit, R-0, NormalTubing and mouth piece, use with nebulizer every 4 hours as needed for SOB      nitroGLYCERIN (NITROSTAT) 0.4 MG SL tablet up to max of 3 total doses. If no relief after 1 dose, call 911., Disp-25 tablet, R-3Print       !! - Potential duplicate medications found. Please discuss with provider. ALLERGIES     is allergic to codeine; hydrocodone; pcn [penicillins]; and sulfa antibiotics. FAMILY HISTORY     is adopted. He was adopted. Family history is unknown by patient. SOCIAL HISTORY      reports that he quit smoking about 12 years ago. His smoking use included cigarettes. He has a 19.00 pack-year smoking history. He quit smokeless tobacco use about 18 years ago. He reports that he has current or past drug history. Drug: Marijuana. Frequency: 1.00 time per week. He reports that he does not drink alcohol. PHYSICAL EXAM     INITIAL VITALS:  height is 5' 9\" (1.753 m) and weight is 215 lb (97.5 kg). His oral temperature is 98.3 °F (36.8 °C). His blood pressure is 102/69 and his pulse is 55. His respiration is 20 and oxygen saturation is 95%. Physical Exam   Constitutional: Patient is oriented to person, place, and time. Patient appears well-developed and well-nourished. Patient is active and cooperative. Body odor detectable  HENT:   Head: Normocephalic and atraumatic. Head is without contusion. Right Ear: Hearing and external ear normal. No drainage. Left Ear: Hearing and external ear normal. No drainage. Nose: Nose normal. No nasal deformity. No epistaxis. Mouth/Throat: Mucous membranes are not dry. Eyes: EOMI. Conjunctivae, sclera, and lids are normal. Right eye exhibits no discharge. Left eye exhibits no discharge. Neck: Full passive range of motion without pain and phonation normal.   Cardiovascular:  Normal rate, regular rhythm and intact distal pulses. Pulses: Right radial pulse  2+   Pulmonary/Chest: Effort normal. No tachypnea and no bradypnea. Abdominal: Soft. Patient without distension or tenderness  Musculoskeletal:   Negative acute trauma or deformity,  apparent full range of motion and normal strength all extremities appropriate to age. Neurological: Patient is alert and oriented to person, place, and time. patient displays no tremor. Patient displays no seizure activity. Skin: Skin is warm and dry. Patient is not diaphoretic. Psychiatric: Patient has a normal mood and affect.  Patient speech is normal and behavior is normal. Cognition and memory are normal.      DIFFERENTIAL DIAGNOSIS:   Migraine cephalalgia tension headache, ICH,    DIAGNOSTIC RESULTS           RADIOLOGY: non-plain film images(s) such as CT, Ultrasound and MRI are read by the radiologist.  CT Head WO Contrast   Final Result      Normal Discharge Medication List as of 4/15/2019  9:43 PM          Follow-up:  Maura Arreola DO  [address]     Call           Final Impression:   1. Acute nonintractable headache, unspecified headache type    2.  Cough               (Please note that portions of this note were completed with a voice recognition program.  Efforts were made to edit the dictations but occasionally words are mis-transcribed.)    MD Ishan Stanley MD  04/15/19 0014

## 2019-04-21 ENCOUNTER — APPOINTMENT (OUTPATIENT)
Dept: CT IMAGING | Age: 64
End: 2019-04-21
Payer: COMMERCIAL

## 2019-04-21 ENCOUNTER — HOSPITAL ENCOUNTER (EMERGENCY)
Age: 64
Discharge: HOME | End: 2019-04-21
Attending: FAMILY MEDICINE
Payer: COMMERCIAL

## 2019-04-21 ENCOUNTER — APPOINTMENT (OUTPATIENT)
Dept: GENERAL RADIOLOGY | Age: 64
End: 2019-04-21
Payer: COMMERCIAL

## 2019-04-21 VITALS
WEIGHT: 210 LBS | SYSTOLIC BLOOD PRESSURE: 119 MMHG | TEMPERATURE: 98 F | BODY MASS INDEX: 31.01 KG/M2 | RESPIRATION RATE: 14 BRPM | DIASTOLIC BLOOD PRESSURE: 67 MMHG | HEART RATE: 53 BPM | OXYGEN SATURATION: 97 %

## 2019-04-21 DIAGNOSIS — I95.1 ORTHOSTATIC SYNCOPE: Primary | ICD-10-CM

## 2019-04-21 LAB
ABSOLUTE EOS #: 0.3 K/UL (ref 0–0.4)
ABSOLUTE IMMATURE GRANULOCYTE: NORMAL K/UL (ref 0–0.3)
ABSOLUTE LYMPH #: 1.3 K/UL (ref 1–4.8)
ABSOLUTE MONO #: 0.5 K/UL (ref 0–1)
ALBUMIN SERPL-MCNC: 5 G/DL (ref 3.5–5.2)
ALBUMIN/GLOBULIN RATIO: ABNORMAL (ref 1–2.5)
ALP BLD-CCNC: 100 U/L (ref 40–129)
ALT SERPL-CCNC: 16 U/L (ref 5–41)
ANION GAP SERPL CALCULATED.3IONS-SCNC: 13 MMOL/L (ref 9–17)
AST SERPL-CCNC: 13 U/L
BASOPHILS # BLD: 0 % (ref 0–2)
BASOPHILS ABSOLUTE: 0 K/UL (ref 0–0.2)
BILIRUB SERPL-MCNC: 0.35 MG/DL (ref 0.3–1.2)
BUN BLDV-MCNC: 22 MG/DL (ref 8–23)
BUN/CREAT BLD: 19 (ref 9–20)
CALCIUM SERPL-MCNC: 9.5 MG/DL (ref 8.6–10.4)
CHLORIDE BLD-SCNC: 97 MMOL/L (ref 98–107)
CO2: 27 MMOL/L (ref 20–31)
CREAT SERPL-MCNC: 1.17 MG/DL (ref 0.7–1.2)
DIFFERENTIAL TYPE: YES
EKG ATRIAL RATE: 55 BPM
EKG P AXIS: 47 DEGREES
EKG P-R INTERVAL: 212 MS
EKG Q-T INTERVAL: 454 MS
EKG QRS DURATION: 88 MS
EKG QTC CALCULATION (BAZETT): 434 MS
EKG R AXIS: 37 DEGREES
EKG T AXIS: 11 DEGREES
EKG VENTRICULAR RATE: 55 BPM
EOSINOPHILS RELATIVE PERCENT: 5 % (ref 0–5)
GFR AFRICAN AMERICAN: >60 ML/MIN
GFR NON-AFRICAN AMERICAN: >60 ML/MIN
GFR SERPL CREATININE-BSD FRML MDRD: ABNORMAL ML/MIN/{1.73_M2}
GFR SERPL CREATININE-BSD FRML MDRD: ABNORMAL ML/MIN/{1.73_M2}
GLUCOSE BLD-MCNC: 96 MG/DL (ref 70–99)
HCT VFR BLD CALC: 45.3 % (ref 41–53)
HEMOGLOBIN: 15.1 G/DL (ref 13.5–17.5)
IMMATURE GRANULOCYTES: NORMAL %
INR BLD: 1
LYMPHOCYTES # BLD: 25 % (ref 13–44)
MCH RBC QN AUTO: 31.1 PG (ref 26–34)
MCHC RBC AUTO-ENTMCNC: 33.4 G/DL (ref 31–37)
MCV RBC AUTO: 93.1 FL (ref 80–100)
MONOCYTES # BLD: 9 % (ref 5–9)
NRBC AUTOMATED: NORMAL PER 100 WBC
PDW BLD-RTO: 14.5 % (ref 12.1–15.2)
PLATELET # BLD: 203 K/UL (ref 140–450)
PLATELET ESTIMATE: NORMAL
PMV BLD AUTO: NORMAL FL (ref 6–12)
POTASSIUM SERPL-SCNC: 4.3 MMOL/L (ref 3.7–5.3)
PROTHROMBIN TIME: 9.8 SEC (ref 9–11.6)
RBC # BLD: 4.86 M/UL (ref 4.5–5.9)
RBC # BLD: NORMAL 10*6/UL
SEG NEUTROPHILS: 61 % (ref 39–75)
SEGMENTED NEUTROPHILS ABSOLUTE COUNT: 3.2 K/UL (ref 2.1–6.5)
SODIUM BLD-SCNC: 137 MMOL/L (ref 135–144)
TOTAL PROTEIN: 7.6 G/DL (ref 6.4–8.3)
TROPONIN INTERP: NORMAL
TROPONIN T: <0.03 NG/ML
TROPONIN, HIGH SENSITIVITY: NORMAL NG/L (ref 0–22)
WBC # BLD: 5.3 K/UL (ref 3.5–11)
WBC # BLD: NORMAL 10*3/UL

## 2019-04-21 PROCEDURE — 99285 EMERGENCY DEPT VISIT HI MDM: CPT

## 2019-04-21 PROCEDURE — 84484 ASSAY OF TROPONIN QUANT: CPT

## 2019-04-21 PROCEDURE — 71101 X-RAY EXAM UNILAT RIBS/CHEST: CPT

## 2019-04-21 PROCEDURE — 80053 COMPREHEN METABOLIC PANEL: CPT

## 2019-04-21 PROCEDURE — 93005 ELECTROCARDIOGRAM TRACING: CPT

## 2019-04-21 PROCEDURE — 73080 X-RAY EXAM OF ELBOW: CPT

## 2019-04-21 PROCEDURE — 6370000000 HC RX 637 (ALT 250 FOR IP): Performed by: FAMILY MEDICINE

## 2019-04-21 PROCEDURE — 70450 CT HEAD/BRAIN W/O DYE: CPT

## 2019-04-21 PROCEDURE — 72125 CT NECK SPINE W/O DYE: CPT

## 2019-04-21 PROCEDURE — 85610 PROTHROMBIN TIME: CPT

## 2019-04-21 PROCEDURE — 85025 COMPLETE CBC W/AUTO DIFF WBC: CPT

## 2019-04-21 RX ORDER — ONDANSETRON 4 MG/1
8 TABLET, ORALLY DISINTEGRATING ORAL ONCE
Status: COMPLETED | OUTPATIENT
Start: 2019-04-21 | End: 2019-04-21

## 2019-04-21 RX ORDER — ONDANSETRON 4 MG/1
4 TABLET, ORALLY DISINTEGRATING ORAL EVERY 4 HOURS PRN
Qty: 10 TABLET | Refills: 0 | Status: SHIPPED | OUTPATIENT
Start: 2019-04-21 | End: 2019-04-22 | Stop reason: SDUPTHER

## 2019-04-21 RX ORDER — ACETAMINOPHEN 500 MG
1000 TABLET ORAL ONCE
Status: COMPLETED | OUTPATIENT
Start: 2019-04-21 | End: 2019-04-21

## 2019-04-21 RX ADMIN — ONDANSETRON 8 MG: 4 TABLET, ORALLY DISINTEGRATING ORAL at 17:21

## 2019-04-21 RX ADMIN — ACETAMINOPHEN 1000 MG: 500 TABLET, FILM COATED ORAL at 15:59

## 2019-04-21 ASSESSMENT — PAIN SCALES - GENERAL
PAINLEVEL_OUTOF10: 9
PAINLEVEL_OUTOF10: 6
PAINLEVEL_OUTOF10: 9

## 2019-04-21 ASSESSMENT — PAIN DESCRIPTION - FREQUENCY: FREQUENCY: CONTINUOUS

## 2019-04-21 ASSESSMENT — PAIN DESCRIPTION - ORIENTATION: ORIENTATION: RIGHT

## 2019-04-21 ASSESSMENT — PAIN DESCRIPTION - DESCRIPTORS: DESCRIPTORS: THROBBING

## 2019-04-21 ASSESSMENT — PAIN DESCRIPTION - LOCATION: LOCATION: ARM;RIB CAGE

## 2019-04-21 ASSESSMENT — PAIN DESCRIPTION - PAIN TYPE
TYPE: ACUTE PAIN
TYPE: ACUTE PAIN

## 2019-04-21 NOTE — ED PROVIDER NOTES
bypass graft (10/23/15); Cardiac surgery; Pilonidal cyst excision; Carpal tunnel release (Right); fracture surgery; Finger amputation (Left); Colonoscopy (2012); Colonoscopy (11/27/2017); pr colonoscopy w/biopsy single/multiple (N/A, 11/27/2017); and pr egd transoral biopsy single/multiple (N/A, 11/27/2017). CURRENT MEDICATIONS       Discharge Medication List as of 4/21/2019  4:39 PM      CONTINUE these medications which have NOT CHANGED    Details   !! ondansetron (ZOFRAN ODT) 4 MG disintegrating tablet Take 1 tablet by mouth every 8 hours as needed for Nausea or Vomiting, Disp-10 tablet, R-2Normal      albuterol sulfate HFA (PROAIR HFA) 108 (90 Base) MCG/ACT inhaler Inhale 2 puffs into the lungs every 4 hours as needed for Wheezing or Shortness of Breath, Disp-1 Inhaler, R-2Normal      sildenafil (REVATIO) 20 MG tablet Take 2-3 tablets by mouth daily as needed (as needed for ED.  do not take if you've taken imdur the same day.), Disp-20 tablet, R-0Normal      tiotropium (SPIRIVA RESPIMAT) 2.5 MCG/ACT AERS inhaler Inhale 2 puffs into the lungs daily, Disp-1 Inhaler, R-5Normal      pantoprazole (PROTONIX) 40 MG tablet Take 1 tablet by mouth every morning (before breakfast), Disp-30 tablet, R-5Normal      senna-docusate (PERICOLACE) 8.6-50 MG per tablet Take 1 tablet by mouth 2 times daily as needed for Constipation, Disp-60 tablet, R-5Normal      mirtazapine (REMERON) 30 MG tablet Take 1.5 tablets by mouth nightly, Disp-30 tablet, R-0Normal      RANEXA 500 MG extended release tablet Take 500 mg by mouth 3 times daily, R-1, DAWHistorical Med      terazosin (HYTRIN) 2 MG capsule Take 1 capsule by mouth nightly, Disp-30 capsule, R-3Normal      spironolactone (ALDACTONE) 25 MG tablet Take 1 tablet by mouth daily, Disp-30 tablet, R-11Normal      atorvastatin (LIPITOR) 20 MG tablet TAKE 1 TABLET BY MOUTH EVERY DAY, Disp-30 tablet, R-5Normal      ibuprofen (ADVIL;MOTRIN) 600 MG tablet Take 600 mg by mouth every 8 hours as needed for PainHistorical Med      gabapentin (NEURONTIN) 300 MG capsule Take 900 mg by mouth 3 times daily as needed. Parish Alvarez Historical Med      albuterol (PROVENTIL) (2.5 MG/3ML) 0.083% nebulizer solution inhale contents of 1 vial in nebulizer four times a day, Disp-360 mL, R-1Normal      atenolol (TENORMIN) 25 MG tablet Take 25 mg by mouth dailyHistorical Med      nitroGLYCERIN (NITROSTAT) 0.4 MG SL tablet up to max of 3 total doses. If no relief after 1 dose, call 911., Disp-25 tablet, R-3Print      acetaminophen (TYLENOL) 325 MG tablet Take 325 mg by mouth every 6 hours as neededHistorical Med      traZODone (DESYREL) 50 MG tablet Take 100 mg by mouth nightly Historical Med      Cholecalciferol (VITAMIN D) 2000 UNITS CAPS capsule Take 1 capsule by mouth daily      furosemide (LASIX) 40 MG tablet Take 1 tablet by mouth daily, Disp-30 tablet, R-3      aspirin 81 MG tablet Take 81 mg by mouth daily      fluticasone (FLONASE) 50 MCG/ACT nasal spray 2 sprays by Nasal route daily      !! Respiratory Therapy Supplies (ADULT MASK) MISC Disp-1 each, R-1, NormalUse every 4-6 hours as needed for sob      !! Respiratory Therapy Supplies (FULL KIT NEBULIZER SET) MISC R-0, Historical Med      Respiratory Therapy Supplies (NEBULIZER/TUBING/MOUTHPIECE) KIT Disp-1 kit, R-0, NormalTubing and mouth piece, use with nebulizer every 4 hours as needed for SOB       ! ! - Potential duplicate medications found. Please discuss with provider. ALLERGIES     is allergic to codeine; hydrocodone; pcn [penicillins]; and sulfa antibiotics. FAMILY HISTORY     is adopted. He was adopted. Family history is unknown by patient. SOCIAL HISTORY      reports that he quit smoking about 12 years ago. His smoking use included cigarettes. He has a 19.00 pack-year smoking history. He quit smokeless tobacco use about 18 years ago. He reports that he has current or past drug history. Drug: Marijuana. Frequency: 1.00 time per week.  He reports that he does not drink alcohol. PHYSICAL EXAM     INITIAL VITALS:  weight is 210 lb (95.3 kg). His oral temperature is 98 °F (36.7 °C). His blood pressure is 119/67 and his pulse is 53. His respiration is 14 and oxygen saturation is 97%. Physical Exam   Constitutional: Patient is oriented to person, place, and time. Patient appears well-developed and well-nourished. Patient is active and cooperative. HENT:   Head: Normocephalic and atraumatic. Head is without contusion. Right Ear: Hearing and external ear normal. No drainage. Left Ear: Hearing and external ear normal. No drainage. Nose: Nose normal. No nasal deformity. No epistaxis. Mouth/Throat: Mucous membranes are slight dry. Eyes: EOMI. Conjunctivae, sclera, and lids are normal. Right eye exhibits no discharge. Left eye exhibits no discharge. Neck: Full passive range of motion without pain and phonation normal.   Cardiovascular:  Normal rate, regular rhythm and intact distal pulses. No gross lower extremity edema. Pulses: Right radial pulse  2+   Pulmonary/Chest: Effort normal. No tachypnea and no bradypnea. No wheezes, rhonchi, or rales. Pain to palpation on the right chest wall without subcutaneous emphysema or crepitus  Abdominal: Increased BMI, soft nontender   Musculoskeletal:    focus examination patient's right upper extremity shows no gross trauma or deformity, there is pain to palpation on the right elbow and distal humerus area without overlying integument aberration, distal CSM intact     except as otherwise noted, Negative acute trauma or deformity,  apparent full range of motion and normal strength all extremities appropriate to age. Neurological: Patient is alert and oriented to person, place, and time. patient displays no tremor. Patient displays no seizure activity. CN II-XII grossly intact, NIHSS = 0     Skin: Skin is warm and dry. Patient is not diaphoretic. Psychiatric: Patient has a normal mood and affect.  Patient speech is normal and behavior is normal. Cognition and memory are normal.      DIFFERENTIAL DIAGNOSIS:   ICH, contusion, rib fracture, PTX, MSK, dehydration, TIA/CVA, syncope NOS,     DIAGNOSTIC RESULTS     EKG: All EKG's are interpreted by the Emergency Department Physician who either signs or Co-signs this chart in the absence of a cardiologist.  EKG    The patient had an EKG which is interpreted by me in the absence of a Cardiologist.   [] Without comparison to previous. [x] With comparison to a previous EKG Dated 3/18/2019    EKG @ 1416 hrs - sinus bradycardia with first-degree AV block, rate 55, HI-2 12, QRS QTC normal, normal axis, noted TWI Lead III seen in prior EKG    EKG @ 1607 hrs - sinus bradycardia with first degree AV block, rate 54, HI-212, QRS QTC normal, normal axis      RADIOLOGY: non-plain film images(s) such as CT, Ultrasound and MRI are read by the radiologist.  XR ELBOW RIGHT (MIN 3 VIEWS)   Final Result   FINDINGS/IMPRESSION:       No radiographic evidence of acute fracture. No dislocation or traumatic    malalignment. No significant joint effusion. XR RIBS RIGHT INCLUDE CHEST (MIN 3 VIEWS)   Final Result      1. No acute pulmonary disease      2. No osseous abnormality noted         CT Head WO Contrast   Final Result      1. No acute intracranial abnormality. 2. Mild chronic sinusitis. CT Cervical Spine WO Contrast   Final Result      1. No acute fracture or subluxation. 2. Moderate to severe multilevel degenerative spondylosis as described above. Please refer to the MRI report from 1/29/2019 for further discussion. 3. Additional findings suggestive diffuse idiopathic skeletal hyperostosis.              LABS:   Labs Reviewed   COMPREHENSIVE METABOLIC PANEL - Abnormal; Notable for the following components:       Result Value    Chloride 97 (*)     All other components within normal limits   CBC WITH AUTO DIFFERENTIAL   PROTIME-INR   TROPONIN EMERGENCY DEPARTMENT COURSE:   Vitals:    Vitals:    04/21/19 1605 04/21/19 1606 04/21/19 1617 04/21/19 1632   BP:  134/65 121/71 119/67   Pulse: 55 56 52 53   Resp: 12 18 14 14   Temp:       TempSrc:       SpO2: 97% 98% 99% 97%   Weight:         Patient history and physical exam taken at bedside, discussed patient's symptoms and exam findings, discussed initial workup to include CT head and cervical spine, x-rays of right elbow and right rib cage, EKG, blood work. Patient placed on monitor, resting high semi-Fowlers in bed with wife present. Patient and wife acknowledge workup    EKG as above    Labs reviewed    Imaging reports reviewed    Discussion with patient, he now states the first syncope event happened while getting up from toilet, and the second while having a bowel movement. Patient is now stating he feels short of breath, and nauseated, had taken Phenergan twice earlier today. EKG #2 as above    Orthostatic vital signs reviewed    Discussed with patient and patient's wife overall workup, discussed likely orthostatic syncope events, noted with position changes, patient advised to stay well-hydrated, regularly spaced meals, take it slow when he is able going from laying to sitting and sitting to standing position, close outpatient follow-up, acknowledges                FINAL IMPRESSION      1.  Orthostatic syncope          DISPOSITION/PLAN   Discharge    PATIENT REFERRED TO:  Dwain Goldman DO  [address] [facility]  [phone]    Schedule an appointment as soon as possible for a visit       [place] [address] [address] [phone]    As needed, If symptoms worsen      DISCHARGE MEDICATIONS:  Discharge Medication List as of 4/21/2019  4:39 PM      START taking these medications    Details   !! ondansetron (ZOFRAN ODT) 4 MG disintegrating tablet Take 1 tablet by mouth every 4 hours as needed for Nausea or Vomiting, Disp-10 tablet, R-0Print       !! - Potential duplicate medications found. Please discuss with provider. Summation      Patient Course:  Discharge    ED Medications administered this visit:    Medications   acetaminophen (TYLENOL) tablet 1,000 mg (1,000 mg Oral Given 4/21/19 1559)   ondansetron (ZOFRAN-ODT) disintegrating tablet 8 mg (8 mg Oral Given 4/21/19 1721)       New Prescriptions from this visit:    Discharge Medication List as of 4/21/2019  4:39 PM      START taking these medications    Details   !! ondansetron (ZOFRAN ODT) 4 MG disintegrating tablet Take 1 tablet by mouth every 4 hours as needed for Nausea or Vomiting, Disp-10 tablet, R-0Print       !! - Potential duplicate medications found. Please discuss with provider. Follow-up:  [place] Kirby [place]  [address] [facility]  [phone]    Schedule an appointment as soon as possible for a visit       [facility] ED  [address] [address]  [phone]    As needed, If symptoms worsen        Final Impression:   1.  Orthostatic syncope               (Please note that portions of this note were completed with a voice recognition program.  Efforts were made to edit the dictations but occasionally words are mis-transcribed.)    MD Alex Argueta MD  04/22/19 3589

## 2019-04-22 ENCOUNTER — HOSPITAL ENCOUNTER (EMERGENCY)
Age: 64
Discharge: ANOTHER HOSPITAL | End: 2019-04-23
Attending: EMERGENCY MEDICINE
Payer: COMMERCIAL

## 2019-04-22 VITALS
OXYGEN SATURATION: 97 % | DIASTOLIC BLOOD PRESSURE: 66 MMHG | TEMPERATURE: 99 F | RESPIRATION RATE: 20 BRPM | WEIGHT: 210 LBS | HEART RATE: 58 BPM | BODY MASS INDEX: 31.1 KG/M2 | HEIGHT: 69 IN | SYSTOLIC BLOOD PRESSURE: 122 MMHG

## 2019-04-22 DIAGNOSIS — F41.1 ANXIETY STATE: ICD-10-CM

## 2019-04-22 DIAGNOSIS — R45.850 HOMICIDAL THOUGHTS: Primary | ICD-10-CM

## 2019-04-22 LAB
ABSOLUTE EOS #: 0.3 K/UL (ref 0–0.4)
ABSOLUTE IMMATURE GRANULOCYTE: NORMAL K/UL (ref 0–0.3)
ABSOLUTE LYMPH #: 1.4 K/UL (ref 1–4.8)
ABSOLUTE MONO #: 0.5 K/UL (ref 0–1)
ALBUMIN SERPL-MCNC: 4.8 G/DL (ref 3.5–5.2)
ALBUMIN/GLOBULIN RATIO: ABNORMAL (ref 1–2.5)
ALP BLD-CCNC: 97 U/L (ref 40–129)
ALT SERPL-CCNC: 16 U/L (ref 5–41)
AMPHETAMINE SCREEN URINE: NEGATIVE
ANION GAP SERPL CALCULATED.3IONS-SCNC: 14 MMOL/L (ref 9–17)
AST SERPL-CCNC: 15 U/L
BARBITURATE SCREEN URINE: NEGATIVE
BASOPHILS # BLD: 0 % (ref 0–2)
BASOPHILS ABSOLUTE: 0 K/UL (ref 0–0.2)
BENZODIAZEPINE SCREEN, URINE: NEGATIVE
BILIRUB SERPL-MCNC: 0.53 MG/DL (ref 0.3–1.2)
BILIRUBIN URINE: NEGATIVE
BUN BLDV-MCNC: 20 MG/DL (ref 8–23)
BUN/CREAT BLD: 16 (ref 9–20)
BUPRENORPHINE URINE: ABNORMAL
CALCIUM SERPL-MCNC: 9.1 MG/DL (ref 8.6–10.4)
CANNABINOID SCREEN URINE: POSITIVE
CHLORIDE BLD-SCNC: 97 MMOL/L (ref 98–107)
CO2: 26 MMOL/L (ref 20–31)
COCAINE METABOLITE, URINE: NEGATIVE
COLOR: YELLOW
COMMENT UA: NORMAL
CREAT SERPL-MCNC: 1.23 MG/DL (ref 0.7–1.2)
DIFFERENTIAL TYPE: YES
EKG ATRIAL RATE: 54 BPM
EKG P AXIS: 38 DEGREES
EKG P-R INTERVAL: 212 MS
EKG Q-T INTERVAL: 458 MS
EKG QRS DURATION: 90 MS
EKG QTC CALCULATION (BAZETT): 434 MS
EKG R AXIS: 14 DEGREES
EKG T AXIS: 26 DEGREES
EKG VENTRICULAR RATE: 54 BPM
EOSINOPHILS RELATIVE PERCENT: 4 % (ref 0–5)
ETHANOL PERCENT: <0.01 %
ETHANOL: <10 MG/DL
GFR AFRICAN AMERICAN: >60 ML/MIN
GFR NON-AFRICAN AMERICAN: 59 ML/MIN
GFR SERPL CREATININE-BSD FRML MDRD: ABNORMAL ML/MIN/{1.73_M2}
GFR SERPL CREATININE-BSD FRML MDRD: ABNORMAL ML/MIN/{1.73_M2}
GLUCOSE BLD-MCNC: 98 MG/DL (ref 70–99)
GLUCOSE URINE: NEGATIVE
HCT VFR BLD CALC: 44.7 % (ref 41–53)
HEMOGLOBIN: 14.9 G/DL (ref 13.5–17.5)
IMMATURE GRANULOCYTES: NORMAL %
KETONES, URINE: NEGATIVE
LEUKOCYTE ESTERASE, URINE: NEGATIVE
LYMPHOCYTES # BLD: 21 % (ref 13–44)
MCH RBC QN AUTO: 30.9 PG (ref 26–34)
MCHC RBC AUTO-ENTMCNC: 33.4 G/DL (ref 31–37)
MCV RBC AUTO: 92.6 FL (ref 80–100)
MDMA URINE: ABNORMAL
METHADONE SCREEN, URINE: NEGATIVE
METHAMPHETAMINE, URINE: NEGATIVE
MONOCYTES # BLD: 7 % (ref 5–9)
NITRITE, URINE: NEGATIVE
NRBC AUTOMATED: NORMAL PER 100 WBC
OPIATES, URINE: NEGATIVE
OXYCODONE SCREEN URINE: NEGATIVE
PDW BLD-RTO: 14.6 % (ref 12.1–15.2)
PH UA: 7 (ref 5–8)
PHENCYCLIDINE, URINE: NEGATIVE
PLATELET # BLD: 204 K/UL (ref 140–450)
PLATELET ESTIMATE: NORMAL
PMV BLD AUTO: NORMAL FL (ref 6–12)
POTASSIUM SERPL-SCNC: 4.6 MMOL/L (ref 3.7–5.3)
PROPOXYPHENE, URINE: NEGATIVE
PROTEIN UA: NEGATIVE
RBC # BLD: 4.83 M/UL (ref 4.5–5.9)
RBC # BLD: NORMAL 10*6/UL
SEG NEUTROPHILS: 68 % (ref 39–75)
SEGMENTED NEUTROPHILS ABSOLUTE COUNT: 4.7 K/UL (ref 2.1–6.5)
SODIUM BLD-SCNC: 137 MMOL/L (ref 135–144)
SPECIFIC GRAVITY UA: 1.01 (ref 1–1.03)
TEST INFORMATION: ABNORMAL
TOTAL PROTEIN: 7.7 G/DL (ref 6.4–8.3)
TRICYCLIC ANTIDEPRESSANTS, UR: NEGATIVE
TROPONIN INTERP: NORMAL
TROPONIN T: <0.03 NG/ML
TROPONIN, HIGH SENSITIVITY: NORMAL NG/L (ref 0–22)
TSH SERPL DL<=0.05 MIU/L-ACNC: 2.84 MIU/L (ref 0.3–5)
TURBIDITY: CLEAR
URINE HGB: NEGATIVE
UROBILINOGEN, URINE: NORMAL
WBC # BLD: 6.9 K/UL (ref 3.5–11)
WBC # BLD: NORMAL 10*3/UL

## 2019-04-22 PROCEDURE — 85025 COMPLETE CBC W/AUTO DIFF WBC: CPT

## 2019-04-22 PROCEDURE — 80306 DRUG TEST PRSMV INSTRMNT: CPT

## 2019-04-22 PROCEDURE — 36415 COLL VENOUS BLD VENIPUNCTURE: CPT

## 2019-04-22 PROCEDURE — 84484 ASSAY OF TROPONIN QUANT: CPT

## 2019-04-22 PROCEDURE — 93005 ELECTROCARDIOGRAM TRACING: CPT

## 2019-04-22 PROCEDURE — 80053 COMPREHEN METABOLIC PANEL: CPT

## 2019-04-22 PROCEDURE — 6370000000 HC RX 637 (ALT 250 FOR IP): Performed by: EMERGENCY MEDICINE

## 2019-04-22 PROCEDURE — 84443 ASSAY THYROID STIM HORMONE: CPT

## 2019-04-22 PROCEDURE — 81003 URINALYSIS AUTO W/O SCOPE: CPT

## 2019-04-22 PROCEDURE — G0480 DRUG TEST DEF 1-7 CLASSES: HCPCS

## 2019-04-22 PROCEDURE — 99285 EMERGENCY DEPT VISIT HI MDM: CPT

## 2019-04-22 RX ORDER — MIRTAZAPINE 30 MG/1
TABLET, FILM COATED ORAL
Qty: 45 TABLET | Refills: 2 | Status: ON HOLD | OUTPATIENT
Start: 2019-04-22 | End: 2019-04-23

## 2019-04-22 RX ORDER — LORAZEPAM 0.5 MG/1
1 TABLET ORAL ONCE
Status: COMPLETED | OUTPATIENT
Start: 2019-04-22 | End: 2019-04-22

## 2019-04-22 RX ADMIN — LORAZEPAM 1 MG: 0.5 TABLET ORAL at 21:01

## 2019-04-22 ASSESSMENT — PAIN SCALES - GENERAL: PAINLEVEL_OUTOF10: 4

## 2019-04-22 ASSESSMENT — PAIN DESCRIPTION - LOCATION: LOCATION: HIP

## 2019-04-22 ASSESSMENT — PAIN DESCRIPTION - ORIENTATION: ORIENTATION: LEFT

## 2019-04-22 ASSESSMENT — PAIN DESCRIPTION - PAIN TYPE: TYPE: CHRONIC PAIN

## 2019-04-23 ENCOUNTER — HOSPITAL ENCOUNTER (INPATIENT)
Age: 64
LOS: 2 days | Discharge: ANOTHER HOSPITAL | DRG: 755 | End: 2019-04-25
Attending: PSYCHIATRY & NEUROLOGY | Admitting: PSYCHIATRY & NEUROLOGY
Payer: COMMERCIAL

## 2019-04-23 PROBLEM — F43.10 POST TRAUMATIC STRESS DISORDER: Chronic | Status: ACTIVE | Noted: 2019-04-23

## 2019-04-23 PROBLEM — F43.10 POST TRAUMATIC STRESS DISORDER: Status: ACTIVE | Noted: 2019-04-23

## 2019-04-23 LAB
EKG ATRIAL RATE: 55 BPM
EKG P AXIS: 15 DEGREES
EKG P-R INTERVAL: 206 MS
EKG Q-T INTERVAL: 440 MS
EKG QRS DURATION: 84 MS
EKG QTC CALCULATION (BAZETT): 420 MS
EKG R AXIS: -18 DEGREES
EKG T AXIS: -6 DEGREES
EKG VENTRICULAR RATE: 55 BPM

## 2019-04-23 PROCEDURE — 6370000000 HC RX 637 (ALT 250 FOR IP): Performed by: PSYCHIATRY & NEUROLOGY

## 2019-04-23 PROCEDURE — 90792 PSYCH DIAG EVAL W/MED SRVCS: CPT | Performed by: PSYCHIATRY & NEUROLOGY

## 2019-04-23 PROCEDURE — 1240000000 HC EMOTIONAL WELLNESS R&B

## 2019-04-23 PROCEDURE — 99221 1ST HOSP IP/OBS SF/LOW 40: CPT | Performed by: INTERNAL MEDICINE

## 2019-04-23 PROCEDURE — 6370000000 HC RX 637 (ALT 250 FOR IP): Performed by: NURSE PRACTITIONER

## 2019-04-23 RX ORDER — DOCUSATE SODIUM 100 MG/1
100 CAPSULE, LIQUID FILLED ORAL 2 TIMES DAILY PRN
Status: ON HOLD | COMMUNITY
End: 2019-04-25 | Stop reason: HOSPADM

## 2019-04-23 RX ORDER — ISOSORBIDE MONONITRATE 30 MG/1
15 TABLET, EXTENDED RELEASE ORAL DAILY
COMMUNITY
End: 2019-05-11 | Stop reason: ALTCHOICE

## 2019-04-23 RX ORDER — NITROGLYCERIN 0.4 MG/1
0.4 TABLET SUBLINGUAL EVERY 5 MIN PRN
Status: DISCONTINUED | OUTPATIENT
Start: 2019-04-23 | End: 2019-04-25 | Stop reason: HOSPADM

## 2019-04-23 RX ORDER — BENZTROPINE MESYLATE 1 MG/ML
2 INJECTION INTRAMUSCULAR; INTRAVENOUS 2 TIMES DAILY PRN
Status: DISCONTINUED | OUTPATIENT
Start: 2019-04-23 | End: 2019-04-25 | Stop reason: HOSPADM

## 2019-04-23 RX ORDER — FUROSEMIDE 40 MG/1
40 TABLET ORAL DAILY
Status: DISCONTINUED | OUTPATIENT
Start: 2019-04-23 | End: 2019-04-25 | Stop reason: HOSPADM

## 2019-04-23 RX ORDER — SENNA AND DOCUSATE SODIUM 50; 8.6 MG/1; MG/1
1 TABLET, FILM COATED ORAL NIGHTLY PRN
Status: DISCONTINUED | OUTPATIENT
Start: 2019-04-23 | End: 2019-04-25 | Stop reason: HOSPADM

## 2019-04-23 RX ORDER — PANTOPRAZOLE SODIUM 40 MG/1
40 TABLET, DELAYED RELEASE ORAL
Status: DISCONTINUED | OUTPATIENT
Start: 2019-04-24 | End: 2019-04-25 | Stop reason: HOSPADM

## 2019-04-23 RX ORDER — IBUPROFEN 400 MG/1
400 TABLET ORAL EVERY 8 HOURS PRN
Status: DISCONTINUED | OUTPATIENT
Start: 2019-04-23 | End: 2019-04-23

## 2019-04-23 RX ORDER — MIRTAZAPINE 45 MG/1
45 TABLET, FILM COATED ORAL NIGHTLY
Status: ON HOLD | COMMUNITY
End: 2019-05-31 | Stop reason: HOSPADM

## 2019-04-23 RX ORDER — TRAZODONE HYDROCHLORIDE 50 MG/1
50 TABLET ORAL NIGHTLY PRN
Status: DISCONTINUED | OUTPATIENT
Start: 2019-04-23 | End: 2019-04-25 | Stop reason: HOSPADM

## 2019-04-23 RX ORDER — ATORVASTATIN CALCIUM 20 MG/1
20 TABLET, FILM COATED ORAL DAILY
Status: DISCONTINUED | OUTPATIENT
Start: 2019-04-23 | End: 2019-04-25 | Stop reason: HOSPADM

## 2019-04-23 RX ORDER — GABAPENTIN 300 MG/1
900 CAPSULE ORAL 3 TIMES DAILY
Status: DISCONTINUED | OUTPATIENT
Start: 2019-04-23 | End: 2019-04-24

## 2019-04-23 RX ORDER — RANOLAZINE 500 MG/1
500 TABLET, EXTENDED RELEASE ORAL 3 TIMES DAILY
Status: DISCONTINUED | OUTPATIENT
Start: 2019-04-23 | End: 2019-04-25 | Stop reason: HOSPADM

## 2019-04-23 RX ORDER — ACETAMINOPHEN 325 MG/1
325 TABLET ORAL EVERY 6 HOURS PRN
Status: DISCONTINUED | OUTPATIENT
Start: 2019-04-23 | End: 2019-04-25 | Stop reason: HOSPADM

## 2019-04-23 RX ORDER — ALBUTEROL SULFATE 90 UG/1
2 AEROSOL, METERED RESPIRATORY (INHALATION) EVERY 4 HOURS PRN
Status: DISCONTINUED | OUTPATIENT
Start: 2019-04-23 | End: 2019-04-25 | Stop reason: HOSPADM

## 2019-04-23 RX ORDER — FLUTICASONE PROPIONATE 50 MCG
2 SPRAY, SUSPENSION (ML) NASAL DAILY
Status: DISCONTINUED | OUTPATIENT
Start: 2019-04-23 | End: 2019-04-25 | Stop reason: HOSPADM

## 2019-04-23 RX ORDER — TRAZODONE HYDROCHLORIDE 100 MG/1
100 TABLET ORAL NIGHTLY PRN
Status: ON HOLD | COMMUNITY
End: 2019-05-31 | Stop reason: HOSPADM

## 2019-04-23 RX ORDER — MIRTAZAPINE 30 MG/1
30 TABLET, FILM COATED ORAL NIGHTLY
Status: DISCONTINUED | OUTPATIENT
Start: 2019-04-23 | End: 2019-04-25 | Stop reason: HOSPADM

## 2019-04-23 RX ORDER — ONDANSETRON 4 MG/1
4 TABLET, FILM COATED ORAL ONCE
Status: COMPLETED | OUTPATIENT
Start: 2019-04-23 | End: 2019-04-23

## 2019-04-23 RX ORDER — ATENOLOL 25 MG/1
25 TABLET ORAL DAILY
Status: DISCONTINUED | OUTPATIENT
Start: 2019-04-23 | End: 2019-04-25 | Stop reason: HOSPADM

## 2019-04-23 RX ORDER — ALBUTEROL SULFATE 2.5 MG/3ML
2.5 SOLUTION RESPIRATORY (INHALATION) EVERY 4 HOURS PRN
COMMUNITY
End: 2020-04-28 | Stop reason: SDUPTHER

## 2019-04-23 RX ORDER — SPIRONOLACTONE 25 MG/1
25 TABLET ORAL DAILY
Status: DISCONTINUED | OUTPATIENT
Start: 2019-04-23 | End: 2019-04-25 | Stop reason: HOSPADM

## 2019-04-23 RX ORDER — TRAZODONE HYDROCHLORIDE 100 MG/1
100 TABLET ORAL NIGHTLY
Status: DISCONTINUED | OUTPATIENT
Start: 2019-04-23 | End: 2019-04-25 | Stop reason: HOSPADM

## 2019-04-23 RX ORDER — ALBUTEROL SULFATE 2.5 MG/3ML
2.5 SOLUTION RESPIRATORY (INHALATION) 4 TIMES DAILY PRN
Status: DISCONTINUED | OUTPATIENT
Start: 2019-04-23 | End: 2019-04-25 | Stop reason: HOSPADM

## 2019-04-23 RX ORDER — ALBUTEROL SULFATE 2.5 MG/3ML
2.5 SOLUTION RESPIRATORY (INHALATION) 4 TIMES DAILY
Status: DISCONTINUED | OUTPATIENT
Start: 2019-04-23 | End: 2019-04-23

## 2019-04-23 RX ORDER — ASPIRIN 81 MG/1
81 TABLET ORAL DAILY
Status: DISCONTINUED | OUTPATIENT
Start: 2019-04-23 | End: 2019-04-25 | Stop reason: HOSPADM

## 2019-04-23 RX ORDER — HYDROXYZINE 50 MG/1
50 TABLET, FILM COATED ORAL NIGHTLY PRN
Status: DISCONTINUED | OUTPATIENT
Start: 2019-04-23 | End: 2019-04-25 | Stop reason: HOSPADM

## 2019-04-23 RX ADMIN — IBUPROFEN 400 MG: 400 TABLET, FILM COATED ORAL at 10:28

## 2019-04-23 RX ADMIN — MIRTAZAPINE 30 MG: 30 TABLET, FILM COATED ORAL at 22:21

## 2019-04-23 RX ADMIN — RANOLAZINE 500 MG: 500 TABLET, FILM COATED, EXTENDED RELEASE ORAL at 13:56

## 2019-04-23 RX ADMIN — ATENOLOL 25 MG: 25 TABLET ORAL at 10:22

## 2019-04-23 RX ADMIN — SPIRONOLACTONE 25 MG: 25 TABLET ORAL at 10:22

## 2019-04-23 RX ADMIN — RANOLAZINE 500 MG: 500 TABLET, FILM COATED, EXTENDED RELEASE ORAL at 10:22

## 2019-04-23 RX ADMIN — RANOLAZINE 500 MG: 500 TABLET, FILM COATED, EXTENDED RELEASE ORAL at 22:21

## 2019-04-23 RX ADMIN — FLUTICASONE PROPIONATE 2 SPRAY: 50 SPRAY, METERED NASAL at 10:23

## 2019-04-23 RX ADMIN — ATORVASTATIN CALCIUM 20 MG: 20 TABLET, FILM COATED ORAL at 10:22

## 2019-04-23 RX ADMIN — ONDANSETRON HYDROCHLORIDE 4 MG: 4 TABLET, FILM COATED ORAL at 21:23

## 2019-04-23 RX ADMIN — FUROSEMIDE 40 MG: 40 TABLET ORAL at 10:22

## 2019-04-23 RX ADMIN — HYDROXYZINE HYDROCHLORIDE 50 MG: 50 TABLET, FILM COATED ORAL at 22:21

## 2019-04-23 RX ADMIN — VITAMIN D, TAB 1000IU (100/BT) 2000 UNITS: 25 TAB at 10:22

## 2019-04-23 RX ADMIN — TIOTROPIUM BROMIDE 18 MCG: 18 CAPSULE ORAL; RESPIRATORY (INHALATION) at 11:31

## 2019-04-23 RX ADMIN — MAGNESIUM HYDROXIDE 30 ML: 400 SUSPENSION ORAL at 14:47

## 2019-04-23 RX ADMIN — ASPIRIN 81 MG: 81 TABLET, COATED ORAL at 10:22

## 2019-04-23 ASSESSMENT — SLEEP AND FATIGUE QUESTIONNAIRES
DO YOU USE A SLEEP AID: YES
DO YOU USE A SLEEP AID: YES
AVERAGE NUMBER OF SLEEP HOURS: 5
RESTFUL SLEEP: NO
DIFFICULTY FALLING ASLEEP: YES
DIFFICULTY STAYING ASLEEP: YES
SLEEP PATTERN: DIFFICULTY FALLING ASLEEP;DISTURBED/INTERRUPTED SLEEP
AVERAGE NUMBER OF SLEEP HOURS: 5
DO YOU HAVE DIFFICULTY SLEEPING: YES
DO YOU HAVE DIFFICULTY SLEEPING: YES
DIFFICULTY FALLING ASLEEP: YES
DIFFICULTY STAYING ASLEEP: YES
DIFFICULTY ARISING: NO
SLEEP PATTERN: DIFFICULTY FALLING ASLEEP;INSOMNIA
DIFFICULTY ARISING: NO
RESTFUL SLEEP: NO

## 2019-04-23 ASSESSMENT — PAIN SCALES - GENERAL
PAINLEVEL_OUTOF10: 0
PAINLEVEL_OUTOF10: 9

## 2019-04-23 ASSESSMENT — ENCOUNTER SYMPTOMS
DIARRHEA: 0
APNEA: 0
EYE DISCHARGE: 0
ABDOMINAL DISTENTION: 0
NAUSEA: 0
EYE PAIN: 0
SORE THROAT: 0
COUGH: 0
SINUS PRESSURE: 0
EYE REDNESS: 0

## 2019-04-23 ASSESSMENT — LIFESTYLE VARIABLES
HISTORY_ALCOHOL_USE: NO

## 2019-04-23 NOTE — H&P
HISTORY and [address] [address]         NAME:  Ben Rolon  MRN: [id number]   YOB: 1955   Date: 4/23/2019   Age: 61 y.o. Gender: male       COMPLAINT AND PRESENT HISTORY:    Anali Vilchis is a 61 yr old male who was admitted from [facility] ED with depression, He said he had wanted to go to the [facility] in [place] part of the [address] [facility]. He said he has been feeling stress, He has been feeling anger at no one in particular and he is feeling like he is \"close to the edge. \" He has been in [facility] in the past, He does not have a car and his wife does not drive so he had to go to [facility] and ask them to get him to [place], [place] had already agreed to accept him according to him, He said [facility] sent him here and we are supposed to get him to [facility] in [place], He also has been in [place] [address], He has PTSD from [place] and it affects his entire life. He used to work but cannot due to the stress, He was last admitted to [facility] 3 yr ago,     Currently he and his wife are living in [facility] in [facility], They have been evicted due to non payment,  He has not worked, He has seen Dr Ryan Zuluaga as PCP and now needs a new Dr in his area. He has seen Dr Romy Sahni in [facility], He lives in [facility]. He and his wife had lived in a rental but he said it had black mold so they moved out because it is bad for his COPD and he could nto get the landlord to eliminate it,     He limps and has left hip pain     S/P Rt knee replacement,  S/P 2 vessel CABG in [facility] several yrs ago.      He has DM Type 2 Glucose is 98mg/dl    Right now living in hotel, no car and having troubles,     DIAGNOSTIC RESULTS   Radiology:     EKG:    Labs:  CBC:   Recent Labs     04/21/19  1410 04/22/19 2020   WBC 5.3 6.9   HGB 15.1 14.9    204     BMP:    Recent Labs     04/21/19  1410 04/22/19 2020    137   K 4.3 4.6   CL 97* 97*   CO2 27 26   BUN 22 20   CREATININE 1.17 1.23*   GLUCOSE 96 98     Hepatic:   Recent Labs     04/21/19  1410 04/22/19 2020   AST 13 15   ALT 16 16   BILITOT 0.35 0.53   ALKPHOS 100 97     CARDIAC ENZY:   Recent Labs     04/21/19  1410 04/22/19 2020   [address] <0.03 <0.03     INR:   Recent Labs     04/21/19 1410   INR 1.0     Lab Results   Component Value Date    COLORU YELLOW 04/22/2019    WBCUA NOT REPORTED 01/04/2019    RBCUA 0 TO 2 01/04/2019    MUCUS NOT REPORTED 01/04/2019    BACTERIA NOT REPORTED 01/04/2019    SPECGRAV 1.010 04/22/2019    LEUKOCYTESUR NEGATIVE 04/22/2019    GLUCOSEU NEGATIVE 04/22/2019    GLUCOSEU NEGATIVE 09/08/2011    AMORPHOUS NOT REPORTED 01/04/2019       PAST MEDICAL HISTORY     Past Medical History:   Diagnosis Date    Arthritis     WENDY KNEE    Bronchitis with chronic airway obstruction (HCC)     CAD (coronary artery disease)     CHF (congestive heart failure) (HCC)     CTS (carpal tunnel syndrome)     RIGHT    Diabetes mellitus ([facility]     Diabetic neuropathy associated with diabetes mellitus due to underlying condition ([facility]     Exposure to toxic chemical     [email or web address] base-toxic water exposure    GERD (gastroesophageal reflux disease)     H/O cardiovascular stress test 07/08/2016    Abnormal.  Moderate perfusion defect of mild to moderate intensity in the inferolateral,inferior and inferoapical regions during stress imaging. Ef 45%. with regional wall motion abnormalities.  H/O echocardiogram 2/17/16    EF >60%. LV wall thickness is mildly increased. Inferoseptal wall is abnormal in its motion not unusual status post open heart surgery. Normal aortic valve structure with ild aortic regurgitation.  Hip pain, left     Hx of cardiac cath 07/08/2016    LMCA: Normal 0% stenosis. LAD: Chronic occlusion 100%. Lesion on Mid LAD: 100% stenosis. LCx: single stenosis. Lesion on Mid CX: 80% stenosis. RCA: Lesion on Mid RCA: 70% stenosis.     Hyperlipidemia     Hypertension     [facility]    Pneumonia     PTSD (post-traumatic stress disorder)     S/P CABG (coronary artery bypass graft) 10/23/15    Sleep apnea     Vent[facility] fibrillation [facility] 10/18/2015    x 2 DURING HEART ATTACK       Pt denies any history of, stroke, heart disease, , HLD, Cancer, Seizures,Thyroid disease, Kidney Disease, Hepatitis, TB, Psychiatric Disorders or Substance abuse.     SURGICAL HISTORY       Past Surgical History:   Procedure Laterality Date    CARDIAC SURGERY      10/21/2015 3 vessel CABG    CARPAL TUNNEL RELEASE      CARPAL TUNNEL RELEASE Right     [facility]    COLONOSCOPY  11/27/2017    CORONARY ARTERY BYPASS GRAFT  10/23/15    Dr. Ike Shaikh      left middle finger    FRACTURE SURGERY      left wrist    JOINT REPLACEMENT      right knee    PILONIDAL CYST EXCISION      1974    PILONIDAL CYST EXCISION      MD COLONOSCOPY W/BIOPSY SINGLE/MULTIPLE N/A 11/27/2017    COLONOSCOPY WITH BIOPSY/ polypectomy performed by Ninfa Kelly MD at [id number] [place] [address] EGD TRANSORAL BIOPSY SINGLE/MULTIPLE N/A 11/27/2017    EGD BIOPSY performed by Ninfa Kelly MD at [address] [facility]      left wrist       FAMILY HISTORY       Family History   Adopted: Yes   Family history unknown: Yes       SOCIAL HISTORY       Social History     Socioeconomic History    Marital status:      Spouse name: None    Number of children: None    Years of education: None    Highest education level: None   Occupational History    None   Social Needs    Financial resource strain: None    Food insecurity:     Worry: None     Inability: None    Transportation needs:     Medical: None     Non-medical: None   Tobacco Use    Smoking status: Former Smoker     Packs/day: 1.00     Years: 19.00     Pack years: 19.00     Types: Cigarettes     Last attempt to quit: 8/23/2006     Years since quittin.6    Smokeless tobacco: Former User     Quit date: 2000   Substance and Sexual Activity    Alcohol use: No    Drug use: Yes     Frequency: 1.0 times per week     Types: Marijuana     Comment: Pt smokes marijuana therapeutically.  Sexual activity: None   Lifestyle    Physical activity:     Days per week: None     Minutes per session: None    Stress: None   Relationships    Social connections:     Talks on phone: None     Gets together: None     Attends [employer] service: None     Active member of club or organization: None     Attends meetings of clubs or organizations: None     Relationship status: None    Intimate partner violence:     Fear of current or ex partner: None     Emotionally abused: None     Physically abused: None     Forced sexual activity: None   Other Topics Concern    None   Social History Narrative    None           REVIEW OF SYSTEMS      Allergies   Allergen Reactions    Codeine Other (See Comments)    Hydrocodone     Pcn [Penicillins] Other (See Comments)    Sulfa Antibiotics Other (See Comments)       Review of Systems   Constitutional: Negative for activity change, diaphoresis, fatigue and fever. HENT: Negative for congestion, mouth sores, sinus pressure and sore throat. Eyes: Negative for pain, discharge and redness. Respiratory: Negative for apnea and cough. Cardiovascular: Negative for chest pain and leg swelling. Gastrointestinal: Negative for abdominal distention, diarrhea and nausea. Genitourinary: Negative for frequency and urgency. Musculoskeletal: Negative for arthralgias. Skin: Negative. Neurological: Negative. Negative for tremors, syncope, facial asymmetry and speech difficulty. Psychiatric/Behavioral: Positive for agitation, behavioral problems, confusion, decreased concentration, dysphoric mood, self-injury and sleep disturbance. Negative for hallucinations. The patient is nervous/anxious and is hyperactive. Gets very angry at living in hotel, It is expensive, He got evicted from one for dirtying a wash cloth to the point of non repair,          See HPI. Depression PTSD and wants to go to [facility] in [facility]    GENERAL PHYSICAL EXAM:         Vitals: BP (!) 157/99   Pulse 54   Temp 97.5 °F (36.4 °C) (Oral)   Resp 16   Ht 5' 9\" (1.753 m)   Wt 210 lb (95.3 kg)   BMI 31.01 kg/m²  Body mass index is 31.01 kg/m². GENERAL APPEARANCE:  Rolf Hanks is 61 y.o.,  male, mildly obese, Walks with limp Has bad left hip         Physical Exam   Constitutional: He appears well-developed and well-nourished. No distress. HENT:   Head: Normocephalic and atraumatic. Right Ear: External ear normal.   Left Ear: External ear normal.   Mouth/Throat: Oropharynx is clear and moist. No oropharyngeal exudate. Eyes: Pupils are equal, round, and reactive to light. EOM are normal.   Neck: Normal range of motion. No tracheal deviation present. Cardiovascular: Normal rate, normal heart sounds and intact distal pulses. No murmur heard. Pulmonary/Chest: Effort normal and breath sounds normal. No respiratory distress. Abdominal: Soft. Bowel sounds are normal. He exhibits no distension. There is no guarding. Musculoskeletal: Normal range of motion. He exhibits no edema or deformity. Neurological: He displays normal reflexes. No cranial nerve deficit. Skin: Capillary refill takes less than 2 seconds. Psychiatric: He has a normal mood and affect. PROVISIONAL DIAGNOSES:     PTSD and has depression  Left hip pain   Principal Problem:    Post traumatic stress disorder  Resolved Problems:    * No resolved hospital problems.  Suma Agosto, DIANNA - CNP on 4/23/2019 at 2:17 PM

## 2019-04-23 NOTE — GROUP NOTE
Group Therapy Note    Date: April 23    Group Start Time: 1330  Group End Time: 6643  Group Topic: Cognitive Skills    LA Pressley, CTRS    Pt did not participate in Cognitive Skills-Stress Management/Coping Skills Group despite staff encouragement.         Signature:  Yelena Hair

## 2019-04-23 NOTE — ED PROVIDER NOTES
eMERGENCY dEPARTMENT eNCOUnter      [address] [facility]    Chief Complaint   Patient presents with    Homicidal     Pt. states \"I'm feeling on edge\". Denies suicide but states 'I feel like I'm gonna kill someone\". Denies any plan to do so       HPI    Zaria Yuen is a 61 y.o. male who presentsto ED from home. By car. With complaint of I am feeling on edge. She denies being suicidal.  Patient states that I feel like I am going to kill someone. Patient had syncopal episode yesterday. Patient was seen in ED. Patient had CT head that was negative, patient had negative cardiac workup. Patient has extensive cardiac history. Patient has history of from PTSD  Onset for the past couple days. Patient has been previously hospitalized at [facility]. Patient denies chest pain denies shortness of breath. Patient denies recent use of alcohol. PAST MEDICAL HISTORY    Past Medical History:   Diagnosis Date    Arthritis     WENDY KNEE    Bronchitis with chronic airway obstruction (HCC)     CAD (coronary artery disease)     CHF (congestive heart failure) (HCC)     CTS (carpal tunnel syndrome)     RIGHT    Diabetes mellitus ([address]     Diabetic neuropathy associated with diabetes mellitus due to underlying condition [facility]     Exposure to toxic chemical     [employer], [employer] base-toxic water exposure    GERD (gastroesophageal reflux disease)     H/O cardiovascular stress test 07/08/2016    Abnormal.  Moderate perfusion defect of mild to moderate intensity in the inferolateral,inferior and inferoapical regions during stress imaging. Ef 45%. with regional wall motion abnormalities.  H/O echocardiogram 2/17/16    EF >60%. LV wall thickness is mildly increased. Inferoseptal wall is abnormal in its motion not unusual status post open heart surgery. Normal aortic valve structure with ild aortic regurgitation.     Hip pain, left     Hx of cardiac cath 07/08/2016    LMCA: Normal 0% stenosis. LAD: Chronic occlusion 100%. Lesion on Mid LAD: 100% stenosis. LCx: single stenosis. Lesion on Mid CX: 80% stenosis. RCA: Lesion on Mid RCA: 70% stenosis.     Hyperlipidemia     Hypertension     [facility] 2015    Pneumonia     PTSD (post-traumatic stress disorder)     S/P CABG (coronary artery bypass graft) 10/23/15    Sleep apnea     Ventricular fibrillation ([facility] [address] 10/18/2015    x 2 DURING HEART ATTACK       SURGICAL HISTORY    Past Surgical History:   Procedure Laterality Date    CARDIAC SURGERY      10/21/2015 3 vessel CABG    CARPAL TUNNEL RELEASE      CARPAL TUNNEL RELEASE Right     [facility]    COLONOSCOPY  11/27/2017    CORONARY ARTERY BYPASS GRAFT  10/23/15    Dr. Ni Gibson      left middle finger    FRACTURE SURGERY      left wrist    JOINT REPLACEMENT      right knee    PILONIDAL CYST EXCISION      1974    PILONIDAL CYST EXCISION      [place] COLONOSCOPY W/BIOPSY SINGLE/MULTIPLE N/A 11/27/2017    COLONOSCOPY WITH BIOPSY/ polypectomy performed by Bonifacio Mejía MD at [address] [facility] EGD TRANSORAL BIOPSY SINGLE/MULTIPLE N/A 11/27/2017    EGD BIOPSY performed by Bonifacio Mejía MD at [facility]      left wrist       CURRENT MEDICATIONS        ALLERGIES    Allergies   Allergen Reactions    Codeine Other (See Comments)    Hydrocodone     Pcn [Penicillins] Other (See Comments)    Sulfa Antibiotics Other (See Comments)       FAMILY HISTORY    Family History   Adopted: Yes   Family history unknown: Yes       SOCIAL HISTORY    Social History     Socioeconomic History    Marital status:      Spouse name: None    Number of children: None    Years of education: None    Highest education level: None   Occupational History    None   Social Needs    Financial resource strain: None    Food insecurity:     Worry: None Inability: None    Transportation needs:     Medical: None     Non-medical: None   Tobacco Use    Smoking status: Former Smoker     Packs/day: 1.00     Years: 19.00     Pack years: 19.00     Types: Cigarettes     Last attempt to quit: 2006     Years since quittin.6    Smokeless tobacco: Former User     Quit date: 2000   Substance and Sexual Activity    Alcohol use: No    Drug use: Yes     Frequency: 1.0 times per week     Types: Marijuana     Comment: Pt smokes marijuana therapeutically.  Sexual activity: None   Lifestyle    Physical activity:     Days per week: None     Minutes per session: None    Stress: None   Relationships    Social connections:     Talks on phone: None     Gets together: None     Attends Baptism service: None     Active member of club or organization: None     Attends meetings of clubs or organizations: None     Relationship status: None    Intimate partner violence:     Fear of current or ex partner: None     Emotionally abused: None     Physically abused: None     Forced sexual activity: None   Other Topics Concern    None   Social History Narrative    None       REVIEW OF SYSTEMS    Constitutional:  Denies fever, chills, weight loss or weakness   Eyes:  Denies photophobia or discharge   HENT:  Denies sore throat or ear pain   Respiratory:  Denies cough or shortness of breath   Cardiovascular:  Denies chest pain, palpitations or swelling   GI:  Denies abdominal pain, nausea, vomiting, or diarrhea   Musculoskeletal:  Denies back pain   Skin:  Denies rash   Neurologic:  Denies headache, focal weakness or sensory changes   Endocrine:  Denies polyuria or polydypsia   Lymphatic:  Denies swollen glands   Psychiatric:  \"I feel on the edge\" I feel I am going to kill someone   All systems negative except as marked.      PHYSICAL EXAM    VITAL SIGNS: /66   Pulse 58   Temp 99 °F (37.2 °C) (Oral)   Resp 20   Ht 5' 9\" (1.753 m)   Wt 210 lb (95.3 kg)   SpO2 97% BMI 31.01 kg/m²    Constitutional:  Well developed, Well nourished, No acute distress, Non-toxic appearance. HENT:  Normocephalic, Atraumatic, Bilateral external ears normal, Oropharynx moist, No oral exudates, Nose normal. Neck- Normal range of motion, No tenderness, Supple, No stridor. Eyes:  PERRL, EOMI, Conjunctiva normal, No discharge. Respiratory:  Normal breath sounds, No respiratory distress, No wheezing, No chest tenderness. Cardiovascular: Sinus bradycardia   GI:  Bowel sounds normal, Soft, No tenderness, No masses, No pulsatile masses. : External genitalia appear normal, No masses or lesions. No discharge. No CVA tenderness. Musculoskeletal:  Intact distal pulses, No edema, No tenderness, No cyanosis, No clubbing. Good range of motion in all major joints. No tenderness to palpation or major deformities noted. Back- No tenderness. Integument:  Warm, Dry, No erythema, No rash. Lymphatic:  No lymphadenopathy noted. Neurologic:  Alert & oriented x 3, Normal motor function, Normal sensory function, No focal deficits noted.    Psychiatric:  Patient appears as some anxious, patient has \"homicidal thoughts\"   EKG    Sinus rhythm rate of 55 st changes    RADIOLOGY    No orders to display       [address] PANEL - Abnormal; Notable for the following components:       Result Value    CREATININE 1.23 (*)     Chloride 97 (*)     GFR Non- 59 (*)     All other components within normal limits   URINE DRUG SCREEN - Abnormal; Notable for the following components:    Cannabinoid Scrn, Ur POSITIVE (*)     All other components within normal limits   CBC WITH AUTO DIFFERENTIAL   TROPONIN   TSH WITHOUT REFLEX   ETHANOL   URINALYSIS             Summation      Patient Course: Patient is medically cleared for psychiatric evaluation    ED Medications administered this visit:    Medications   LORazepam (ATIVAN) tablet 1 mg (1 mg Oral Given 4/22/19 2109)       New Prescriptions from this visit:    Discharge Medication List as of 4/23/2019  4:33 AM          Follow-up:  No follow-up provider specified. Final Impression:   1. Homicidal thoughts    2.  Anxiety state               (Please note that portions of this note were completed with a voice recognition program.  Efforts were made to edit the dictations but occasionally words are mis-transcribed.)        Corrinne Gehrig, MD  04/23/19 [address] Precious Ahn MD  04/25/19 0282
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
## 2019-04-23 NOTE — BH NOTE
While completing the RT assessment, pt stated \"If I can't get transferred to the [place], you all are going to see how bull headed I can be. I won't participate in anything you ask of me. \" This writer encouraged pt to speak with social work about his concerns.

## 2019-04-23 NOTE — BH NOTE
Results   Component Value Date    HDL 35 (L) 02/24/2019    HDL 54 12/17/2018    HDL 40 (L) 07/05/2017    HDL 35 (L) 07/08/2016     No components found for: LDLCAL  No results found for: LABVLDL      Body mass index is 31.01 kg/m². BP Readings from Last 2 Encounters:   04/23/19 (!) 157/99   04/22/19 122/66           Pt admitted with followings belongings:  Dentures: None  Vision - Corrective Lenses: Glasses  Hearing Aid: None  Jewelry: Necklace  Body Piercings Removed: N/A  Clothing: Footwear, Pants, Jacket / coat, Shirt, Socks  Were All Patient Medications Collected?: Yes  Other Valuables: Other (Comment)     Valuables sent home with n/a. Valuables placed in safe in security envelope, number:  [id number]. Patient's home medications were verified. Patient oriented to surroundings and program expectations and copy of patient rights given. Received admission packet:  yes. Consents reviewed, signed yes. Refused n/a. Patient verbalize understanding:  yes. Patient education on precautions: yes    Pt admitted to unit. Pt changed into hospital clothes and wanded for safety. [facility]. [facility] to the [facility] for treatment. Pt admitted for Homicidal Ideation with no plan. Patient denies HI and SI on admission, states he is feeling frustrated and on edge from PTSD. Pt denies drug or alcohol use. States he uses medical marijuana sporadically. Pt states he is medication compliant but has not had mirtazapine for about a month.                    Maxi Cardenas RN

## 2019-04-23 NOTE — ED NOTES
Pt resting on bed playing games on his cell phone. Pt's wife at bedside. Pt denies wanting to hurt himself, but reports \"still feel like I could hurt someone\" Pt reports   \"need to contact the [place] in [place] and they will send someone down to get me. \" Pt is cooperative.       Emilia Conti RN  04/22/19 [id number]

## 2019-04-23 NOTE — PROGRESS NOTES

## 2019-04-23 NOTE — BH NOTE
Psychiatric Admission Note         Wendy Ballard is a 61 y.o. male who was admitted from [facility].  He said he was getting irritable and angry and has homicidal thoughts and suicidal thoughts. He feels depressed. He said for the last 2 weeks isn't sleeping is disturbed. He was afraid that his posttraumatic stress disorder going to have a neck size information. Yesterday he apparently went to the bathroom and passed out for about an hour and then he came out in the past or again. This led him to seek help. He said he usually goes [facility]. This time he ended up with us. He feels that he is having more irritability and he is angrier than before and he has both suicidal and homicidal thoughts. He is unable to focus and concentrate. He feels depressed and upset. Patient is currently living with his wife in and he has a 80-year-old son. He served in [employer]. His son could not get into [employer] because he has only half any And he was medically unfit for serving in the armed forces. Patient denies any drug and alcohol use at this time. He denies any legal problems. He says he gets traumatic reliving experiences and hypervigilance from the service in the [facility]. He had previous suicide attempts. He denies any legal problems. He denies any physical sexual or emotional abuse in him. He denies any family history mental illness suicide of drug and alcohol abuse. Past Psychiatric History   Patient reports current outpatient psychiatric linkage. . Reported history of psychiatric inpatient hospitalizations. Reported history of suicide attempts. History of Substance Abuse     Denies alcohol use or use of any illicit drugs. Family History of psychiatric disorders    Family history: denied       Medical History   Allergies:  Codeine;  Hydrocodone; Pcn [penicillins]; and Sulfa antibiotics   Past Medical History:   Diagnosis Date    Arthritis     WENDY KNEE    Bronchitis with chronic airway obstruction (HCC)     CAD (coronary artery disease)     CHF (congestive heart failure) (HCC)     CTS (carpal tunnel syndrome)     RIGHT    Diabetes mellitus ([address]     Diabetic neuropathy associated with diabetes mellitus due to underlying condition ([address]     Exposure to toxic chemical     [employer], [employer] base-toxic water exposure    GERD (gastroesophageal reflux disease)     H/O cardiovascular stress test 07/08/2016    Abnormal.  Moderate perfusion defect of mild to moderate intensity in the inferolateral,inferior and inferoapical regions during stress imaging. Ef 45%. with regional wall motion abnormalities.  H/O echocardiogram 2/17/16    EF >60%. LV wall thickness is mildly increased. Inferoseptal wall is abnormal in its motion not unusual status post open heart surgery. Normal aortic valve structure with ild aortic regurgitation.  Hip pain, left     Hx of cardiac cath 07/08/2016    LMCA: Normal 0% stenosis. LAD: Chronic occlusion 100%. Lesion on Mid LAD: 100% stenosis. LCx: single stenosis. Lesion on Mid CX: 80% stenosis. RCA: Lesion on Mid RCA: 70% stenosis.     Hyperlipidemia     Hypertension     MI (myocardial infarction) ([address] 2015    Pneumonia     PTSD (post-traumatic stress disorder)     S/P CABG (coronary artery bypass graft) 10/23/15    Sleep apnea     Ventricular fibrillation ([facility] [facility] 10/18/2015    x 2 DURING HEART ATTACK      Past Surgical History:   Procedure Laterality Date    CARDIAC SURGERY      10/21/2015 3 vessel CABG    CARPAL TUNNEL RELEASE      CARPAL TUNNEL RELEASE Right     [facility]    COLONOSCOPY  11/27/2017    CORONARY ARTERY [facility]RA[facility]  [facility]/23[facility]    [facility]      left middle finger    FRACTURE SURGERY      left wrist    JOINT REPLACEMENT right knee    PILONIDAL CYST EXCISION      1974    PILONIDAL CYST EXCISION      [place] COLONOSCOPY W/BIOPSY SINGLE/MULTIPLE N/A 2017    COLONOSCOPY WITH BIOPSY/ polypectomy performed by Clive Vernon MD at [address] [facility] EGD TRANSORAL BIOPSY SINGLE/MULTIPLE N/A 2017    EGD BIOPSY performed by Clive Vernon MD at [address] [facility]      left wrist           SOCIAL HISTORY  Social History     Socioeconomic History    Marital status:      Spouse name: Not on file    Number of children: Not on file    Years of education: Not on file    Highest education level: Not on file   Occupational History    Not on file   Social Needs    Financial resource strain: Not on file    Food insecurity:     Worry: Not on file     Inability: Not on file    Transportation needs:     Medical: Not on file     Non-medical: Not on file   Tobacco Use    Smoking status: Former Smoker     Packs/day: 1.00     Years: 19.00     Pack years: 19.00     Types: Cigarettes     Last attempt to quit: 2006     Years since quittin.6    Smokeless tobacco: Former User     Quit date: 2000   Substance and Sexual Activity    Alcohol use: No    Drug use: Yes     Frequency: 1.0 times per week     Types: Marijuana     Comment: Pt smokes marijuana therapeutically.     Sexual activity: Not on file   Lifestyle    Physical activity:     Days per week: Not on file     Minutes per session: Not on file    Stress: Not on file   Relationships    Social connections:     Talks on phone: Not on file     Gets together: Not on file     Attends Jewish service: Not on file     Active member of club or organization: Not on file     Attends meetings of clubs or organizations: Not on file     Relationship status: Not on file    Intimate partner violence:     Fear of current or ex partner: Not on file     Emotionally abused: Not on file     Physically abused: Not on file     Forced sexual activity: Not on file   Other Topics Concern    Not on file   Social History Narrative    Not on file         Mental Status  Pt. was alert, fully oriented, and cooperative. Appearance and hygiene wereappropriate, well-groomed . Mood was depressed. Affect was \"dysthymic and poorly reactive Thought process was linear and well-organized. Patient denied any hallucinations or paranoia. Patient endorsed suicidal ideations. Patient endorsed homicidal ideations . Patient's gross cognitive functions were intact. Insight and judgement were poor. Both recent and remote memory were intact. Psychomotor status was agitated     Diagnostic Impression  Principal Problem:    Post traumatic stress disorder  Resolved Problems:    * No resolved hospital problems. *          Medications   albuterol  2.5 mg Nebulization 4x daily    aspirin  81 mg Oral Daily    atenolol  25 mg Oral Daily    atorvastatin  1 tablet Oral Daily    vitamin D  1 capsule Oral Daily    fluticasone  2 spray Nasal Daily    furosemide  40 mg Oral Daily    gabapentin  900 mg Oral TID    mirtazapine  30 mg Oral Nightly    [START ON 4/24/2019] pantoprazole  40 mg Oral QAM AC    ranolazine  500 mg Oral TID    spironolactone  25 mg Oral Daily    tiotropium  2 puff Inhalation Daily    traZODone  100 mg Oral Nightly     hydrOXYzine, traZODone, benztropine mesylate, magnesium hydroxide, acetaminophen, albuterol sulfate HFA, ibuprofen, nitroGLYCERIN, senna-docusate    Treatment Plan:    1. Admit to inpatient psychiatric treatment  2. Supportive therapy with medication management. Reviewed risks and benefits as well as potential side effects with patient. 3. Therapeutic activities and groups  4. Follow up at [facility] after symptoms stabilized    Estimated length of stay: 5-7 days    Seymour Tatum MD  Psychiatrist.  Dragon voice recognition software used in portions of this document.  Occasionally words are mis-transcribed

## 2019-04-23 NOTE — PLAN OF CARE
[id number] [facility]  Initial Interdisciplinary Treatment Plan NO      Original treatment plan Date & Time: 4/23/19    Admission Type:  Admission Type: Voluntary    Reason for admission:   Reason for Admission: HI no plan, feels on edge, [facility] told [facility]     Estimated Length of Stay:  5-7days  Estimated Discharge Date: to be determined by physician    PATIENT STRENGTHS:  Patient Strengths:Strengths: Connection to output provider, Positive Support  Patient Strengths and Limitations:Limitations: Difficulty problem solving/relies on others to help solve problems, Multiple barriers to leisure interests  Addictive Behavior: Addictive Behavior  In the past 3 months, have you felt or has someone told you that you have a problem with:  : None  Do you have a history of Chemical Use?: No  Do you have a history of Alcohol Use?: No  Do you have a history of Street Drug Abuse?: No  Histroy of Prescripton Drug Abuse?: No  Medical Problems:  Past Medical History:   Diagnosis Date    Arthritis     WENDY KNEE    Bronchitis with chronic airway obstruction (HCC)     CAD (coronary artery disease)     CHF (congestive heart failure) [facility]     CTS (carpal tunnel syndrome)     RIGHT    Diabetes mellitus ([facility]     Diabetic neuropathy associated with diabetes mellitus due to underlying condition ([facility]     Exposure to toxic chemical     [employer], [employer] base-toxic water exposure    GERD (gastroesophageal reflux disease)     H/O cardiovascular stress test 07/08/2016    Abnormal.  Moderate perfusion defect of mild to moderate intensity in the inferolateral,inferior and inferoapical regions during stress imaging. Ef 45%. with regional wall motion abnormalities. H/O echocardiogram 2/17/16    EF >60%. LV wall thickness is mildly increased. Inferoseptal wall is abnormal in its motion not unusual status post open heart surgery. Normal aortic valve structure with ild aortic regurgitation.     Hip pain, left     Hx of cardiac cath 07/08/2016    LMCA: Normal 0% stenosis. LAD: Chronic occlusion 100%. Lesion on Mid LAD: 100% stenosis. LCx: single stenosis. Lesion on Mid CX: 80% stenosis. RCA: Lesion on Mid RCA: 70% stenosis.     Hyperlipidemia     Hypertension     [facility]    Pneumonia     PTSD (post-traumatic stress disorder)     S/P CABG (coronary artery bypass graft) 10/23/15    Sleep apnea     Vent[facility] ([facility] 10/18/2015    x 2 DURING HEART ATTACK     Status EXAM:Status and Exam  Normal: No  Facial Expression: Sad  Affect: Appropriate  Level of Consciousness: Alert  Mood:Normal: No  Mood: Depressed, Sad, Irritable  Motor Activity:Normal: No  Motor Activity: Decreased  Interview Behavior: Cooperative  Preception: [place] to Place, [place] to Situation  Attention:Normal: Yes  Thought Content:Normal: No  Thought Content: Preoccupations  Hallucinations: None  Delusions: No  Memory:Normal: Yes  Insight and Judgment: No  Insight and Judgment: Poor Judgment, Poor Insight  Present Suicidal Ideation: No  Present Homicidal Ideation: No    EDUCATION:   Learner Progress Toward Treatment Goals: reviewed group plans and strategies for care    Method:group therapy, medication compliance, individualized assessments and care planning    Outcome: needs reinforcement    PATIENT GOALS: to be discussed with patient within 72 hours    PLAN/TREATMENT RECOMMENDATIONS:     continue group therapy , medications compliance, goal setting, individualized assessments and care, continue to monitor pt on unit      SHORT-TERM GOALS:   Time frame for Short-Term Goals: 5-7 days    LONG-TERM GOALS:  Time frame for Long-Term Goals: 6 months  Members Present in Team Meeting: See Signature Sheet    Shahida Villegas, [address]

## 2019-04-23 NOTE — BH NOTE
Patient given tobacco quitline number 28689946815 at this time, refusing to call at this time, states \" I just dont want to quit now\"- patient given information as to the dangers of long term tobacco use. Continue to reinforce the importance of tobacco cessation.

## 2019-04-23 NOTE — CARE COORDINATION
patient to refrain from any outbursts. SHREYA used education to described steps needed for hospital transfer including paperwork and physician review. SHREYA then called April Carrier at [phone] and rep states that she will sent packet of paperwork to be completed and returned for review, she also stated physician needs to be available to physician to physician discussion on patient condition. SHREYA updated staff and supervisor on SW awaiting paperwork, to be completed and faxed tomorrow.

## 2019-04-23 NOTE — ED NOTES
Spoke with Mlog and Jefe Porras at [address] [place] [address]. Patient is accepted under Dr. Bernardo Mayer. Awaiting bed assignment and transport.       Lazarus Arias RN  04/23/19 0006

## 2019-04-23 NOTE — PROGRESS NOTES
Clinical Pharmacist Medication Reconciliation    List of medications patient is currently taking is complete. Source of medications in list is [address] ([phone]) & Pablo Gregorio ([phone]). The following medications were added to the home medication list:  - Docusate 100mg PO BID PRN  - Isosorbide monohydrate ER 15mg PO Daily    The following medications were changed on the home medication list:  - Albuterol 2.5mg/3mL 1 vial Q4H was changed to Albuterol 2.5mg/3mL 1 vial Q4H PRN    The following medications were removed from the home medication list:  - Acetaminophen 325mg PO Q6H PRN  - Nitroglycerin 0.4mg SL Q5min PRN  - Gabapentin 900mg PO TID PRN (No fills at the [place] or reported to [facility] in past 2 years)  - Senna/Docusate 8.6mg/50mg 1 tab BID PRN    Please let me know if you have any questions about this encounter. Thanks! Mukesh MONCADA,  4/23/2019  10:40 AM

## 2019-04-24 PROCEDURE — 6360000002 HC RX W HCPCS: Performed by: NURSE PRACTITIONER

## 2019-04-24 PROCEDURE — 99232 SBSQ HOSP IP/OBS MODERATE 35: CPT | Performed by: PSYCHIATRY & NEUROLOGY

## 2019-04-24 PROCEDURE — 1240000000 HC EMOTIONAL WELLNESS R&B

## 2019-04-24 PROCEDURE — 6370000000 HC RX 637 (ALT 250 FOR IP): Performed by: PSYCHIATRY & NEUROLOGY

## 2019-04-24 RX ORDER — IBUPROFEN 400 MG/1
400 TABLET ORAL EVERY 8 HOURS PRN
Status: DISCONTINUED | OUTPATIENT
Start: 2019-04-24 | End: 2019-04-25 | Stop reason: HOSPADM

## 2019-04-24 RX ADMIN — SPIRONOLACTONE 25 MG: 25 TABLET ORAL at 08:31

## 2019-04-24 RX ADMIN — PANTOPRAZOLE SODIUM 40 MG: 40 TABLET, DELAYED RELEASE ORAL at 08:31

## 2019-04-24 RX ADMIN — ATENOLOL 25 MG: 25 TABLET ORAL at 08:32

## 2019-04-24 RX ADMIN — VITAMIN D, TAB 1000IU (100/BT) 2000 UNITS: 25 TAB at 08:34

## 2019-04-24 RX ADMIN — ATORVASTATIN CALCIUM 20 MG: 20 TABLET, FILM COATED ORAL at 08:31

## 2019-04-24 RX ADMIN — RANOLAZINE 500 MG: 500 TABLET, FILM COATED, EXTENDED RELEASE ORAL at 08:33

## 2019-04-24 RX ADMIN — RANOLAZINE 500 MG: 500 TABLET, FILM COATED, EXTENDED RELEASE ORAL at 21:57

## 2019-04-24 RX ADMIN — SENNOSIDES AND DOCUSATE SODIUM 1 TABLET: 8.6; 5 TABLET ORAL at 22:00

## 2019-04-24 RX ADMIN — IBUPROFEN 400 MG: 400 TABLET, FILM COATED ORAL at 14:22

## 2019-04-24 RX ADMIN — FUROSEMIDE 40 MG: 40 TABLET ORAL at 08:33

## 2019-04-24 RX ADMIN — FLUTICASONE PROPIONATE 2 SPRAY: 50 SPRAY, METERED NASAL at 08:32

## 2019-04-24 RX ADMIN — TIOTROPIUM BROMIDE 18 MCG: 18 CAPSULE ORAL; RESPIRATORY (INHALATION) at 08:31

## 2019-04-24 RX ADMIN — HYDROXYZINE HYDROCHLORIDE 50 MG: 50 TABLET, FILM COATED ORAL at 21:57

## 2019-04-24 RX ADMIN — MIRTAZAPINE 30 MG: 30 TABLET, FILM COATED ORAL at 21:57

## 2019-04-24 RX ADMIN — ALBUTEROL SULFATE 2.5 MG: 2.5 SOLUTION RESPIRATORY (INHALATION) at 20:17

## 2019-04-24 RX ADMIN — ASPIRIN 81 MG: 81 TABLET, COATED ORAL at 08:31

## 2019-04-24 RX ADMIN — RANOLAZINE 500 MG: 500 TABLET, FILM COATED, EXTENDED RELEASE ORAL at 15:36

## 2019-04-24 ASSESSMENT — PAIN SCALES - GENERAL: PAINLEVEL_OUTOF10: 3

## 2019-04-24 NOTE — GROUP NOTE
Group Therapy Note    Date: April 24    Group Start Time: 1330  Group End Time: 9601  Group Topic: Recovery    [facility]    EVELYN Garza, NARINDERW        Group Therapy Note    Attendees: 11         Patient's Goal:  Increase socialization and understanding of recovery process. Notes:  Patient is making progress as evidenced by participating in group discussion about identifying self defeating behaviors and poor coping skills. Pt shared how developing healthy coping skills and expanding outside of his comfort zone is vital for long term sobriety and overall wellness. Status After Intervention:  Improved    Participation Level:  Active Listener and Interactive    Participation Quality: Appropriate, Attentive, Sharing and Supportive      Speech:  normal      Thought Process/Content: Logical      Affective Functioning: Congruent      Mood: stable      Level of consciousness:  Alert, Oriented x4 and Attentive      Response to Learning: Able to verbalize current knowledge/experience, Able to verbalize/acknowledge new learning, Able to retain information, Capable of insight, Able to change behavior and Progressing to goal      Endings: None Reported    Modes of Intervention: Education, Support, Socialization and Problem-solving      Discipline Responsible: /Counselor      Signature:  EVELYN Garza, BABAR

## 2019-04-24 NOTE — BH NOTE
Department of Psychiatry  Attending Progress Note  Chief Complaint: Post traumatic stress disorder     SUBJECTIVE:    Patient said that he still feeling depressed. He has suicidal thoughts. He has homicidal thoughts. He complains of lack of energy and motivation. He complains of agitation. Is complaining of traumatic reliving to some extent. He is unable to sleep. He is waiting for [facility]. to accept him so that he can go for inpatient treatment there.   OBJECTIVE    Physical  /83   Pulse 57   Temp 98.5 °F (36.9 °C)   Resp 14   Ht 5' 9\" (1.753 m)   Wt 210 lb (95.3 kg)   BMI 31.01 kg/m²      Mental Status Evaluation:  Orientation: alertness: alert   Mood:. angry, anxious and constricted      Affect:  constricted      Appearance:  age appropriate   Activity:  Psychomotor Agitation   Gait/Posture: Normal   Speech:  delayed, increased latency of response, normal pitch and normal volume   Thought Process:  circumstantial   Thought Content:  Denies delusions and hallucinations   Sensorium:  person, place and time/date   Cognition:  grossly intact   Memory: intact   Insight:  limited   Judgment: limited   Suicidal Ideations: passive   Homicidal Ideations: Positive for homicidal ideation      Medication Side Effects: absent       Attention Span attention span appeared shorter than expected for age     Medications  Current Facility-Administered Medications   Medication Dose Route Frequency Provider Last Rate Last Dose    hydrOXYzine (ATARAX) tablet 50 mg  50 mg Oral Nightly PRN Thanh WIGGINS MD   50 mg at 04/23/19 2221    traZODone (DESYREL) tablet 50 mg  50 mg Oral Nightly PRN Kelechi Salgado MD        benztropine mesylate (COGENTIN) injection 2 mg  2 mg Intramuscular BID PRN Kelechi Salgado MD        magnesium hydroxide (MILK OF MAGNESIA) 400 MG/5ML suspension 30 mL  30 mL Oral Daily PRN Thanh WIGGINS MD   30 mL at 04/23/19 1447    acetaminophen (TYLENOL) tablet 325 mg  325 mg Oral Q6H PRN Feng WIGGINS MD        albuterol sulfate  (90 Base) MCG/ACT inhaler 2 puff  2 puff Inhalation Q4H PRN Chas Henao MD        aspirin EC tablet 81 mg  81 mg Oral Daily Feng WIGGINS MD   81 mg at 04/24/19 0831    atenolol (TENORMIN) tablet 25 mg  25 mg Oral Daily Feng WIGGINS MD   25 mg at 04/24/19 7757    atorvastatin (LIPITOR) tablet 20 mg  20 mg Oral Daily Feng WIGGINS MD   20 mg at 04/24/19 0831    vitamin D (CHOLECALCIFEROL) tablet 2,000 Units  2,000 Units Oral Daily Castro Bryan MD   2,000 Units at 04/24/19 0834    fluticasone (FLONASE) 50 MCG/ACT nasal spray 2 spray  2 spray Nasal Daily Castro Bryan MD   2 spray at 04/24/19 6186    furosemide (LASIX) tablet 40 mg  40 mg Oral Daily Castro Bryan MD   40 mg at 04/24/19 4614    gabapentin (NEURONTIN) capsule 900 mg  900 mg Oral TID Chas Henao MD        mirtazapine (REMERON) tablet 30 mg  30 mg Oral Nightly Feng WIGGINS MD   30 mg at 04/23/19 2221    nitroGLYCERIN (NITROSTAT) SL tablet 0.4 mg  0.4 mg Sublingual Q5 Min PRN Feng WIGGINS MD        pantoprazole (PROTONIX) tablet 40 mg  40 mg Oral QAM AC Feng WIGGINS MD   40 mg at 04/24/19 0831    ranolazine (RANEXA) extended release tablet 500 mg  500 mg Oral TID Castro Bryan MD   500 mg at 04/24/19 5006    sennosides-docusate sodium (SENOKOT-S) 8.6-50 MG tablet 1 tablet  1 tablet Oral Nightly PRN Chas Henao MD        spironolactone (ALDACTONE) tablet 25 mg  25 mg Oral Daily Feng WIGGINS MD   25 mg at 04/24/19 0831    traZODone (DESYREL) tablet 100 mg  100 mg Oral Nightly Feng Shannan WIGGINS MD        tiotropium (SPIRIVA) inhalation capsule 18 mcg  18 mcg Inhalation Daily Castro Bryan MD   18 mcg at 04/24/19 0831    albuterol (PROVENTIL) nebulizer solution 2.5 mg  2.5 mg Nebulization 4x Daily PRN DIANNA Rose

## 2019-04-24 NOTE — GROUP NOTE
Group Therapy Note    Date: April 24    Group Start Time: 1100  Group End Time: 5367  Group Topic: Psychotherapy    CZ BHBABAR Pineda        Group Therapy Note    Attendees: 7/11         Patient's Goal:  Provide patient with safe environment to express self and process emotions         Status After Intervention:  Unchanged    Participation Level:  Active Listener    Participation Quality: Appropriate      Speech:  normal      Thought Process/Content: Logical      Affective Functioning: Congruent      Mood: anxious      Level of consciousness:  Alert and Oriented x4      Response to Learning: Able to verbalize current knowledge/experience      Endings: None Reported    Modes of Intervention: Education and Support      Discipline Responsible: /Counselor      Signature:  BABAR Ham

## 2019-04-24 NOTE — PLAN OF CARE
Problem: Anger Management/Homicidal Ideation:  Goal: Ability to verbalize frustrations and anger appropriately will improve  Description  Ability to verbalize frustrations and anger appropriately will improve  4/23/2019 2126 by Ivelisse Dykes RN  Outcome: Ongoing  Note:   Patient denies suicidal and homicidal ideations. Patient denies hallucinations. Patient state he has been eating fine but has been \"forcing himself\". Patient states he has not been sleeping well. 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 Patient has been social on the unit, has been trying to get a hold of his son without success for an \"urgent matter\". Problem: Anger Management/Homicidal Ideation:  Goal: Absence of homicidal ideation  Description  Absence of homicidal ideation  Outcome: Ongoing  Note:   Patient denies suicidal and homicidal ideations. Patient denies hallucinations. Patient state he has been eating fine but has been \"forcing himself\". Patient states he has not been sleeping well. 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 Patient has been social on the unit, has been trying to get a hold of his son without success for an \"urgent matter\". Problem: Falls - Risk of:  Goal: Will remain free from falls  Description  Will remain free from falls  4/23/2019 2126 by Ivelisse Dykes RN  Outcome: Ongoing  Note:   Patient denies suicidal and homicidal ideations. Patient denies hallucinations. Patient state he has been eating fine but has been \"forcing himself\". Patient states he has not been sleeping well.  Patient states he has been having stomach problems but always has them since he was in the [employer], states the base camp

## 2019-04-24 NOTE — CONSULTS
[address] [facility] [[email or web address]    Date:   4/23/2019  Patient name:  Emely Wilkes  Date of admission:  4/23/2019  5:52 AM  MRN:   [id number]  YOB: 1955      Cc CHF     HPI   Pt admitted with sympts of sob         HPI   1) Location/Symptom pedal edema   2) Timing/Onset: 3 days   3) Context/Setting: known COPD CHF CAD   4) Quality: uses lasix no leg pain   5) Duration: continuous   6) Modifying Factors: as above   7) Severity: moderate   Pt with hx of HTN DM CAD COPD with recent echo EF 45 % diastolic dysfunction stress noted from 2/19     Past Medical History:   Diagnosis Date    Arthritis     WENDY KNEE    Bronchitis with chronic airway obstruction (HCC)     CAD (coronary artery disease)     [facility]     CTS (carpal tunnel syndrome)     RIGHT    Diabetes mellitus ([facility]     Diabetic neuropathy associated with diabetes mellitus due to underlying condition ([address]     Exposure to toxic chemical     [employer], [employer], [employer] base-toxic water exposure    GERD (gastroesophageal reflux disease)     H/O cardiovascular stress test 07/08/2016    Abnormal.  Moderate perfusion defect of mild to moderate intensity in the inferolateral,inferior and inferoapical regions during stress imaging. Ef 45%. with regional wall motion abnormalities.  H/O echocardiogram 2/17/16    EF >60%. LV wall thickness is mildly increased. Inferoseptal wall is abnormal in its motion not unusual status post open heart surgery. Normal aortic valve structure with ild aortic regurgitation.  Hip pain, left     Hx of cardiac cath 07/08/2016    LMCA: Normal 0% stenosis. LAD: Chronic occlusion 100%. Lesion on Mid LAD: 100% stenosis. LCx: single stenosis. Lesion on Mid CX: 80% stenosis. RCA: Lesion on Mid RCA: 70% stenosis.     Hyperlipidemia     Hypertension     MI (myocardial infarction) ([facility] 2015    Pneumonia     PTSD (post-traumatic stress disorder)     S/P CABG (coronary artery bypass graft) 10/23/15    Sleep apnea     Ventricular fibrillation ([facility] 10/18/2015    x 2 DURING HEART ATTACK     Family History   Adopted: Yes   Family history unknown: Yes      reports that he quit smoking about 12 years ago. His smoking use included cigarettes. He has a 19.00 pack-year smoking history. He quit smokeless tobacco use about 18 years ago. He reports that he has current or past drug history. Drug: Marijuana. Frequency: 1.00 time per week. He reports that he does not drink alcohol. Past Medical History:   Diagnosis Date    Arthritis     WENDY KNEE    Bronchitis with chronic airway obstruction (HCC)     CAD (coronary artery disease)     CHF (congestive heart failure) (HCC)     CTS (carpal tunnel syndrome)     RIGHT    Diabetes mellitus ([facility]     Diabetic neuropathy associated with diabetes mellitus due to underlying condition ([facility]     Exposure to toxic chemical     [employer], [employer], [employer] base-toxic water exposure    GERD (gastroesophageal reflux disease)     H/O cardiovascular stress test 07/08/2016    Abnormal.  Moderate perfusion defect of mild to moderate intensity in the inferolateral,inferior and inferoapical regions during stress imaging. Ef 45%. with regional wall motion abnormalities.  H/O echocardiogram 2/17/16    EF >60%. LV wall thickness is mildly increased. Inferoseptal wall is abnormal in its motion not unusual status post open heart surgery. Normal aortic valve structure with ild aortic regurgitation.  Hip pain, left     Hx of cardiac cath 07/08/2016    LMCA: Normal 0% stenosis. LAD: Chronic occlusion 100%. Lesion on Mid LAD: 100% stenosis. LCx: single stenosis. Lesion on Mid CX: 80% stenosis. RCA: Lesion on Mid RCA: 70% stenosis.     Hyperlipidemia     Hypertension     MI (myocardial infarction) ([facility] 2015    Pneumonia     PTSD (post-traumatic stress disorder)     S/P CABG (coronary artery bypass graft) 10/23/15    Sleep apnea     Ventricular fibrillation ([facility] 10/18/2015    x 2 DURING HEART ATTACK     Allergies   Allergen Reactions    Codeine Other (See Comments)    Hydrocodone     Pcn [Penicillins] Other (See Comments)    Sulfa Antibiotics Other (See Comments)       Review of systems    Constitutional: Negative for fever and fatigue. Respiratory: Negative for cough and shortness of breath. Cardiovascular: Negative for chest pain, palpitations and +  leg swelling. Gastrointestinal: Negative for abdominal pain, diarrhea and blood in stool. Endocrine: Negative for cold intolerance. Genitourinary: Negative for dysuria and frequency. Musculoskeletal: Negative for back pain and arthralgias. Skin: Negative for color change and rash. Neurological: Negative for dizziness and headaches. Psychiatric/Behavioral: Negative for confusion. ROS  Physical exam     /73   Pulse 54   Temp 97.8 °F (36.6 °C) (Oral)   Resp 14   Ht 5' 9\" (1.753 m)   Wt 210 lb (95.3 kg)   BMI 31.01 kg/m²      General appearance: well nourished in no distress  Eyes:  SAMM   Head: AT/NC  ENT NAD   Neck: Trachea midline; supple  Lungs: normal effort, clear to auscultation. CVS sinus with no murmurs. Vasc No JVP, no carotid bruit  Abdomen: Soft, non-tender; no masses or HSM,   Extremities: no edema; no digital cyanosis or clubbing. Musculoskeletal NO joint effusion or synovitis  Skin: No rash or ulcers  Neurologic: Cranial nerves II-XII grossly intact; no motor deficit. Psych: Appropriate affect, alert and oriented to person, place and time  NO lymphadenopathy            Relevant Labs/Imaging     CBC:   Recent Labs     04/22/19 2020   WBC 6.9   HGB 14.9        BMP:    Recent Labs     04/21/19  1410 04/22/19 2020    137   K 4.3 4.6   CL 97* 97*   CO2 27 26   BUN 22 20   CREATININE 1.17 1.23*   GLUCOSE 96 98     S.  Calcium:  Recent Labs     04/22/19 2020   CALCIUM 9.1 Glycosylated hemoglobin A1C: No results for input(s): LABA1C in the last 72 hours. BNP:No results for input(s): BNP in the last 72 hours. Assessment / Plan      Patient Active Problem List   Diagnosis    ASHD (arteriosclerotic heart disease)    PTSD (post-traumatic stress disorder)    Precordial pain    History of coronary artery bypass surgery    Obstructive apnea    Hypertension    Hyperlipidemia    [facility]    Vitamin D deficiency disease    Unstable angina ([facility]    Post traumatic stress disorder     A/P  Pt with hx of HTN DM CAD COPD with recent echo EF 45 % diastolic dysfunction stress noted from 2/19     Stop NSAID   If needed use tramadol   Cont SA lipitor lasix ranexa atenolol         MD MAURA Salvador[address], [address] [place] [address].    Phone [phone]   Fax: [phone]  Answering Service: [phone]

## 2019-04-24 NOTE — PLAN OF CARE
Problem: Anger Management/Homicidal Ideation:  Goal: Ability to verbalize frustrations and anger appropriately will improve  Description  Ability to verbalize frustrations and anger appropriately will improve  Outcome: Ongoing   No angry outburst or frustrations noted this shift 15 minute visual safety checks con times per unit protocol  Problem: Anger Management/Homicidal Ideation:  Goal: Absence of homicidal ideation  Description  Absence of homicidal ideation  Outcome: Ongoing  Denies homicidal ideations to this writer  Problem: Falls - Risk of:  Goal: Absence of physical injury  Description  Absence of physical injury  Outcome: Ongoing   Remains free of harm this shift

## 2019-04-24 NOTE — GROUP NOTE
Group Therapy Note    Date: April 24    Group Start Time: 0900  Group End Time: 0930  Group Topic: Community Meeting    [facility]    [address]           Patient's short term goal for today:  Work on getting transferred to [facility].        Status After Intervention:  Improved    Participation Level:  Active Listener and Interactive    Participation Quality: Appropriate, Attentive and Sharing      Speech:  normal      Thought Process/Content: Logical      Affective Functioning: Congruent      Level of consciousness:  Alert and Attentive      Response to Learning: Able to verbalize current knowledge/experience, Able to verbalize/acknowledge new learning, Able to retain information, Capable of insight and Progressing to goal      Endings: None Reported    Modes of Intervention: Education, Support, Socialization, Exploration, Clarifying, Problem-solving, Confrontation, Limit-setting and Reality-testing      Discipline Responsible: Psychoeducational Specialist      Signature:  [address]

## 2019-04-24 NOTE — PLAN OF CARE
Maintains good appetite and adl's takes medications as prescribed  interacts with staff appropriately  social with select peers on the unit   is encouraged to attend unit programming to learn new coping skills  safety maintained with 15 minute visual safety checks per unit protocol

## 2019-04-24 NOTE — CARE COORDINATION
SHREYA spoke with Champ Mcfarland from [address] this date to advise that transfer paperwork to be completed did not arrive via fax. SHREYA informed that SW at [facility] completed patient paperwork and faxed when patient was in ED 4/22/2019 but was unaware why patient wasn't sent to [place] from [facility]. Champ Mcfarland states that [place] needs \"interfacilty transfer form completed\" and signed by MD for consideration for transfer. SHREYA updated RN supervisor, patient. SHREYA relayed to DIAMANTE Ji as MD has left hospital and is not available over [facility]. Transfer to be discussed and directed by MD tomorrow, SHREYA gathered supportive documentation required by [place] in preparation if MD orders transfer and ARCELIA Wade, [facility] has bed. Patient updated, voiced understanding and has no questions.

## 2019-04-25 VITALS
HEART RATE: 62 BPM | SYSTOLIC BLOOD PRESSURE: 139 MMHG | DIASTOLIC BLOOD PRESSURE: 89 MMHG | BODY MASS INDEX: 31.1 KG/M2 | TEMPERATURE: 98.5 F | HEIGHT: 69 IN | WEIGHT: 210 LBS | RESPIRATION RATE: 14 BRPM

## 2019-04-25 PROCEDURE — 99232 SBSQ HOSP IP/OBS MODERATE 35: CPT | Performed by: PSYCHIATRY & NEUROLOGY

## 2019-04-25 PROCEDURE — 6370000000 HC RX 637 (ALT 250 FOR IP): Performed by: PSYCHIATRY & NEUROLOGY

## 2019-04-25 PROCEDURE — 99999 PR OFFICE/OUTPT VISIT,PROCEDURE ONLY: CPT | Performed by: PSYCHIATRY & NEUROLOGY

## 2019-04-25 RX ORDER — SENNA AND DOCUSATE SODIUM 50; 8.6 MG/1; MG/1
1 TABLET, FILM COATED ORAL NIGHTLY PRN
Qty: 20 TABLET | Refills: 0 | Status: SHIPPED | OUTPATIENT
Start: 2019-04-25 | End: 2019-05-11 | Stop reason: ALTCHOICE

## 2019-04-25 RX ADMIN — RANOLAZINE 500 MG: 500 TABLET, FILM COATED, EXTENDED RELEASE ORAL at 08:20

## 2019-04-25 RX ADMIN — PANTOPRAZOLE SODIUM 40 MG: 40 TABLET, DELAYED RELEASE ORAL at 08:17

## 2019-04-25 RX ADMIN — ATORVASTATIN CALCIUM 20 MG: 20 TABLET, FILM COATED ORAL at 08:17

## 2019-04-25 RX ADMIN — TIOTROPIUM BROMIDE 18 MCG: 18 CAPSULE ORAL; RESPIRATORY (INHALATION) at 08:17

## 2019-04-25 RX ADMIN — ATENOLOL 25 MG: 25 TABLET ORAL at 08:19

## 2019-04-25 RX ADMIN — ASPIRIN 81 MG: 81 TABLET, COATED ORAL at 08:17

## 2019-04-25 RX ADMIN — VITAMIN D, TAB 1000IU (100/BT) 2000 UNITS: 25 TAB at 08:20

## 2019-04-25 RX ADMIN — FLUTICASONE PROPIONATE 2 SPRAY: 50 SPRAY, METERED NASAL at 08:17

## 2019-04-25 RX ADMIN — SPIRONOLACTONE 25 MG: 25 TABLET ORAL at 08:17

## 2019-04-25 RX ADMIN — FUROSEMIDE 40 MG: 40 TABLET ORAL at 08:20

## 2019-04-25 RX ADMIN — IBUPROFEN 400 MG: 400 TABLET, FILM COATED ORAL at 11:45

## 2019-04-25 ASSESSMENT — PAIN DESCRIPTION - LOCATION: LOCATION: HEAD;HIP

## 2019-04-25 ASSESSMENT — PAIN SCALES - GENERAL
PAINLEVEL_OUTOF10: 7
PAINLEVEL_OUTOF10: 8

## 2019-04-25 ASSESSMENT — PAIN DESCRIPTION - ORIENTATION: ORIENTATION: LEFT

## 2019-04-25 NOTE — GROUP NOTE
Group Therapy Note    Date: April 25    Group Start Time: 0900  Group End Time: 9923  Group Topic: Community Meeting    LA Marti       Patient's Goal: Pt will demonstrate increased interpersonal interaction     Notes:      Status After Intervention:  Improved    Participation Level:  Active Listener and Interactive    Participation Quality: Appropriate, Attentive and Sharing      Speech:  normal      Thought Process/Content: Logical      Affective Functioning: Congruent      Mood: euthymic      Level of consciousness:  Alert, Oriented x4 and Attentive      Response to Learning: Able to verbalize current knowledge/experience, Able to verbalize/acknowledge new learning, Able to retain information and Progressing to goal      Endings: None Reported    Modes of Intervention: Education, Support, Socialization, Exploration, Clarifying and Problem-solving      Discipline Responsible: Psychoeducational Specialist      Signature:  Nadine Marti

## 2019-04-25 NOTE — GROUP NOTE
Group Therapy Note    Date: April 25    Group Start Time: 1330  Group End Time: 5686  Group Topic: Cognitive Skills    LA Rg, [place]       Patient's Goal:  Patient will demonstrate increased interpersonal interaction and skills. Notes:  Patient attended group and actively participated in task at hand. Patient conversed appropriately with peers and Ray Harpin. Status After Intervention:  Improved    Participation Level:  Active Listener and Interactive    Participation Quality: Appropriate, Attentive and Sharing      Speech:  normal      Thought Process/Content: Logical      Affective Functioning: Congruent      Level of consciousness:  Alert and Attentive      Response to Learning: Able to verbalize current knowledge/experience, Able to verbalize/acknowledge new learning, Able to retain information, Capable of insight and Progressing to goal      Endings: None Reported    Modes of Intervention: Support, Socialization, Exploration, Clarifying, Problem-solving, Activity, Confrontation, Limit-setting and Reality-testing      Discipline Responsible: Psychoeducational Specialist      Signature:  Jacey Anne

## 2019-04-25 NOTE — PLAN OF CARE
Problem: Anger Management/Homicidal Ideation:  Goal: Ability to verbalize frustrations and anger appropriately will improve  Description  Ability to verbalize frustrations and anger appropriately will improve  4/24/2019 2342 by Maxi Vaca RN  Outcome: Ongoing  Note:   Patient denies suicidal and homicidal ideations. Patient reports mood has improved he is just ready to get to the [facility] for treatment. Patient social in day room watching TV. Patient takes medications as prescribed. Patient verbalizes positive reasons to control anger. 4/24/2019 1214 by Ankush Chaudhary LPN  Outcome: Ongoing     Problem: Anger Management/Homicidal Ideation:  Goal: Ability to verbalize frustrations and anger appropriately will improve  Description  Ability to verbalize frustrations and anger appropriately will improve  4/24/2019 2342 by Maxi Vaca RN  Outcome: Ongoing  Note:   Patient denies suicidal and homicidal ideations. Patient reports mood has improved he is just ready to get to the [facility] for treatment. Patient social in day room watching TV. Patient takes medications as prescribed. Patient verbalizes positive reasons to control anger. 4/24/2019 1214 by Ankush Chaudhary LPN  Outcome: Ongoing  Goal: Absence of homicidal ideation  Description  Absence of homicidal ideation  4/24/2019 2342 by Maxi Vaca RN  Outcome: Ongoing  4/24/2019 1214 by Ankush Chaudhary LPN  Outcome: Ongoing     Problem: Falls - Risk of:  Goal: Will remain free from falls  Description  Will remain free from falls  Outcome: Ongoing  Goal: Absence of physical injury  Description  Absence of physical injury  4/24/2019 2342 by Maxi Vaca RN  Outcome: Ongoing  Note:   Patient denies suicidal ideation and hallucinations. Patient safety maintained q15 minute checks.    4/24/2019 1214 by Ankush Chaudhary LPN  Outcome: Ongoing     Problem: Pain:  Goal: Pain level will decrease  Description  Pain level will decrease  Outcome: Ongoing  Goal: Control of acute pain  Description  Control of acute pain  Outcome: Ongoing  Goal: Control of chronic pain  Description  Control of chronic pain  Outcome: Ongoing

## 2019-04-25 NOTE — CARE COORDINATION
Name: Janiya Hernandez    : 1955    Discharge Date: 19    Primary Auth/Cert #: [id number]    Discharged to [place] [id number]  Discharge Medications:      Medication List      CHANGE how you take these medications    sennosides-docusate sodium 8.6-50 MG tablet  Commonly known as:  SENOKOT-S  Take 1 tablet by mouth nightly as needed (constipation)  What changed:    · when to take this  · reasons to take this  Notes to patient:  For constipation        CONTINUE taking these medications    * albuterol sulfate  (90 Base) MCG/ACT inhaler  Commonly known as:  PROAIR HFA  Inhale 2 puffs into the lungs every 4 hours as needed for Wheezing or Shortness of Breath  Notes to patient: For wheezing or shortness of breath     * albuterol (2.5 MG/3ML) 0.083% nebulizer solution  Commonly known as:  PROVENTIL  Notes to patient: For wheezing or shortness of breath     aspirin 81 MG tablet  Notes to patient: To prevent blood clots     atenolol 25 MG tablet  Commonly known as:  TENORMIN  Notes to patient:  To decrease blood pressure     atorvastatin 20 MG tablet  Commonly known as:  LIPITOR  TAKE 1 TABLET BY MOUTH EVERY DAY  Notes to patient:  To decrease cholesterol     fluticasone 50 MCG/ACT nasal spray  Commonly known as:  FLONASE  Notes to patient:  To decrease allergy symptoms     ibuprofen 600 MG tablet  Commonly known as:  ADVIL;MOTRIN  Notes to patient:  For pain      isosorbide mononitrate 30 MG extended release tablet  Commonly known as:  IMDUR  Notes to patient:  Decrease chest pain     mirtazapine 45 MG tablet  Commonly known as:  REMERON  Notes to patient:   To help sleep/ antidepressant     pantoprazole 40 MG tablet  Commonly known as:  PROTONIX  Take 1 tablet by mouth every morning (before breakfast)  Notes to patient:  To decrease stomach acid     RANEXA 500 MG extended release tablet  Generic drug:  ranolazine  Notes to patient:  Heart medication to treat chronic chest pain     tiotropium 2.5 MCG/ACT Aers inhaler  Commonly known as:  SPIRIVA RESPIMAT  Inhale 2 puffs into the lungs daily  Notes to patient: To help breathing     traZODone 100 MG tablet  Commonly known as:  DESYREL  Notes to patient:  For sleep     vitamin D 2000 units Caps capsule  Notes to patient:  vitamin         * This list has 2 medication(s) that are the same as other medications prescribed for you. Read the directions carefully, and ask your doctor or other care provider to review them with you. STOP taking these medications    docusate sodium 100 MG capsule  Commonly known as:  COLACE     furosemide 40 MG tablet  Commonly known as:  LASIX     ondansetron 4 MG disintegrating tablet  Commonly known as:  ZOFRAN ODT     sildenafil 20 MG tablet  Commonly known as:  REVATIO     spironolactone 25 MG tablet  Commonly known as:  ALDACTONE     terazosin 2 MG capsule  Commonly known as:  HYTRIN           Where to Get Your Medications      These medications were sent to [address] [place] [address], [facility] [address] ,   [facility] [address]    Phone:  [phone]   · sennosides-docusate sodium 8.6-50 MG tablet      Pharmacy Instructions:        at [facility]. [address]. Follow Up Appointment: Dasha Caruso DO  [address] [address] [phone]          [facility]  [address] Steven Jaime, [address] [place] [address]  [phone]    You are being transferred to [facility] in [facility] per your request for inpatient mental health treatment, when you are discharged, your followup appointments will be scheduled by [facility] staff.

## 2019-04-25 NOTE — PROGRESS NOTES
Patient doesn't smoke. Nurse continue to reinforce the dangers of long term tobacco use and why tobacco cessation is important to patient.

## 2019-04-25 NOTE — GROUP NOTE
Group Therapy Note    Date: April 25    Group Start Time: 1000  Group End Time: 7961  Group Topic: Recreational    [facility] LALITHA Vidal [place], [place]           Patient's Goal:  Patient will demonstrate increased interpersonal interaction and skills. Notes:  Patient attended group and actively participated in task at hand. Patient conversed appropriately with peers and Christen Innocent. Status After Intervention:  Improved    Participation Level:  Active Listener and Interactive    Participation Quality: Appropriate, Attentive, Sharing and Supportive      Speech:  normal      Thought Process/Content: Logical      Affective Functioning: Congruent      Level of consciousness:  Alert and Attentive      Response to Learning: Able to verbalize current knowledge/experience, Able to verbalize/acknowledge new learning, Able to retain information, Capable of insight and Progressing to goal      Endings: None Reported    Modes of Intervention: Education, Socialization, Exploration, Problem-solving, Limit-setting and Reality-testing      Discipline Responsible: Psychoeducational Specialist      Signature:  Adalberto Quevedo

## 2019-04-25 NOTE — PLAN OF CARE
Patient has been pleasant,cooperative also medication compliant. PT denies any thoughts of suicidal or homicidal ideation. Patient remain safe while on the unit. 100 % meal intake. 15 MIN safety checks maintained.

## 2019-04-25 NOTE — GROUP NOTE
Group Therapy Note    Date: April 24    Group Start Time: 2030  Group End Time: 2100  Group Topic: Wrap-Up    [facility] JOAO Jacobson LPN        Group Therapy Note    Attendees: 14/19       Patient attend evening wrap up group. Signature:   Laura Jacobson LPN

## 2019-04-25 NOTE — BH NOTE
Cameron Rivera MD   30 mL at 04/23/19 1447    acetaminophen (TYLENOL) tablet 325 mg  325 mg Oral Q6H PRN Feng WIGGINS MD        albuterol sulfate  (90 Base) MCG/ACT inhaler 2 puff  2 puff Inhalation Q4H PRN Cameron Rivera MD        aspirin EC tablet 81 mg  81 mg Oral Daily Feng WIGGINS MD   81 mg at 04/25/19 0817    atenolol (TENORMIN) tablet 25 mg  25 mg Oral Daily Feng WIGGINS MD   25 mg at 04/25/19 0819    atorvastatin (LIPITOR) tablet 20 mg  20 mg Oral Daily Army Carmelo WIGGINS MD   20 mg at 04/25/19 2316    vitamin D (CHOLECALCIFEROL) tablet 2,000 Units  2,000 Units Oral Daily [facility] Carmelo WIGGINS MD   2,000 Units at 04/25/19 0820    fluticasone (FLONASE) 50 MCG/ACT nasal spray 2 spray  2 spray Nasal Daily Army Carmelo WIGGINS MD   2 spray at 04/25/19 0817    furosemide (LASIX) tablet 40 mg  40 mg Oral Daily Feng WIGGINS MD   40 mg at 04/25/19 0820    mirtazapine (REMERON) tablet 30 mg  30 mg Oral Nightly Feng WIGGINS MD   30 mg at 04/24/19 2157    nitroGLYCERIN (NITROSTAT) SL tablet 0.4 mg  0.4 mg Sublingual Q5 Min PRN Feng WIGGINS MD        pantoprazole (PROTONIX) tablet 40 mg  40 mg Oral QAM AC Feng WIGGINS MD   40 mg at 04/25/19 0817    ranolazine (RANEXA) extended release tablet 500 mg  500 mg Oral TID Yvon Imshruthi WIGGINS MD   500 mg at 04/25/19 0820    sennosides-docusate sodium (SENOKOT-S) 8.6-50 MG tablet 1 tablet  1 tablet Oral Nightly PRN Cameron Rivera MD   1 tablet at 04/24/19 2200    spironolactone (ALDACTONE) tablet 25 mg  25 mg Oral Daily Feng WIGGINS MD   25 mg at 04/25/19 0817    traZODone (DESYREL) tablet 100 mg  100 mg Oral Nightly Feng WIGGINS MD        tiotropium (SPIRIVA) inhalation capsule 18 mcg  18 mcg Inhalation Daily [facility] Carmelo WIGGINS MD   18 mcg at 04/25/19 0817    albuterol (PROVENTIL) nebulizer solution 2.5 mg  2.5 mg Nebulization 4x Daily PRN Olga Lidia Vang,

## 2019-04-25 NOTE — DISCHARGE SUMMARY
Psychiatry: Discharge Note     patient is a 77-year-old male . He was admitted for suicidal and homicidal thoughts. He said that he doesn't have any specific plan to hurt himself or hurt anybody. He said when he was being screened emergency department he could not find a place in [facility] in [facility]. So in the interim he was sent to [facility].  He feels depressed and sad. He complains of agitation and anxiety. He complains of suicidal thoughts. He denies any plans. He also describes traumatic reliving from the posttraumatic stress disorder he had in the past.  He denies any health delusions. He has several medical problems such as coronary artery disease arthritis COPD asthma emphysema. He is allergic to codeine and hydrocodone. Course during the hospital stay after getting admitted because he was restarted on medication. [facility] in [facility] who accepted and were willing to admit him. Patient agreed to go there and the agreed for a voluntary admission into the hospital and is willing to sign himself therefore his stabilization. Mental status examination the time of discharge:    Patient is cooperative. Affect adequate eye contact. His psychomotor activity decreased. His speech is within normal limits. He has adequate rapport. His mood subjectively sad. Object really appears to be depressed and has appropriate affect. Thought processes within normal limits. Thought content predominantly of depression and lack of energy and motivation. He has traumatic reliving experience and difficulty in continuing that maintaining his sleep. He denies any suicidal or homicidal thoughts or plans. He is agreeable to be transported in an ambulance to be a hospital in [facility] and he is willing to sign a consent for his treatment and further management there. So he is being sent there. He denies any homicidal thoughts or plans. He denies any hallucinations or delusions. He is of average intelligence. He is oriented to time place and person. Memory for recent and remote and immediate events are within normal limits. He has poor attention and concentration. His insight and judgment are impaired. Abstraction is fair.   Recent Results (from the past 72 hour(s))   Urine Drug Screen    Collection Time: 04/22/19  8:15 PM   Result Value Ref Range    Amphetamine Screen, Ur NEGATIVE NEGATIVE    Barbiturate Screen, Ur NEGATIVE NEGATIVE    Benzodiazepine Screen, Urine NEGATIVE NEGATIVE    Cocaine Metabolite, Urine NEGATIVE NEGATIVE    Methadone Screen, Urine NEGATIVE NEGATIVE    Opiates, Urine NEGATIVE NEGATIVE    Phencyclidine, Urine NEGATIVE NEGATIVE    Propoxyphene, Urine NEGATIVE NEGATIVE    Cannabinoid Scrn, Ur POSITIVE (A) NEGATIVE    Oxycodone Screen, Ur NEGATIVE NEGATIVE    Methamphetamine, Urine NEGATIVE NEGATIVE    Tricyclic Antidepressants, Urine NEGATIVE NEGATIVE    MDMA, Urine NOT REPORTED NEGATIVE    Buprenorphine Urine NOT REPORTED NEGATIVE    Test Information NOT REPORTED    Urinalysis    Collection Time: 04/22/19  8:15 PM   Result Value Ref Range    Color, UA YELLOW YELLOW    Turbidity UA CLEAR CLEAR    Glucose, Ur NEGATIVE NEGATIVE    Bilirubin Urine NEGATIVE NEGATIVE    Ketones, Urine NEGATIVE NEGATIVE    Specific Gravity, UA 1.010 1.005 - 1.030    Urine Hgb NEGATIVE NEGATIVE    pH, UA 7.0 5.0 - 8.0    Protein, UA NEGATIVE NEGATIVE    Urobilinogen, Urine Normal Normal    Nitrite, Urine NEGATIVE NEGATIVE    Leukocyte Esterase, Urine NEGATIVE NEGATIVE    Urinalysis Comments         Comprehensive Metabolic Panel    Collection Time: 04/22/19  8:20 PM   Result Value Ref Range    Glucose 98 70 - 99 mg/dL    BUN 20 8 - 23 mg/dL    CREATININE 1.23 (H) 0.70 - 1.20 mg/dL    Bun/Cre Ratio 16 9 - 20    Calcium 9.1 8.6 - 10.4 mg/dL    Sodium 137 135 - 144 mmol/L    Potassium 4.6 3.7 - 5.3 mmol/L Chloride 97 (L) 98 - 107 mmol/L    CO2 26 20 - 31 mmol/L    Anion Gap 14 9 - 17 mmol/L    Alkaline Phosphatase 97 40 - 129 U/L    ALT 16 5 - 41 U/L    AST 15 <40 U/L    Total Bilirubin 0.53 0.30 - 1.20 mg/dL    Total Protein 7.7 6.4 - 8.3 g/dL    Alb 4.8 3.5 - 5.2 g/dL    Albumin/Globulin Ratio NOT REPORTED 1.0 - 2.5    GFR Non- 59 (L) >60 mL/min    GFR African American >60 >60 mL/min    GFR Comment          GFR Staging NOT REPORTED    CBC Auto Differential    Collection Time: 04/22/19  8:20 PM   Result Value Ref Range    WBC 6.9 3.5 - 11.0 k/uL    RBC 4.83 4.5 - 5.9 m/uL    Hemoglobin 14.9 13.5 - 17.5 g/dL    Hematocrit 44.7 41 - 53 %    MCV 92.6 80 - 100 fL    MCH 30.9 26 - 34 pg    MCHC 33.4 31 - 37 g/dL    RDW 14.6 12.1 - 15.2 %    Platelets 021 623 - 433 k/uL    MPV NOT REPORTED 6.0 - 12.0 fL    NRBC Automated NOT REPORTED per 100 WBC    Differential Type YES     Seg Neutrophils 68 39 - 75 %    Lymphocytes 21 13 - 44 %    Monocytes 7 5 - 9 %    Eosinophils % 4 0 - 5 %    Basophils 0 0 - 2 %    Immature Granulocytes NOT REPORTED 0 %    Segs Absolute 4.70 2.1 - 6.5 k/uL    Absolute Lymph # 1.40 1.0 - 4.8 k/uL    Absolute Mono # 0.50 0.0 - 1.0 k/uL    Absolute Eos # 0.30 0.0 - 0.4 k/uL    Basophils # 0.00 0.0 - 0.2 k/uL    Absolute Immature Granulocyte NOT REPORTED 0.00 - 0.30 k/uL    WBC Morphology NOT REPORTED     RBC Morphology NOT REPORTED     Platelet Estimate NOT REPORTED    Troponin    Collection Time: 04/22/19  8:20 PM   Result Value Ref Range    Troponin, High Sensitivity NOT REPORTED 0 - 22 ng/L    Troponin T <0.03 <0.03 ng/mL    Troponin Interp         TSH without Reflex    Collection Time: 04/22/19  8:20 PM   Result Value Ref Range    TSH 2.84 0.30 - 5.00 mIU/L   Ethanol    Collection Time: 04/22/19  8:20 PM   Result Value Ref Range    Ethanol <10 <10 mg/dL    Ethanol percent <0.010 %   EKG 12 Lead    Collection Time: 04/22/19  8:21 PM   Result Value Ref Range    Ventricular Rate 55 BPM    Atrial Rate 55 BPM    P-R Interval 206 ms    QRS Duration 84 ms    Q-T Interval 440 ms    QTc Calculation (Bazett) 420 ms    P Axis 15 degrees    R Axis -18 degrees    T Axis -6 degrees     Discharge Medications:   Current Discharge Medication List           Details   sennosides-docusate sodium (SENOKOT-S) 8.6-50 MG tablet Take 1 tablet by mouth nightly as needed (constipation)  Qty: 20 tablet, Refills: 0              Details   albuterol (PROVENTIL) (2.5 MG/3ML) 0.083% nebulizer solution Take 2.5 mg by nebulization every 4 hours as needed for Wheezing or Shortness of Breath      mirtazapine (REMERON) 45 MG tablet Take 45 mg by mouth nightly      isosorbide mononitrate (IMDUR) 30 MG extended release tablet Take 15 mg by mouth daily      traZODone (DESYREL) 100 MG tablet Take 100 mg by mouth nightly as needed for Sleep      albuterol sulfate HFA (PROAIR HFA) 108 (90 Base) MCG/ACT inhaler Inhale 2 puffs into the lungs every 4 hours as needed for Wheezing or Shortness of Breath  Qty: 1 Inhaler, Refills: 2      pantoprazole (PROTONIX) 40 MG tablet Take 1 tablet by mouth every morning (before breakfast)  Qty: 30 tablet, Refills: 5      RANEXA 500 MG extended release tablet Take 500 mg by mouth 3 times daily  Refills: 1      atorvastatin (LIPITOR) 20 MG tablet TAKE 1 TABLET BY MOUTH EVERY DAY  Qty: 30 tablet, Refills: 5      atenolol (TENORMIN) 25 MG tablet Take 25 mg by mouth daily      Cholecalciferol (VITAMIN D) 2000 UNITS CAPS capsule Take 1 capsule by mouth daily      aspirin 81 MG tablet Take 81 mg by mouth daily      fluticasone (FLONASE) 50 MCG/ACT nasal spray 1 spray by Nasal route daily       tiotropium (SPIRIVA RESPIMAT) 2.5 MCG/ACT AERS inhaler Inhale 2 puffs into the lungs daily  Qty: 1 Inhaler, Refills: 5      ibuprofen (ADVIL;MOTRIN) 600 MG tablet Take 600 mg by mouth every 8 hours as needed for Pain         plan:    Patient is discharged from [facility] and is going to be admitted and but [facility] in [facility].   He is being transported with the help of [employer] and support of transportation  Electronically signed by Naun Guevara MD on 4/25/2019 at 12:13 PM

## 2019-05-11 ENCOUNTER — HOSPITAL ENCOUNTER (OUTPATIENT)
Age: 64
Setting detail: OBSERVATION
Discharge: HOME | End: 2019-05-12
Attending: EMERGENCY MEDICINE | Admitting: INTERNAL MEDICINE
Payer: COMMERCIAL

## 2019-05-11 ENCOUNTER — APPOINTMENT (OUTPATIENT)
Dept: GENERAL RADIOLOGY | Age: 64
End: 2019-05-11
Payer: COMMERCIAL

## 2019-05-11 DIAGNOSIS — L03.116 CELLULITIS OF LEFT LOWER EXTREMITY: Primary | ICD-10-CM

## 2019-05-11 DIAGNOSIS — R60.0 BILATERAL LOWER EXTREMITY EDEMA: ICD-10-CM

## 2019-05-11 PROBLEM — L03.115 BILATERAL LOWER LEG CELLULITIS: Status: ACTIVE | Noted: 2019-05-11

## 2019-05-11 LAB
ABSOLUTE EOS #: 0.5 K/UL (ref 0–0.4)
ABSOLUTE IMMATURE GRANULOCYTE: ABNORMAL K/UL (ref 0–0.3)
ABSOLUTE LYMPH #: 1.1 K/UL (ref 1–4.8)
ABSOLUTE MONO #: 0.4 K/UL (ref 0–1)
ALBUMIN SERPL-MCNC: 4.7 G/DL (ref 3.5–5.2)
ALBUMIN/GLOBULIN RATIO: ABNORMAL (ref 1–2.5)
ALP BLD-CCNC: 92 U/L (ref 40–129)
ALT SERPL-CCNC: 19 U/L (ref 5–41)
ANION GAP SERPL CALCULATED.3IONS-SCNC: 14 MMOL/L (ref 9–17)
AST SERPL-CCNC: 15 U/L
BASOPHILS # BLD: 0 % (ref 0–2)
BASOPHILS ABSOLUTE: 0 K/UL (ref 0–0.2)
BILIRUB SERPL-MCNC: 0.32 MG/DL (ref 0.3–1.2)
BILIRUBIN URINE: NEGATIVE
BNP INTERPRETATION: NORMAL
BUN BLDV-MCNC: 10 MG/DL (ref 8–23)
BUN/CREAT BLD: 9 (ref 9–20)
CALCIUM SERPL-MCNC: 9.1 MG/DL (ref 8.6–10.4)
CHLORIDE BLD-SCNC: 97 MMOL/L (ref 98–107)
CO2: 26 MMOL/L (ref 20–31)
COLOR: ABNORMAL
COMMENT UA: ABNORMAL
CREAT SERPL-MCNC: 1.06 MG/DL (ref 0.7–1.2)
D-DIMER QUANTITATIVE: 0.44 MG/L FEU (ref 0–0.5)
DIFFERENTIAL TYPE: YES
EOSINOPHILS RELATIVE PERCENT: 8 % (ref 0–5)
GFR AFRICAN AMERICAN: >60 ML/MIN
GFR NON-AFRICAN AMERICAN: >60 ML/MIN
GFR SERPL CREATININE-BSD FRML MDRD: ABNORMAL ML/MIN/{1.73_M2}
GFR SERPL CREATININE-BSD FRML MDRD: ABNORMAL ML/MIN/{1.73_M2}
GLUCOSE BLD-MCNC: 127 MG/DL (ref 70–99)
GLUCOSE URINE: NEGATIVE
HCT VFR BLD CALC: 42.1 % (ref 41–53)
HEMOGLOBIN: 14 G/DL (ref 13.5–17.5)
IMMATURE GRANULOCYTES: ABNORMAL %
INR BLD: 1
KETONES, URINE: NEGATIVE
LACTIC ACID: 1.4 MMOL/L (ref 0.5–2.2)
LEUKOCYTE ESTERASE, URINE: NEGATIVE
LYMPHOCYTES # BLD: 19 % (ref 13–44)
MCH RBC QN AUTO: 31.4 PG (ref 26–34)
MCHC RBC AUTO-ENTMCNC: 33.4 G/DL (ref 31–37)
MCV RBC AUTO: 94.1 FL (ref 80–100)
MONOCYTES # BLD: 6 % (ref 5–9)
NITRITE, URINE: NEGATIVE
NRBC AUTOMATED: ABNORMAL PER 100 WBC
PARTIAL THROMBOPLASTIN TIME: 26 SEC (ref 21–33)
PDW BLD-RTO: 14.9 % (ref 12.1–15.2)
PH UA: 6 (ref 5–8)
PLATELET # BLD: 217 K/UL (ref 140–450)
PLATELET ESTIMATE: ABNORMAL
PMV BLD AUTO: ABNORMAL FL (ref 6–12)
POTASSIUM SERPL-SCNC: 4.5 MMOL/L (ref 3.7–5.3)
PRO-BNP: 181 PG/ML
PROTEIN UA: NEGATIVE
PROTHROMBIN TIME: 9.3 SEC (ref 9–11.6)
RBC # BLD: 4.47 M/UL (ref 4.5–5.9)
RBC # BLD: ABNORMAL 10*6/UL
SEG NEUTROPHILS: 67 % (ref 39–75)
SEGMENTED NEUTROPHILS ABSOLUTE COUNT: 4 K/UL (ref 2.1–6.5)
SODIUM BLD-SCNC: 137 MMOL/L (ref 135–144)
SPECIFIC GRAVITY UA: 1 (ref 1–1.03)
TOTAL PROTEIN: 7.3 G/DL (ref 6.4–8.3)
TROPONIN INTERP: NORMAL
TROPONIN INTERP: NORMAL
TROPONIN T: <0.03 NG/ML
TROPONIN T: <0.03 NG/ML
TROPONIN, HIGH SENSITIVITY: NORMAL NG/L (ref 0–22)
TROPONIN, HIGH SENSITIVITY: NORMAL NG/L (ref 0–22)
TURBIDITY: CLEAR
URINE HGB: NEGATIVE
UROBILINOGEN, URINE: NORMAL
WBC # BLD: 5.9 K/UL (ref 3.5–11)
WBC # BLD: ABNORMAL 10*3/UL

## 2019-05-11 PROCEDURE — 80053 COMPREHEN METABOLIC PANEL: CPT

## 2019-05-11 PROCEDURE — 96376 TX/PRO/DX INJ SAME DRUG ADON: CPT

## 2019-05-11 PROCEDURE — 85379 FIBRIN DEGRADATION QUANT: CPT

## 2019-05-11 PROCEDURE — 71046 X-RAY EXAM CHEST 2 VIEWS: CPT

## 2019-05-11 PROCEDURE — 6360000002 HC RX W HCPCS

## 2019-05-11 PROCEDURE — 36415 COLL VENOUS BLD VENIPUNCTURE: CPT

## 2019-05-11 PROCEDURE — 96366 THER/PROPH/DIAG IV INF ADDON: CPT

## 2019-05-11 PROCEDURE — 6370000000 HC RX 637 (ALT 250 FOR IP): Performed by: INTERNAL MEDICINE

## 2019-05-11 PROCEDURE — 6360000002 HC RX W HCPCS: Performed by: EMERGENCY MEDICINE

## 2019-05-11 PROCEDURE — 99285 EMERGENCY DEPT VISIT HI MDM: CPT

## 2019-05-11 PROCEDURE — 96372 THER/PROPH/DIAG INJ SC/IM: CPT

## 2019-05-11 PROCEDURE — G0378 HOSPITAL OBSERVATION PER HR: HCPCS

## 2019-05-11 PROCEDURE — 87040 BLOOD CULTURE FOR BACTERIA: CPT

## 2019-05-11 PROCEDURE — 93005 ELECTROCARDIOGRAM TRACING: CPT

## 2019-05-11 PROCEDURE — 96365 THER/PROPH/DIAG IV INF INIT: CPT

## 2019-05-11 PROCEDURE — 2580000003 HC RX 258: Performed by: INTERNAL MEDICINE

## 2019-05-11 PROCEDURE — 94761 N-INVAS EAR/PLS OXIMETRY MLT: CPT

## 2019-05-11 PROCEDURE — 83880 ASSAY OF NATRIURETIC PEPTIDE: CPT

## 2019-05-11 PROCEDURE — 81003 URINALYSIS AUTO W/O SCOPE: CPT

## 2019-05-11 PROCEDURE — 84484 ASSAY OF TROPONIN QUANT: CPT

## 2019-05-11 PROCEDURE — 85730 THROMBOPLASTIN TIME PARTIAL: CPT

## 2019-05-11 PROCEDURE — 96375 TX/PRO/DX INJ NEW DRUG ADDON: CPT

## 2019-05-11 PROCEDURE — 94664 DEMO&/EVAL PT USE INHALER: CPT

## 2019-05-11 PROCEDURE — 2500000003 HC RX 250 WO HCPCS: Performed by: EMERGENCY MEDICINE

## 2019-05-11 PROCEDURE — 6360000002 HC RX W HCPCS: Performed by: INTERNAL MEDICINE

## 2019-05-11 PROCEDURE — 83605 ASSAY OF LACTIC ACID: CPT

## 2019-05-11 PROCEDURE — 85025 COMPLETE CBC W/AUTO DIFF WBC: CPT

## 2019-05-11 PROCEDURE — 85610 PROTHROMBIN TIME: CPT

## 2019-05-11 PROCEDURE — 96367 TX/PROPH/DG ADDL SEQ IV INF: CPT

## 2019-05-11 RX ORDER — ATENOLOL 25 MG/1
25 TABLET ORAL DAILY
Status: DISCONTINUED | OUTPATIENT
Start: 2019-05-12 | End: 2019-05-12 | Stop reason: HOSPADM

## 2019-05-11 RX ORDER — RANOLAZINE 500 MG/1
1000 TABLET, EXTENDED RELEASE ORAL 2 TIMES DAILY
Status: DISCONTINUED | OUTPATIENT
Start: 2019-05-11 | End: 2019-05-12 | Stop reason: HOSPADM

## 2019-05-11 RX ORDER — FUROSEMIDE 10 MG/ML
40 INJECTION INTRAMUSCULAR; INTRAVENOUS ONCE
Status: COMPLETED | OUTPATIENT
Start: 2019-05-11 | End: 2019-05-11

## 2019-05-11 RX ORDER — ATORVASTATIN CALCIUM 20 MG/1
20 TABLET, FILM COATED ORAL DAILY
Status: DISCONTINUED | OUTPATIENT
Start: 2019-05-12 | End: 2019-05-12 | Stop reason: HOSPADM

## 2019-05-11 RX ORDER — SPIRONOLACTONE 25 MG/1
25 TABLET ORAL DAILY
COMMUNITY

## 2019-05-11 RX ORDER — PREGABALIN 75 MG/1
75 CAPSULE ORAL 2 TIMES DAILY
Status: DISCONTINUED | OUTPATIENT
Start: 2019-05-11 | End: 2019-05-12

## 2019-05-11 RX ORDER — FUROSEMIDE 10 MG/ML
20 INJECTION INTRAMUSCULAR; INTRAVENOUS 2 TIMES DAILY
Status: DISCONTINUED | OUTPATIENT
Start: 2019-05-11 | End: 2019-05-12 | Stop reason: HOSPADM

## 2019-05-11 RX ORDER — FLUTICASONE PROPIONATE 50 MCG
1 SPRAY, SUSPENSION (ML) NASAL DAILY
Status: DISCONTINUED | OUTPATIENT
Start: 2019-05-12 | End: 2019-05-12 | Stop reason: HOSPADM

## 2019-05-11 RX ORDER — PANTOPRAZOLE SODIUM 40 MG/1
40 TABLET, DELAYED RELEASE ORAL
Status: DISCONTINUED | OUTPATIENT
Start: 2019-05-12 | End: 2019-05-12 | Stop reason: HOSPADM

## 2019-05-11 RX ORDER — PREGABALIN 75 MG/1
75 CAPSULE ORAL 2 TIMES DAILY
Status: ON HOLD | COMMUNITY
End: 2019-05-12 | Stop reason: HOSPADM

## 2019-05-11 RX ORDER — LANOLIN ALCOHOL/MO/W.PET/CERES
3 CREAM (GRAM) TOPICAL NIGHTLY PRN
COMMUNITY
End: 2021-06-01

## 2019-05-11 RX ORDER — CLINDAMYCIN PHOSPHATE 300 MG/50ML
300 INJECTION INTRAVENOUS ONCE
Status: COMPLETED | OUTPATIENT
Start: 2019-05-11 | End: 2019-05-11

## 2019-05-11 RX ORDER — POLYETHYLENE GLYCOL 3350 17 G/17G
17 POWDER, FOR SOLUTION ORAL DAILY
COMMUNITY
End: 2021-06-01

## 2019-05-11 RX ORDER — TRAZODONE HYDROCHLORIDE 50 MG/1
100 TABLET ORAL NIGHTLY PRN
Status: DISCONTINUED | OUTPATIENT
Start: 2019-05-11 | End: 2019-05-12 | Stop reason: HOSPADM

## 2019-05-11 RX ORDER — MIRTAZAPINE 15 MG/1
45 TABLET, FILM COATED ORAL NIGHTLY
Status: DISCONTINUED | OUTPATIENT
Start: 2019-05-11 | End: 2019-05-12 | Stop reason: HOSPADM

## 2019-05-11 RX ORDER — POLYETHYLENE GLYCOL 3350 17 G/17G
17 POWDER, FOR SOLUTION ORAL DAILY
Status: DISCONTINUED | OUTPATIENT
Start: 2019-05-11 | End: 2019-05-12 | Stop reason: HOSPADM

## 2019-05-11 RX ORDER — ASPIRIN 81 MG/1
81 TABLET, CHEWABLE ORAL DAILY
Status: DISCONTINUED | OUTPATIENT
Start: 2019-05-12 | End: 2019-05-12 | Stop reason: HOSPADM

## 2019-05-11 RX ORDER — SPIRONOLACTONE 25 MG/1
25 TABLET ORAL DAILY
Status: DISCONTINUED | OUTPATIENT
Start: 2019-05-12 | End: 2019-05-12 | Stop reason: HOSPADM

## 2019-05-11 RX ORDER — SODIUM CHLORIDE 0.9 % (FLUSH) 0.9 %
10 SYRINGE (ML) INJECTION PRN
Status: DISCONTINUED | OUTPATIENT
Start: 2019-05-11 | End: 2019-05-12 | Stop reason: HOSPADM

## 2019-05-11 RX ORDER — ONDANSETRON 2 MG/ML
4 INJECTION INTRAMUSCULAR; INTRAVENOUS EVERY 6 HOURS PRN
Status: DISCONTINUED | OUTPATIENT
Start: 2019-05-11 | End: 2019-05-12 | Stop reason: HOSPADM

## 2019-05-11 RX ORDER — ACETAMINOPHEN 325 MG/1
650 TABLET ORAL EVERY 4 HOURS PRN
Status: DISCONTINUED | OUTPATIENT
Start: 2019-05-11 | End: 2019-05-12 | Stop reason: HOSPADM

## 2019-05-11 RX ORDER — LANOLIN ALCOHOL/MO/W.PET/CERES
3 CREAM (GRAM) TOPICAL NIGHTLY PRN
Status: DISCONTINUED | OUTPATIENT
Start: 2019-05-11 | End: 2019-05-12 | Stop reason: HOSPADM

## 2019-05-11 RX ORDER — ALBUTEROL SULFATE 2.5 MG/3ML
2.5 SOLUTION RESPIRATORY (INHALATION) EVERY 4 HOURS PRN
Status: DISCONTINUED | OUTPATIENT
Start: 2019-05-11 | End: 2019-05-12 | Stop reason: HOSPADM

## 2019-05-11 RX ORDER — VANCOMYCIN HYDROCHLORIDE 750 MG/15ML
INJECTION, POWDER, LYOPHILIZED, FOR SOLUTION INTRAVENOUS
Status: COMPLETED
Start: 2019-05-11 | End: 2019-05-11

## 2019-05-11 RX ORDER — SODIUM CHLORIDE 0.9 % (FLUSH) 0.9 %
10 SYRINGE (ML) INJECTION EVERY 12 HOURS SCHEDULED
Status: DISCONTINUED | OUTPATIENT
Start: 2019-05-11 | End: 2019-05-12 | Stop reason: HOSPADM

## 2019-05-11 RX ADMIN — PREGABALIN 75 MG: 75 CAPSULE ORAL at 21:22

## 2019-05-11 RX ADMIN — VANCOMYCIN HYDROCHLORIDE 1500 MG: 1 INJECTION, POWDER, LYOPHILIZED, FOR SOLUTION INTRAVENOUS at 16:08

## 2019-05-11 RX ADMIN — MELATONIN TAB 3 MG 3 MG: 3 TAB at 21:53

## 2019-05-11 RX ADMIN — MIRTAZAPINE 45 MG: 15 TABLET, FILM COATED ORAL at 21:22

## 2019-05-11 RX ADMIN — Medication 10 ML: at 21:25

## 2019-05-11 RX ADMIN — POLYETHYLENE GLYCOL 3350 17 G: 17 POWDER, FOR SOLUTION ORAL at 15:21

## 2019-05-11 RX ADMIN — VANCOMYCIN HYDROCHLORIDE 1500 MG: 750 INJECTION, POWDER, LYOPHILIZED, FOR SOLUTION INTRAVENOUS at 16:00

## 2019-05-11 RX ADMIN — CEFTRIAXONE SODIUM 1 G: 1 INJECTION, POWDER, FOR SOLUTION INTRAMUSCULAR; INTRAVENOUS at 15:21

## 2019-05-11 RX ADMIN — ACETAMINOPHEN 650 MG: 325 TABLET, FILM COATED ORAL at 21:53

## 2019-05-11 RX ADMIN — ENOXAPARIN SODIUM 100 MG: 100 INJECTION SUBCUTANEOUS at 13:51

## 2019-05-11 RX ADMIN — FUROSEMIDE 40 MG: 10 INJECTION, SOLUTION INTRAMUSCULAR; INTRAVENOUS at 13:13

## 2019-05-11 RX ADMIN — RANOLAZINE 1000 MG: 500 TABLET, FILM COATED, EXTENDED RELEASE ORAL at 21:22

## 2019-05-11 RX ADMIN — ALBUTEROL SULFATE 2.5 MG: 2.5 SOLUTION RESPIRATORY (INHALATION) at 21:31

## 2019-05-11 RX ADMIN — CLINDAMYCIN PHOSPHATE 300 MG: 6 INJECTION, SOLUTION INTRAVENOUS at 13:51

## 2019-05-11 RX ADMIN — FUROSEMIDE 20 MG: 10 INJECTION, SOLUTION INTRAMUSCULAR; INTRAVENOUS at 21:22

## 2019-05-11 ASSESSMENT — PAIN DESCRIPTION - DESCRIPTORS: DESCRIPTORS: TIGHTNESS;ACHING

## 2019-05-11 ASSESSMENT — PAIN DESCRIPTION - ORIENTATION
ORIENTATION: LEFT;RIGHT
ORIENTATION: LEFT
ORIENTATION: LEFT
ORIENTATION_2: LEFT

## 2019-05-11 ASSESSMENT — PAIN DESCRIPTION - ONSET
ONSET_2: ON-GOING
ONSET: ON-GOING

## 2019-05-11 ASSESSMENT — PAIN DESCRIPTION - FREQUENCY: FREQUENCY: CONTINUOUS

## 2019-05-11 ASSESSMENT — PAIN DESCRIPTION - LOCATION
LOCATION: LEG
LOCATION: LEG
LOCATION_2: CHEST
LOCATION: FOOT

## 2019-05-11 ASSESSMENT — PAIN DESCRIPTION - PROGRESSION
CLINICAL_PROGRESSION_2: GRADUALLY WORSENING
CLINICAL_PROGRESSION: GRADUALLY WORSENING

## 2019-05-11 ASSESSMENT — PAIN DESCRIPTION - INTENSITY: RATING_2: 9

## 2019-05-11 ASSESSMENT — PAIN SCALES - GENERAL
PAINLEVEL_OUTOF10: 7
PAINLEVEL_OUTOF10: 4
PAINLEVEL_OUTOF10: 8

## 2019-05-11 ASSESSMENT — PAIN DESCRIPTION - DURATION: DURATION_2: CONTINUOUS

## 2019-05-11 ASSESSMENT — PAIN DESCRIPTION - PAIN TYPE
TYPE: ACUTE PAIN
TYPE_2: ACUTE PAIN

## 2019-05-11 NOTE — PROGRESS NOTES
Pt reports lt leg pain 7-8/10. Refuses prn Tylenol at this time. Will continue to moniotr. Repositioned, leg elevated. Call light in reach. Electronically signed by José Pruett RN on 5/11/2019 at 4:16 PM

## 2019-05-11 NOTE — PROGRESS NOTES
Pharmacy Note  Vancomycin Consult    Rolf Hanks is a 61 y.o. male started on Vancomycin for Leg Cellulitis; consult received from Dr. Cony Brown Back to manage therapy. Also receiving the following antibiotics: Rocephin 1 gm daily, Clindamycin 300 mg x1 dose    Patient Active Problem List   Diagnosis    ASHD (arteriosclerotic heart disease)    PTSD (post-traumatic stress disorder)    Precordial pain    History of coronary artery bypass surgery    Obstructive apnea    Hypertension    Hyperlipidemia    Chronic obstructive pulmonary disease (HCC)    Vitamin D deficiency disease    Unstable angina (HCC)    Post traumatic stress disorder    CHF (congestive heart failure), NYHA class II, chronic, diastolic (HCC)    Bilateral lower leg cellulitis       Allergies:  Codeine; Hydrocodone; Pcn [penicillins]; and Sulfa antibiotics     Temp max: 98.8 F    Recent Labs     05/11/19  1213   BUN 10       Recent Labs     05/11/19  1213   CREATININE 1.06       Recent Labs     05/11/19  1213   WBC 5.9         Intake/Output Summary (Last 24 hours) at 5/11/2019 1459  Last data filed at 5/11/2019 1410  Gross per 24 hour   Intake --   Output 1000 ml   Net -1000 ml       Culture Date      Source                       Results  5/11/19                Blood                          sent    Ht Readings from Last 1 Encounters:   05/11/19 5' 9\" (1.753 m)        Wt Readings from Last 1 Encounters:   05/11/19 238 lb 3.2 oz (108 kg)         Body mass index is 35.18 kg/m². Estimated Creatinine Clearance: 86 mL/min (based on SCr of 1.06 mg/dL). Goal Trough Level: 10 - 20  mcg/mL    Assessment/Plan:  Will initiate vancomycin 1500 mg IV every 12 hours. Timing of trough level will be determined based on culture results, renal function, and clinical response. Thank you for the consult. Will continue to follow.   Radha Kaufman, Pharm D.  5/11/2019  3:01 PM

## 2019-05-11 NOTE — PROGRESS NOTES
Pt arrives via cart and transfers self to bed. Pt A&O x 4 pain lt lower leg. Noted red and swollen lt lower leg. Oriented to room and call light.  Electronically signed by Aide Esquivel RN on 5/11/2019 at 2:34 PM

## 2019-05-12 VITALS
OXYGEN SATURATION: 94 % | HEART RATE: 53 BPM | HEIGHT: 69 IN | WEIGHT: 238.1 LBS | DIASTOLIC BLOOD PRESSURE: 64 MMHG | RESPIRATION RATE: 18 BRPM | BODY MASS INDEX: 35.27 KG/M2 | SYSTOLIC BLOOD PRESSURE: 122 MMHG | TEMPERATURE: 98.8 F

## 2019-05-12 LAB
ABSOLUTE EOS #: 0.4 K/UL (ref 0–0.4)
ABSOLUTE IMMATURE GRANULOCYTE: ABNORMAL K/UL (ref 0–0.3)
ABSOLUTE LYMPH #: 1.4 K/UL (ref 1–4.8)
ABSOLUTE MONO #: 0.5 K/UL (ref 0–1)
ALBUMIN SERPL-MCNC: 3.7 G/DL (ref 3.5–5.2)
ALBUMIN/GLOBULIN RATIO: ABNORMAL (ref 1–2.5)
ALP BLD-CCNC: 78 U/L (ref 40–129)
ALT SERPL-CCNC: 21 U/L (ref 5–41)
ANION GAP SERPL CALCULATED.3IONS-SCNC: 11 MMOL/L (ref 9–17)
AST SERPL-CCNC: 21 U/L
BASOPHILS # BLD: 0 % (ref 0–2)
BASOPHILS ABSOLUTE: 0 K/UL (ref 0–0.2)
BILIRUB SERPL-MCNC: 0.31 MG/DL (ref 0.3–1.2)
BUN BLDV-MCNC: 14 MG/DL (ref 8–23)
BUN/CREAT BLD: 14 (ref 9–20)
CALCIUM SERPL-MCNC: 8.7 MG/DL (ref 8.6–10.4)
CHLORIDE BLD-SCNC: 97 MMOL/L (ref 98–107)
CO2: 26 MMOL/L (ref 20–31)
CREAT SERPL-MCNC: 1.03 MG/DL (ref 0.7–1.2)
DIFFERENTIAL TYPE: YES
EKG ATRIAL RATE: 57 BPM
EKG P AXIS: 42 DEGREES
EKG P-R INTERVAL: 202 MS
EKG Q-T INTERVAL: 452 MS
EKG QRS DURATION: 86 MS
EKG QTC CALCULATION (BAZETT): 439 MS
EKG R AXIS: 21 DEGREES
EKG T AXIS: 17 DEGREES
EKG VENTRICULAR RATE: 57 BPM
EOSINOPHILS RELATIVE PERCENT: 7 % (ref 0–5)
GFR AFRICAN AMERICAN: >60 ML/MIN
GFR NON-AFRICAN AMERICAN: >60 ML/MIN
GFR SERPL CREATININE-BSD FRML MDRD: ABNORMAL ML/MIN/{1.73_M2}
GFR SERPL CREATININE-BSD FRML MDRD: ABNORMAL ML/MIN/{1.73_M2}
GLUCOSE BLD-MCNC: 105 MG/DL (ref 70–99)
HCT VFR BLD CALC: 41 % (ref 41–53)
HEMOGLOBIN: 13.7 G/DL (ref 13.5–17.5)
IMMATURE GRANULOCYTES: ABNORMAL %
LYMPHOCYTES # BLD: 21 % (ref 13–44)
MCH RBC QN AUTO: 31.1 PG (ref 26–34)
MCHC RBC AUTO-ENTMCNC: 33.3 G/DL (ref 31–37)
MCV RBC AUTO: 93.5 FL (ref 80–100)
MONOCYTES # BLD: 8 % (ref 5–9)
NRBC AUTOMATED: ABNORMAL PER 100 WBC
PDW BLD-RTO: 14.7 % (ref 12.1–15.2)
PLATELET # BLD: 192 K/UL (ref 140–450)
PLATELET ESTIMATE: ABNORMAL
PMV BLD AUTO: ABNORMAL FL (ref 6–12)
POTASSIUM SERPL-SCNC: 4.4 MMOL/L (ref 3.7–5.3)
RBC # BLD: 4.39 M/UL (ref 4.5–5.9)
RBC # BLD: ABNORMAL 10*6/UL
SEG NEUTROPHILS: 64 % (ref 39–75)
SEGMENTED NEUTROPHILS ABSOLUTE COUNT: 4.2 K/UL (ref 2.1–6.5)
SODIUM BLD-SCNC: 134 MMOL/L (ref 135–144)
TOTAL PROTEIN: 6.9 G/DL (ref 6.4–8.3)
TROPONIN INTERP: NORMAL
TROPONIN T: <0.03 NG/ML
TROPONIN, HIGH SENSITIVITY: NORMAL NG/L (ref 0–22)
WBC # BLD: 6.6 K/UL (ref 3.5–11)
WBC # BLD: ABNORMAL 10*3/UL

## 2019-05-12 PROCEDURE — 80053 COMPREHEN METABOLIC PANEL: CPT

## 2019-05-12 PROCEDURE — 2580000003 HC RX 258: Performed by: INTERNAL MEDICINE

## 2019-05-12 PROCEDURE — 6360000002 HC RX W HCPCS: Performed by: INTERNAL MEDICINE

## 2019-05-12 PROCEDURE — G0378 HOSPITAL OBSERVATION PER HR: HCPCS

## 2019-05-12 PROCEDURE — 84484 ASSAY OF TROPONIN QUANT: CPT

## 2019-05-12 PROCEDURE — 96376 TX/PRO/DX INJ SAME DRUG ADON: CPT

## 2019-05-12 PROCEDURE — 94761 N-INVAS EAR/PLS OXIMETRY MLT: CPT

## 2019-05-12 PROCEDURE — 96366 THER/PROPH/DIAG IV INF ADDON: CPT

## 2019-05-12 PROCEDURE — 36415 COLL VENOUS BLD VENIPUNCTURE: CPT

## 2019-05-12 PROCEDURE — 6370000000 HC RX 637 (ALT 250 FOR IP): Performed by: INTERNAL MEDICINE

## 2019-05-12 PROCEDURE — 85025 COMPLETE CBC W/AUTO DIFF WBC: CPT

## 2019-05-12 PROCEDURE — 96372 THER/PROPH/DIAG INJ SC/IM: CPT

## 2019-05-12 RX ORDER — VANCOMYCIN HYDROCHLORIDE 500 MG/10ML
INJECTION, POWDER, LYOPHILIZED, FOR SOLUTION INTRAVENOUS
Status: DISPENSED
Start: 2019-05-12 | End: 2019-05-12

## 2019-05-12 RX ORDER — VANCOMYCIN HYDROCHLORIDE 1 G/20ML
INJECTION, POWDER, LYOPHILIZED, FOR SOLUTION INTRAVENOUS
Status: DISPENSED
Start: 2019-05-12 | End: 2019-05-12

## 2019-05-12 RX ORDER — DOXYCYCLINE HYCLATE 100 MG
100 TABLET ORAL 2 TIMES DAILY
Qty: 14 TABLET | Refills: 0 | Status: ON HOLD | OUTPATIENT
Start: 2019-05-12 | End: 2019-05-31 | Stop reason: HOSPADM

## 2019-05-12 RX ADMIN — POLYETHYLENE GLYCOL 3350 17 G: 17 POWDER, FOR SOLUTION ORAL at 08:33

## 2019-05-12 RX ADMIN — ENOXAPARIN SODIUM 40 MG: 40 INJECTION SUBCUTANEOUS at 08:33

## 2019-05-12 RX ADMIN — PANTOPRAZOLE SODIUM 40 MG: 40 TABLET, DELAYED RELEASE ORAL at 05:20

## 2019-05-12 RX ADMIN — ATENOLOL 25 MG: 25 TABLET ORAL at 08:33

## 2019-05-12 RX ADMIN — ACETAMINOPHEN 650 MG: 325 TABLET, FILM COATED ORAL at 08:32

## 2019-05-12 RX ADMIN — Medication 10 ML: at 15:11

## 2019-05-12 RX ADMIN — VITAMIN D, TAB 1000IU (100/BT) 2000 UNITS: 25 TAB at 08:33

## 2019-05-12 RX ADMIN — RANOLAZINE 1000 MG: 500 TABLET, FILM COATED, EXTENDED RELEASE ORAL at 08:32

## 2019-05-12 RX ADMIN — ASPIRIN 81 MG 81 MG: 81 TABLET ORAL at 08:32

## 2019-05-12 RX ADMIN — CEFTRIAXONE SODIUM 1 G: 1 INJECTION, POWDER, FOR SOLUTION INTRAMUSCULAR; INTRAVENOUS at 15:10

## 2019-05-12 RX ADMIN — SPIRONOLACTONE 25 MG: 25 TABLET, FILM COATED ORAL at 08:32

## 2019-05-12 RX ADMIN — ATORVASTATIN CALCIUM 20 MG: 20 TABLET, FILM COATED ORAL at 08:32

## 2019-05-12 RX ADMIN — FUROSEMIDE 20 MG: 10 INJECTION, SOLUTION INTRAMUSCULAR; INTRAVENOUS at 08:33

## 2019-05-12 RX ADMIN — FLUTICASONE PROPIONATE 1 SPRAY: 50 SPRAY, METERED NASAL at 11:29

## 2019-05-12 RX ADMIN — VANCOMYCIN HYDROCHLORIDE 1500 MG: 1 INJECTION, POWDER, LYOPHILIZED, FOR SOLUTION INTRAVENOUS at 03:08

## 2019-05-12 RX ADMIN — Medication 10 ML: at 08:33

## 2019-05-12 ASSESSMENT — PAIN SCALES - GENERAL
PAINLEVEL_OUTOF10: 4
PAINLEVEL_OUTOF10: 1
PAINLEVEL_OUTOF10: 6

## 2019-05-12 ASSESSMENT — PAIN DESCRIPTION - PAIN TYPE
TYPE: ACUTE PAIN
TYPE: ACUTE PAIN

## 2019-05-12 ASSESSMENT — PAIN DESCRIPTION - ONSET: ONSET: ON-GOING

## 2019-05-12 ASSESSMENT — PAIN DESCRIPTION - DESCRIPTORS
DESCRIPTORS: ACHING
DESCRIPTORS: ACHING

## 2019-05-12 ASSESSMENT — PAIN DESCRIPTION - LOCATION
LOCATION: HIP;LEG
LOCATION: BUTTOCKS;LEG

## 2019-05-12 ASSESSMENT — PAIN DESCRIPTION - FREQUENCY: FREQUENCY: CONTINUOUS

## 2019-05-12 ASSESSMENT — PAIN DESCRIPTION - ORIENTATION
ORIENTATION: LEFT
ORIENTATION: LEFT

## 2019-05-12 ASSESSMENT — PAIN DESCRIPTION - PROGRESSION: CLINICAL_PROGRESSION: GRADUALLY IMPROVING

## 2019-05-12 NOTE — H&P
Hospital Medicine  History and Physical    Patient:  Ana Maria Lloyd  MRN: [id number]    CHIEF COMPLAINT:  Leg swelling    History Obtained From:  patient  PCP: Fransico Lee DO    HISTORY OF PRESENT ILLNESS:   The patient is a 61 y.o. male who presents to ER with increased swelling in both legs over the past week. According to Antonieta Lipscomb, he has chronic swelling in the left leg after vein grafting. He normally uses compression hose daily, that were given by the [place], to keep the swelling down. Antonieta Lipscomb states that 2 weeks ago, the [place] started him on Lyrica. Since that time his weight and lower leg edema have gradually worsened. He started to notice redness in both lower legs over the anterior aspect. He denies fever or chills but the legs did feel tender. With the swelling he had a hard time using his compression stockings and hasn't been using them over the past few days. During this time he did notice it was harder to breath when ambulating. He denies chest pain during this time. Antonieta Lipscomb states he has a chronic cough but feels this was a little worse. No increase in sputum production. Appetite has been good with no nausea. He denies any cold symptoms. No abdominal pain or nausea. Antonieta Lipscomb states he has chronic loose stools. With his worsening symptoms he comes to the ER. In the ER his CMP was normal other than a chloride of 97 and glucose of 127. CBC was normal.  Lactic acid was normal.  D-dimer was negative. Troponin was negative. CXR showed no acute changes. He was afebrile. Oxygen saturation was in the high 90s on room air. The ER doctor did not feel comfortable with the patient going home with the worries cellulitis of the lower legs and CHF and requested admission. Patient admitted as an observation.         Past Medical History:        Diagnosis Date    Arthritis     WENDY KNEE    Bronchitis with chronic airway obstruction (HCC)     CAD (coronary artery disease)     CH[facility]congest[facility])  CTS (carpal tunnel syndrome)     RIGHT    [facility]     Diabetic neuropathy associated with diabetes mellitus due to underlying condition ([facility]     Exposure to toxic chemical     [employer] base-toxic water exposure    GERD (gastroesophageal reflux disease)     H/O cardiovascular stress test 07/08/2016    Abnormal.  Moderate perfusion defect of mild to moderate intensity in the inferolateral,inferior and inferoapical regions during stress imaging. Ef 45%. with regional wall motion abnormalities.  H/O echocardiogram 2/17/16    EF >60%. LV wall thickness is mildly increased. Inferoseptal wall is abnormal in its motion not unusual status post open heart surgery. Normal aortic valve structure with ild aortic regurgitation.  Hip pain, left     Hx of cardiac cath 07/08/2016    LMCA: Normal 0% stenosis. LAD: Chronic occlusion 100%. Lesion on Mid LAD: 100% stenosis. LCx: single stenosis. Lesion on Mid CX: 80% stenosis. RCA: Lesion on Mid RCA: 70% stenosis.     Hyperlipidemia     Hypertension     MI (myocardial infarction) ([facility] 2015    Pneumonia     PTSD (post-traumatic stress disorder)     S/P CABG (coronary artery bypass graft) 10/23/15    Sleep apnea     Ventricular fibrillation ([facility] 10/18/2015    x 2 DURING HEART ATTACK       Past Surgical History:        Procedure Laterality Date    CARDIAC SURGERY      10/21/2015 3 vessel CABG    CARPAL TUNNEL RELEASE      CARPAL TUNNEL RELEASE Right     [facility]  2012    [facility]    COLONOSCOPY  11/27/2017    CORONARY ARTERY BYPASS GRAFT  10/23/15    Dr. [place] Hernandez      left middle finger    FRACTURE SURGERY      left wrist    JOINT REPLACEMENT      right knee    PILONIDAL CYST EXCISION      1974    PILONIDAL CYST EXCISION      [place] COLONOSCOPY W/BIOPSY SINGLE/MULTIPLE N/A 11/27/2017    COLONOSCOPY WITH BIOPSY/ polypectomy UNITS CAPS capsule Take 1 capsule by mouth daily   Yes Historical Provider, MD   aspirin 81 MG tablet Take 81 mg by mouth daily   Yes Historical Provider, MD   fluticasone (FLONASE) 50 MCG/ACT nasal spray 1 spray by Nasal route daily    Yes Historical Provider, MD       Allergies:  Codeine; Hydrocodone; Pcn [penicillins]; and Sulfa antibiotics    Social History:   TOBACCO:   reports that he quit smoking about 12 years ago. His smoking use included cigarettes. He has a 19.00 pack-year smoking history. He quit smokeless tobacco use about 18 years ago. ETOH:   reports that he does not drink alcohol. OCCUPATION:      Family History:       Adopted: Yes   Family history unknown: Yes       REVIEW OF SYSTEMS:  Constitutional:  negative for  fevers, chills and malaise  HEENT:  negative for  ear drainage, earaches, nasal congestion and sore throat  Neck:  No lumps or masses  Cardiac:  positive for  edema, increase SOB with exertion. negative for  palpitations, orthopnea, PND  Respiratory:  negative for  wheezing, hemoptysis, pleuritic pain and positive for cough with some increase in sputum production. Gastrointestinal:  negative for nausea, vomiting, abdominal pain, hemtochezia and positive for chronic loose stools. :  negative for frequency, dysuria and hesitancy  Musculoskeletal:  positive for  arthralgias and neck pain. Neuro:  negative      Physical Exam:    Vitals: /65   Pulse 55   Temp 97.5 °F (36.4 °C) (Oral)   Resp 16   Ht 5' 9\" (1.753 m)   Wt 238 lb 1.6 oz (108 kg)   SpO2 94%   BMI 35.16 kg/m²   General appearance: alert, appears stated age and cooperative  Skin: Skin color, texture, turgor normal. No rashes or lesions  HEENT: Head: Normocephalic, no lesions, without obvious abnormality. Eye: Normal external eye, conjunctiva, lids cornea, MARIYA. Ears: Normal TM's bilaterally. Normal auditory canals and external ears. Non-tender. Nose: Normal external nose, mucus membranes and septum.   Pharynx: Dental Hygiene adequate. Normal buccal mucosa. Normal pharynx. Neck: no adenopathy, no carotid bruit, no JVD and supple, symmetrical, trachea midline  Lungs: clear to auscultation bilaterally  Heart: regular rate and rhythm, S1, S2 normal and II/VI systolic murmur. Abdomen: soft, non-tender; bowel sounds normal; no masses,  no organomegaly and over wt,. Extremities: 2 + edema in the right lower leg with 1 + edema in the left leg. Anterior leg with a patch of erythema that is not warm or tender to palpation. Neurologic: Mental status: Alert, oriented, thought content appropriate    CBC:   Recent Labs     05/11/19 1213 05/12/19 0515   WBC 5.9 6.6   HGB 14.0 13.7    192     BMP:    Recent Labs     05/11/19 1213 05/12/19 0515    134*   K 4.5 4.4   CL 97* 97*   CO2 26 26   BUN 10 14   CREATININE 1.06 1.03   GLUCOSE 127* 105*     Hepatic:   Recent Labs     05/11/19 1213 05/12/19 0515   AST 15 21   ALT 19 21   BILITOT 0.32 0.31   ALKPHOS 92 78     Troponin: No results for input(s): TROPONINI in the last 72 hours. BNP: No results for input(s): BNP in the last 72 hours. Lipids: No results for input(s): CHOL, HDL in the last 72 hours. Invalid input(s): LDLCALCU  ABGs: No results found for: PHART, PO2ART, BHU2MFG  INR:   Recent Labs     05/11/19 1213   INR 1.0     -----------------------------------------------------------------  PA/lat CXR: No acute changes. EKG: Interpreted by me:  Sinus bradycardia with LAE and septal infarct, age undetermined. Assessment and Plan   1. Increased Lower extremity edema secondary to Lyrica. Sonal Gomez states he would prefer to stop the Lyrica rather than have the lower leg edema. Lyrica did help with his chronic neck pain. 2. Stasis dermatitis - I don't feel this is cellulitis. WBC is normal, no fever, and the redness is not warm or tender on exam.  3. Increased dyspnea on exertion - likely from the fluid over load from Lyrica.   Breathing has improved with diuresis. CXR normal, not hypoxic. Exam is normal with no rales. Normal EF on ECHO in February. 4. CAD - no increase in chest pain. Troponin negative. CABG 10/23/15. 5. Sleep apnea -  Uses CPAP. 6. PTSD - follows with Psychiatry. 7. HTN - controlled. 8. COPD - stopped smoking years ago. 9. Hyperlipidemia - well controlled on statin. 10.   GERD - controlled on Protonix. 11.   Vitamin D deficiency - on Vitamin D replacement. Plan:  1. Admitted as as observation. 2.  Started on Cleocin in the ER and given Rocephin / Vancomycin upon admission but I feel he has stasis dermatitis from the swelling and not cellulitis. 3.  Started on IV Lasix with ACE wrapping his legs which have helped. Will stop Lyrica as patient states he would rather have the pain than the lower leg swelling. 4.  With the negative D - dimer and negative Emma's I don't feel he needs to be on full anticoagulation which was given in the ER. Will given Lovenox at the DVT prophylactic dose. 5.  Repeat labs this am - no change from admission with normal WBC. 6.  Continue home medication other than Lyrica. Sandie Segal has had improvement in his swelling with diuresis and ace wrapping. Breathing seems to be back to baseline, no chest pain. He feels the redness in the legs is better than when he came in. WBC remains normal with no fever. Oxygen saturations are in the high 90s. I feel Sandie Segal can be discharged home off Lyrica with follow up with Dr. Reina Carreon. Will treat with an oral antibiotic for a few days to cover for any cellulitis that may be starting. Estimated length of stay 1 day. The beneficiary may reasonably be expected to be discharged or transferred to a hospital within 96 hours after admission.       Patient Active Problem List   Diagnosis Code    ASHD (arteriosclerotic heart disease) I25.10    PTSD (post-traumatic stress disorder) F43.10    Precordial pain R07.2    History of coronary artery bypass surgery Z95.1    Obstructive apnea G47.33    Hypertension I10    Hyperlipidemia E78.5    Chronic obstructive pulmonary disease ([facility]) J44.9    Vitamin D deficiency disease E55.9    Unstable angina ([facility]) I20.0    Post traumatic stress disorder F43.10    CHF (congestive heart failure), NYHA class II, chronic, diastolic ([facility]) U99.68    Bilateral lower leg cellulitis L03.116, L03.115       Vernell Vital MD  Admitting Hospitalist

## 2019-05-12 NOTE — PROGRESS NOTES
This nurse was told by dietary staff that patient states he will be staying here overnight. This nurse goes in to speak to patient. Patient aware of discharge orders from this morning. Pt was told this morning by this nurse that he would be discharged this afternoon after his 3pm antibiotic. Patient told this nurse that he would work on finding transportation. Patient tells this nurse at this time that he will not have a ride and that he does not have anywhere to go. Supervisor notified. 1448- Supervisor in room at this time. Pt aware of planned discharge. Pt states \" I can go to a friends house. \" Pt states he is aware he can not stay the night. Pt requests food to take home. Canned items provided.

## 2019-05-12 NOTE — PROGRESS NOTES
Pt c/o bilateral lower leg pain. Describes as \" soreness\". Medicated, see MAR. No s/s of distress. Denies further needs. Call light in reach. Bed alarm on. Will continue to monitor.

## 2019-05-12 NOTE — DISCHARGE SUMMARY
Lyrica was discontinued. Repeat labs the following day showed his WBC to remain normal.  He remained afebrile. With diuresis and ACE wrapping, his lower leg edema did show some improvement. His oxygen saturation remained in the high 90s. Repeat troponin was negative and he was chest pain free. With his improvement it was felt her could be discharged off Lyrica with an oral antibiotic with follow up with Dr. Samia Gonzalez. Discharge Exam:    Vitals: /65   Pulse 55   Temp 97.5 °F (36.4 °C) (Oral)   Resp 16   Ht 5' 9\" (1.753 m)   Wt 238 lb 1.6 oz (108 kg)   SpO2 94%   BMI 35.16 kg/m²   General appearance: alert, appears stated age and cooperative  Skin: Skin color, texture, turgor normal. No rashes or lesions  HEENT: Head: Normocephalic, no lesions, without obvious abnormality. Eye: Normal external eye, conjunctiva, lids cornea, MARIYA. Ears: Normal TM's bilaterally. Normal auditory canals and external ears. Non-tender. Nose: Normal external nose, mucus membranes and septum. Pharynx: Dental Hygiene adequate. Normal buccal mucosa. Normal pharynx. Neck: no adenopathy, no carotid bruit, no JVD and supple, symmetrical, trachea midline  Lungs: clear to auscultation bilaterally  Heart: regular rate and rhythm, S1, S2 normal and II/VI systolic murmur. Abdomen: soft, non-tender; bowel sounds normal; no masses,  no organomegaly and over wt,. Extremities: 2 + edema in the right lower leg with 1 + edema in the left leg. Anterior leg with a patch of erythema that is not warm or tender to palpation.     Neurologic: Mental status: Alert, oriented, thought content appropriate           Discharge Medications:       Gita John   Home Medication Instructions FEY:[id number]    Printed on:05/12/19 9570   Medication Information                      albuterol (PROVENTIL) (2.5 MG/3ML) 0.083% nebulizer solution  Take 2.5 mg by nebulization every 4 hours as needed for Wheezing or Shortness of Breath albuterol sulfate HFA (PROAIR HFA) 108 (90 Base) MCG/ACT inhaler  Inhale 2 puffs into the lungs every 4 hours as needed for Wheezing or Shortness of Breath             aspirin 81 MG tablet  Take 81 mg by mouth daily             atenolol (TENORMIN) 25 MG tablet  Take 25 mg by mouth daily             atorvastatin (LIPITOR) 20 MG tablet  TAKE 1 TABLET BY MOUTH EVERY DAY             Cholecalciferol (VITAMIN D) 2000 UNITS CAPS capsule  Take 1 capsule by mouth daily             doxycycline hyclate (VIBRA-TABS) 100 MG tablet  Take 1 tablet by mouth 2 times daily for 7 days             fluticasone (FLONASE) 50 MCG/ACT nasal spray  1 spray by Nasal route daily              melatonin 3 MG TABS tablet  Take 3 mg by mouth nightly as needed             mirtazapine (REMERON) 45 MG tablet  Take 45 mg by mouth nightly             pantoprazole (PROTONIX) 40 MG tablet  Take 1 tablet by mouth every morning (before breakfast)             polyethylene glycol (GLYCOLAX) powder  Take 17 g by mouth daily             RANEXA 500 MG extended release tablet  Take 1,000 mg by mouth 2 times daily              spironolactone (ALDACTONE) 25 MG tablet  Take 25 mg by mouth daily             tiotropium (SPIRIVA RESPIMAT) 2.5 MCG/ACT AERS inhaler  Inhale 2 puffs into the lungs daily             traZODone (DESYREL) 100 MG tablet  Take 100 mg by mouth nightly as needed for Sleep                 Consults:  Pharmacy to dose  Vancomycin. Significant Diagnostic Studies:  Labs, CXR, ECG. Treatments:   IV Lasix, IV Cleocin (ER), IV Rocephin / Vancomycin, ACE wrapping of legs. Disposition:   Home. Discharge Instructions:  Resume previous home medication but stop Lyrica. Take Doxycycline 100 mg two times a day x 7 days. Keep legs elevated as much as possible and wear compression hose or ACE wraps. Follow up with Dr. Daphney Abreu on next scheduled appointment or sooner with shortness of breath / Chest pain. Activity:  As tolerated.   Diet:  Cardiac,

## 2019-05-12 NOTE — PROGRESS NOTES
Pt is sleeping comfortably in bed. Pt awoke from sleep- states pain 1/10 in legs and feel back asleep. Respirations are even and unlabored. Call light in reach. Will continue to monitor.

## 2019-05-13 ENCOUNTER — HOSPITAL ENCOUNTER (INPATIENT)
Age: 64
LOS: 18 days | Discharge: HOME | DRG: 755 | End: 2019-05-31
Attending: PSYCHIATRY & NEUROLOGY | Admitting: PSYCHIATRY & NEUROLOGY
Payer: COMMERCIAL

## 2019-05-13 ENCOUNTER — HOSPITAL ENCOUNTER (EMERGENCY)
Age: 64
Discharge: ANOTHER HOSPITAL | End: 2019-05-13
Attending: FAMILY MEDICINE
Payer: COMMERCIAL

## 2019-05-13 VITALS
TEMPERATURE: 98.1 F | HEART RATE: 70 BPM | BODY MASS INDEX: 35.09 KG/M2 | DIASTOLIC BLOOD PRESSURE: 67 MMHG | WEIGHT: 237.6 LBS | RESPIRATION RATE: 16 BRPM | SYSTOLIC BLOOD PRESSURE: 120 MMHG | OXYGEN SATURATION: 97 %

## 2019-05-13 DIAGNOSIS — Z86.59 HISTORY OF POSTTRAUMATIC STRESS DISORDER (PTSD): ICD-10-CM

## 2019-05-13 DIAGNOSIS — R45.1 AGITATION: ICD-10-CM

## 2019-05-13 DIAGNOSIS — R45.850 HOMICIDAL IDEATION: Primary | ICD-10-CM

## 2019-05-13 LAB — GLUCOSE BLD-MCNC: 138 MG/DL (ref 75–110)

## 2019-05-13 PROCEDURE — 99285 EMERGENCY DEPT VISIT HI MDM: CPT

## 2019-05-13 PROCEDURE — 93005 ELECTROCARDIOGRAM TRACING: CPT

## 2019-05-13 PROCEDURE — 6370000000 HC RX 637 (ALT 250 FOR IP): Performed by: NURSE PRACTITIONER

## 2019-05-13 PROCEDURE — 82947 ASSAY GLUCOSE BLOOD QUANT: CPT

## 2019-05-13 PROCEDURE — 94664 DEMO&/EVAL PT USE INHALER: CPT

## 2019-05-13 PROCEDURE — 1240000000 HC EMOTIONAL WELLNESS R&B

## 2019-05-13 RX ORDER — NICOTINE 21 MG/24HR
1 PATCH, TRANSDERMAL 24 HOURS TRANSDERMAL DAILY
Status: DISCONTINUED | OUTPATIENT
Start: 2019-05-13 | End: 2019-05-14

## 2019-05-13 RX ORDER — POLYETHYLENE GLYCOL 3350 17 G/17G
17 POWDER, FOR SOLUTION ORAL DAILY
Status: DISCONTINUED | OUTPATIENT
Start: 2019-05-13 | End: 2019-05-31 | Stop reason: HOSPADM

## 2019-05-13 RX ORDER — MAGNESIUM HYDROXIDE/ALUMINUM HYDROXICE/SIMETHICONE 120; 1200; 1200 MG/30ML; MG/30ML; MG/30ML
30 SUSPENSION ORAL EVERY 6 HOURS PRN
Status: DISCONTINUED | OUTPATIENT
Start: 2019-05-13 | End: 2019-05-31 | Stop reason: HOSPADM

## 2019-05-13 RX ORDER — RANOLAZINE 500 MG/1
1000 TABLET, EXTENDED RELEASE ORAL 2 TIMES DAILY
Status: DISCONTINUED | OUTPATIENT
Start: 2019-05-13 | End: 2019-05-31 | Stop reason: HOSPADM

## 2019-05-13 RX ORDER — ATENOLOL 25 MG/1
25 TABLET ORAL DAILY
Status: DISCONTINUED | OUTPATIENT
Start: 2019-05-13 | End: 2019-05-31 | Stop reason: HOSPADM

## 2019-05-13 RX ORDER — ASPIRIN 81 MG/1
81 TABLET ORAL DAILY
Status: DISCONTINUED | OUTPATIENT
Start: 2019-05-13 | End: 2019-05-31 | Stop reason: HOSPADM

## 2019-05-13 RX ORDER — DOXYCYCLINE 100 MG/1
100 CAPSULE ORAL 2 TIMES DAILY
Status: DISCONTINUED | OUTPATIENT
Start: 2019-05-13 | End: 2019-05-14

## 2019-05-13 RX ORDER — HYDROXYZINE HYDROCHLORIDE 25 MG/1
25 TABLET, FILM COATED ORAL 3 TIMES DAILY PRN
Status: DISCONTINUED | OUTPATIENT
Start: 2019-05-13 | End: 2019-05-31 | Stop reason: HOSPADM

## 2019-05-13 RX ORDER — ATORVASTATIN CALCIUM 20 MG/1
20 TABLET, FILM COATED ORAL NIGHTLY
Status: DISCONTINUED | OUTPATIENT
Start: 2019-05-13 | End: 2019-05-31 | Stop reason: HOSPADM

## 2019-05-13 RX ORDER — SPIRONOLACTONE 25 MG/1
25 TABLET ORAL DAILY
Status: DISCONTINUED | OUTPATIENT
Start: 2019-05-13 | End: 2019-05-31 | Stop reason: HOSPADM

## 2019-05-13 RX ORDER — TRAZODONE HYDROCHLORIDE 100 MG/1
100 TABLET ORAL NIGHTLY PRN
Status: DISCONTINUED | OUTPATIENT
Start: 2019-05-13 | End: 2019-05-24

## 2019-05-13 RX ORDER — ACETAMINOPHEN 325 MG/1
650 TABLET ORAL EVERY 4 HOURS PRN
Status: DISCONTINUED | OUTPATIENT
Start: 2019-05-13 | End: 2019-05-31 | Stop reason: HOSPADM

## 2019-05-13 RX ORDER — ALBUTEROL SULFATE 2.5 MG/3ML
2.5 SOLUTION RESPIRATORY (INHALATION) EVERY 4 HOURS PRN
Status: DISCONTINUED | OUTPATIENT
Start: 2019-05-13 | End: 2019-05-31 | Stop reason: HOSPADM

## 2019-05-13 RX ORDER — ALBUTEROL SULFATE 90 UG/1
2 AEROSOL, METERED RESPIRATORY (INHALATION) EVERY 4 HOURS PRN
Status: DISCONTINUED | OUTPATIENT
Start: 2019-05-13 | End: 2019-05-31 | Stop reason: HOSPADM

## 2019-05-13 RX ORDER — FLUTICASONE PROPIONATE 50 MCG
1 SPRAY, SUSPENSION (ML) NASAL DAILY
Status: DISCONTINUED | OUTPATIENT
Start: 2019-05-13 | End: 2019-05-31 | Stop reason: HOSPADM

## 2019-05-13 RX ORDER — BENZTROPINE MESYLATE 1 MG/ML
2 INJECTION INTRAMUSCULAR; INTRAVENOUS 2 TIMES DAILY PRN
Status: DISCONTINUED | OUTPATIENT
Start: 2019-05-13 | End: 2019-05-31 | Stop reason: HOSPADM

## 2019-05-13 RX ORDER — TRAZODONE HYDROCHLORIDE 50 MG/1
50 TABLET ORAL NIGHTLY PRN
Status: DISCONTINUED | OUTPATIENT
Start: 2019-05-13 | End: 2019-05-13

## 2019-05-13 RX ORDER — PANTOPRAZOLE SODIUM 40 MG/1
40 TABLET, DELAYED RELEASE ORAL
Status: DISCONTINUED | OUTPATIENT
Start: 2019-05-14 | End: 2019-05-31 | Stop reason: HOSPADM

## 2019-05-13 RX ADMIN — Medication 3 MG: at 21:45

## 2019-05-13 RX ADMIN — ATORVASTATIN CALCIUM 20 MG: 20 TABLET, FILM COATED ORAL at 21:16

## 2019-05-13 RX ADMIN — RANOLAZINE 1000 MG: 1000 TABLET, FILM COATED, EXTENDED RELEASE ORAL at 21:17

## 2019-05-13 RX ADMIN — ASPIRIN 81 MG: 81 TABLET, COATED ORAL at 21:15

## 2019-05-13 RX ADMIN — VITAMIN D, TAB 1000IU (100/BT) 2000 UNITS: 25 TAB at 21:17

## 2019-05-13 RX ADMIN — TRAZODONE HYDROCHLORIDE 100 MG: 100 TABLET ORAL at 21:15

## 2019-05-13 RX ADMIN — FLUTICASONE PROPIONATE 1 SPRAY: 50 SPRAY, METERED NASAL at 22:17

## 2019-05-13 RX ADMIN — MIRTAZAPINE 45 MG: 30 TABLET, FILM COATED ORAL at 21:16

## 2019-05-13 RX ADMIN — DOXYCYCLINE 100 MG: 100 CAPSULE ORAL at 21:15

## 2019-05-13 RX ADMIN — ATENOLOL 25 MG: 25 TABLET ORAL at 21:17

## 2019-05-13 RX ADMIN — POLYETHYLENE GLYCOL 3350 17 G: 17 POWDER, FOR SOLUTION ORAL at 21:46

## 2019-05-13 RX ADMIN — SPIRONOLACTONE 25 MG: 25 TABLET, FILM COATED ORAL at 21:15

## 2019-05-13 ASSESSMENT — SLEEP AND FATIGUE QUESTIONNAIRES
DO YOU USE A SLEEP AID: YES
AVERAGE NUMBER OF SLEEP HOURS: 6
DO YOU HAVE DIFFICULTY SLEEPING: NO

## 2019-05-13 ASSESSMENT — PAIN SCALES - GENERAL: PAINLEVEL_OUTOF10: 7

## 2019-05-13 ASSESSMENT — LIFESTYLE VARIABLES: HISTORY_ALCOHOL_USE: NO

## 2019-05-13 ASSESSMENT — PATIENT HEALTH QUESTIONNAIRE - PHQ9: SUM OF ALL RESPONSES TO PHQ QUESTIONS 1-9: 6

## 2019-05-13 ASSESSMENT — PAIN DESCRIPTION - PAIN TYPE: TYPE: CHRONIC PAIN

## 2019-05-13 ASSESSMENT — PAIN DESCRIPTION - LOCATION: LOCATION: GENERALIZED

## 2019-05-13 NOTE — ED NOTES
Dr. Мария Strickland accepted patient at [facility]. This nurse spoke with Tonya Both at the access center and updated her that patient has been accepted and we now need a room.        Gloria Stanton RN  05/13/19 7879

## 2019-05-13 NOTE — ED NOTES
Access Center called and per [facility] patient has to be pink slipped before they can set up transport. Pink slip given to Dr. Teri Farah.        Aurelia Grullon RN  05/13/19 5645

## 2019-05-13 NOTE — ED PROVIDER NOTES
[facility]  eMERGENCY dEPARTMENT eNCOUnter          [facility]       Chief Complaint   Patient presents with    Homicidal     pt states he is having thoughts of harming one particular person, will not name person, denies any suicidal thoughts  states he wants help wants to be transferred to [facility]        Nurses Notes reviewed and I agree except as noted in the HPI. HISTORY OF PRESENT ILLNESS    Renetta Chambers is a 59 y.o. male who presents to the emergency room with complaint of having homicidal thoughts, patient stating against his cousin, when asked to elaborate, patient states \"will leave it at that right now\", and stating \"situation has escalated\". Patient denies suicidal ideation denies alcohol or drugs. Patient does state his syncope event this morning upon standing, and often has these when he is agitated. Patient was discharged from this facility 5/12/19 for IV antibody treatment for cellulitis of the lower extremity. Patient had been sent to [facility] for psychiatric care 4/22-4/25/19, weren't patient was transferred up to [address] [place] [address]   All other systems reviewed and are negative. PAST MEDICAL HISTORY    has a past medical history of Arthritis, Bronchitis with chronic airway obstruction (HCC), CAD (coronary artery disease), CHF (congestive heart failure) ([address] CTS (carpal tunnel syndrome), Diabetes mellitus ([address] Diabetic neuropathy associated with diabetes mellitus due to underlying condition ([address] Exposure to toxic chemical, GERD (gastroesophageal reflux disease), H/O cardiovascular stress test, H/O echocardiogram, Hip pain, left, Hx of cardiac cath, Hyperlipidemia, Hypertension, MI (myocardial infarction) ([address] Pneumonia, PTSD (post-traumatic stress disorder), S/P CABG (coronary artery bypass graft), Sleep apnea, and Ventricular fibrillation ([address]     SURGICAL HISTORY      has a past surgical history that includes joint replacement; Tonsillectomy; Wrist surgery; Finger surgery; Carpal tunnel release; Pilonidal cyst excision; Coronary artery bypass graft (10/23/15); Cardiac surgery; Pilonidal cyst excision; Carpal tunnel release (Right); fracture surgery; Finger amputation (Left); Colonoscopy (2012); Colonoscopy (11/27/2017); pr colonoscopy w/biopsy single/multiple (N/A, 11/27/2017); and pr egd transoral biopsy single/multiple (N/A, 11/27/2017).     CURRENT MEDICATIONS       Previous Medications    ALBUTEROL (PROVENTIL) (2.5 MG/3ML) 0.083% NEBULIZER SOLUTION    Take 2.5 mg by nebulization every 4 hours as needed for Wheezing or Shortness of Breath    ALBUTEROL SULFATE HFA (PROAIR HFA) 108 (90 BASE) MCG/ACT INHALER    Inhale 2 puffs into the lungs every 4 hours as needed for Wheezing or Shortness of Breath    ASPIRIN 81 MG TABLET    Take 81 mg by mouth daily    ATENOLOL (TENORMIN) 25 MG TABLET    Take 25 mg by mouth daily    ATORVASTATIN (LIPITOR) 20 MG TABLET    TAKE 1 TABLET BY MOUTH EVERY DAY    CHOLECALCIFEROL (VITAMIN D) 2000 UNITS CAPS CAPSULE    Take 1 capsule by mouth daily    DOXYCYCLINE HYCLATE (VIBRA-TABS) 100 MG TABLET    Take 1 tablet by mouth 2 times daily for 7 days    FLUTICASONE (FLONASE) 50 MCG/ACT NASAL SPRAY    1 spray by Nasal route daily     MELATONIN 3 MG TABS TABLET    Take 3 mg by mouth nightly as needed    MIRTAZAPINE (REMERON) 45 MG TABLET    Take 45 mg by mouth nightly    PANTOPRAZOLE (PROTONIX) 40 MG TABLET    Take 1 tablet by mouth every morning (before breakfast)    POLYETHYLENE GLYCOL (GLYCOLAX) POWDER    Take 17 g by mouth daily    RANEXA 500 MG EXTENDED RELEASE TABLET    Take 1,000 mg by mouth 2 times daily     SPIRONOLACTONE (ALDACTONE) 25 MG TABLET    Take 25 mg by mouth daily    TIOTROPIUM (SPIRIVA RESPIMAT) 2.5 MCG/ACT AERS INHALER    Inhale 2 puffs into the lungs daily    TRAZODONE (DESYREL) 100 MG TABLET    Take 100 mg by mouth nightly as needed for warm and dry. Patient is not diaphoretic. Psychiatric: Patient has a normal mood and slightly agitated affect. Patient speech is normal and behavior is normal. Cognition and memory are normal.      DIFFERENTIAL DIAGNOSIS:   Homicidal ideation, PTSD, agitation    DIAGNOSTIC RESULTS     EKG: All EKG's are interpreted by the Emergency Department Physician who either signs or Co-signs this chart in the absence of a cardiologist.  EKG    The patient had an EKG which is interpreted by me in the absence of a Cardiologist.   [] Without comparison to previous. [x] With comparison to a previous EKG Dated 5/11/2019    EKG @ 1223 hrs - sinus rhythm, rate 72, normal axis, normal intervals      RADIOLOGY: non-plain film images(s) such as CT, Ultrasound and MRI are read by the radiologist.  No orders to display       LABS:   Labs Reviewed - No data to display    EMERGENCY DEPARTMENT COURSE:   Vitals:    Vitals:    05/13/19 1302 05/13/19 1332 05/13/19 1347 05/13/19 1402   BP: 104/86 (!) 144/73 (!) 154/74 133/76   Pulse: 71 66 69 71   Resp: 16 18 16 18   Temp:       TempSrc:       SpO2: 95% 96% 96% 95%   Weight:         Patient history physical exam taken at bedside, discussed we'll hold on lab workup at this time as patient has had recent hospitalization both medically and psychiatrically, we will attempt to contact [facility] at this time, patient acknowledges. Advised by ED  at [address] stating that they are full and not accepting patients this time, that they're boarding in the emergency room    EKG as above    Advised patient that at this time [employer] is indicating that they cannot accept patients, I do advised patient that [facility] is even further way it would not be able to transfer that distance, I do offer to try to contact [facility] in [address] [facility], versus [facility] was at a few weeks prior, patient agreeable with [facility] at this time.     Case was discussed with Tucker Mederos, psychiatry at [facility], regarding patient's presentation and current workup, does advise this patient has had recent workup and hold on lab workup at this time, will except patient and perform labs as needed    Nursing advises that his transfer center is indicating patient must be pink slipped for him to be excepted, pink slipped completed    Advised patient of exceptions [facility], acknowledges        No medical problems identified which require immediate intervention or which preclude psychiatric evaluation. FINAL IMPRESSION      1. Homicidal ideation    2. Agitation    3. History of posttraumatic stress disorder (PTSD)          DISPOSITION/PLAN   Transfer    PATIENT REFERRED TO:  No follow-up provider specified. DISCHARGE MEDICATIONS:  New Prescriptions    No medications on file           Summation      Patient Course: Transfer     ED Medications administered this visit:  Medications - No data to display    New Prescriptions from this visit:    New Prescriptions    No medications on file       Follow-up:  No follow-up provider specified. Final Impression:   1. Homicidal ideation    2. Agitation    3.  History of posttraumatic stress disorder (PTSD)               (Please note that portions of this note were completed with a voice recognition program.  Efforts were made to edit the dictations but occasionally words are mis-transcribed.)    Arlys Nageotte, MD Arlys Nageotte, MD  05/13/19 [address] [facility] MD [place]  05/13/19 2716

## 2019-05-13 NOTE — BH NOTE
`[facility]  Admission Note     Admission Type:   Admission Type: Involuntary    Reason for admission:  Reason for Admission: Patient is homicidal with thoughts. PATIENT STRENGTHS:  Strengths: Connection to output provider, Medication Compliance    Patient Strengths and Limitations:  Limitations: Difficult relationships / poor social skills, External locus of control    Addictive Behavior:   Addictive Behavior  In the past 3 months, have you felt or has someone told you that you have a problem with:  : None  Do you have a history of Chemical Use?: No  Do you have a history of Alcohol Use?: No  Do you have a history of Street Drug Abuse?: Comment(HAS MEDICAL MARIJUANA )  Histroy of Prescripton Drug Abuse?: No    Medical Problems:   Past Medical History:   Diagnosis Date    Arthritis     WENDY KNEE    Bronchitis with chronic airway obstruction (HCC)     CAD (coronary artery disease)     CHF (congestive heart failure) (HCC)     CTS (carpal tunnel syndrome)     RIGHT    [facility]     Diabetic neuropathy associated with diabetes mellitus due to underlying condition ([facility]     Exposure to toxic chemical     [employer], [employer] base-toxic water exposure    GERD (gastroesophageal reflux disease)     H/O cardiovascular stress test 07/08/2016    Abnormal.  Moderate perfusion defect of mild to moderate intensity in the inferolateral,inferior and inferoapical regions during stress imaging. Ef 45%. with regional wall motion abnormalities.  H/O echocardiogram 2/17/16    EF >60%. LV wall thickness is mildly increased. Inferoseptal wall is abnormal in its motion not unusual status post open heart surgery. Normal aortic valve structure with ild aortic regurgitation.  Hip pain, left     Hx of cardiac cath 07/08/2016    LMCA: Normal 0% stenosis. LAD: Chronic occlusion 100%. Lesion on Mid LAD: 100% stenosis. LCx: single stenosis. Lesion on Mid CX: 80% stenosis.  RCA: Lesion on Mid RCA: 70% stenosis.     Hyperlipidemia     Hypertension     [facility]) [facility] 2015    Pneumonia     PTSD (post-traumatic stress disorder)     S/P CABG (coronary artery bypass graft) 10/23/15    Sleep apnea     [facility] 10/18/2015    x 2 DURING HEART ATTACK       Status EXAM:  Status and Exam  Normal: No  Facial Expression: Worried, Sad  Affect: Blunt  Level of Consciousness: Alert  Mood:Normal: No  Mood: Depressed, Anxious, Suspicious, Sad, Guilty  Motor Activity:Normal: No  Motor Activity: Tremors, Unusual Posture/Gait  Interview Behavior: Cooperative  Preception: [place] to Person, [place] to Time, [place] to Place, [place] to Situation  Attention:Normal: No  Attention: Unable to Concentrate  Thought Processes: Flt.of Ideas  Thought Content:Normal: No  Thought Content: Preoccupations  Hallucinations: None  Delusions: No  Memory:Normal: No  Memory: Poor Recent  Insight and Judgment: No  Insight and Judgment: Poor Judgment, Poor Insight  Present Suicidal Ideation: No  Present Homicidal Ideation: Yes    Tobacco Screening:  Practical Counseling, on admission, kimberli X, if applicable and completed (first 3 are required if patient doesn't refuse):            ( )  Recognizing danger situations (included triggers and roadblocks)                    ( )  Coping skills (new ways to manage stress, exercise, relaxation techniques, changing routine, distraction)                                                           ( )  Basic information about quitting (benefits of quitting, techniques in how to quit, available resources  ( ) Referral for counseling faxed to [facility]                                           ( ) Patient refused counseling  ( ) Patient has not smoked in the last 30 days    Metabolic Screening:    No results found for: LABA1C    Lab Results   Component Value Date    CHOL 107 02/24/2019    CHOL 155 12/17/2018    CHOL 131 07/05/2017    CHOL 119 07/08/2016     Lab Results   Component Value Date    TRIG 93 02/24/2019    TRIG 39 12/17/2018    TRIG 89 07/05/2017    TRIG 116 07/08/2016     Lab Results   Component Value Date    HDL 35 (L) 02/24/2019    HDL 54 12/17/2018    HDL 40 (L) 07/05/2017    HDL 35 (L) 07/08/2016     No components found for: LDLCAL  No results found for: LABVLDL      Body mass index is 34.56 kg/m². BP Readings from Last 2 Encounters:   05/13/19 (!) 143/80   05/13/19 120/67           Pt admitted with followings belongings:  Dentures: None  Vision - Corrective Lenses: Glasses  Hearing Aid: None  Jewelry: Necklace  Body Piercings Removed: N/A  Clothing: Footwear, Socks, Other (Comment)(shorts)  Were All Patient Medications Collected?: Not Applicable  Other Valuables: Michelle Viera sent home with NA. Valuables placed in safe in security envelope, number:  [id number]. Patient's home medications were verified and reviewed. Patient oriented to surroundings and program expectations and copy of patient rights given. Received admission packet:  Yes. Consents reviewed, signed all consents. Refused no consents. Patient verbalize understanding:  Yes. Patient education on precautions: Yes    Pt admitted to unit A [facility] at 1808 -1 per provider order. Pt scanned with metal detector. Nourishment provided. Patient involuntary from [facility], was at hospital x 3 days for bilateral cellulitis. Patient was started on Doxycycline 1 tab BID x 7days. Patient is homicidal/thoughts towards cousin, got into verbal altercation. Patient lives with wife at [facility] x few months. Wife is now living with cousin. Patient was last at [facility] last month in April. Patient suffers from PTSD- was in [facility]. Patient states compliance to home medications, follows up at [facility] in [place]. Patient has an extensive medical history, (see hx in Epic). Patient ambulates with walker at home. Patient reports adequate sleep and appetite prior to admission.  Patient denies hearing voices, admits to depression and anxiety. Patient also denies suicidal, stating \"I have never been suicidal.\" Patient states he has license for medical marijuana, denies smoking x 28 years. Provider paged for orders. Will monitor pt for safety and behavior.                     Malcolm Anderson RN

## 2019-05-14 LAB
ABSOLUTE EOS #: 0.4 K/UL (ref 0–0.4)
ABSOLUTE IMMATURE GRANULOCYTE: ABNORMAL K/UL (ref 0–0.3)
ABSOLUTE LYMPH #: 1.2 K/UL (ref 1–4.8)
ABSOLUTE MONO #: 0.5 K/UL (ref 0.1–1.3)
ALBUMIN SERPL-MCNC: 4 G/DL (ref 3.5–5.2)
ALBUMIN/GLOBULIN RATIO: ABNORMAL (ref 1–2.5)
ALP BLD-CCNC: 87 U/L (ref 40–129)
ALT SERPL-CCNC: 66 U/L (ref 5–41)
ANION GAP SERPL CALCULATED.3IONS-SCNC: 14 MMOL/L (ref 9–17)
AST SERPL-CCNC: 58 U/L
BASOPHILS # BLD: 0 % (ref 0–2)
BASOPHILS ABSOLUTE: 0 K/UL (ref 0–0.2)
BILIRUB SERPL-MCNC: 0.6 MG/DL (ref 0.3–1.2)
BUN BLDV-MCNC: 17 MG/DL (ref 8–23)
BUN/CREAT BLD: ABNORMAL (ref 9–20)
CALCIUM SERPL-MCNC: 9.4 MG/DL (ref 8.6–10.4)
CHLORIDE BLD-SCNC: 98 MMOL/L (ref 98–107)
CHOLESTEROL/HDL RATIO: 2.6
CHOLESTEROL: 127 MG/DL
CO2: 25 MMOL/L (ref 20–31)
CREAT SERPL-MCNC: 0.97 MG/DL (ref 0.7–1.2)
DIFFERENTIAL TYPE: ABNORMAL
EKG ATRIAL RATE: 72 BPM
EKG P AXIS: 56 DEGREES
EKG P-R INTERVAL: 202 MS
EKG Q-T INTERVAL: 414 MS
EKG QRS DURATION: 82 MS
EKG QTC CALCULATION (BAZETT): 453 MS
EKG R AXIS: 80 DEGREES
EKG T AXIS: 28 DEGREES
EKG VENTRICULAR RATE: 72 BPM
EOSINOPHILS RELATIVE PERCENT: 8 % (ref 0–4)
ESTIMATED AVERAGE GLUCOSE: 100 MG/DL
GFR AFRICAN AMERICAN: >60 ML/MIN
GFR NON-AFRICAN AMERICAN: >60 ML/MIN
GFR SERPL CREATININE-BSD FRML MDRD: ABNORMAL ML/MIN/{1.73_M2}
GFR SERPL CREATININE-BSD FRML MDRD: ABNORMAL ML/MIN/{1.73_M2}
GLUCOSE BLD-MCNC: 103 MG/DL (ref 75–110)
GLUCOSE BLD-MCNC: 104 MG/DL (ref 75–110)
GLUCOSE BLD-MCNC: 120 MG/DL (ref 75–110)
GLUCOSE BLD-MCNC: 124 MG/DL (ref 75–110)
GLUCOSE BLD-MCNC: 92 MG/DL (ref 70–99)
HBA1C MFR BLD: 5.1 % (ref 4–6)
HCT VFR BLD CALC: 44.6 % (ref 41–53)
HDLC SERPL-MCNC: 48 MG/DL
HEMOGLOBIN: 14.7 G/DL (ref 13.5–17.5)
IMMATURE GRANULOCYTES: ABNORMAL %
LDL CHOLESTEROL: 54 MG/DL (ref 0–130)
LYMPHOCYTES # BLD: 22 % (ref 24–44)
MCH RBC QN AUTO: 30.7 PG (ref 26–34)
MCHC RBC AUTO-ENTMCNC: 33 G/DL (ref 31–37)
MCV RBC AUTO: 93.1 FL (ref 80–100)
MONOCYTES # BLD: 9 % (ref 1–7)
NRBC AUTOMATED: ABNORMAL PER 100 WBC
PDW BLD-RTO: 14.5 % (ref 11.5–14.9)
PLATELET # BLD: 202 K/UL (ref 150–450)
PLATELET ESTIMATE: ABNORMAL
PMV BLD AUTO: 7.5 FL (ref 6–12)
POTASSIUM SERPL-SCNC: 4.8 MMOL/L (ref 3.7–5.3)
RBC # BLD: 4.79 M/UL (ref 4.5–5.9)
RBC # BLD: ABNORMAL 10*6/UL
SEG NEUTROPHILS: 61 % (ref 36–66)
SEGMENTED NEUTROPHILS ABSOLUTE COUNT: 3.3 K/UL (ref 1.3–9.1)
SODIUM BLD-SCNC: 137 MMOL/L (ref 135–144)
THYROXINE, FREE: 1.25 NG/DL (ref 0.93–1.7)
TOTAL PROTEIN: 6.9 G/DL (ref 6.4–8.3)
TRIGL SERPL-MCNC: 126 MG/DL
TSH SERPL DL<=0.05 MIU/L-ACNC: 1.92 MIU/L (ref 0.3–5)
VLDLC SERPL CALC-MCNC: NORMAL MG/DL (ref 1–30)
WBC # BLD: 5.3 K/UL (ref 3.5–11)
WBC # BLD: ABNORMAL 10*3/UL

## 2019-05-14 PROCEDURE — 90792 PSYCH DIAG EVAL W/MED SRVCS: CPT | Performed by: REGISTERED NURSE

## 2019-05-14 PROCEDURE — 6370000000 HC RX 637 (ALT 250 FOR IP): Performed by: NURSE PRACTITIONER

## 2019-05-14 PROCEDURE — 80053 COMPREHEN METABOLIC PANEL: CPT

## 2019-05-14 PROCEDURE — 1240000000 HC EMOTIONAL WELLNESS R&B

## 2019-05-14 PROCEDURE — 80061 LIPID PANEL: CPT

## 2019-05-14 PROCEDURE — 82947 ASSAY GLUCOSE BLOOD QUANT: CPT

## 2019-05-14 PROCEDURE — 85025 COMPLETE CBC W/AUTO DIFF WBC: CPT

## 2019-05-14 PROCEDURE — 84443 ASSAY THYROID STIM HORMONE: CPT

## 2019-05-14 PROCEDURE — 6370000000 HC RX 637 (ALT 250 FOR IP): Performed by: PSYCHIATRY & NEUROLOGY

## 2019-05-14 PROCEDURE — 83036 HEMOGLOBIN GLYCOSYLATED A1C: CPT

## 2019-05-14 PROCEDURE — 84439 ASSAY OF FREE THYROXINE: CPT

## 2019-05-14 PROCEDURE — 36415 COLL VENOUS BLD VENIPUNCTURE: CPT

## 2019-05-14 RX ORDER — DOXYCYCLINE 100 MG/1
100 CAPSULE ORAL 2 TIMES DAILY
Status: COMPLETED | OUTPATIENT
Start: 2019-05-14 | End: 2019-05-21

## 2019-05-14 RX ADMIN — TRAZODONE HYDROCHLORIDE 100 MG: 100 TABLET ORAL at 21:20

## 2019-05-14 RX ADMIN — ASPIRIN 81 MG: 81 TABLET, COATED ORAL at 08:16

## 2019-05-14 RX ADMIN — ATENOLOL 25 MG: 25 TABLET ORAL at 08:16

## 2019-05-14 RX ADMIN — FLUTICASONE PROPIONATE 1 SPRAY: 50 SPRAY, METERED NASAL at 08:17

## 2019-05-14 RX ADMIN — SPIRONOLACTONE 25 MG: 25 TABLET, FILM COATED ORAL at 08:16

## 2019-05-14 RX ADMIN — RANOLAZINE 1000 MG: 1000 TABLET, FILM COATED, EXTENDED RELEASE ORAL at 21:20

## 2019-05-14 RX ADMIN — DOXYCYCLINE 100 MG: 100 CAPSULE ORAL at 08:15

## 2019-05-14 RX ADMIN — VITAMIN D, TAB 1000IU (100/BT) 2000 UNITS: 25 TAB at 08:15

## 2019-05-14 RX ADMIN — Medication 3 MG: at 21:19

## 2019-05-14 RX ADMIN — ATORVASTATIN CALCIUM 20 MG: 20 TABLET, FILM COATED ORAL at 21:20

## 2019-05-14 RX ADMIN — ACETAMINOPHEN 650 MG: 325 TABLET, FILM COATED ORAL at 20:00

## 2019-05-14 RX ADMIN — DOXYCYCLINE 100 MG: 100 CAPSULE ORAL at 21:19

## 2019-05-14 RX ADMIN — TIOTROPIUM BROMIDE 18 MCG: 18 CAPSULE ORAL; RESPIRATORY (INHALATION) at 08:15

## 2019-05-14 RX ADMIN — MIRTAZAPINE 45 MG: 30 TABLET, FILM COATED ORAL at 21:20

## 2019-05-14 RX ADMIN — POLYETHYLENE GLYCOL 3350 17 G: 17 POWDER, FOR SOLUTION ORAL at 08:17

## 2019-05-14 RX ADMIN — RANOLAZINE 1000 MG: 1000 TABLET, FILM COATED, EXTENDED RELEASE ORAL at 08:15

## 2019-05-14 RX ADMIN — PANTOPRAZOLE SODIUM 40 MG: 40 TABLET, DELAYED RELEASE ORAL at 08:15

## 2019-05-14 ASSESSMENT — PAIN DESCRIPTION - PAIN TYPE: TYPE: CHRONIC PAIN

## 2019-05-14 ASSESSMENT — PAIN SCALES - GENERAL
PAINLEVEL_OUTOF10: 6
PAINLEVEL_OUTOF10: 3
PAINLEVEL_OUTOF10: 0

## 2019-05-14 ASSESSMENT — LIFESTYLE VARIABLES: HISTORY_ALCOHOL_USE: NO

## 2019-05-14 ASSESSMENT — PAIN DESCRIPTION - LOCATION: LOCATION: GENERALIZED

## 2019-05-14 NOTE — GROUP NOTE
Group Therapy Note    Date: May 14    Group Start Time: 1010  Group End Time: 3764  Group Topic: Recreational    [facility]    [address], [place]     Patient's Goal: Demonstrate increased interpersonal interaction     Notes:  Pt attended group, participated in activity and interacted with staff and peers     Status After Intervention:  Improved    Participation Level:  Active Listener and Interactive    Participation Quality: Appropriate, Attentive and Sharing    Speech:  normal    Thought Process/Content: Logical    Affective Functioning: Congruent    Level of consciousness:  Alert, Oriented x4 and Attentive    Response to Learning: Able to verbalize current knowledge/experience, Capable of insight and Progressing to goal    Endings: None Reported    Modes of Intervention: Education, Socialization, Exploration, Problem-solving and Activity    Discipline Responsible: Psychoeducational Specialist    Signature:  [address], [address]

## 2019-05-14 NOTE — GROUP NOTE
Group Therapy Note    Date: May 14    Group Start Time: 1100  Group End Time: 1200  Group Topic: Psychotherapy    BABAR Nolan        Group Therapy Note    Attendees: 7/11         Patient's Goal:  Able to verbalize acceptance of life and situations over which he or she has no control. Focus on the here and now of their mental health. Status After Intervention:  Improved    Participation Level:  Active Listener and Interactive    Participation Quality: Appropriate, Attentive, Sharing and Supportive      Speech:  normal      Thought Process/Content: Logical      Affective Functioning: Congruent      Mood: anxious      Level of consciousness:  Alert and Oriented x4      Response to Learning: Progressing to goal      Endings: None Reported    Modes of Intervention: Education, Support, Socialization and Exploration      Discipline Responsible: /Counselor      Signature:  BABAR Conley

## 2019-05-14 NOTE — GROUP NOTE
Group Therapy Note    Date: May 14    Group Start Time: 1330  Group End Time: 4285  Group Topic: Cognitive Skills    [facility] LORNA Kim, CTRS    Patient's Goal:  Improve cognitive skills, increase socialization     Status After Intervention:  Improved    Participation Level:  Active Listener and Interactive    Participation Quality: Appropriate and Attentive    Speech:  normal    Thought Process/Content: Logical    Affective Functioning: Congruent    Level of consciousness:  Alert, Oriented x4 and Attentive    Response to Learning: Able to verbalize current knowledge/experience, Able to verbalize/acknowledge new learning, Able to retain information, Capable of insight and Progressing to goal    Endings: None Reported    Modes of Intervention: Education, Socialization, Exploration, Problem-solving, Activity and Limit-setting    Discipline Responsible: Psychoeducational Specialist    Signature:  Anthony Kim, [address]

## 2019-05-14 NOTE — PLAN OF CARE
cardiac cath 07/08/2016    LMCA: Normal 0% stenosis. LAD: Chronic occlusion 100%. Lesion on Mid LAD: 100% stenosis. LCx: single stenosis. Lesion on Mid CX: 80% stenosis. RCA: Lesion on Mid RCA: 70% stenosis. Hyperlipidemia     Hypertension     MI (myocardial infarction) ([facility] 2015    Pneumonia     PTSD (post-traumatic stress disorder)     S/P CABG (coronary artery bypass graft) 10/23/15    Sleep apnea     Ventricular fibrillation ([facility] 10/18/2015    x 2 DURING HEART ATTACK     Status EXAM:Status and Exam  Normal: No  Facial Expression: Worried, Sad  Affect: Blunt  Level of Consciousness: Alert  Mood:Normal: No  Mood: Depressed, Anxious, Sad  Motor Activity:Normal: No  Motor Activity: Unusual Posture/Gait, Tremors  Interview Behavior: Cooperative  Preception: [place] to Person, [place] Daily to Time, [place] to Place, [place] to Situation  Attention:Normal: No  Attention: Unable to Concentrate  Thought Processes: Circumstantial  Thought Content:Normal: No  Thought Content: Preoccupations  Hallucinations: None  Delusions: No  Memory:Normal: No  Memory: Poor Remote, Poor Recent  Insight and Judgment: No  Insight and Judgment: Poor Judgment, Poor Insight  Present Suicidal Ideation: No  Present Homicidal Ideation: Other(See comment)(Patient denies )    EDUCATION:   Learner Progress Toward Treatment Goals:  Reviewed group plans and strategies for care    Method:  Group therapy, medication compliance, individualized assessments and care planning    Outcome:  Needs reinforcement    PATIENT GOALS:  Pt did not identify, to be discussed with patient within 72 hours.     PLAN/TREATMENT RECOMMENDATIONS:   Group therapy, medications compliance, goal setting, individualized assessments and care, continue to monitor pt on unit      SHORT-TERM GOALS:   Time frame for Short-Term Goals:  5-7 days    LONG-TERM GOALS:  Time frame for Long-Term Goals:  6 months    Members Present in Team Meeting:       Haley Middleton

## 2019-05-14 NOTE — GROUP NOTE
Group Therapy Note    Date: May 14    Group Start Time: 1600  Group End Time: 1625  Group Topic: Healthy Living/Wellness    LA ROTHMAN    Kaylah Humphrey RN        Group Therapy Note    Attendees: 8/17    ABCs of coping skills         Patient's Goal:  To learn new coping skills    Notes:  Patient was an active participant    Status After Intervention:  Unchanged    Participation Level:  Active Listener and Interactive    Participation Quality: Appropriate, Attentive, Sharing and Supportive      Speech:  normal      Thought Process/Content: Logical      Affective Functioning: Congruent      Mood: depressed      Level of consciousness:  Alert, Oriented x4 and Attentive      Response to Learning: Able to verbalize current knowledge/experience, Able to verbalize/acknowledge new learning, Able to retain information, Capable of insight, Able to change behavior and Progressing to goal      Endings: None Reported    Modes of Intervention: Education, Support, Socialization, Exploration, Clarifying, Problem-solving, Activity and Limit-setting      Discipline Responsible: Registered Nurse      Signature:  Kaylah Humphrey RN

## 2019-05-14 NOTE — GROUP NOTE
Group Therapy Note    Date: May 13    Group Start Time: 0830  Group End Time: 0900  Group Topic: Wrap-Up    [facility]    Kimi Murphy LPN    Patient attended and actively participated in PM wrap-up group.      Group Therapy Note    Attendees: 10           Signature:  iKmi Murphy LPN
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
## 2019-05-14 NOTE — GROUP NOTE
Group Therapy Note    Date: May 14    Group Start Time: 0900  Group End Time: 1000  Group Topic: [address] [place], [place]        Group Therapy Note    Attendees: 10/19          Patient's Goal:   Patient will demonstrate increased interpersonal interaction    Notes:  Patient attended group and participated fully. Status After Intervention:  Improved    Participation Level:  Active Listener and Interactive    Participation Quality: Appropriate, Attentive and Sharing      Speech:  normal      Thought Process/Content: Logical      Affective Functioning: Congruent      Mood: euthymic      Level of consciousness:  Alert and Oriented x4      Response to Learning: Progressing to goal      Endings: None Reported    Modes of Intervention: Education, Support and Socialization      Discipline Responsible: Psychoeducational Specialist      Signature:  Mau Alcala

## 2019-05-14 NOTE — BH NOTE
Patient is complaining of difficulty walking with long distances. Patient requested to use his walker, which is in his belongings. Deandre Ferrell notified, OK to use walker on unit.

## 2019-05-14 NOTE — PLAN OF CARE
Problem: Anger Management/Homicidal Ideation:  Goal: Absence of homicidal ideation  Description  Absence of homicidal ideation  Outcome: Ongoing  Note:   Patient denies homicidal ideation at this time. Patient is selectively social with peers and actively participated in group therapy. Patient is complaint with medications and behavior is under control. Problem: Falls - Risk of:  Goal: Will remain free from falls  Description  Will remain free from falls  Outcome: Ongoing  Note:   Patient remains free of falls and injury at this time. 15 minute visual, safety checks maintained. Staff will continue to monitor patient and provide support.

## 2019-05-14 NOTE — BH NOTE
Psychiatric Admission Note         Renetta Chambers is a 59 y.o. male who was admitted from [facility].  He said he was getting irritable and angry and has homicidal thoughts about his cousin. States that they have been having issues lately and they have escalated. He feels depressed. He said he usually goes at [facility] in [facility] but beds were full. He feels that he is having more irritability and he is angrier than before and he has homicidal thoughts. He is unable to focus and concentrate. He says he gets traumatic reliving experiences and hypervigilance from the service in the [facility]. Past Psychiatric History   Patient reports current outpatient psychiatric linkage. . Reported history of psychiatric inpatient hospitalizations. Reported history of suicide attempts. History of Substance Abuse     Denies alcohol use or use of any illicit drugs. Family History of psychiatric disorders    Family history: denied       Medical History   Allergies:  Codeine; Hydrocodone; Pcn [penicillins]; and Sulfa antibiotics   Past Medical History:   Diagnosis Date    Arthritis     WENDY KNEE    Bronchitis with chronic airway obstruction (HCC)     CAD (coronary artery disease)     [facility]     CTS (carpal tunnel syndrome)     RIGHT    Diabetes [facility] ([facility]     Diabetic neuropathy associated with diabetes mellitus due to underlying condition ([facility]     Exposure to toxic chemical     [employer], [employer] base-toxic water exposure    GERD (gastroesophageal reflux disease)     H/O cardiovascular stress test 07/08/2016    Abnormal.  Moderate perfusion defect of mild to moderate intensity in the inferolateral,inferior and inferoapical regions during stress imaging. Ef 45%. with regional wall motion abnormalities.  H/O echocardiogram 2/17/16    EF >60%. LV wall thickness is mildly increased. Inferoseptal wall is abnormal in its motion not unusual status post open heart surgery. Normal aortic valve structure with ild aortic regurgitation.  Hip pain, left     Hx of cardiac cath 07/08/2016    LMCA: Normal 0% stenosis. LAD: Chronic occlusion 100%. Lesion on Mid LAD: 100% stenosis. LCx: single stenosis. Lesion on Mid CX: 80% stenosis. RCA: Lesion on Mid RCA: 70% stenosis.  Hyperlipidemia     Hypertension     MI (myocardial infarction) ([facility] 2015    Pneumonia     PTSD (post-traumatic stress disorder)     S/P CABG (coronary artery bypass graft) 10/23/15    Sleep apnea     Ventricular fibrillation ([facility] 10/18/2015    x 2 DURING HEART ATTACK      Past Surgical History:   Procedure Laterality Date    CARDIAC SURGERY      10/21/2015 3 vessel CABG    CARPAL TUNNEL RELEASE      CARPAL TUNNEL RELEASE Right     [facility]    COLONOSCOPY  11/27/2017    CORONARY ARTERY BYPASS GRAFT  10/23/15    Dr. Jaycob Johnston      left middle finger    FRACTURE SURGERY      left wrist    JOINT REPLACEMENT      right knee    PILONIDAL CYST EXCISION      1974    PILONIDAL CYST EXCISION      PA COLONOSCOPY W/BIOPSY SINGLE/MULTIPLE N/A 11/27/2017    COLONOSCOPY WITH BIOPSY/ polypectomy performed by Tono Haley MD at [address] [facility] EGD TRANSORAL BIOPSY SINGLE/MULTIPLE N/A 11/27/2017    EGD BIOPSY performed by Tono Haley MD at [facility]      left wrist           SOCIAL HISTORY  Patient is currently living with his wife in a hotel awaiting renovation of a new home to move in [facility]. He has a 42-year-old son. He served in [employer]. Patient denies any drug and alcohol use at this time. He denies any legal problems. He denies any legal problems. He denies any physical sexual or emotional abuse in him.       Social History     Socioeconomic History    Marital status:      Spouse name: Not on file    Number of children: Not on file    Years of education: Not on file    Highest education level: Not on file   Occupational History    Not on file   Social Needs    Financial resource strain: Not on file    Food insecurity:     Worry: Not on file     Inability: Not on file    Transportation needs:     Medical: Not on file     Non-medical: Not on file   Tobacco Use    Smoking status: Former Smoker     Packs/day: 1.00     Years: 19.00     Pack years: 19.00     Types: Cigarettes     Last attempt to quit: 2006     Years since quittin.7    Smokeless tobacco: Former User     Quit date: 2000   Substance and Sexual Activity    Alcohol use: No    Drug use: Yes     Frequency: 1.0 times per week     Types: Marijuana     Comment: Pt smokes marijuana therapeutically.  Sexual activity: Yes     Partners: Female   Lifestyle    Physical activity:     Days per week: Not on file     Minutes per session: Not on file    Stress: Not on file   Relationships    Social connections:     Talks on phone: Not on file     Gets together: Not on file     Attends Jain service: Not on file     Active member of club or organization: Not on file     Attends meetings of clubs or organizations: Not on file     Relationship status: Not on file    Intimate partner violence:     Fear of current or ex partner: Not on file     Emotionally abused: Not on file     Physically abused: Not on file     Forced sexual activity: Not on file   Other Topics Concern    Not on file   Social History Narrative    Not on file         Mental Status  Pt. was alert, fully oriented, and cooperative. Appearance and hygiene wereappropriate, well-groomed . Mood was depressed. Affect was \"dysthymic and poorly reactive Thought process was linear and well-organized. Patient denied any hallucinations or paranoia. Patient denied suicidal ideations. Patient endorsed homicidal ideations .  Patient's gross

## 2019-05-14 NOTE — PLAN OF CARE
Problem: Falls - Risk of:  Goal: Will remain free from falls  Description  Will remain free from falls  5/14/2019 1313 by Sim Ahn RN  Outcome: Ongoing  No falls noted this shift. Patient ambulates with unsteady gait. Non skid footwear in place. Room is free of clutter. Safety precautions reinforced. Problem: Anger Management/Homicidal Ideation:  Goal: Absence of homicidal ideation  Description  Absence of homicidal ideation  5/14/2019 1313 by Sim Ahn RN  Outcome: Ongoing  Patient is alert, pleasant on approach. Patient is currently denying homicidal thoughts towards his cousin, just states \"I am really angry with him. \" Reassurance and support given. Coping skills explored and discussed. Patient denies suicidal thoughts, states he has never had suicidal thoughts or any attempts. Patient admits to little depression and anxiety due to being homeless and having to stay at a hotel. Patient is social and visible on the unit, attends unit programming. Patient reports adequate sleep and appetite. Patient takes all medications without concern, denies any side effects. Patient hygiene is adequate, took shower today, washed hair. No further concerns voiced. Will continue to reinforce, monitor and ensure safety.

## 2019-05-15 PROCEDURE — 6370000000 HC RX 637 (ALT 250 FOR IP): Performed by: PSYCHIATRY & NEUROLOGY

## 2019-05-15 PROCEDURE — 99232 SBSQ HOSP IP/OBS MODERATE 35: CPT | Performed by: REGISTERED NURSE

## 2019-05-15 PROCEDURE — 6370000000 HC RX 637 (ALT 250 FOR IP): Performed by: NURSE PRACTITIONER

## 2019-05-15 PROCEDURE — 1240000000 HC EMOTIONAL WELLNESS R&B

## 2019-05-15 RX ADMIN — TIOTROPIUM BROMIDE 18 MCG: 18 CAPSULE ORAL; RESPIRATORY (INHALATION) at 09:03

## 2019-05-15 RX ADMIN — DOXYCYCLINE 100 MG: 100 CAPSULE ORAL at 13:08

## 2019-05-15 RX ADMIN — RANOLAZINE 1000 MG: 1000 TABLET, FILM COATED, EXTENDED RELEASE ORAL at 21:30

## 2019-05-15 RX ADMIN — MIRTAZAPINE 45 MG: 30 TABLET, FILM COATED ORAL at 21:30

## 2019-05-15 RX ADMIN — ATORVASTATIN CALCIUM 20 MG: 20 TABLET, FILM COATED ORAL at 21:30

## 2019-05-15 RX ADMIN — HYDROXYZINE HYDROCHLORIDE 25 MG: 25 TABLET, FILM COATED ORAL at 21:30

## 2019-05-15 RX ADMIN — ASPIRIN 81 MG: 81 TABLET, COATED ORAL at 09:04

## 2019-05-15 RX ADMIN — RANOLAZINE 1000 MG: 1000 TABLET, FILM COATED, EXTENDED RELEASE ORAL at 09:04

## 2019-05-15 RX ADMIN — ACETAMINOPHEN 650 MG: 325 TABLET, FILM COATED ORAL at 21:29

## 2019-05-15 RX ADMIN — SPIRONOLACTONE 25 MG: 25 TABLET, FILM COATED ORAL at 09:04

## 2019-05-15 RX ADMIN — FLUTICASONE PROPIONATE 1 SPRAY: 50 SPRAY, METERED NASAL at 09:05

## 2019-05-15 RX ADMIN — ATENOLOL 25 MG: 25 TABLET ORAL at 09:04

## 2019-05-15 RX ADMIN — Medication 3 MG: at 21:30

## 2019-05-15 RX ADMIN — PANTOPRAZOLE SODIUM 40 MG: 40 TABLET, DELAYED RELEASE ORAL at 09:04

## 2019-05-15 RX ADMIN — VITAMIN D, TAB 1000IU (100/BT) 2000 UNITS: 25 TAB at 09:04

## 2019-05-15 RX ADMIN — DOXYCYCLINE 100 MG: 100 CAPSULE ORAL at 21:29

## 2019-05-15 ASSESSMENT — PAIN SCALES - GENERAL
PAINLEVEL_OUTOF10: 7
PAINLEVEL_OUTOF10: 3
PAINLEVEL_OUTOF10: 0

## 2019-05-15 ASSESSMENT — PAIN DESCRIPTION - PAIN TYPE: TYPE: ACUTE PAIN

## 2019-05-15 ASSESSMENT — PAIN DESCRIPTION - LOCATION: LOCATION: HIP

## 2019-05-15 NOTE — CARE COORDINATION
Writer attempted to coordinate pt ransfer to [place], [place] per his request, MAIA in chart. Writer unable to contact admissions coordinator or leave message despite contact with multiple operators.  Writer will continue outreach

## 2019-05-15 NOTE — BH NOTE
Department of Psychiatry  Attending Progress Note  Chief Complaint: PTSD (post-traumatic stress disorder)     SUBJECTIVE:    Donna Vargas is seen in the day area and during treatment team meeting. He states that he still feeling depressed. He states he still has homicidal thoughts towards his cousin. He complains of lack of energy and motivation. He complains of agitation. Is complaining of traumatic reliving to some extent. He is unable to sleep. He is waiting for [facility]. to accept him so that he can go for inpatient treatment there.       OBJECTIVE    Physical  /69   Pulse 63   Temp 97.8 °F (36.6 °C)   Resp 14   Ht 5' 9\" (1.753 m)   Wt 234 lb (106.1 kg)   BMI 34.56 kg/m²      Mental Status Evaluation:  Orientation: alertness: alert   Mood:. angry, anxious and constricted      Affect:  constricted      Appearance:  age appropriate   Activity:  Psychomotor Agitation   Gait/Posture: Normal   Speech:  delayed, increased latency of response, normal pitch and normal volume   Thought Process:  circumstantial   Thought Content:  Denies delusions and hallucinations   Sensorium:  person, place and time/date   Cognition:  grossly intact   Memory: intact   Insight:  limited   Judgment: limited   Suicidal Ideations: denies   Homicidal Ideations: Positive for homicidal ideation      Medication Side Effects: absent       Attention Span attention span appeared shorter than expected for age     Medications  Current Facility-Administered Medications   Medication Dose Route Frequency Provider Last Rate Last Dose    doxycycline monohydrate (MONODOX) capsule 100 mg  100 mg Oral BID Sheri Monroy MD   100 mg at 05/15/19 1308    acetaminophen (TYLENOL) tablet 650 mg  650 mg Oral Q4H PRN DIANNA Hyatt CNP   650 mg at 05/14/19 2000    hydrOXYzine (ATARAX) tablet 25 mg  25 mg Oral TID PRN DIANNA Hyatt CNP        benztropine mesylate (COGENTIN) injection 2 mg  2 mg mg  100 mg Oral Nightly PRN DIANNA Holloway CNP   100 mg at 05/14/19 2120    tiotropium [facility]) inhalation capsule 18 mcg  18 mcg Inhalation Daily DIANNA Holloway CNP   18 mcg at 05/15/19 4528         doxycycline monohydrate  100 mg Oral BID    aspirin  81 mg Oral Daily    atenolol  25 mg Oral Daily    atorvastatin  20 mg Oral Nightly    vitamin D  2,000 Units Oral Daily    fluticasone  1 spray Nasal Daily    mirtazapine  45 mg Oral Nightly    pantoprazole  40 mg Oral QAM AC    polyethylene glycol  17 g Oral Daily    ranolazine  1,000 mg Oral BID    spironolactone  25 mg Oral Daily    tiotropium  18 mcg Inhalation Daily       ASSESSMENT  PTSD (post-traumatic stress disorder)     Patient's Response to Treatment: positive    PLAN    · Continue inpatient psychiatric treatment  · Supportive therapy with medication management. Reviewed risks and benefits as well as potential side effects with patient. Continue current medications. · Therapeutic activities and groups  · Follow up at [facility] after symptoms stabilized    Dragon voice recognition software used in portions of this document.

## 2019-05-15 NOTE — PLAN OF CARE
Problem: Anger Management/Homicidal Ideation:  Goal: Absence of homicidal ideation  Description  Absence of homicidal ideation  5/15/2019 1924 by Celia Medina RN  Outcome: Ongoing  Note:   Patient has been calm, controlled and med complaint. Accepting of 1:1 talk time with staff. 1:1 with pt x ten minutes. Pt encouraged to attend unit programming and interact with peers and staff. Pt also encouraged to tend to hygiene and ADLs. Pt encouraged to discuss feelings with staff and feedback and reassurance provided. Patient has been out in the dayroom and social with peers. Patient reports anxiety and depression 7/10 this evening. Problem: Altered Mood, Depressive Behavior:  Goal: Able to verbalize acceptance of life and situations over which he or she has no control  Description  Able to verbalize acceptance of life and situations over which he or she has no control  5/15/2019 1924 by Celia Medina RN  Outcome: Ongoing  Note:   Patient denies suicidal and homicidal thoughts. Patient denies auditory and visual hallucinations. Patient is taking meds and eating well. No self harm noted this shift. Patient agrees to seek staff out if negative thoughts arise. Will continue to monitor Q15 minute and intermittently. Problem: Falls - Risk of:  Goal: Will remain free from falls  Description  Will remain free from falls  5/15/2019 1924 by Celia Medina RN  Outcome: Ongoing  Note:   Pt remains free of falls and verbalizes understanding of individual fall risks. Pt wearing non skid footwear and encouraged to seek out staff for any assistance needed.

## 2019-05-15 NOTE — PLAN OF CARE
Problem: Anger Management/Homicidal Ideation:  Goal: Absence of homicidal ideation  Description  Absence of homicidal ideation  5/14/2019 2101 by Long Jin, ALEJANDRO  Outcome: Ongoing  Note:   Patient denies homicidal/suicidal ideation at this time. No self-harm. 15 minute visual, safety checks maintained. Staff will continue to monitor patient and provide support. Problem: Falls - Risk of:  Goal: Absence of physical injury  Description  Absence of physical injury  5/14/2019 2101 by Long Jin, BREAN  Outcome: Ongoing  Note:   Patient remains free of injury and falls. 15 minute visual, safety checks maintained. Staff will continue to monitor patient and provide support.

## 2019-05-15 NOTE — GROUP NOTE
Group Therapy Note    Date: May 15    Group Start Time: 1330  Group End Time: 9783  Group Topic: Cognitive Skills    LA ROTHMAN    [place], [place]           Patient's Goal:  Patient will demonstrate increased interpersonal interactions. Notes:  Patient attended group and actively participated in task at hand. Patient conversed appropriately with peers and [facility]. Status After Intervention:  Improved    Participation Level:  Active Listener and Interactive    Participation Quality: Appropriate, Attentive, Sharing and Supportive      Speech:  normal      Thought Process/Content: Logical      Affective Functioning: Congruent      Level of consciousness:  Alert and Attentive      Response to Learning: Able to verbalize current knowledge/experience, Able to verbalize/acknowledge new learning, Able to retain information, Capable of insight and Progressing to goal      Endings: None Reported    Modes of Intervention: Support, Socialization, Exploration, Clarifying, Problem-solving, Activity, Limit-setting and Reality-testing      Discipline Responsible: Psychoeducational Specialist      Signature:  Hamida Young

## 2019-05-15 NOTE — PLAN OF CARE
Pt eats meals, takes medications, sleeps through the night, attends ADL's, denies suicidal thoughts and hallucinations.

## 2019-05-15 NOTE — GROUP NOTE
Group Therapy Note    Date: May 15    Group Start Time: 1000  Group End Time: 1452  Group Topic: Recreational    [facility] [address], [place]        Group Therapy Note    Attendees: 7/10         Patient's Goal:   Patient will demonstrate increased interpersonal interaction     Notes:  Patient attended group and participated fully. Status After Intervention:  Improved    Participation Level:  Active Listener and Interactive    Participation Quality: Appropriate, Attentive and Sharing      Speech:  normal      Thought Process/Content: Logical      Affective Functioning: Congruent      Mood: euthymic      Level of consciousness:  Alert and Oriented x4      Response to Learning: Progressing to goal      Endings: None Reported    Modes of Intervention: Education, Support, Socialization, Problem-solving, Activity and Reality-testing      Discipline Responsible: Psychoeducational Specialist      Signature:  Radha Patino

## 2019-05-15 NOTE — GROUP NOTE
Group Therapy Note    Date: May 15    Group Start Time: 1100  Group End Time: 1200  Group Topic: Psychotherapy    [facility]    BABAR Poon        Group Therapy Note    Attendees: 6/9         Patient's Goal:  Able to verbalize acceptance of life and situations over which he or she has no control. Focus on the here and now of their mental health. Status After Intervention:  Unchanged    Participation Level:  Active Listener and Interactive    Participation Quality: Appropriate, Attentive and Sharing      Speech:  normal      Thought Process/Content: Logical  Linear      Affective Functioning: Congruent      Mood: euthymic      Level of consciousness:  Alert and Oriented x4      Response to Learning: Progressing to goal      Endings: None Reported    Modes of Intervention: Education, Support, Socialization and Exploration      Discipline Responsible: /Counselor      Signature:  BABAR Ahn

## 2019-05-15 NOTE — GROUP NOTE
Group Therapy Note    Date: May 15    Group Start Time: 0900  Group End Time: 0930  Group Topic: [address]        Group Therapy Note    Attendees: 13/20         Patient's Goal:   Patient will demonstrate increased interpersonal interaction     Notes:  Patient attended group and participated fully. Status After Intervention:  Improved    Participation Level:  Active Listener and Interactive    Participation Quality: Appropriate, Attentive and Sharing      Speech:  normal      Thought Process/Content: Logical      Affective Functioning: Congruent      Mood: euthymic      Level of consciousness:  Alert and Oriented x4      Response to Learning: Progressing to goal      Endings: None Reported    Modes of Intervention: Education, Support, Socialization and Reality-testing      Discipline Responsible: Psychoeducational Specialist      Signature:  Carissa Smith

## 2019-05-15 NOTE — PLAN OF CARE
could go to sleep and not wake up? : No  2) Have you actually had any thoughts of killing yourself? : No  6) Have you ever done anything, started to do anything, or prepared to do anything to end your life?: No  Change in Result: No Change in Plan of care: No      EDUCATION:   EDUCATION:   Learner Progress Toward Treatment Goals: Reviewed results and recommendations of this team, Reviewed group plan and strategies, Reviewed signs, symptoms and risk of self harm and violent behavior, Reviewed goals and plan of care    Method:small group, individual verbal education    Outcome:verbalized by patient, but needs reinforcement to obtain goals    PATIENT GOALS:  Short term: Get transferred to [place] unit. Get rid of HI. Long term: No longer have contact with cousin whom he states causes him issues. Utilize support from [employer]. Medication compliance.      PLAN/TREATMENT RECOMMENDATIONS UPDATE: continue with group therapies, increased socialization, continue planning for after discharge goals, continue with medication compliance    SHORT-TERM GOALS UPDATE:   Time frame for Short-Term Goals: 5-7 days    LONG-TERM GOALS UPDATE:   Time frame for Long-Term Goals: 6 months  Members Present in Team Meeting: See Signature Sheet    Tiffani Grullon, [address]

## 2019-05-16 PROCEDURE — 6370000000 HC RX 637 (ALT 250 FOR IP): Performed by: PSYCHIATRY & NEUROLOGY

## 2019-05-16 PROCEDURE — 99232 SBSQ HOSP IP/OBS MODERATE 35: CPT | Performed by: REGISTERED NURSE

## 2019-05-16 PROCEDURE — 6370000000 HC RX 637 (ALT 250 FOR IP): Performed by: NURSE PRACTITIONER

## 2019-05-16 PROCEDURE — 1240000000 HC EMOTIONAL WELLNESS R&B

## 2019-05-16 RX ADMIN — ATENOLOL 25 MG: 25 TABLET ORAL at 09:31

## 2019-05-16 RX ADMIN — HYDROXYZINE HYDROCHLORIDE 25 MG: 25 TABLET, FILM COATED ORAL at 22:06

## 2019-05-16 RX ADMIN — ASPIRIN 81 MG: 81 TABLET, COATED ORAL at 09:28

## 2019-05-16 RX ADMIN — DOXYCYCLINE 100 MG: 100 CAPSULE ORAL at 09:28

## 2019-05-16 RX ADMIN — TIOTROPIUM BROMIDE 18 MCG: 18 CAPSULE ORAL; RESPIRATORY (INHALATION) at 09:26

## 2019-05-16 RX ADMIN — ATORVASTATIN CALCIUM 20 MG: 20 TABLET, FILM COATED ORAL at 22:06

## 2019-05-16 RX ADMIN — VITAMIN D, TAB 1000IU (100/BT) 2000 UNITS: 25 TAB at 09:28

## 2019-05-16 RX ADMIN — RANOLAZINE 1000 MG: 1000 TABLET, FILM COATED, EXTENDED RELEASE ORAL at 09:28

## 2019-05-16 RX ADMIN — MIRTAZAPINE 45 MG: 30 TABLET, FILM COATED ORAL at 22:06

## 2019-05-16 RX ADMIN — DOXYCYCLINE 100 MG: 100 CAPSULE ORAL at 22:06

## 2019-05-16 RX ADMIN — FLUTICASONE PROPIONATE 1 SPRAY: 50 SPRAY, METERED NASAL at 09:27

## 2019-05-16 RX ADMIN — SPIRONOLACTONE 25 MG: 25 TABLET, FILM COATED ORAL at 09:28

## 2019-05-16 RX ADMIN — PANTOPRAZOLE SODIUM 40 MG: 40 TABLET, DELAYED RELEASE ORAL at 09:28

## 2019-05-16 RX ADMIN — RANOLAZINE 1000 MG: 1000 TABLET, FILM COATED, EXTENDED RELEASE ORAL at 22:06

## 2019-05-16 RX ADMIN — Medication 3 MG: at 22:06

## 2019-05-16 ASSESSMENT — PAIN DESCRIPTION - LOCATION: LOCATION: HIP

## 2019-05-16 ASSESSMENT — PAIN DESCRIPTION - ORIENTATION: ORIENTATION: LEFT

## 2019-05-16 ASSESSMENT — PAIN SCALES - GENERAL
PAINLEVEL_OUTOF10: 7
PAINLEVEL_OUTOF10: 0

## 2019-05-16 NOTE — FLOWSHEET NOTE
05/16/19 1430   Encounter Summary   Services provided to: Patient   Referral/Consult From:   ([facility] service)   Continue Visiting   (5/16/19)   Complexity of Encounter Moderate   Length of Encounter 30 minutes   Spiritual Assessment Completed Yes   Spiritual/Amish   Type Spiritual support   Assessment Calm; Approachable   Intervention Active listening;Prayer;Scripture;Sustaining presence/ Ministry of presence; Discussed relationship with God;Discussed belief system/Scientologist practices/papito  (Patient attended [facility] service)   Outcome Expressed gratitude;Engaged in conversation;Receptive

## 2019-05-16 NOTE — PLAN OF CARE
Pt. Is out in milieu. Using 4 wheeled walker and is steady with it. Pt. Is social with others and is attending groups. Pt. Denies feeling suicidal. Denies hallucinations. Pt. Does acknowledge depression over his hip pain and is anxious to get surgery for a hip replacement arranged. Pt. Does admit to having homicidal thoughts towards his cousin. Pt. Says he helps his wife but not him. Pt. [place] his cousin is trying to make moves on his wife. Pt. Has no plan to hurt his cousin but remains very angry with him and has thoughts to hurt him. Pt. Remains safe on the unit. Q 15 minute checks for safety maintained.

## 2019-05-16 NOTE — GROUP NOTE
Group Therapy Note    Date: May 16    Group Start Time: 1100  Group End Time: 5572  Group Topic: Psychotherapy    BABAR Nolan        Group Therapy Note    Attendees: 7/12         Patient's Goal:  Able to verbalize acceptance of life and situations over which he or she has no control. Focus on the here and now of their mental health. Status After Intervention:  Unchanged    Participation Level:  Active Listener and Interactive    Participation Quality: Appropriate and Attentive      Speech:  normal      Thought Process/Content: Logical      Affective Functioning: Congruent      Mood: euthymic      Level of consciousness:  Alert and Oriented x4      Response to Learning: Progressing to goal      Endings: None Reported    Modes of Intervention: Education, Support and Socialization      Discipline Responsible: /Counselor      Signature:  BABAR Acosta

## 2019-05-16 NOTE — BH NOTE
Department of Psychiatry  Attending Progress Note  Chief Complaint: PTSD (post-traumatic stress disorder)     SUBJECTIVE:    Avanell Bence is seen in the day area. He states he continues with some depression and anxiety. He states with HI towards his cousin. He is sleeping fair. He is attending groups. He complains of lack of energy and motivation. He complains of agitation. Is complaining of traumatic reliving to some extent. He is unable to sleep. He is waiting for [facility]. to accept him so that he can go for inpatient treatment there.       OBJECTIVE    Physical  /75   Pulse 57   Temp 98.4 °F (36.9 °C) (Oral)   Resp 14   Ht 5' 9\" (1.753 m)   Wt 234 lb (106.1 kg)   BMI 34.56 kg/m²      Mental Status Evaluation:  Orientation: alertness: alert   Mood:. angry, anxious and constricted      Affect:  constricted      Appearance:  age appropriate   Activity:  Psychomotor Agitation   Gait/Posture: Normal   Speech:  delayed, increased latency of response, normal pitch and normal volume   Thought Process:  circumstantial   Thought Content:  Denies delusions and hallucinations   Sensorium:  person, place and time/date   Cognition:  grossly intact   Memory: intact   Insight:  limited   Judgment: limited   Suicidal Ideations: denies   Homicidal Ideations: Positive homicidal ideation      Medication Side Effects: absent       Attention Span attention span appeared shorter than expected for age     Medications  Current Facility-Administered Medications   Medication Dose Route Frequency Provider Last Rate Last Dose    doxycycline monohydrate (MONODOX) capsule 100 mg  100 mg Oral BID Theo Ruiz MD   100 mg at 05/16/19 0928    acetaminophen (TYLENOL) tablet 650 mg  650 mg Oral Q4H PRN DIANNA Vaughan CNP   650 mg at 05/15/19 2129    hydrOXYzine (ATARAX) tablet 25 mg  25 mg Oral TID PRN DIANNA Vaughan CNP   25 mg at 05/15/19 2130    benztropine mesylate (COGENTIN) injection 2 mg  2 mg Intramuscular BID PRN Willaim Carbajal, APRN - CNP        magnesium hydroxide (MILK OF MAGNESIA) 400 MG/5ML suspension 30 mL  30 mL Oral Daily PRN Willaim Carbajal, APRN - CNP        aluminum & magnesium hydroxide-simethicone (MAALOX) 200-200-20 MG/5ML suspension 30 mL  30 mL Oral Q6H PRN Willaim Carbajal, APRN - CNP        nicotine polacrilex (NICORETTE) gum 2 mg  2 mg Oral Q2H PRN Willaim Carbajal, APRN - CNP        albuterol (PROVENTIL) nebulizer solution 2.5 mg  2.5 mg Nebulization Q4H PRN Willaim Carbajal, APRN - CNP        albuterol sulfate  (90 Base) MCG/ACT inhaler 2 puff  2 puff Inhalation Q4H PRN Willaim Carbajal, APRN - CNP        aspirin EC tablet 81 mg  81 mg Oral Daily Willaim Carbajal, APRN - CNP   81 mg at 05/16/19 5548    atenolol (TENORMIN) tablet 25 mg  25 mg Oral Daily Willaim Carbajal, APRN - CNP   25 mg at 05/16/19 0931    atorvastatin (LIPITOR) tablet 20 mg  20 mg Oral Nightly Willaim Carbajal, APRN - CNP   20 mg at 05/15/19 2130    vitamin D (CHOLECALCIFEROL) tablet 2,000 Units  2,000 Units Oral Daily Willaim Carbajal, APRN - CNP   2,000 Units at 05/16/19 0928    fluticasone (FLONASE) 50 MCG/ACT nasal spray 1 spray  1 spray Nasal Daily Willaim Carbajal, APRN - CNP   1 spray at 05/16/19 6489    melatonin ER tablet 3 mg  3 mg Oral Nightly PRN Willaim Carbajal, APRN - CNP   3 mg at 05/15/19 2130    mirtazapine (REMERON) tablet 45 mg  45 mg Oral Nightly Willaim Carbajal, APRN - CNP   45 mg at 05/15/19 2130    pantoprazole (PROTONIX) tablet 40 mg  40 mg Oral QAM AC Willaim Carbajal, APRN - CNP   40 mg at 05/16/19 1138    polyethylene glycol (GLYCOLAX) packet 17 g  17 g Oral Daily Willaim Carbajal, APRN - CNP   17 g at 05/14/19 0817    ranolazine (RANEXA) extended release tablet 1,000 mg  1,000 mg Oral BID DIANNA Mendiola - CNP   1,000 mg at 05/16/19 3042    spironolactone (ALDACTONE) tablet 25 mg  25 mg Oral Daily DIANNA Mendiola CNP   25 mg at 05/16/19 3595    traZODone

## 2019-05-16 NOTE — ED PROVIDER NOTES
Hyperlipidemia     Hypertension     MI (myocardial infarction) ([facility] [address] 2015    Pneumonia     PTSD (post-traumatic stress disorder)     S/P CABG (coronary artery bypass graft) 10/23/15    Sleep apnea     Ventricular fibrillation ([facility] [address] 10/18/2015    x 2 DURING HEART ATTACK       SURGICAL HISTORY    Past Surgical History:   Procedure Laterality Date    CARDIAC SURGERY      10/21/2015 3 vessel CABG    CARPAL TUNNEL RELEASE      CARPAL TUNNEL RELEASE Right     [facility]  2012    [facility]    COLONOSCOPY  11/27/2017    CORONARY ARTERY BYPASS GRAFT  10/23/15    Dr. Melissa--[facility]      left middle finger    FRACTURE SURGERY      left wrist    JOINT REPLACEMENT      right knee    PILONIDAL CYST EXCISION      1974    PILONIDAL CYST EXCISION      AL COLONOSCOPY W/BIOPSY SINGLE/MULTIPLE N/A 11/27/2017    COLONOSCOPY WITH BIOPSY/ polypectomy performed by Leigh Cervantes MD at [address] [facility] EGD TRANSORAL BIOPSY SINGLE/MULTIPLE N/A 11/27/2017    EGD BIOPSY performed by Leigh Cervantes MD at [facility]      left wrist       CURRENT MEDICATIONS        ALLERGIES    Allergies   Allergen Reactions    Codeine Other (See Comments)    Hydrocodone     Pcn [Penicillins] Other (See Comments)    Sulfa Antibiotics Other (See Comments)       FAMILY HISTORY    Family History   Adopted: Yes   Family history unknown: Yes       SOCIAL HISTORY    Social History     Socioeconomic History    Marital status:      Spouse name: None    Number of children: None    Years of education: None    Highest education level: None   Occupational History    None   Social Needs    Financial resource strain: None    Food insecurity:     Worry: None     Inability: None    Transportation needs:     Medical: None     Non-medical: None   Tobacco Use    Smoking status: Former Smoker     Packs/day: 1.00     Years: 19.00 Pack years: 19.00     Types: Cigarettes     Last attempt to quit: 2006     Years since quittin.7    Smokeless tobacco: Former User     Quit date: 2000   Substance and Sexual Activity    Alcohol use: No    Drug use: Yes     Frequency: 1.0 times per week     Types: Marijuana     Comment: Pt smokes marijuana therapeutically.  Sexual activity: Yes     Partners: Female   Lifestyle    Physical activity:     Days per week: None     Minutes per session: None    Stress: None   Relationships    Social connections:     Talks on phone: None     Gets together: None     Attends Christianity service: None     Active member of club or organization: None     Attends meetings of clubs or organizations: None     Relationship status: None    Intimate partner violence:     Fear of current or ex partner: None     Emotionally abused: None     Physically abused: None     Forced sexual activity: None   Other Topics Concern    None   Social History Narrative    None       REVIEW OF SYSTEMS    Constitutional:  Denies fever, chills, weight loss or weakness   Eyes:  Denies photophobia or discharge   HENT:  Denies sore throat or ear pain   Respiratory:  Denies cough or shortness of breath   Cardiovascular:  c/o chest pain,but denies  palpitations    GI:  Denies abdominal pain, nausea, vomiting, or diarrhea   Musculoskeletal:  Denies back pain but c/o b/l lower extremity swellingh  Skin:  Denies rash   Neurologic:  Denies headache, focal weakness or sensory changes   Endocrine:  Denies polyuria or polydypsia   Lymphatic:  Denies swollen glands   Psychiatric:  Denies depression, suicidal ideation or homicidal ideation   All systems negative except as marked.      PHYSICAL EXAM    VITAL SIGNS: /64   Pulse 53   Temp 98.8 °F (37.1 °C) (Oral)   Resp 18   Ht 5' 9\" (1.753 m)   Wt 238 lb 1.6 oz (108 kg)   SpO2 94%   BMI 35.16 kg/m²    Constitutional:  Well developed, Well nourished, No acute distress, Non-toxic appearance. HENT:  Normocephalic, Atraumatic, Bilateral external ears normal, Oropharynx moist, No oral exudates, Nose normal. Neck- Normal range of motion, No tenderness, Supple, No stridor. Eyes:  PERRL, EOMI, Conjunctiva normal, No discharge. Respiratory:  Normal breath sounds, No respiratory distress, No wheezing, No chest tenderness. Cardiovascular:  Normal heart rate, Normal rhythm, No murmurs, No rubs, No gallops. GI:  Bowel sounds normal, Soft, No tenderness, No masses, No pulsatile masses. : No CVA tenderness. Musculoskeletal: B/L Lower extremity edema , worse on the left with erythema, no calf tenderness,  Intact distal pulses,, No tenderness, No cyanosis, No clubbing. Good range of motion in all major joints. No tenderness to palpation or major deformities noted. Back- No tenderness. Integument:  Warm, Dry, No erythema, No rash. Lymphatic:  No lymphadenopathy noted. Neurologic:  Alert & oriented x 3, Normal motor function, Normal sensory function, No focal deficits noted. Psychiatric:  Affect normal, Judgment normal, Mood normal.     EKG    NSR, HR 57 , Normal Axis, No ST- T wave changes, QTc 439,q waves V1. V2      RADIOLOGY    XR CHEST STANDARD (2 VW)   Final Result      Nonacute two-view chest with chronic and postsurgical changes. REEVALUATION   Denies CP  Patient was updated the results of labs and Radiology.   Spoke with Dr Tiffany Carey with Dr Kelly accepted admission       Labs  Labs Reviewed   CBC WITH AUTO DIFFERENTIAL - Abnormal; Notable for the following components:       Result Value    RBC 4.47 (*)     Eosinophils % 8 (*)     Absolute Eos # 0.50 (*)     All other components within normal limits   COMPREHENSIVE METABOLIC PANEL W/ REFLEX TO MG FOR LOW K - Abnormal; Notable for the following components:    Glucose 127 (*)     Chloride 97 (*)     All other components within normal limits   URINE RT REFLEX TO CULTURE - Abnormal; Notable for the following

## 2019-05-16 NOTE — GROUP NOTE
Group Therapy Note    Date: May 16    Group Start Time: 0900  Group End Time: 0920  Group Topic: Community Meeting    LA BRIEDEA [place], [place]           Patient's Goal for Today:  Attend groups. [facility] Notes:      Status After Intervention:  Improved    Participation Level:  Active Listener and Interactive    Participation Quality: Appropriate, Attentive and Sharing      Speech:  normal      Thought Process/Content: Logical      Affective Functioning: Congruent      Level of consciousness:  Alert and Attentive      Response to Learning: Able to verbalize current knowledge/experience, Able to verbalize/acknowledge new learning, Able to retain information, Capable of insight and Progressing to goal      Endings: None Reported       Modes of Intervention: Education, Support, Socialization, Exploration, Clarifying, Problem-solving, Confrontation, Limit-setting and Reality-testing      Discipline Responsible: Psychoeducational Specialist      Signature:  Maci Sinclair

## 2019-05-16 NOTE — PLAN OF CARE
Problem: Anger Management/Homicidal Ideation:  Goal: Absence of homicidal ideation  Description  Absence of homicidal ideation  5/16/2019 1918 by Sadie Tellez RN  Outcome: Ongoing  Note:   Patient has been calm, controlled and med complaint. Accepting of 1:1 talk time with staff. 1:1 with pt x ten minutes. Pt encouraged to attend unit programming and interact with peers and staff. Pt also encouraged to tend to hygiene and ADLs. Pt encouraged to discuss feelings with staff and feedback and reassurance provided. Reports anxiety and depression 5/10 this evening. Problem: Altered Mood, Depressive Behavior:  Goal: Able to verbalize acceptance of life and situations over which he or she has no control  Description  Able to verbalize acceptance of life and situations over which he or she has no control  5/16/2019 1918 by Sadie Tellez RN  Outcome: Ongoing  Note:   Patient denies suicidal thoughts at this time. Reports fleeting homicidal thoughts towards cousin. \"I'm still pissed off at him. \"  Patient denies auditory and visual hallucinations. Patient is taking meds and eating well. No self harm noted this shift. Patient agrees to seek staff out if negative thoughts arise. Will continue to monitor Q15 minute and intermittently. Problem: Falls - Risk of:  Goal: Will remain free from falls  Description  Will remain free from falls  5/16/2019 1918 by Sadie Tellez RN  Outcome: Ongoing  Note:   Pt remains free of falls and verbalizes understanding of individual fall risks. Pt wearing non skid footwear and encouraged to seek out staff for any assistance needed.

## 2019-05-16 NOTE — GROUP NOTE
Group Therapy Note    Date: May 16    Group Start Time: 1600  Group End Time: 1630  Group Topic: Group Therapy    [facility]    Amanda Carrero LPN        Group Therapy Note    Attendees: 4         Patient's Goal:  Stress coping     Notes:  n/a    Status After Intervention:  Improved    Participation Level:  Active Listener    Participation Quality: Appropriate      Speech:  normal      Thought Process/Content: Logical      Affective Functioning: Flat      Mood: normal      Level of consciousness:  Alert      Response to Learning: Able to verbalize current knowledge/experience      Endings: None Reported    Modes of Intervention: Education      Discipline Responsible: Licensed Practical Nurse      Signature:  Amanda Carrero LPN

## 2019-05-16 NOTE — CARE COORDINATION
[facility] COORDINATION    Writer spoke with Lennie Kehr and informed transfer needs to be initiated through Wing Cutler with [facility] contacted [facility] voice mail for Wing Cutler requesting assistance with process for pt transfer    4 pm: Wing Cutler with [employer] returned call and instructed SHREYA to coordinate with Lennie Kehr  @ [phone] ext [phone]. SHREYA attempted out reach to Lennie Kehr- phone rang but no answer/ no voice mail option.  SHREYA will continue outreach attempts for pt transfer per his request

## 2019-05-17 LAB
CULTURE: NORMAL
Lab: NORMAL
SPECIMEN DESCRIPTION: NORMAL

## 2019-05-17 PROCEDURE — 6370000000 HC RX 637 (ALT 250 FOR IP): Performed by: NURSE PRACTITIONER

## 2019-05-17 PROCEDURE — 99232 SBSQ HOSP IP/OBS MODERATE 35: CPT | Performed by: NURSE PRACTITIONER

## 2019-05-17 PROCEDURE — 1240000000 HC EMOTIONAL WELLNESS R&B

## 2019-05-17 PROCEDURE — 6370000000 HC RX 637 (ALT 250 FOR IP): Performed by: PSYCHIATRY & NEUROLOGY

## 2019-05-17 RX ORDER — IBUPROFEN 600 MG/1
600 TABLET ORAL ONCE
Status: COMPLETED | OUTPATIENT
Start: 2019-05-17 | End: 2019-05-17

## 2019-05-17 RX ADMIN — MIRTAZAPINE 45 MG: 30 TABLET, FILM COATED ORAL at 21:52

## 2019-05-17 RX ADMIN — ATENOLOL 25 MG: 25 TABLET ORAL at 10:30

## 2019-05-17 RX ADMIN — TIOTROPIUM BROMIDE 18 MCG: 18 CAPSULE ORAL; RESPIRATORY (INHALATION) at 08:37

## 2019-05-17 RX ADMIN — Medication 3 MG: at 21:52

## 2019-05-17 RX ADMIN — PANTOPRAZOLE SODIUM 40 MG: 40 TABLET, DELAYED RELEASE ORAL at 08:37

## 2019-05-17 RX ADMIN — RANOLAZINE 1000 MG: 1000 TABLET, FILM COATED, EXTENDED RELEASE ORAL at 08:37

## 2019-05-17 RX ADMIN — HYDROXYZINE HYDROCHLORIDE 25 MG: 25 TABLET, FILM COATED ORAL at 21:52

## 2019-05-17 RX ADMIN — FLUTICASONE PROPIONATE 1 SPRAY: 50 SPRAY, METERED NASAL at 08:37

## 2019-05-17 RX ADMIN — DOXYCYCLINE 100 MG: 100 CAPSULE ORAL at 21:52

## 2019-05-17 RX ADMIN — DOXYCYCLINE 100 MG: 100 CAPSULE ORAL at 08:37

## 2019-05-17 RX ADMIN — SPIRONOLACTONE 25 MG: 25 TABLET, FILM COATED ORAL at 08:37

## 2019-05-17 RX ADMIN — IBUPROFEN 600 MG: 600 TABLET, FILM COATED ORAL at 16:04

## 2019-05-17 RX ADMIN — ASPIRIN 81 MG: 81 TABLET, COATED ORAL at 08:37

## 2019-05-17 RX ADMIN — ATORVASTATIN CALCIUM 20 MG: 20 TABLET, FILM COATED ORAL at 21:52

## 2019-05-17 RX ADMIN — RANOLAZINE 1000 MG: 1000 TABLET, FILM COATED, EXTENDED RELEASE ORAL at 21:52

## 2019-05-17 RX ADMIN — POLYETHYLENE GLYCOL 3350 17 G: 17 POWDER, FOR SOLUTION ORAL at 08:44

## 2019-05-17 RX ADMIN — VITAMIN D, TAB 1000IU (100/BT) 2000 UNITS: 25 TAB at 08:37

## 2019-05-17 ASSESSMENT — PAIN SCALES - GENERAL: PAINLEVEL_OUTOF10: 6

## 2019-05-17 NOTE — PLAN OF CARE
Problem: Falls - Risk of:  Goal: Will remain free from falls  Description  Will remain free from falls  Outcome: Ongoing  No falls noted this shift. Patient room is free of clutter. Patient ambulates with 4 wheel walker appropriately. Non skid footwear in place. Safety precautions reinforced. Problem: Anger Management/Homicidal Ideation:  Goal: Absence of homicidal ideation  Description  Absence of homicidal ideation  Outcome: Ongoing  Patient is alert, observed in day room. Patient is currently denying homicidal thoughts, but did state \"I want to beat the shit out of him. \" Coping skills related to anger explored and discussed. Patient is currently denying depression and anxiety. Patient is active and social on unit, goes to all unit programming. Patient reports adequate sleep and appetite. Patient hygiene is adequate, showered today. Patient is pleasant during assessment. No further concerns voiced. Will continue to reinforce, monitor and ensure safety.

## 2019-05-17 NOTE — PROGRESS NOTES
Department of Psychiatry  Attending Progress Note  Chief Complaint: PTSD (post-traumatic stress disorder)     SUBJECTIVE:  Tonie Martinez is seen today in the day area. He states he is still a little depressed and anxious. He continued to have HI towards his cousin, but adds \"it is a really messed up situation. He states his sleep is OK. He states that his pain usually prevents him from a full nights sleep. He has some irritability and agitation at times. He does have some flashbacks at times related to his time in the [address] [place] [address]. He is waiting to hear if the [facility] in [place] will accept him for pain management. OBJECTIVE    Physical  BP (!) 142/91   Pulse 59   Temp 98.2 °F (36.8 °C) (Oral)   Resp 16   Ht 5' 9\" (1.753 m)   Wt 234 lb (106.1 kg)   BMI 34.56 kg/m²      Mental Status Evaluation:  Orientation: alertness: alert   Mood:. angry and anxious      Affect:  constricted      Appearance:  age appropriate and bearded   Activity:  Psychomotor Agitation   Gait/Posture: Normal   Speech:  normal pitch and normal volume   Thought Process:  circumstantial   Thought Content:  Denies delusions and hallucinations   Sensorium:  person, place, time/date and situation   Cognition:  grossly intact   Memory: intact   Insight:  limited   Judgment: limited   Suicidal Ideations: denies suicidal ideation   Homicidal Ideations: Positive for homicidal ideation      Medication Side Effects: absent       Attention Span attention span appeared shorter than expected for age       Labs  Results for Gely Benitez (MRN [id number]) as of 5/17/2019 15:10   Ref.  Range 5/14/2019 07:23 5/14/2019 07:58 5/14/2019 12:03 5/14/2019 16:39 5/14/2019 20:44   Sodium Latest Ref Range: 135 - 144 mmol/L 137       Potassium Latest Ref Range: 3.7 - 5.3 mmol/L 4.8       Chloride Latest Ref Range: 98 - 107 mmol/L 98       CO2 Latest Ref Range: 20 - 31 mmol/L 25       BUN Latest Ref Range: 8 - 23 mg/dL 17       Creatinine Latest Ref Range: 0.70 - 1.20 mg/dL 0.97       Bun/Cre Ratio Latest Ref Range: 9 - 20  NOT REPORTED       Anion Gap Latest Ref Range: 9 - 17 mmol/L 14       GFR Non- Latest Ref Range: >60 mL/min >60       GFR  Latest Ref Range: >60 mL/min >60       Glucose Latest Ref Range: 70 - 99 mg/dL 92       POC Glucose Latest Ref Range: 75 - 110 mg/dL  103 124 (H) 120 (H) 104   Calcium Latest Ref Range: 8.6 - 10.4 mg/dL 9.4       Albumin/Globulin Ratio Latest Ref Range: 1.0 - 2.5  NOT REPORTED       Total Protein Latest Ref Range: 6.4 - 8.3 g/dL 6.9       GFR Comment Unknown Pend       GFR Staging Unknown NOT REPORTED       Chol/HDL Ratio Latest Ref Range: <5  2.6       Cholesterol Latest Ref Range: <200 mg/dL 127       HDL Cholesterol Latest Ref Range: >40 mg/dL 48       LDL Cholesterol Latest Ref Range: 0 - 130 mg/dL 54       Triglycerides Latest Ref Range: <150 mg/dL 126       VLDL Latest Ref Range: 1 - 30 mg/dL NOT REPORTED       Albumin Latest Ref Range: 3.5 - 5.2 g/dL 4.0       Alk Phos Latest Ref Range: 40 - 129 U/L 87       ALT Latest Ref Range: 5 - 41 U/L 66 (H)       AST Latest Ref Range: <40 U/L 58 (H)       Bilirubin Latest Ref Range: 0.3 - 1.2 mg/dL 0.60       Hemoglobin A1C Latest Ref Range: 4.0 - 6.0 % 5.1       eAG (mg/dL) Latest Units: mg/dL 100       TSH Latest Ref Range: 0.30 - 5.00 mIU/L 1.92       Thyroxine, Free Latest Ref Range: 0.93 - 1.70 ng/dL 1.25       WBC Latest Ref Range: 3.5 - 11.0 k/uL 5.3       RBC Latest Ref Range: 4.5 - 5.9 m/uL 4.79       Hemoglobin Quant Latest Ref Range: 13.5 - 17.5 g/dL 14.7       Hematocrit Latest Ref Range: 41 - 53 % 44.6       MCV Latest Ref Range: 80 - 100 fL 93.1       MCH Latest Ref Range: 26 - 34 pg 30.7       MCHC Latest Ref Range: 31 - 37 g/dL 33.0       MPV Latest Ref Range: 6.0 - 12.0 fL 7.5       RDW Latest Ref Range: 11.5 - 14.9 % 14.5       Platelet Count Latest Ref Range: 150 - 450 k/uL 202       Platelet Estimate Unknown NOT REPORTED Absolute Mono # Latest Ref Range: 0.1 - 1.3 k/uL 0.50       Eosinophils % Latest Ref Range: 0 - 4 % 8 (H)       Basophils # Latest Ref Range: 0.0 - 0.2 k/uL 0.00       Differential Type Unknown NOT REPORTED       Seg Neutrophils Latest Ref Range: 36 - 66 % 61       Segs Absolute Latest Ref Range: 1.3 - 9.1 k/uL 3.30       Lymphocytes Latest Ref Range: 24 - 44 % 22 (L)       Absolute Lymph # Latest Ref Range: 1.0 - 4.8 k/uL 1.20       Monocytes Latest Ref Range: 1 - 7 % 9 (H)       Absolute Eos # Latest Ref Range: 0.0 - 0.4 k/uL 0.40       Basophils Latest Ref Range: 0 - 2 % 0       Immature Granulocytes Latest Ref Range: 0 % NOT REPORTED       WBC Morphology Unknown NOT REPORTED       RBC Morphology Unknown NOT REPORTED       Absolute Immature Granulocyte Latest Ref Range: 0.00 - 0.30 k/uL NOT REPORTED       NRBC Automated Latest Units: per 100 WBC NOT REPORTED           Medications  Current Facility-Administered Medications   Medication Dose Route Frequency Provider Last Rate Last Dose    doxycycline monohydrate (MONODOX) capsule 100 mg  100 mg Oral BID Akila Felipe MD   100 mg at 05/17/19 0837    acetaminophen (TYLENOL) tablet 650 mg  650 mg Oral Q4H PRN DIANNA Walls - CNP   650 mg at 05/15/19 2129    hydrOXYzine (ATARAX) tablet 25 mg  25 mg Oral TID PRN DIANNA Walls - CNP   25 mg at 05/16/19 2206    benztropine mesylate (COGENTIN) injection 2 mg  2 mg Intramuscular BID PRN DIANNA Walls - CNP        magnesium hydroxide (MILK OF MAGNESIA) 400 MG/5ML suspension 30 mL  30 mL Oral Daily PRN DIANNA Walls CNP        aluminum & magnesium hydroxide-simethicone (MAALOX) 200-200-20 MG/5ML suspension 30 mL  30 mL Oral Q6H PRN DIANNA Walls - CNP        nicotine polacrilex (NICORETTE) gum 2 mg  2 mg Oral Q2H PRN DIANNA Walls - CNP        albuterol (PROVENTIL) nebulizer solution 2.5 mg  2.5 mg Nebulization Q4H PRN DIANNA Walls - DIAMANTE        albuterol sulfate  (90 Base) MCG/ACT inhaler 2 puff  2 puff Inhalation Q4H PRN Midge Blakes, APRN - CNP        aspirin EC tablet 81 mg  81 mg Oral Daily Midge Blakes, APRN - CNP   81 mg at 05/17/19 7399    atenolol (TENORMIN) tablet 25 mg  25 mg Oral Daily Midge Blakes, APRN - CNP   25 mg at 05/17/19 1030    atorvastatin (LIPITOR) tablet 20 mg  20 mg Oral Nightly Midge Blakes, APRN - CNP   20 mg at 05/16/19 2206    vitamin D (CHOLECALCIFEROL) tablet 2,000 Units  2,000 Units Oral Daily Midge Blakes, APRN - CNP   2,000 Units at 05/17/19 0837    fluticasone (FLONASE) 50 MCG/ACT nasal spray 1 spray  1 spray Nasal Daily Midge Blakes, APRN - CNP   1 spray at 05/17/19 0837    melatonin ER tablet 3 mg  3 mg Oral Nightly PRN Midge Blakes, APRN - CNP   3 mg at 05/16/19 2206    mirtazapine (REMERON) tablet 45 mg  45 mg Oral Nightly Midge Blakes, APRN - CNP   45 mg at 05/16/19 2206    pantoprazole (PROTONIX) tablet 40 mg  40 mg Oral QAM AC Midge Blakes, APRN - CNP   40 mg at 05/17/19 0837    polyethylene glycol (GLYCOLAX) packet 17 g  17 g Oral Daily Midge Blakes, APRN - CNP   17 g at 05/17/19 0844    ranolazine (RANEXA) extended release tablet 1,000 mg  1,000 mg Oral BID Midge Blakes, APRN - CNP   1,000 mg at 05/17/19 4109    spironolactone (ALDACTONE) tablet 25 mg  25 mg Oral Daily Midge Blakes, APRN - CNP   25 mg at 05/17/19 9179    traZODone (DESYREL) tablet 100 mg  100 mg Oral Nightly PRN Midge Blakes, APRN - CNP   100 mg at 05/14/19 2120    tiotropium (SPIRIVA) inhalation capsule 18 mcg  18 mcg Inhalation Daily Midge Blakes, APRN - CNP   18 mcg at 05/17/19 2906         doxycycline monohydrate  100 mg Oral BID    aspirin  81 mg Oral Daily    atenolol  25 mg Oral Daily    atorvastatin  20 mg Oral Nightly    vitamin D  2,000 Units Oral Daily    fluticasone  1 spray Nasal Daily    mirtazapine  45 mg Oral Nightly    pantoprazole  40 mg Oral QAM AC    polyethylene glycol  17 g Oral Daily    ranolazine  1,000 mg Oral BID    spironolactone  25 mg Oral Daily    tiotropium  18 mcg Inhalation Daily       ASSESSMENT  PTSD (post-traumatic stress disorder)     Patient's Response to Treatment: positive    PLAN:  · Continue inpatient psychiatric treatment  · Supportive therapy with medication management. Reviewed risks and benefits as well as potential side effects with patient. Continue current medications.   · Therapeutic activities and groups  · Follow up at [facility] after symptoms stabilized               Electronically signed by DIANNA Maldonado CNP on 5/17/2019 at 3:07 PM.    Dragon voice recognition software used in portions of this document.

## 2019-05-17 NOTE — GROUP NOTE
Group Therapy Note    Date: May 17    Group Start Time: 0845  Group End Time: 0900  Group Topic: Community Meeting    LA Akhtar, [address]        Group Therapy Note    Attendees: 8/18         Patient's Goal:  To work with social work on getting transferred to the [place]     Notes:  PT attended and participated in group. Status After Intervention:  Improved    Participation Level:  Active Listener and Interactive    Participation Quality: Appropriate, Attentive and Sharing      Speech:  normal      Thought Process/Content: Logical      Affective Functioning: Congruent      Mood: euthymic      Level of consciousness:  Alert and Attentive      Response to Learning: Able to retain information and Progressing to goal      Endings: None Reported    Modes of Intervention: Education, Support, Socialization, Exploration, Problem-solving and Reality-testing      Discipline Responsible: Psychoeducational Specialist      Signature:  Jazmyn Jefferson

## 2019-05-17 NOTE — GROUP NOTE
Group Therapy Note    Date: May 17    Group Start Time: 1000  Group End Time: 5443  Group Topic: Recreational    ST[facility] LALITHA Akhtar, [place]        Group Therapy Note    Attendees: 7/10         Patient's Goal:  Pt will demonstrate increased interpersonal interactions. Notes:  Pt attended and participated in group    Status After Intervention:  Improved    Participation Level:  Active Listener and Interactive    Participation Quality: Appropriate, Attentive, Sharing and Supportive      Speech:  normal      Thought Process/Content: Logical  Linear      Affective Functioning: Congruent      Mood: euthymic      Level of consciousness:  Alert, Oriented x4 and Attentive      Response to Learning: Able to verbalize current knowledge/experience, Able to verbalize/acknowledge new learning, Able to retain information and Progressing to goal      Endings: None Reported    Modes of Intervention: Education, Support, Socialization, Exploration, Problem-solving, Activity and Reality-testing      Discipline Responsible: Psychoeducational Specialist      Signature:  Cyn Berkowitz

## 2019-05-17 NOTE — GROUP NOTE
Group Therapy Note    Date: May 17    Group Start Time: 1450  Group End Time: 1630  Group Topic: Healthy Living/Wellness    Lauryn Bernal 1729, LPN        Group Therapy Note    Attendees: 4/17

## 2019-05-17 NOTE — GROUP NOTE
Group Therapy Note    Date: May 17    Group Start Time: 1430  Group End Time: 3187  Group Topic: Recreational    [place]    Group Therapy Note    Attendees: 8         Patient's Goal:  Patient will demonstrate increased interpersonal interactions. Notes:  Patient attended group and actively participated in task at hand. Patient conversed appropriately with peers and Edilma Hunt. Status After Intervention:  Improved    Participation Level:  Active Listener and Interactive    Participation Quality: Appropriate, Attentive, Sharing and Supportive      Speech:  normal      Thought Process/Content: Logical      Affective Functioning: Congruent      Level of consciousness:  Alert and Attentive      Response to Learning: Able to verbalize current knowledge/experience, Able to verbalize/acknowledge new learning, Able to retain information, Capable of insight and Progressing to goal      Endings: None Reported       Modes of Intervention: Socialization, Exploration, Problem-solving, Activity, Limit-setting and Reality-testing      Discipline Responsible: Psychoeducational Specialist      Signature:  [id number]

## 2019-05-18 PROCEDURE — 99232 SBSQ HOSP IP/OBS MODERATE 35: CPT | Performed by: NURSE PRACTITIONER

## 2019-05-18 PROCEDURE — 6370000000 HC RX 637 (ALT 250 FOR IP): Performed by: PSYCHIATRY & NEUROLOGY

## 2019-05-18 PROCEDURE — 6370000000 HC RX 637 (ALT 250 FOR IP): Performed by: NURSE PRACTITIONER

## 2019-05-18 PROCEDURE — 1240000000 HC EMOTIONAL WELLNESS R&B

## 2019-05-18 RX ADMIN — ATENOLOL 25 MG: 25 TABLET ORAL at 08:48

## 2019-05-18 RX ADMIN — VITAMIN D, TAB 1000IU (100/BT) 2000 UNITS: 25 TAB at 08:47

## 2019-05-18 RX ADMIN — PANTOPRAZOLE SODIUM 40 MG: 40 TABLET, DELAYED RELEASE ORAL at 08:48

## 2019-05-18 RX ADMIN — RANOLAZINE 1000 MG: 1000 TABLET, FILM COATED, EXTENDED RELEASE ORAL at 23:45

## 2019-05-18 RX ADMIN — DOXYCYCLINE 100 MG: 100 CAPSULE ORAL at 22:57

## 2019-05-18 RX ADMIN — Medication 3 MG: at 22:57

## 2019-05-18 RX ADMIN — HYDROXYZINE HYDROCHLORIDE 25 MG: 25 TABLET, FILM COATED ORAL at 22:57

## 2019-05-18 RX ADMIN — MIRTAZAPINE 45 MG: 30 TABLET, FILM COATED ORAL at 22:57

## 2019-05-18 RX ADMIN — DOXYCYCLINE 100 MG: 100 CAPSULE ORAL at 08:48

## 2019-05-18 RX ADMIN — ASPIRIN 81 MG: 81 TABLET, COATED ORAL at 08:48

## 2019-05-18 RX ADMIN — ATORVASTATIN CALCIUM 20 MG: 20 TABLET, FILM COATED ORAL at 22:57

## 2019-05-18 RX ADMIN — FLUTICASONE PROPIONATE 1 SPRAY: 50 SPRAY, METERED NASAL at 08:47

## 2019-05-18 RX ADMIN — RANOLAZINE 1000 MG: 1000 TABLET, FILM COATED, EXTENDED RELEASE ORAL at 11:59

## 2019-05-18 RX ADMIN — SPIRONOLACTONE 25 MG: 25 TABLET, FILM COATED ORAL at 08:48

## 2019-05-18 RX ADMIN — TIOTROPIUM BROMIDE 18 MCG: 18 CAPSULE ORAL; RESPIRATORY (INHALATION) at 08:47

## 2019-05-18 NOTE — GROUP NOTE
Group Therapy Note    Date: May 18    Group Start Time: 1600  Group End Time: 1630  Group Topic: Psychoeducation    CZ BHDEA Matias LPN        Group Therapy Note    Attendees: 7/16         Patient's Goal:  To attend groups    Notes:  Participated in group    Status After Intervention:  Improved    Participation Level:  Active Listener    Participation Quality: Appropriate, Attentive and Sharing      Speech:  normal      Thought Process/Content: Logical      Affective Functioning: Congruent      Mood: euthymic      Level of consciousness:  Alert, Oriented x4 and Attentive      Response to Learning: Capable of insight      Endings: None Reported    Modes of Intervention: Socialization      Discipline Responsible: Licensed Practical Nurse      Signature:  Salima Connell LPN

## 2019-05-18 NOTE — GROUP NOTE
Group Therapy Note    Date: May 18    Group Start Time: 1000  Group End Time: 8691  Group Topic: Group Therapy    [facility]    EVELYN Phoenix LSW        Group Therapy Note    Attendees: 4         Patient's Goal:  Increase interpersonal relationship skills    Notes:  Pt was an active participant in group discussion, offered positive feedback and encouragement to others    Status After Intervention:  Unchanged    Participation Level:  Active Listener and Interactive    Participation Quality: Appropriate, Attentive, Sharing and Supportive      Speech:  normal      Thought Process/Content: Logical      Affective Functioning: Congruent      Mood: depressed      Level of consciousness:  Alert, Oriented x4 and Attentive      Response to Learning: Able to verbalize current knowledge/experience, Able to verbalize/acknowledge new learning, Able to change behavior and Progressing to goal      Endings: None Reported    Modes of Intervention: Support, Socialization, Exploration and Clarifying      Discipline Responsible: /Counselor      Signature:  EEVLYN Phoenix LSW

## 2019-05-18 NOTE — GROUP NOTE
Group Therapy Note    Date: May 17    Group Start Time: 0830  Group End Time: 0900  Group Topic: Wrap-Up    [facility] ANTWAN Noyola LPN    Patient actively participated in 2030 Wrap-up group session.      Group Therapy Note    Attendees: 8               Signature:  Meliton Noyola LPN

## 2019-05-18 NOTE — GROUP NOTE
Group Therapy Note    Date: May 18    Group Start Time: 1330  Group End Time: 6769  Group Topic: Cognitive Skills    [facility] PRISCILA Mayer    Patient's Goal:  Improve cognitive functioning, demonstrate increased interpersonal interaction     Notes:  Pt attend group, participated in activity and was supportive of peers. Status After Intervention:  Improved    Participation Level:  Active Listener and Interactive    Participation Quality: Appropriate, Attentive and Supportive    Speech:  normal    Thought Process/Content: Logical    Affective Functioning: Congruent    Level of consciousness:  Alert, Oriented x4 and Attentive    Response to Learning: Able to verbalize current knowledge/experience, Capable of insight and Progressing to goal    Endings: None Reported    Modes of Intervention: Education, Support, Exploration, Problem-solving and Activity    Discipline Responsible: Psychoeducational Specialist    Signature:  Sukumar Khoury

## 2019-05-18 NOTE — PROGRESS NOTES
REPORTED       Anion Gap Latest Ref Range: 9 - 17 mmol/L 14       GFR Non- Latest Ref Range: >60 mL/min >60       GFR  Latest Ref Range: >60 mL/min >60       Glucose Latest Ref Range: 70 - 99 mg/dL 92       POC Glucose Latest Ref Range: 75 - 110 mg/dL  103 124 (H) 120 (H) 104   Calcium Latest Ref Range: 8.6 - 10.4 mg/dL 9.4       Albumin/Globulin Ratio Latest Ref Range: 1.0 - 2.5  NOT REPORTED       Total Protein Latest Ref Range: 6.4 - 8.3 g/dL 6.9       GFR Comment Unknown Pend       GFR Staging Unknown NOT REPORTED       Chol/HDL Ratio Latest Ref Range: <5  2.6       Cholesterol Latest Ref Range: <200 mg/dL 127       HDL Cholesterol Latest Ref Range: >40 mg/dL 48       LDL Cholesterol Latest Ref Range: 0 - 130 mg/dL 54       Triglycerides Latest Ref Range: <150 mg/dL 126       VLDL Latest Ref Range: 1 - 30 mg/dL NOT REPORTED       Albumin Latest Ref Range: 3.5 - 5.2 g/dL 4.0       Alk Phos Latest Ref Range: 40 - 129 U/L 87       ALT Latest Ref Range: 5 - 41 U/L 66 (H)       AST Latest Ref Range: <40 U/L 58 (H)       Bilirubin Latest Ref Range: 0.3 - 1.2 mg/dL 0.60       Hemoglobin A1C Latest Ref Range: 4.0 - 6.0 % 5.1       eAG (mg/dL) Latest Units: mg/dL 100       TSH Latest Ref Range: 0.30 - 5.00 mIU/L 1.92       Thyroxine, Free Latest Ref Range: 0.93 - 1.70 ng/dL 1.25       WBC Latest Ref Range: 3.5 - 11.0 k/uL 5.3       RBC Latest Ref Range: 4.5 - 5.9 m/uL 4.79       Hemoglobin Quant Latest Ref Range: 13.5 - 17.5 g/dL 14.7       Hematocrit Latest Ref Range: 41 - 53 % 44.6       MCV Latest Ref Range: 80 - 100 fL 93.1       MCH Latest Ref Range: 26 - 34 pg 30.7       MCHC Latest Ref Range: 31 - 37 g/dL 33.0       MPV Latest Ref Range: 6.0 - 12.0 fL 7.5       RDW Latest Ref Range: 11.5 - 14.9 % 14.5       Platelet Count Latest Ref Range: 150 - 450 k/uL 202       Platelet Estimate Unknown NOT REPORTED       Absolute Mono # Latest Ref Range: 0.1 - 1.3 k/uL 0.50       Eosinophils % Latest Ref Range: 0 - 4 % 8 (H)       Basophils # Latest Ref Range: 0.0 - 0.2 k/uL 0.00       Differential Type Unknown NOT REPORTED       Seg Neutrophils Latest Ref Range: 36 - 66 % 61       Segs Absolute Latest Ref Range: 1.3 - 9.1 k/uL 3.30       Lymphocytes Latest Ref Range: 24 - 44 % 22 (L)       Absolute Lymph # Latest Ref Range: 1.0 - 4.8 k/uL 1.20       Monocytes Latest Ref Range: 1 - 7 % 9 (H)       Absolute Eos # Latest Ref Range: 0.0 - 0.4 k/uL 0.40       Basophils Latest Ref Range: 0 - 2 % 0       Immature Granulocytes Latest Ref Range: 0 % NOT REPORTED       WBC Morphology Unknown NOT REPORTED       RBC Morphology Unknown NOT REPORTED       Absolute Immature Granulocyte Latest Ref Range: 0.00 - 0.30 k/uL NOT REPORTED       NRBC Automated Latest Units: per 100 WBC NOT REPORTED           Medications  Current Facility-Administered Medications   Medication Dose Route Frequency Provider Last Rate Last Dose    doxycycline monohydrate (MONODOX) capsule 100 mg  100 mg Oral BID Rozina Tello MD   100 mg at 05/18/19 0848    acetaminophen (TYLENOL) tablet 650 mg  650 mg Oral Q4H PRN DIANNA Scott CNP   650 mg at 05/15/19 2129    hydrOXYzine (ATARAX) tablet 25 mg  25 mg Oral TID PRN DIANNA Scott CNP   25 mg at 05/17/19 2152    benztropine mesylate (COGENTIN) injection 2 mg  2 mg Intramuscular BID PRN DIANNA Scott CNP        magnesium hydroxide (MILK OF MAGNESIA) 400 MG/5ML suspension 30 mL  30 mL Oral Daily PRN DIANNA Scott CNP        aluminum & magnesium hydroxide-simethicone (MAALOX) 200-200-20 MG/5ML suspension 30 mL  30 mL Oral Q6H PRN DIANNA Scott CNP        nicotine polacrilex (NICORETTE) gum 2 mg  2 mg Oral Q2H PRN DIANNA Scott CNP        albuterol (PROVENTIL) nebulizer solution 2.5 mg  2.5 mg Nebulization Q4H PRN DIANNA Scott CNP        albuterol sulfate  (90 Base) MCG/ACT inhaler 2 puff  2 puff Inhalation Q4H PRN Roshni Walters APRN - CNP        aspirin EC tablet 81 mg  81 mg Oral Daily Roshni Walters APRN - CNP   81 mg at 05/18/19 0848    atenolol (TENORMIN) tablet 25 mg  25 mg Oral Daily Roshni Walters, APRN - CNP   25 mg at 05/18/19 0848    atorvastatin (LIPITOR) tablet 20 mg  20 mg Oral Nightly Roshni Walters APRN - CNP   20 mg at 05/17/19 2152    vitamin D (CHOLECALCIFEROL) tablet 2,000 Units  2,000 Units Oral Daily Roshni Walters, APRN - CNP   2,000 Units at 05/18/19 0847    fluticasone (FLONASE) 50 MCG/ACT nasal spray 1 spray  1 spray Nasal Daily DIANNA Scott - CNP   1 spray at 05/18/19 0847    melatonin ER tablet 3 mg  3 mg Oral Nightly PRN Roshni Walters APRN - CNP   3 mg at 05/17/19 2152    mirtazapine (REMERON) tablet 45 mg  45 mg Oral Nightly Roshni Walters APRN - CNP   45 mg at 05/17/19 2152    pantoprazole (PROTONIX) tablet 40 mg  40 mg Oral QAM AC Roshni Walters APRN - CNP   40 mg at 05/18/19 0848    polyethylene glycol (GLYCOLAX) packet 17 g  17 g Oral Daily Roshnichantelle Walters APRN - CNP   17 g at 05/17/19 0844    ranolazine (RANEXA) extended release tablet 1,000 mg  1,000 mg Oral BID Roshni Walters APRN - CNP   1,000 mg at 05/18/19 1159    spironolactone (ALDACTONE) tablet 25 mg  25 mg Oral Daily Roshni Walters APRN - CNP   25 mg at 05/18/19 0848    traZODone (DESYREL) tablet 100 mg  100 mg Oral Nightly PRN Roshni Walters, APRN - CNP   100 mg at 05/14/19 2120    tiotropium (SPIRIVA) inhalation capsule 18 mcg  18 mcg Inhalation Daily Roshni Walters APRN - CNP   18 mcg at 05/18/19 0847         doxycycline monohydrate  100 mg Oral BID    aspirin  81 mg Oral Daily    atenolol  25 mg Oral Daily    atorvastatin  20 mg Oral Nightly    vitamin D  2,000 Units Oral Daily    fluticasone  1 spray Nasal Daily    mirtazapine  45 mg Oral Nightly    pantoprazole  40 mg Oral QAM AC    polyethylene glycol  17 g Oral Daily    ranolazine  1,000 mg Oral BID    spironolactone  25 mg Oral Daily    tiotropium  18 mcg Inhalation Daily       ASSESSMENT  PTSD (post-traumatic stress disorder)     Patient's Response to Treatment: positive    PLAN:  · Continue inpatient psychiatric treatment  · Supportive therapy with medication management. Reviewed risks and benefits as well as potential side effects with patient. Continue current medications.   · Therapeutic activities and groups  · Follow up at [facility] after symptoms stabilized               Electronically signed by DIANNA Murphy CNP on 5/18/2019 at 2:24 PM.    Dragon voice recognition software used in portions of this document.

## 2019-05-18 NOTE — PLAN OF CARE
Problem: Altered Mood, Depressive Behavior:  Goal: Able to verbalize acceptance of life and situations over which he or she has no control  Description  Able to verbalize acceptance of life and situations over which he or she has no control  Outcome: Ongoing  Note:   Pt denies SI/HI - no self harm has occurred. Pt states he is beginning to feel more hopeful and that he is \"learning to deal with things from the past.\" Pt states he is utilizing effective coping mechanisms and trying to learn more from groups. Pt selectively social with peers, keeps to back group room watching TV. Pt is open during talk time with the writer. Pt safety maintained per safety checks every 15 minutes and irregular rounding. Problem: Falls - Risk of:  Goal: Will remain free from falls  Description  Will remain free from falls  5/17/2019 2018 by Ariana Vázquez RN  Outcome: Ongoing  Note:   Pt remains free of falls and verbalizes understanding of individual fall risks. Pt gait is steady with assistive device. Pt wearing non-skid footwear and encouraged to seek out staff for any assistance needed. Pt utilizes proper use of 4-wheel walker. Pt safety maintained per safety checks every 15 minutes and irregular rounding.

## 2019-05-19 PROCEDURE — 6370000000 HC RX 637 (ALT 250 FOR IP): Performed by: NURSE PRACTITIONER

## 2019-05-19 PROCEDURE — 6370000000 HC RX 637 (ALT 250 FOR IP): Performed by: PSYCHIATRY & NEUROLOGY

## 2019-05-19 PROCEDURE — 90833 PSYTX W PT W E/M 30 MIN: CPT | Performed by: NURSE PRACTITIONER

## 2019-05-19 PROCEDURE — 99232 SBSQ HOSP IP/OBS MODERATE 35: CPT | Performed by: NURSE PRACTITIONER

## 2019-05-19 PROCEDURE — 1240000000 HC EMOTIONAL WELLNESS R&B

## 2019-05-19 RX ADMIN — VITAMIN D, TAB 1000IU (100/BT) 2000 UNITS: 25 TAB at 08:33

## 2019-05-19 RX ADMIN — RANOLAZINE 1000 MG: 1000 TABLET, FILM COATED, EXTENDED RELEASE ORAL at 14:32

## 2019-05-19 RX ADMIN — ASPIRIN 81 MG: 81 TABLET, COATED ORAL at 08:33

## 2019-05-19 RX ADMIN — MIRTAZAPINE 45 MG: 30 TABLET, FILM COATED ORAL at 22:03

## 2019-05-19 RX ADMIN — Medication 3 MG: at 22:04

## 2019-05-19 RX ADMIN — PANTOPRAZOLE SODIUM 40 MG: 40 TABLET, DELAYED RELEASE ORAL at 08:33

## 2019-05-19 RX ADMIN — RANOLAZINE 1000 MG: 1000 TABLET, FILM COATED, EXTENDED RELEASE ORAL at 22:05

## 2019-05-19 RX ADMIN — SPIRONOLACTONE 25 MG: 25 TABLET, FILM COATED ORAL at 08:33

## 2019-05-19 RX ADMIN — FLUTICASONE PROPIONATE 1 SPRAY: 50 SPRAY, METERED NASAL at 08:33

## 2019-05-19 RX ADMIN — DOXYCYCLINE 100 MG: 100 CAPSULE ORAL at 22:04

## 2019-05-19 RX ADMIN — MAGNESIUM HYDROXIDE 30 ML: 400 SUSPENSION ORAL at 22:11

## 2019-05-19 RX ADMIN — HYDROXYZINE HYDROCHLORIDE 25 MG: 25 TABLET, FILM COATED ORAL at 22:04

## 2019-05-19 RX ADMIN — ATORVASTATIN CALCIUM 20 MG: 20 TABLET, FILM COATED ORAL at 22:04

## 2019-05-19 RX ADMIN — DOXYCYCLINE 100 MG: 100 CAPSULE ORAL at 08:32

## 2019-05-19 RX ADMIN — TIOTROPIUM BROMIDE 18 MCG: 18 CAPSULE ORAL; RESPIRATORY (INHALATION) at 08:33

## 2019-05-19 RX ADMIN — ATENOLOL 25 MG: 25 TABLET ORAL at 08:33

## 2019-05-19 NOTE — GROUP NOTE
Group Therapy Note    Date: May 19    Group Start Time: 1100  Group End Time: 1120  Group Topic: Healthy Living/Wellness    [facility]    Concha Fleischer, RN; Emily Dolan RN; Rosita Marks LPN;  Araceli Burton LPN        Group Therapy Note    Attendees: 15/15         Patient's Goal:  Safety group    Notes:  Patient was in dayroom during room check        Signature:  Concha Fleischer, RN

## 2019-05-19 NOTE — PLAN OF CARE
Problem: Anger Management/Homicidal Ideation:  Goal: Absence of homicidal ideation  Description  Absence of homicidal ideation  5/18/2019 2022 by Tena Rincon RN  Outcome: Ongoing  Note:   Pt denies SI/HI - no self harm has occurred. Pt has been calm, controlled, cooperative. Pt remains in behavioral control, no angry outbursts. Pt is social in the milieu with select peers. Pt open during talk time with writer. Pt has flat affect but brightens upon approach. Pt reports he hopes to talk to a  Monday about discharge planning. Pt is medication compliant. Pt safety maintained per safety checks every 15 minutes and irregular rounding. Problem: Falls - Risk of:  Goal: Will remain free from falls  Description  Will remain free from falls  5/18/2019 2022 by Tena Rincon RN  Outcome: Ongoing  Note:   Pt remains free of falls and verbalizes understanding of individual fall risks. Pt gait is steady with assistive device. Pt wearing non-skid footwear and encouraged to seek out staff for any assistance needed. Pt utilizes proper use of four wheel walker. Pt safety maintained per safety checks every 15 minutes and irregular rounding.
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
## 2019-05-19 NOTE — GROUP NOTE
Group Therapy Note    Date: May 19    Group Start Time: 1330  Group End Time: 2924  Group Topic: Recreational    [facility] LALITHA Thomas, [address]    Group Therapy Note    Attendees: 9      Patient's Goal:  To demonstrate increased interpersonal skills. Notes:  Patient attended group and actively participated in task at hand. Patient conversed appropriately with peers and Maggie Leavitt. Status After Intervention:  Improved    Participation Level:  Active Listener and Interactive    Participation Quality: Appropriate, Attentive and Sharing      Speech:  normal      Thought Process/Content: Logical      Affective Functioning: Congruent      Level of consciousness:  Alert, Oriented x4 and Attentive      Response to Learning: Able to verbalize current knowledge/experience, Able to verbalize/acknowledge new learning, Able to retain information, Capable of insight and Progressing to goal      Endings: None Reported    Modes of Intervention: Education, Socialization, Exploration, Clarifying, Problem-solving, Activity, Limit-setting and Reality-testing      Discipline Responsible: Psychoeducational Specialist      Signature:  Kalani Rodriguez

## 2019-05-19 NOTE — GROUP NOTE
Group Therapy Note    Date: May 19    Group Start Time: 1000  Group End Time: 9157  Group Topic: Group Therapy    [facility]    EVELYN Aranda LSW        Group Therapy Note    Attendees: 7         Patient's Goal:  Increase interpersonal relationship skills     Notes:  Pt was an active participant in group discussion    Status After Intervention:  Unchanged    Participation Level:  Active Listener and Interactive    Participation Quality: Appropriate, Attentive, Sharing and Supportive      Speech:  normal      Thought Process/Content: Logical      Affective Functioning: Congruent      Mood: depressed      Level of consciousness:  Alert, Oriented x4 and Attentive      Response to Learning: Able to verbalize current knowledge/experience, Able to change behavior and Progressing to goal      Endings: None Reported    Modes of Intervention: Support, Socialization, Exploration, Clarifying and Activity      Discipline Responsible: /Counselor      Signature:  EVELYN Aranda LSW

## 2019-05-19 NOTE — GROUP NOTE
Group Therapy Note    Date: May 19    Group Start Time: 0855  Group End Time: 0920  Group Topic: Community Meeting    [facility] [place], Jearlean Staple         Patient's Goal for Today:  Socialize. Attend groups. Notes:      Status After Intervention:  Improved    Participation Level:  Active Listener and Interactive    Participation Quality: Appropriate, Attentive, Sharing and Supportive      Speech:  normal      Thought Process/Content: Logical      Affective Functioning: Congruent      Level of consciousness:  Alert and Attentive      Response to Learning: Able to verbalize current knowledge/experience, Able to verbalize/acknowledge new learning, Able to retain information, Capable of insight and Progressing to goal      Endings: None Reported       Modes of Intervention: Education, Support, Socialization, Exploration, Clarifying, Problem-solving, Limit-setting and Reality-testing      Discipline Responsible: Psychoeducational Specialist      Signature:  Lisa Alva

## 2019-05-20 PROCEDURE — 90833 PSYTX W PT W E/M 30 MIN: CPT | Performed by: NURSE PRACTITIONER

## 2019-05-20 PROCEDURE — 6370000000 HC RX 637 (ALT 250 FOR IP): Performed by: REGISTERED NURSE

## 2019-05-20 PROCEDURE — 6370000000 HC RX 637 (ALT 250 FOR IP): Performed by: NURSE PRACTITIONER

## 2019-05-20 PROCEDURE — 6370000000 HC RX 637 (ALT 250 FOR IP): Performed by: PSYCHIATRY & NEUROLOGY

## 2019-05-20 PROCEDURE — 1240000000 HC EMOTIONAL WELLNESS R&B

## 2019-05-20 PROCEDURE — 99232 SBSQ HOSP IP/OBS MODERATE 35: CPT | Performed by: NURSE PRACTITIONER

## 2019-05-20 RX ORDER — ONDANSETRON 4 MG/1
4 TABLET, FILM COATED ORAL ONCE
Status: COMPLETED | OUTPATIENT
Start: 2019-05-20 | End: 2019-05-20

## 2019-05-20 RX ADMIN — ONDANSETRON HYDROCHLORIDE 4 MG: 4 TABLET, FILM COATED ORAL at 16:38

## 2019-05-20 RX ADMIN — HYDROXYZINE HYDROCHLORIDE 25 MG: 25 TABLET, FILM COATED ORAL at 21:25

## 2019-05-20 RX ADMIN — PANTOPRAZOLE SODIUM 40 MG: 40 TABLET, DELAYED RELEASE ORAL at 09:18

## 2019-05-20 RX ADMIN — TIOTROPIUM BROMIDE 18 MCG: 18 CAPSULE ORAL; RESPIRATORY (INHALATION) at 09:18

## 2019-05-20 RX ADMIN — ATENOLOL 25 MG: 25 TABLET ORAL at 09:19

## 2019-05-20 RX ADMIN — DOXYCYCLINE 100 MG: 100 CAPSULE ORAL at 21:24

## 2019-05-20 RX ADMIN — ACETAMINOPHEN 650 MG: 325 TABLET, FILM COATED ORAL at 20:12

## 2019-05-20 RX ADMIN — RANOLAZINE 1000 MG: 1000 TABLET, FILM COATED, EXTENDED RELEASE ORAL at 09:17

## 2019-05-20 RX ADMIN — TRAZODONE HYDROCHLORIDE 100 MG: 100 TABLET ORAL at 21:26

## 2019-05-20 RX ADMIN — RANOLAZINE 1000 MG: 1000 TABLET, FILM COATED, EXTENDED RELEASE ORAL at 21:24

## 2019-05-20 RX ADMIN — VITAMIN D, TAB 1000IU (100/BT) 2000 UNITS: 25 TAB at 09:19

## 2019-05-20 RX ADMIN — ASPIRIN 81 MG: 81 TABLET, COATED ORAL at 09:18

## 2019-05-20 RX ADMIN — Medication 3 MG: at 23:35

## 2019-05-20 RX ADMIN — SPIRONOLACTONE 25 MG: 25 TABLET, FILM COATED ORAL at 09:18

## 2019-05-20 RX ADMIN — ATORVASTATIN CALCIUM 20 MG: 20 TABLET, FILM COATED ORAL at 21:25

## 2019-05-20 RX ADMIN — MIRTAZAPINE 45 MG: 30 TABLET, FILM COATED ORAL at 21:25

## 2019-05-20 RX ADMIN — FLUTICASONE PROPIONATE 1 SPRAY: 50 SPRAY, METERED NASAL at 09:18

## 2019-05-20 RX ADMIN — DOXYCYCLINE 100 MG: 100 CAPSULE ORAL at 09:17

## 2019-05-20 ASSESSMENT — PAIN SCALES - GENERAL
PAINLEVEL_OUTOF10: 3
PAINLEVEL_OUTOF10: 0

## 2019-05-20 ASSESSMENT — PAIN DESCRIPTION - ORIENTATION: ORIENTATION: LEFT

## 2019-05-20 ASSESSMENT — PAIN DESCRIPTION - LOCATION: LOCATION: HIP

## 2019-05-20 NOTE — GROUP NOTE
Group Therapy Note    Date: May 20    Group Start Time: 1000  Group End Time: 2080  Group Topic: Recreational    [facility] A    Joanthan Masker, [place]    Attendees: 5      Patient's Goal:  Demonstrate increased interpersonal skills. Notes:  Patient attended group and actively participated in task at hand. Patient conversed appropriately with peers and Kellie Garner. Status After Intervention:  Improved    Participation Level:  Active Listener and Interactive    Participation Quality: Appropriate, Attentive, Sharing and Supportive      Speech:  normal      Thought Process/Content: Logical      Affective Functioning: Congruent      Level of consciousness:  Alert, Oriented x4 and Attentive      Response to Learning: Able to verbalize current knowledge/experience, Able to verbalize/acknowledge new learning, Able to retain information, Capable of insight and Progressing to goal      Endings: None Reported    Modes of Intervention: Education, Socialization, Exploration, Problem-solving, Activity and Reality-testing      Discipline Responsible: Psychoeducational Specialist      Signature:  Marty Peng

## 2019-05-20 NOTE — GROUP NOTE
Group Therapy Note    Date: May 20    Group Start Time: 1430  Group End Time: 7179  Group Topic: Psychoeducation    LA Jensen, [facility]S    Patient refused to attend Recreational Therapy Group at 1430 after encouragement from staff. 1:1 talk time offered but patient refused.         Signature:  Leopold Imus

## 2019-05-20 NOTE — CARE COORDINATION
Outreach to [facility] - left voice mail requesting call back. Contacted : [facility].  Kimo transferred writer to [place] office, informed writer \"request to speak with AOD:  officer of the day\" to navigate transfer of pt for [employer]. [facility] are full. No beds\". Writer transferred to : [address] [place] [address]. Writer spoke with admissions coordinator and was informed \"Pt is not is our vusen, it's a  term m'am, but transferring him to our facility would be difficult\". Writer was again referred to coordinate with [facility] for services. Writer explained Jerris Goodpasture with [facility] last week to transfer directly with Matthew Ortiz for coordination . Writer requested to speak with another clinician but was denied, again referred to [facility]. Writer will outreach to Jerris Goodpasture with [facility] for further direction.

## 2019-05-20 NOTE — GROUP NOTE
Group Therapy Note    Date: May 20    Group Start Time: 1330  Group End Time: 1410  Group Topic: Cognitive Skills    [facility]    PRISCILA Fegn    Patient's Goal:  Participate in self disclosure group activity and discussion, demonstrate increased interpersonal interaction       Notes:  Pt attended group. Pt participated in group activity. Pt participated in group discussion and was supportive of peers. Status After Intervention:  Improved    Participation Level:  Active Listener and Interactive    Participation Quality: Appropriate, Attentive, Sharing and Supportive    Speech:  normal    Thought Process/Content: Logical    Affective Functioning: Congruent    Level of consciousness:  Alert, Oriented x4 and Attentive    Response to Learning: Able to verbalize current knowledge/experience, Capable of insight and Progressing to goal    Endings: None Reported    Modes of Intervention: Support, Socialization, Exploration, Clarifying, Problem-solving and Activity    Discipline Responsible: Psychoeducational Specialist    Signature:  PRISCILA Feng

## 2019-05-20 NOTE — GROUP NOTE
Group Therapy Note    Date: May 20    Group Start Time: 1100  Group End Time: 0725  Group Topic: Psychotherapy    [facility]    EVELYN Ho, BABAR        Group Therapy Note    Attendees: 4/9         Patient's Goal:  Interpersonal relationships, communication exploration       Notes:     Status After Intervention:  Unchanged    Participation Level: Interactive    Participation Quality: Appropriate, Attentive, Sharing and Supportive      Speech:  normal      Thought Process/Content: Logical  Linear      Affective Functioning: Congruent      Mood: euthymic      Level of consciousness:  Alert, Oriented x4 and Attentive      Response to Learning: Able to verbalize current knowledge/experience, Able to retain information and Capable of insight      Endings: None Reported    Modes of Intervention: Support      Discipline Responsible: /Counselor      Signature:   EVELYN Ho, BABAR
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
## 2019-05-20 NOTE — GROUP NOTE
Group Therapy Note    Date: May 20    Group Start Time: 0845  Group End Time: 0920  Group Topic: Community Meeting    PRISCILA Salomon    Patient's Goal:  Identify daily goal, review daily schedule and unit expectations, demonstrate increased interpersonal interaction. Notes:  Pt identify daily goal to talk to  [facility], go to groups. Status After Intervention:  Improved    Participation Level:  Active Listener and Interactive    Participation Quality: Appropriate, Attentive and Sharing    Speech:  normal    Thought Process/Content: Logical    Affective Functioning: Congruent    Level of consciousness:  Alert, Oriented x4 and Attentive    Response to Learning: Able to verbalize current knowledge/experience, Capable of insight and Progressing to goal    Endings: None Reported    Modes of Intervention: Education, Socialization, Exploration and Problem-solving    Discipline Responsible: Psychoeducational Specialist    Signature:  Sukumar Morocho

## 2019-05-21 PROCEDURE — 1240000000 HC EMOTIONAL WELLNESS R&B

## 2019-05-21 PROCEDURE — 99232 SBSQ HOSP IP/OBS MODERATE 35: CPT | Performed by: NURSE PRACTITIONER

## 2019-05-21 PROCEDURE — 6370000000 HC RX 637 (ALT 250 FOR IP): Performed by: NURSE PRACTITIONER

## 2019-05-21 PROCEDURE — 6370000000 HC RX 637 (ALT 250 FOR IP): Performed by: PSYCHIATRY & NEUROLOGY

## 2019-05-21 PROCEDURE — 90833 PSYTX W PT W E/M 30 MIN: CPT | Performed by: NURSE PRACTITIONER

## 2019-05-21 RX ADMIN — DOXYCYCLINE 100 MG: 100 CAPSULE ORAL at 08:59

## 2019-05-21 RX ADMIN — ATENOLOL 25 MG: 25 TABLET ORAL at 09:00

## 2019-05-21 RX ADMIN — TIOTROPIUM BROMIDE 18 MCG: 18 CAPSULE ORAL; RESPIRATORY (INHALATION) at 09:00

## 2019-05-21 RX ADMIN — RANOLAZINE 1000 MG: 1000 TABLET, FILM COATED, EXTENDED RELEASE ORAL at 21:21

## 2019-05-21 RX ADMIN — Medication 3 MG: at 21:21

## 2019-05-21 RX ADMIN — FLUTICASONE PROPIONATE 1 SPRAY: 50 SPRAY, METERED NASAL at 09:00

## 2019-05-21 RX ADMIN — ATORVASTATIN CALCIUM 20 MG: 20 TABLET, FILM COATED ORAL at 21:21

## 2019-05-21 RX ADMIN — HYDROXYZINE HYDROCHLORIDE 25 MG: 25 TABLET, FILM COATED ORAL at 21:22

## 2019-05-21 RX ADMIN — RANOLAZINE 1000 MG: 1000 TABLET, FILM COATED, EXTENDED RELEASE ORAL at 08:59

## 2019-05-21 RX ADMIN — ASPIRIN 81 MG: 81 TABLET, COATED ORAL at 08:59

## 2019-05-21 RX ADMIN — VITAMIN D, TAB 1000IU (100/BT) 2000 UNITS: 25 TAB at 08:59

## 2019-05-21 RX ADMIN — PANTOPRAZOLE SODIUM 40 MG: 40 TABLET, DELAYED RELEASE ORAL at 09:00

## 2019-05-21 RX ADMIN — MIRTAZAPINE 45 MG: 30 TABLET, FILM COATED ORAL at 21:22

## 2019-05-21 RX ADMIN — TRAZODONE HYDROCHLORIDE 100 MG: 100 TABLET ORAL at 21:22

## 2019-05-21 RX ADMIN — SPIRONOLACTONE 25 MG: 25 TABLET, FILM COATED ORAL at 09:00

## 2019-05-21 RX ADMIN — ACETAMINOPHEN 650 MG: 325 TABLET, FILM COATED ORAL at 13:34

## 2019-05-21 ASSESSMENT — PAIN - FUNCTIONAL ASSESSMENT: PAIN_FUNCTIONAL_ASSESSMENT: ACTIVITIES ARE NOT PREVENTED

## 2019-05-21 ASSESSMENT — PAIN DESCRIPTION - DESCRIPTORS: DESCRIPTORS: ACHING;CONSTANT;DISCOMFORT

## 2019-05-21 ASSESSMENT — PAIN SCALES - GENERAL
PAINLEVEL_OUTOF10: 3
PAINLEVEL_OUTOF10: 1
PAINLEVEL_OUTOF10: 6

## 2019-05-21 ASSESSMENT — PAIN DESCRIPTION - PAIN TYPE: TYPE: CHRONIC PAIN

## 2019-05-21 ASSESSMENT — PAIN DESCRIPTION - ONSET: ONSET: ON-GOING

## 2019-05-21 ASSESSMENT — PAIN DESCRIPTION - ORIENTATION: ORIENTATION: LEFT

## 2019-05-21 ASSESSMENT — PAIN DESCRIPTION - PROGRESSION: CLINICAL_PROGRESSION: NOT CHANGED

## 2019-05-21 ASSESSMENT — PAIN DESCRIPTION - FREQUENCY: FREQUENCY: CONTINUOUS

## 2019-05-21 ASSESSMENT — PAIN DESCRIPTION - LOCATION: LOCATION: HIP

## 2019-05-21 NOTE — GROUP NOTE
Group Therapy Note    Date: May 21    Group Start Time: 0900  Group End Time: 8958  Group Topic: Relapse Prevention    LA Hilton, CTRS    Patient refused to attend Community Resources/Relapse Prevention Group after encouragement from staff. 1:1 talk time offered but patient refused.       Signature:  Loki Wills

## 2019-05-21 NOTE — FLOWSHEET NOTE
Patient participated in [address] [place] [address] this afternoon 05/21/19 1623   Encounter Summary   Services provided to: Patient   Referral/Consult From: Rounding   Continue Visiting   (5/21/19)   Complexity of Encounter Low   Length of Encounter 30 minutes   Spiritual/Yarsani   Type Spiritual support   Assessment Approachable   Intervention Active listening   Outcome Receptive

## 2019-05-21 NOTE — PLAN OF CARE
Problem: Altered Mood, Depressive Behavior:  Goal: Able to verbalize acceptance of life and situations over which he or she has no control  Description  Able to verbalize acceptance of life and situations over which he or she has no control  Outcome: Ongoing  Note:   Patient reports depression and anxiety were both at a 9/10 today. Patient reports being anxious today because it is his grandson's birthday. Patient denies suicidal ideations. Patient takes medications appropriately and attends group. Patient social with peers in day room. Patient safety maintained q15 minute checks. Problem: Falls - Risk of:  Goal: Will remain free from falls  Description  Will remain free from falls  Outcome: Ongoing  Note:   Patient currently using a walker for ambulation. Patient uses assistive device properly and has a steady gait. Patient remains free from falls. Will continue to monitor.

## 2019-05-21 NOTE — PROGRESS NOTES
Department of Psychiatry  Nurse Practitioner Progress Note    Chief Complaint: PTSD (post-traumatic stress disorder)     SUBJECTIVE:  Juvenal Alvarez is seen today in the day area. Social Work contacted [facility] and [place], [place] for inpatient admission but has not heard from either. Rosaleslisa Lara is interested in contacting the [facility] [employer], [place] also and will be working with Social Work. He still hopes that once there he will be referred to the pain management clinic. We again discuss optional plan(s) and unfortunately, he has none. He states he continues to feel better and remains the same as yesterday. He is denying SI and hallucinations admitting to decreased HI, depression and anxiety. He states that the depression and anxiety are related to his chronic pain. He is still having some interrupted sleep due to pain. He is attending groups and is social with peers. Chart and medications reviewed. Therapeutic support, empathetic care and psycho education provided greater than 20 minutes. At this time there is no safe alternative other than inpatient care. OBJECTIVE    Physical  BP (!) 149/73   Pulse 57   Temp 97.5 °F (36.4 °C) (Oral)   Resp 14   Ht 5' 9\" (1.753 m)   Wt 234 lb (106.1 kg)   SpO2 97%   BMI 34.56 kg/m²      Mental Status Evaluation:  Orientation: alertness: alert   Mood:.  Depression,  anxious      Affect:  brightened      Appearance:  age appropriate and bearded   Activity:  Psychomotor Agitation   Gait/Posture: Normal   Speech:  normal pitch and normal volume   Thought Process:  circumstantial   Thought Content:  Denies delusions and hallucinations   Sensorium:  person, place, time/date and situation   Cognition:  grossly intact   Memory: intact   Insight:  limited   Judgment: limited   Suicidal Ideations: denies suicidal ideation   Homicidal Ideations: Positive for homicidal ideation that has decreased   Medication Side Effects: absent       Attention Span attention span appeared shorter than expected for age       Labs  Results for Minoo Ramos (MRN [id number]) as of 5/17/2019 15:10   Ref.  Range 5/14/2019 07:23 5/14/2019 07:58 5/14/2019 12:03 5/14/2019 16:39 5/14/2019 20:44   Sodium Latest Ref Range: 135 - 144 mmol/L 137       Potassium Latest Ref Range: 3.7 - 5.3 mmol/L 4.8       Chloride Latest Ref Range: 98 - 107 mmol/L 98       CO2 Latest Ref Range: 20 - 31 mmol/L 25       BUN Latest Ref Range: 8 - 23 mg/dL 17       Creatinine Latest Ref Range: 0.70 - 1.20 mg/dL 0.97       Bun/Cre Ratio Latest Ref Range: 9 - 20  NOT REPORTED       Anion Gap Latest Ref Range: 9 - 17 mmol/L 14       GFR Non- Latest Ref Range: >60 mL/min >60       GFR  Latest Ref Range: >60 mL/min >60       Glucose Latest Ref Range: 70 - 99 mg/dL 92       POC Glucose Latest Ref Range: 75 - 110 mg/dL  103 124 (H) 120 (H) 104   Calcium Latest Ref Range: 8.6 - 10.4 mg/dL 9.4       Albumin/Globulin Ratio Latest Ref Range: 1.0 - 2.5  NOT REPORTED       Total Protein Latest Ref Range: 6.4 - 8.3 g/dL 6.9       GFR Comment Unknown Pend       GFR Staging Unknown NOT REPORTED       Chol/HDL Ratio Latest Ref Range: <5  2.6       Cholesterol Latest Ref Range: <200 mg/dL 127       HDL Cholesterol Latest Ref Range: >40 mg/dL 48       LDL Cholesterol Latest Ref Range: 0 - 130 mg/dL 54       Triglycerides Latest Ref Range: <150 mg/dL 126       VLDL Latest Ref Range: 1 - 30 mg/dL NOT REPORTED       Albumin Latest Ref Range: 3.5 - 5.2 g/dL 4.0       Alk Phos Latest Ref Range: 40 - 129 U/L 87       ALT Latest Ref Range: 5 - 41 U/L 66 (H)       AST Latest Ref Range: <40 U/L 58 (H)       Bilirubin Latest Ref Range: 0.3 - 1.2 mg/dL 0.60       Hemoglobin A1C Latest Ref Range: 4.0 - 6.0 % 5.1       eAG (mg/dL) Latest Units: mg/dL 100       TSH Latest Ref Range: 0.30 - 5.00 mIU/L 1.92       Thyroxine, Free Latest Ref Range: 0.93 - 1.70 ng/dL 1.25       WBC Latest Ref Range: 3.5 - 11.0 k/uL 5.3       RBC Latest Ref Range: 4.5 - 5.9 m/uL 4.79       Hemoglobin Quant Latest Ref Range: 13.5 - 17.5 g/dL 14.7       Hematocrit Latest Ref Range: 41 - 53 % 44.6       MCV Latest Ref Range: 80 - 100 fL 93.1       MCH Latest Ref Range: 26 - 34 pg 30.7       MCHC Latest Ref Range: 31 - 37 g/dL 33.0       MPV Latest Ref Range: 6.0 - 12.0 fL 7.5       RDW Latest Ref Range: 11.5 - 14.9 % 14.5       Platelet Count Latest Ref Range: 150 - 450 k/uL 202       Platelet Estimate Unknown NOT REPORTED       Absolute Mono # Latest Ref Range: 0.1 - 1.3 k/uL 0.50       Eosinophils % Latest Ref Range: 0 - 4 % 8 (H)       Basophils # Latest Ref Range: 0.0 - 0.2 k/uL 0.00       Differential Type Unknown NOT REPORTED       Seg Neutrophils Latest Ref Range: 36 - 66 % 61       Segs Absolute Latest Ref Range: 1.3 - 9.1 k/uL 3.30       Lymphocytes Latest Ref Range: 24 - 44 % 22 (L)       Absolute Lymph # Latest Ref Range: 1.0 - 4.8 k/uL 1.20       Monocytes Latest Ref Range: 1 - 7 % 9 (H)       Absolute Eos # Latest Ref Range: 0.0 - 0.4 k/uL 0.40       Basophils Latest Ref Range: 0 - 2 % 0       Immature Granulocytes Latest Ref Range: 0 % NOT REPORTED       WBC Morphology Unknown NOT REPORTED       RBC Morphology Unknown NOT REPORTED       Absolute Immature Granulocyte Latest Ref Range: 0.00 - 0.30 k/uL NOT REPORTED       NRBC Automated Latest Units: per 100 WBC NOT REPORTED           Medications  Current Facility-Administered Medications   Medication Dose Route Frequency Provider Last Rate Last Dose    acetaminophen (TYLENOL) tablet 650 mg  650 mg Oral Q4H PRN Davidealejandra Antont, APRN - CNP   650 mg at 05/20/19 2012    hydrOXYzine (ATARAX) tablet 25 mg  25 mg Oral TID PRN Davidealejandra Antont, APRN - CNP   25 mg at 05/20/19 2125    benztropine mesylate (COGENTIN) injection 2 mg  2 mg Intramuscular BID PRN Davidealejandra Antont, APRN - CNP        magnesium hydroxide (MILK OF MAGNESIA) 400 MG/5ML suspension 30 mL  30 mL Oral Daily PRN Davidealejandra Antont, APRN - CNP   30 mL at 05/19/19 2211  aluminum & magnesium hydroxide-simethicone (MAALOX) 200-200-20 MG/5ML suspension 30 mL  30 mL Oral Q6H PRN DIANNA Gonzales - CNP        nicotine polacrilex (NICORETTE) gum 2 mg  2 mg Oral Q2H PRN DIANNA Gonzales - CNP        albuterol (PROVENTIL) nebulizer solution 2.5 mg  2.5 mg Nebulization Q4H PRN DIANNA Gonzales - CNP        albuterol sulfate  (90 Base) MCG/ACT inhaler 2 puff  2 puff Inhalation Q4H PRN DIANNA Gonzales - CNP        aspirin EC tablet 81 mg  81 mg Oral Daily DIANNA Gonzales - DIAMATNE   81 mg at 05/21/19 0859    atenolol (TENORMIN) tablet 25 mg  25 mg Oral Daily DIANNA Gonzales - CNP   25 mg at 05/21/19 0900    atorvastatin (LIPITOR) tablet 20 mg  20 mg Oral Nightly DIANNA Gonzales - CNP   20 mg at 05/20/19 2125    vitamin D (CHOLECALCIFEROL) tablet 2,000 Units  2,000 Units Oral Daily DIANNA Gonzales - CNP   2,000 Units at 05/21/19 0859    fluticasone (FLONASE) 50 MCG/ACT nasal spray 1 spray  1 spray Nasal Daily DIANNA Gonzales - CNP   1 spray at 05/21/19 0900    melatonin ER tablet 3 mg  3 mg Oral Nightly PRN DIANNA Gonzales - CNP   3 mg at 05/20/19 2335    mirtazapine (REMERON) tablet 45 mg  45 mg Oral Nightly DIANNA Gonzales - CNP   45 mg at 05/20/19 2125    pantoprazole (PROTONIX) tablet 40 mg  40 mg Oral QAM AC DIANNA Gonzales - CNP   40 mg at 05/21/19 0900    polyethylene glycol (GLYCOLAX) packet 17 g  17 g Oral Daily DIANNA Gonzales - CNP   17 g at 05/17/19 0844    ranolazine (RANEXA) extended release tablet 1,000 mg  1,000 mg Oral BID DIANNA Gonzales - CNP   1,000 mg at 05/21/19 9435    spironolactone (ALDACTONE) tablet 25 mg  25 mg Oral Daily DIANNA Gonzales CNP   25 mg at 05/21/19 0900    traZODone (DESYREL) tablet 100 mg  100 mg Oral Nightly PRN DIANNA Gonzales CNP   100 mg at 05/20/19 2126    tiotropium (SPIRIVA) inhalation capsule 18 mcg  18 mcg Inhalation Daily Albertina Copeland APRN - CNP   18 mcg at 05/21/19 0900         aspirin  81 mg Oral Daily    atenolol  25 mg Oral Daily    atorvastatin  20 mg Oral Nightly    vitamin D  2,000 Units Oral Daily    fluticasone  1 spray Nasal Daily    mirtazapine  45 mg Oral Nightly    pantoprazole  40 mg Oral QAM AC    polyethylene glycol  17 g Oral Daily    ranolazine  1,000 mg Oral BID    spironolactone  25 mg Oral Daily    tiotropium  18 mcg Inhalation Daily       ASSESSMENT  PTSD (post-traumatic stress disorder)     Patient's Response to Treatment: positive    PLAN:  · Continue inpatient psychiatric treatment  · Supportive therapy with medication management. Reviewed risks and benefits as well as potential side effects with patient. Continue current medications.   · New Order for shoes with strings in day area only beginning 5/19/19. · Therapeutic activities and groups. · Therapeutic support, empathetic care and psycho education provided greater than 20 minutes. · Follow up at [facility] [place] after symptoms stabilized  · Discharge planning with social work.     Electronically signed by DIANNA Saul CNP on 5/21/2019 at 12:03 PM.

## 2019-05-21 NOTE — PLAN OF CARE
Problem: Falls - Risk of:  Goal: Will remain free from falls  Description  Will remain free from falls  5/21/2019 1106 by Ruth Dodge RN  Outcome: Ongoing     Problem: Falls - Risk of:  Goal: Absence of physical injury  Description  Absence of physical injury  Outcome: Ongoing   Patient has had no falls this shift so far. Patient encouraged to stay hydrated and to change position slowly to prevent chance of falls. Patient voiced understanding. Patient denies any thoughts to harm self and no signs of self harm were noted. Patient encouraged to inform staff if he develops any thoughts to harm self. Patient voiced understanding.

## 2019-05-21 NOTE — GROUP NOTE
Group Therapy Note    Date: May 21    Group Start Time: 1000  Group End Time: 3757  Group Topic: Psychotherapy    STCZ LALITHA Villarreal, BABAR        Group Therapy Note    Pt declined to attend psychotherapy at 1000 am despite encouragement. Pt offered 1:1 and refused.             Signature:  BABAR Rhoades

## 2019-05-21 NOTE — PLAN OF CARE
Problem: Altered Mood, Depressive Behavior:  Goal: Able to verbalize acceptance of life and situations over which he or she has no control  Description  Able to verbalize acceptance of life and situations over which he or she has no control  5/21/2019 1109 by Alexandria Hillman RN  Outcome: Ongoing   Patient reports being here has helped him to learn coping skills (such as helping other people with their problems) to use when he comes across problems in his life. Patient reports it has also been helpful just listening to the other people in the groups and remembering that he needs to try to see things from the other person's perspective, too. Patient reports he has also learned to \"play it ear by ear, one step at a time\" when dealing with stressful circumstances in life. Patient was encouraged to continue attending groups to learn more coping skills. Patient voiced understanding.

## 2019-05-21 NOTE — GROUP NOTE
Group Therapy Note    Date: May 21    Group Start Time: 1100  Group End Time: 1150  Group Topic: Recreational    STCZ BHI A    PRISCILA Andrade    Patient's Goal:  Demonstrate increased interpersonal interaction     Status After Intervention:  Improved    Participation Level:  Active Listener and Interactive    Participation Quality: Appropriate, Attentive, Sharing and Supportive    Speech:  normal    Thought Process/Content: Logical    Affective Functioning: Congruent    Level of consciousness:  Alert, Oriented x4 and Attentive    Response to Learning: Able to verbalize current knowledge/experience, Able to verbalize/acknowledge new learning, Able to retain information, Capable of insight and Progressing to goal    Endings: None Reported    Modes of Intervention: Education, Socialization, Exploration and Activity    Discipline Responsible: Psychoeducational Specialist    Signature:  Sukumar Andrade [place]

## 2019-05-21 NOTE — GROUP NOTE
Group Therapy Note    Date: May 20    Group Start Time: 2035  Group End Time: 2115  Group Topic: Wrap-Up/Relaxation    LA ROTHMAN    More Quach RN        Group Therapy Note    Attendees: 10/18       Patient's Goal:    1. To be able to reflect on daily unit activities/experiences. 2.  To review accomplished daily goals and encourage patient to set a new goal for the next day. 3.  To demonstrate increased interpersonal interaction      Notes:  Pt attended and actively participated in Wrap-Up/Relaxation groups this evening. Status After Intervention:  Improved     Participation Level:  Active Listener and Interactive     Participation Quality: Appropriate, Attentive and Sharing     Speech:  normal     Thought Process/Content: Logical     Affective Functioning: Congruent     Mood: euthymic     Level of consciousness:  Alert, Oriented x4 and Attentive     Response to Learning: Able to verbalize current knowledge/experience, Able to verbalize/acknowledge new learning, Able to retain information and Capable of insight     Endings: None Reported     Modes of Intervention: Education, Support and Socialization      Discipline Responsible: Registered Nurse        Signature:  More Quach RN

## 2019-05-21 NOTE — GROUP NOTE
Group Therapy Note    Date: May 21    Group Start Time: 1330  Group End Time: 1410  Group Topic: Recovery Stress Management     CZ PRISCILA Maria    Patient's Goal:  Demonstrate increased interpersonal interaction, participate in group discussion about stress management    Status After Intervention:  Improved    Participation Level:  Active Listener and Interactive    Participation Quality: Appropriate, Attentive and Sharing    Speech:  normal    Thought Process/Content: Logical    Affective Functioning: Congruent    Level of consciousness:  Alert, Oriented x4 and Attentive    Response to Learning: Able to verbalize current knowledge/experience, Able to verbalize/acknowledge new learning, Capable of insight and Progressing to goal    Endings: None Reported    Modes of Intervention: Education, Support, Socialization, Exploration and Activity    Discipline Responsible: Psychoeducational Specialist    Signature:  Sukumar Morocho

## 2019-05-21 NOTE — PLAN OF CARE
Problem: Anger Management/Homicidal Ideation:  Goal: Absence of homicidal ideation  Description  Absence of homicidal ideation  Outcome: Ongoing   Patient reports homicidal ideations towards his cousin \"but I love him and don't want to hurt him. That's why I came here. They are getting better, not as frequent. \" patient voiced no intent to act on homicidal ideations. Writer encouraged patient to inform writer if the homicidal ideations become more frequent and/or if he feels he may try to harm someone. Patient voiced understanding.

## 2019-05-21 NOTE — PLAN OF CARE
[facility] Interdisciplinary Treatment Plan Note     Review Date & Time: 5/21/2019  3461    Admission Type:   Admission Type: Involuntary    Reason for admission:  Reason for Admission: Patient is homicidal with thoughts. Estimated Length of Stay Update:  Est 3-7 days, to be determined by physician  Estimated Discharge Date Update: to be determined by physician    PATIENT STRENGTHS:  Patient Strengths:Strengths: Communication, Connection to output provider, Medication Compliance, Positive Support, Social Skills  Patient Strengths and Limitations:Limitations: Difficult relationships / poor social skills  Addictive Behavior:Addictive Behavior  In the past 3 months, have you felt or has someone told you that you have a problem with:  : None  Do you have a history of Chemical Use?: No  Do you have a history of Alcohol Use?: No  Do you have a history of Street Drug Abuse?: Comment(med marijuana Rx )  Histroy of Prescripton Drug Abuse?: No  Medical Problems:   Past Medical History:   Diagnosis Date    Arthritis     WENDY KNEE    Bronchitis with chronic airway obstruction (HCC)     CAD (coronary artery disease)     CHF (congestive heart failure) ([facility]     CTS (carpal tunnel syndrome)     RIGHT    Diabetes mellitus ([facility]     Diabetic neuropathy associated with diabetes mellitus due to underlying condition ([facility]     Exposure to toxic chemical     [employer] base-toxic water exposure    GERD (gastroesophageal reflux disease)     H/O cardiovascular stress test 07/08/2016    Abnormal.  Moderate perfusion defect of mild to moderate intensity in the inferolateral,inferior and inferoapical regions during stress imaging. Ef 45%. with regional wall motion abnormalities. H/O echocardiogram 2/17/16    EF >60%. LV wall thickness is mildly increased. Inferoseptal wall is abnormal in its motion not unusual status post open heart surgery.   Normal aortic valve structure with ild aortic regurgitation. Hip pain, left     Hx of cardiac cath 07/08/2016    LMCA: Normal 0% stenosis. LAD: Chronic occlusion 100%. Lesion on Mid LAD: 100% stenosis. LCx: single stenosis. Lesion on Mid CX: 80% stenosis. RCA: Lesion on Mid RCA: 70% stenosis. Hyperlipidemia     Hypertension     MI [facility] infarction) [facility] 2015    Pneumonia     PTSD (post-traumatic stress disorder)     S/P CABG (coronary artery bypass graft) 10/23/15    Sleep apnea     Ventricular fibrillation ([facility] 10/18/2015    x 2 DURING HEART ATTACK       Risk:  Fall RiskTotal: 73  Tl Scale Tl Scale Score: 22  BVC Total: 0  Change in scores no. Changes to plan of Care  no    Status EXAM:   Status and Exam  Normal: No  Facial Expression: Sad, Worried  Affect: Congruent  Level of Consciousness: Alert  Mood:Normal: No  Mood: Anxious, Depressed, Sad  Motor Activity:Normal: Yes  Motor Activity: Decreased  Interview Behavior: Cooperative  Preception: [place] to Situation  Attention:Normal: Yes  Attention: Distractible  Thought Processes: Circumstantial  Thought Content:Normal: No  Thought Content: Preoccupations  Hallucinations: None  Delusions: No  Memory:Normal: No  Memory: Poor Recent  Insight and Judgment: No  Insight and Judgment: Poor Insight  Present Suicidal Ideation: No  Present Homicidal Ideation: No    Daily Assessment Last Entry:   Daily Sleep (WDL): Within Defined Limits         Patient Currently in Pain: No  Daily Nutrition (WDL): Within Defined Limits    Patient Monitoring:  Frequency of Checks: 4 times per hour, close    Psychiatric Symptoms:   Depression Symptoms  Depression Symptoms: Feelings of helplessness  Anxiety Symptoms  Anxiety Symptoms: Generalized  Mone Symptoms  Mone Symptoms: No problems reported or observed. Psychosis Symptoms  Delusion Type: No problems reported or observed. Suicide Risk CSSR-S:  1) Within the past month, have you wished you were dead or wished you could go to sleep and not wake up? : No  2) Have you actually had any thoughts of killing yourself? : No  6) Have you ever done anything, started to do anything, or prepared to do anything to end your life?: No  Change in Result no  Change in Plan of care no    EDUCATION:   Learner Progress Toward Treatment Goals: Reviewed results and recommendations of this team    Method: Group    Outcome: Verbalized understanding    PATIENT GOALS: short term: attending groups improve anger management/ long term getting into [facility]    PLAN/TREATMENT RECOMMENDATIONS UPDATE:   [address] Wagner, GOAL SETTING    SHORT-TERM GOALS UPDATE:  Time frame for Short-Term Goals: 1-2 WEEKS     LONG-TERM GOALS UPDATE:  Time frame for Long-Term Goals: 6 MONTHS  Members Present in Team Meeting: See Signature Sheet    [id number], [facility]

## 2019-05-21 NOTE — GROUP NOTE
Group Therapy Note    Date: May 21    Group Start Time: 1600  Group End Time: 1645  Group Topic: Healthy Living/Wellness    LA Kaur LPN        Group Therapy Note    Attendees: 14/17       Patient's Goal:  Increase socialization    Notes:      Status After Intervention:  Improved    Participation Level: Interactive    Participation Quality: Appropriate      Speech:  normal      Thought Process/Content: Logical      Affective Functioning: Congruent      Mood: elevated      Level of consciousness:  Alert      Response to Learning: Able to change behavior      Endings: None Reported    Modes of Intervention: Socialization      Discipline Responsible: Licensed Practical Nurse      Signature:  Len Kaur LPN

## 2019-05-22 LAB
AMPHETAMINE SCREEN URINE: NEGATIVE
BARBITURATE SCREEN URINE: NEGATIVE
BENZODIAZEPINE SCREEN, URINE: NEGATIVE
BILIRUBIN URINE: NEGATIVE
BUPRENORPHINE URINE: ABNORMAL
CANNABINOID SCREEN URINE: POSITIVE
COCAINE METABOLITE, URINE: NEGATIVE
COLOR: YELLOW
COMMENT UA: NORMAL
GLUCOSE URINE: NEGATIVE
KETONES, URINE: NEGATIVE
LEUKOCYTE ESTERASE, URINE: NEGATIVE
MDMA URINE: ABNORMAL
METHADONE SCREEN, URINE: NEGATIVE
METHAMPHETAMINE, URINE: ABNORMAL
NITRITE, URINE: NEGATIVE
OPIATES, URINE: NEGATIVE
OXYCODONE SCREEN URINE: NEGATIVE
PH UA: 7 (ref 5–8)
PHENCYCLIDINE, URINE: NEGATIVE
PROPOXYPHENE, URINE: ABNORMAL
PROTEIN UA: NEGATIVE
SPECIFIC GRAVITY UA: 1.01 (ref 1–1.03)
TEST INFORMATION: ABNORMAL
TRICYCLIC ANTIDEPRESSANTS, UR: ABNORMAL
TURBIDITY: CLEAR
URINE HGB: NEGATIVE
UROBILINOGEN, URINE: NORMAL

## 2019-05-22 PROCEDURE — 1240000000 HC EMOTIONAL WELLNESS R&B

## 2019-05-22 PROCEDURE — 80307 DRUG TEST PRSMV CHEM ANLYZR: CPT

## 2019-05-22 PROCEDURE — 99232 SBSQ HOSP IP/OBS MODERATE 35: CPT | Performed by: NURSE PRACTITIONER

## 2019-05-22 PROCEDURE — 81003 URINALYSIS AUTO W/O SCOPE: CPT

## 2019-05-22 PROCEDURE — 6370000000 HC RX 637 (ALT 250 FOR IP): Performed by: NURSE PRACTITIONER

## 2019-05-22 PROCEDURE — 90833 PSYTX W PT W E/M 30 MIN: CPT | Performed by: NURSE PRACTITIONER

## 2019-05-22 RX ADMIN — MIRTAZAPINE 45 MG: 30 TABLET, FILM COATED ORAL at 21:13

## 2019-05-22 RX ADMIN — ASPIRIN 81 MG: 81 TABLET, COATED ORAL at 08:48

## 2019-05-22 RX ADMIN — PANTOPRAZOLE SODIUM 40 MG: 40 TABLET, DELAYED RELEASE ORAL at 08:48

## 2019-05-22 RX ADMIN — RANOLAZINE 1000 MG: 1000 TABLET, FILM COATED, EXTENDED RELEASE ORAL at 08:47

## 2019-05-22 RX ADMIN — SPIRONOLACTONE 25 MG: 25 TABLET, FILM COATED ORAL at 08:48

## 2019-05-22 RX ADMIN — POLYETHYLENE GLYCOL 3350 17 G: 17 POWDER, FOR SOLUTION ORAL at 09:17

## 2019-05-22 RX ADMIN — HYDROXYZINE HYDROCHLORIDE 25 MG: 25 TABLET, FILM COATED ORAL at 21:13

## 2019-05-22 RX ADMIN — ATORVASTATIN CALCIUM 20 MG: 20 TABLET, FILM COATED ORAL at 21:13

## 2019-05-22 RX ADMIN — ATENOLOL 25 MG: 25 TABLET ORAL at 08:47

## 2019-05-22 RX ADMIN — VITAMIN D, TAB 1000IU (100/BT) 2000 UNITS: 25 TAB at 08:47

## 2019-05-22 RX ADMIN — Medication 3 MG: at 21:13

## 2019-05-22 RX ADMIN — ACETAMINOPHEN 650 MG: 325 TABLET, FILM COATED ORAL at 20:11

## 2019-05-22 RX ADMIN — RANOLAZINE 1000 MG: 1000 TABLET, FILM COATED, EXTENDED RELEASE ORAL at 21:13

## 2019-05-22 RX ADMIN — HYDROXYZINE HYDROCHLORIDE 25 MG: 25 TABLET, FILM COATED ORAL at 11:40

## 2019-05-22 RX ADMIN — ACETAMINOPHEN 650 MG: 325 TABLET, FILM COATED ORAL at 13:11

## 2019-05-22 RX ADMIN — FLUTICASONE PROPIONATE 1 SPRAY: 50 SPRAY, METERED NASAL at 08:46

## 2019-05-22 RX ADMIN — TIOTROPIUM BROMIDE 18 MCG: 18 CAPSULE ORAL; RESPIRATORY (INHALATION) at 08:46

## 2019-05-22 RX ADMIN — TRAZODONE HYDROCHLORIDE 100 MG: 100 TABLET ORAL at 21:13

## 2019-05-22 ASSESSMENT — PAIN SCALES - GENERAL
PAINLEVEL_OUTOF10: 0
PAINLEVEL_OUTOF10: 8
PAINLEVEL_OUTOF10: 8
PAINLEVEL_OUTOF10: 7
PAINLEVEL_OUTOF10: 8

## 2019-05-22 ASSESSMENT — PAIN DESCRIPTION - ORIENTATION: ORIENTATION: LEFT

## 2019-05-22 ASSESSMENT — PAIN DESCRIPTION - DESCRIPTORS: DESCRIPTORS: ACHING

## 2019-05-22 ASSESSMENT — PAIN DESCRIPTION - LOCATION
LOCATION: HEAD
LOCATION: HIP

## 2019-05-22 ASSESSMENT — PAIN DESCRIPTION - PAIN TYPE: TYPE: ACUTE PAIN

## 2019-05-22 NOTE — PROGRESS NOTES
Department of Psychiatry  Nurse Practitioner Progress Note    Chief Complaint: PTSD (post-traumatic stress disorder)     SUBJECTIVE:  Natalia Le is seen today in the day area. Patient is concerned that his lasix was not ordered on admission however; after further investigation, Kimo's private physician discontinued the medication. A urinalysis will be ordered. Social Work contacted [facility] and [place], [address], [place] for inpatient admission and patient has been denied by all due to a lack of beds. Eliezer Mccallum is interested in contacting the [address] [place] [address] OF [employer], [place] and [place], [place] also and will be working with Social Work. We again discuss optional plan(s)/Plan B  and unfortunately, he has none. Provider discussed the fact that we cannot keep him on inpatient until his check arrives on 5/31/19 and he becomes irritable stating that \"if you discharge me, I will just turn around and come right back\". He states he continues to feel better and remains the same as yesterday. He is denying SI and hallucinations admitting to decreased HI related to his cousin and the fact that he had privacy invasion, depression and anxiety. He states that the depression and anxiety are related to his chronic pain. He is still having some interrupted sleep due to pain. He is attending groups and is social with peers. Chart and medications reviewed. Therapeutic support, empathetic care and psycho education provided greater than 20 minutes. At this time there is no safe alternative other than inpatient care. OBJECTIVE    Physical  BP (!) 136/59   Pulse 60   Temp 98.2 °F (36.8 °C) (Oral)   Resp 14   Ht 5' 9\" (1.753 m)   Wt 234 lb (106.1 kg)   SpO2 97%   BMI 34.56 kg/m²      Mental Status Evaluation:  Orientation: alertness: alert   Mood:.  Depression,  anxious      Affect:  brightened      Appearance:  age appropriate and bearded   Activity:  Psychomotor Agitation   Gait/Posture: Normal   Speech:  normal pitch and normal volume Thought Process:  circumstantial   Thought Content:  Denies delusions and hallucinations   Sensorium:  person, place, time/date and situation   Cognition:  grossly intact   Memory: intact   Insight:  limited   Judgment: limited   Suicidal Ideations: denies suicidal ideation   Homicidal Ideations: Positive for homicidal ideation that has decreased   Medication Side Effects: absent       Attention Span attention span appeared shorter than expected for age       Labs  Results for Gabriela Wilder (MRN [id number]) as of 5/17/2019 15:10   Ref.  Range 5/14/2019 07:23 5/14/2019 07:58 5/14/2019 12:03 5/14/2019 16:39 5/14/2019 20:44   Sodium Latest Ref Range: 135 - 144 mmol/L 137       Potassium Latest Ref Range: 3.7 - 5.3 mmol/L 4.8       Chloride Latest Ref Range: 98 - 107 mmol/L 98       CO2 Latest Ref Range: 20 - 31 mmol/L 25       BUN Latest Ref Range: 8 - 23 mg/dL 17       Creatinine Latest Ref Range: 0.70 - 1.20 mg/dL 0.97       Bun/Cre Ratio Latest Ref Range: 9 - 20  NOT REPORTED       Anion Gap Latest Ref Range: 9 - 17 mmol/L 14       GFR Non- Latest Ref Range: >60 mL/min >60       GFR  Latest Ref Range: >60 mL/min >60       Glucose Latest Ref Range: 70 - 99 mg/dL 92       POC Glucose Latest Ref Range: 75 - 110 mg/dL  103 124 (H) 120 (H) 104   Calcium Latest Ref Range: 8.6 - 10.4 mg/dL 9.4       Albumin/Globulin Ratio Latest Ref Range: 1.0 - 2.5  NOT REPORTED       Total Protein Latest Ref Range: 6.4 - 8.3 g/dL 6.9       GFR Comment Unknown Pend       GFR Staging Unknown NOT REPORTED       Chol/HDL Ratio Latest Ref Range: <5  2.6       Cholesterol Latest Ref Range: <200 mg/dL 127       HDL Cholesterol Latest Ref Range: >40 mg/dL 48       LDL Cholesterol Latest Ref Range: 0 - 130 mg/dL 54       Triglycerides Latest Ref Range: <150 mg/dL 126       VLDL Latest Ref Range: 1 - 30 mg/dL NOT REPORTED       Albumin Latest Ref Range: 3.5 - 5.2 g/dL 4.0       Alk Phos Latest Ref Range: 40 - 129 U/L 87       ALT Latest Ref Range: 5 - 41 U/L 66 (H)       AST Latest Ref Range: <40 U/L 58 (H)       Bilirubin Latest Ref Range: 0.3 - 1.2 mg/dL 0.60       Hemoglobin A1C Latest Ref Range: 4.0 - 6.0 % 5.1       eAG (mg/dL) Latest Units: mg/dL 100       TSH Latest Ref Range: 0.30 - 5.00 mIU/L 1.92       Thyroxine, Free Latest Ref Range: 0.93 - 1.70 ng/dL 1.25       WBC Latest Ref Range: 3.5 - 11.0 k/uL 5.3       RBC Latest Ref Range: 4.5 - 5.9 m/uL 4.79       Hemoglobin Quant Latest Ref Range: 13.5 - 17.5 g/dL 14.7       Hematocrit Latest Ref Range: 41 - 53 % 44.6       MCV Latest Ref Range: 80 - 100 fL 93.1       MCH Latest Ref Range: 26 - 34 pg 30.7       MCHC Latest Ref Range: 31 - 37 g/dL 33.0       MPV Latest Ref Range: 6.0 - 12.0 fL 7.5       RDW Latest Ref Range: 11.5 - 14.9 % 14.5       Platelet Count Latest Ref Range: 150 - 450 k/uL 202       Platelet Estimate Unknown NOT REPORTED       Absolute Mono # Latest Ref Range: 0.1 - 1.3 k/uL 0.50       Eosinophils % Latest Ref Range: 0 - 4 % 8 (H)       Basophils # Latest Ref Range: 0.0 - 0.2 k/uL 0.00       Differential Type Unknown NOT REPORTED       Seg Neutrophils Latest Ref Range: 36 - 66 % 61       Segs Absolute Latest Ref Range: 1.3 - 9.1 k/uL 3.30       Lymphocytes Latest Ref Range: 24 - 44 % 22 (L)       Absolute Lymph # Latest Ref Range: 1.0 - 4.8 k/uL 1.20       Monocytes Latest Ref Range: 1 - 7 % 9 (H)       Absolute Eos # Latest Ref Range: 0.0 - 0.4 k/uL 0.40       Basophils Latest Ref Range: 0 - 2 % 0       Immature Granulocytes Latest Ref Range: 0 % NOT REPORTED       WBC Morphology Unknown NOT REPORTED       RBC Morphology Unknown NOT REPORTED       Absolute Immature Granulocyte Latest Ref Range: 0.00 - 0.30 k/uL NOT REPORTED       NRBC Automated Latest Units: per 100 WBC NOT REPORTED           Medications  Current Facility-Administered Medications   Medication Dose Route Frequency Provider Last Rate Last Dose    acetaminophen (TYLENOL) tablet 650 mg 650 mg Oral Q4H PRN [facility]   650 mg at 05/21/19 1334    hydrOXYzine (ATARAX) tablet 25 mg  25 mg Oral TID PRN [facility]   25 mg at 05/21/19 2122    benztropine mesylate (COGENTIN) injection 2 mg  2 mg Intramuscular BID PRN [facility]        magnesium hydroxide (MILK OF MAGNESIA) 400 MG/5ML suspension 30 mL  30 mL Oral Daily PRN [facility]   30 mL at 05/19/19 2211    aluminum & magnesium hydroxide-simethicone (MAALOX) 200-200-20 MG/5ML suspension 30 mL  30 mL Oral Q6H PRN [facility]        nicotine polacrilex (NICORETTE) gum 2 mg  2 mg Oral Q2H PRN [facility]        albuterol (PROVENTIL) nebulizer solution 2.5 mg  2.5 mg Nebulization Q4H PRN [facility]        albuterol sulfate  (90 Base) MCG/ACT inhaler 2 puff  2 puff Inhalation Q4H PRN [facility]        aspirin EC tablet 81 mg  81 mg Oral Daily [facility], APRN - CNP   81 mg at 05/22/19 0848    atenolol (TENORMIN) tablet 25 mg  25 mg Oral Daily [facility]   25 mg at 05/22/19 0847    atorvastatin (LIPITOR) tablet 20 mg  20 mg Oral Nightly [facility]   20 mg at 05/21/19 2121    vitamin D (CHOLECALCIFEROL) tablet 2,000 Units  2,000 Units Oral Daily [facility]   2,000 Units at 05/22/19 0847    fluticasone (FLONASE) 50 MCG/ACT nasal spray 1 spray  1 spray Nasal Daily [facility], APRN - CNP   1 spray at 05/22/19 0846    melatonin ER tablet 3 mg  3 mg Oral Nightly PRN [facility]   3 mg at 05/21/19 2121    mirtazapine (REMERON) tablet 45 mg  45 mg Oral Nightly LavDIANNA Mancia CNP   45 mg at 05/21/19 2122    pantoprazole (PROTONIX) tablet 40 mg  40 mg Oral QAM AC DIANNA Harper CNP   40 mg at 05/22/19 0848    polyethylene glycol (GLYCOLAX) packet 17 g  17 g Oral Daily DIANNA Harper CNP   17 g at 05/22/19 0917    ranolazine (RANEXA) extended release tablet 1,000 mg  1,000 mg Oral BID Lamine Auguste APRN - CNP   1,000 mg at 05/22/19 9942    spironolactone (ALDACTONE) tablet 25 mg  25 mg Oral Daily Lamine Auguste, APRN - CNP   25 mg at 05/22/19 0848    traZODone (DESYREL) tablet 100 mg  100 mg Oral Nightly PRN Laminealva Auguste, APRN - CNP   100 mg at 05/21/19 2122    tiotropium (SPIRIVA) inhalation capsule 18 mcg  18 mcg Inhalation Daily Laminealva Auguste APRN - CNP   18 mcg at 05/22/19 4931         aspirin  81 mg Oral Daily    atenolol  25 mg Oral Daily    atorvastatin  20 mg Oral Nightly    vitamin D  2,000 Units Oral Daily    fluticasone  1 spray Nasal Daily    mirtazapine  45 mg Oral Nightly    pantoprazole  40 mg Oral QAM AC    polyethylene glycol  17 g Oral Daily    ranolazine  1,000 mg Oral BID    spironolactone  25 mg Oral Daily    tiotropium  18 mcg Inhalation Daily       ASSESSMENT  PTSD (post-traumatic stress disorder)     Patient's Response to Treatment: positive    PLAN:  · Continue inpatient psychiatric treatment  · Supportive therapy with medication management. Reviewed risks and benefits as well as potential side effects with patient. Continue current medications.   · New Order for shoes with strings in day area only beginning 5/19/19. · Urinalysis ordered for 5/22/19 re: re-order Lasix and consider IM consult? · Therapeutic activities and groups. · Therapeutic support, empathetic care and psycho education provided greater than 20 minutes. · Follow up at [facility] [place] after symptoms stabilized  · Discharge planning with social work.     Electronically signed by DIANNA Brand CNP on 5/22/2019 at 10:32 AM.

## 2019-05-22 NOTE — GROUP NOTE
Group Therapy Note    Date: May 22    Group Start Time: 1000  Group End Time: 1050  Group Topic: Psychoeducation    [facility]    Pt did not attend RT group at 1000 d/t resting in room despite staff invitation to attend. 1:1 talk time offered as alternative to group session, pt declined.          Signature:  Kira Trent

## 2019-05-22 NOTE — PLAN OF CARE
Pt. Up this morning but went to room to rest. Pt. Uses 4 wheeled walker and ambulates well with this. Pt. Is controlled but is very angry after talking with nurse practitioner. Pt. Says he [facility] to [facility] because if he leaves here now, he will have to go back to the same situation that brought him in to begin with. Pt. Says this makes him very angry. Pt says he lost a [facility] bed due to someone getting his social security number wrong. Denies suicidal thoughts but is depressed over his pain and anxious over being discharged not to [facility]. Pt. Is medication compliant. Attends most groups on the unit. Pt. Remains safe on the unit. Q 15 minute checks for safety maintained.

## 2019-05-22 NOTE — GROUP NOTE
Group Therapy Note    Date: May 22    Group Start Time: 0845  Group End Time: 9213  Group Topic: Community Meeting    [address], [place]        Group Therapy Note    Attendees: 11/20         Patient's Goal:  To work with social work on being transferred to the [facility]    Notes:  PT attended and participated in group     Status After Intervention:  Improved    Participation Level:  Active Listener and Interactive    Participation Quality: Appropriate, Attentive and Sharing      Speech:  normal      Thought Process/Content: Logical      Affective Functioning: Congruent      Mood: euthymic      Level of consciousness:  Alert and Attentive      Response to Learning: Able to retain information and Progressing to goal      Endings: None Reported    Modes of Intervention: Education, Support, Socialization, Exploration, Problem-solving and Reality-testing      Discipline Responsible: Psychoeducational Specialist      Signature:  Ruddy Nunez

## 2019-05-22 NOTE — GROUP NOTE
Group Therapy Note    Date: May 22    Group Start Time: 1430  Group End Time: 1505  Group Topic: Recovery    [facility]    Tima Cruz, CTRS    Patient's Goal:  Demonstrate increased interpersonal interaction, verbalize positive thinking coping skills    Status After Intervention:  Improved    Participation Level:  Active Listener and Interactive    Participation Quality: Appropriate, Attentive, Sharing and Supportive    Speech:  normal    Thought Process/Content: Logical    Affective Functioning: Congruent    Level of consciousness:  Alert, Oriented x4 and Attentive    Response to Learning: Able to verbalize current knowledge/experience, Capable of insight and Progressing to goal    Endings: None Reported    Modes of Intervention: Education, Socialization, Exploration and Problem-solving    Discipline Responsible: Psychoeducational Specialist    Signature:  Tima Cruz, [address]

## 2019-05-22 NOTE — PLAN OF CARE
patient refused to attend psychotherapy group at 1100 after encouragement from staff.   1:1 talk time provided as alternative to group session and refused

## 2019-05-22 NOTE — GROUP NOTE
Group Therapy Note    Date: May 22    Group Start Time: 1330  Group End Time: 1410  Group Topic: Cognitive Skills    [facility] LALITHA Lizama [place], [place]        Group Therapy Note    Attendees: 11/21         Patient's Goal:  To demonstrate increased interpersonal interaction. Notes:  Pt attended and participated in group. Status After Intervention:  Improved    Participation Level:  Active Listener and Interactive    Participation Quality: Appropriate, Attentive and Sharing      Speech:  normal      Thought Process/Content: Logical      Affective Functioning: Congruent      Mood: euthymic      Level of consciousness:  Alert and Attentive      Response to Learning: Progressing to goal      Endings: None Reported    Modes of Intervention: Support, Socialization, Problem-solving, Activity, Media and Reality-testing      Discipline Responsible: Psychoeducational Specialist      Signature:  Brandi Parsons

## 2019-05-23 PROCEDURE — 99232 SBSQ HOSP IP/OBS MODERATE 35: CPT | Performed by: NURSE PRACTITIONER

## 2019-05-23 PROCEDURE — 6370000000 HC RX 637 (ALT 250 FOR IP): Performed by: NURSE PRACTITIONER

## 2019-05-23 PROCEDURE — 90833 PSYTX W PT W E/M 30 MIN: CPT | Performed by: NURSE PRACTITIONER

## 2019-05-23 PROCEDURE — 1240000000 HC EMOTIONAL WELLNESS R&B

## 2019-05-23 RX ADMIN — MIRTAZAPINE 45 MG: 30 TABLET, FILM COATED ORAL at 21:44

## 2019-05-23 RX ADMIN — RANOLAZINE 1000 MG: 1000 TABLET, FILM COATED, EXTENDED RELEASE ORAL at 08:39

## 2019-05-23 RX ADMIN — FLUTICASONE PROPIONATE 1 SPRAY: 50 SPRAY, METERED NASAL at 08:38

## 2019-05-23 RX ADMIN — ATENOLOL 25 MG: 25 TABLET ORAL at 08:39

## 2019-05-23 RX ADMIN — ALBUTEROL SULFATE 2 PUFF: 90 AEROSOL, METERED RESPIRATORY (INHALATION) at 09:03

## 2019-05-23 RX ADMIN — ATORVASTATIN CALCIUM 20 MG: 20 TABLET, FILM COATED ORAL at 21:44

## 2019-05-23 RX ADMIN — TIOTROPIUM BROMIDE 18 MCG: 18 CAPSULE ORAL; RESPIRATORY (INHALATION) at 08:38

## 2019-05-23 RX ADMIN — PANTOPRAZOLE SODIUM 40 MG: 40 TABLET, DELAYED RELEASE ORAL at 08:39

## 2019-05-23 RX ADMIN — POLYETHYLENE GLYCOL 3350 17 G: 17 POWDER, FOR SOLUTION ORAL at 08:39

## 2019-05-23 RX ADMIN — VITAMIN D, TAB 1000IU (100/BT) 2000 UNITS: 25 TAB at 08:39

## 2019-05-23 RX ADMIN — ASPIRIN 81 MG: 81 TABLET, COATED ORAL at 08:39

## 2019-05-23 RX ADMIN — SPIRONOLACTONE 25 MG: 25 TABLET, FILM COATED ORAL at 08:39

## 2019-05-23 RX ADMIN — RANOLAZINE 1000 MG: 1000 TABLET, FILM COATED, EXTENDED RELEASE ORAL at 21:44

## 2019-05-23 RX ADMIN — HYDROXYZINE HYDROCHLORIDE 25 MG: 25 TABLET, FILM COATED ORAL at 12:06

## 2019-05-23 RX ADMIN — Medication 3 MG: at 21:44

## 2019-05-23 RX ADMIN — TRAZODONE HYDROCHLORIDE 100 MG: 100 TABLET ORAL at 21:44

## 2019-05-23 NOTE — GROUP NOTE
Group Therapy Note    Date: May 23    Group Start Time: 1330  Group End Time: 3998  Group Topic: Relapse Prevention    LA Wang, CTRS    Patient attended [facility] community education session offered on unit at 1330.       Signature:  Jaiden Will

## 2019-05-23 NOTE — PLAN OF CARE
Problem: Anger Management/Homicidal Ideation:  Goal: Absence of homicidal ideation  Description  Absence of homicidal ideation  5/22/2019 2153 by Mil Izquierdo RN  Outcome: Ongoing   Pt admits to HI towards his cousin. \"If I was not here I would be in assisted\"   Problem: Altered Mood, Depressive Behavior:  Goal: Able to verbalize acceptance of life and situations over which he or she has no control  Description  Able to verbalize acceptance of life and situations over which he or she has no control  5/22/2019 2153 by Mil Izquierdo RN  Outcome: Ongoing   Pt is visible in the milieu social with staff and peers. He attends group eats well at snack and accepts all medication.

## 2019-05-23 NOTE — GROUP NOTE
Group Therapy Note    Date: May 23    Group Start Time: 1000  Group End Time: 7985  Group Topic: Recreational    [facility]    Bety Stroud, [address]    Attendees: 5      Patient's Goal:  To demonstrate increased interpersonal skills. Notes:  Patient attended group and actively participated in task at hand. Patient conversed appropriately with peers and Bianca Machado. Status After Intervention:  Improved    Participation Level:  Active Listener and Interactive    Participation Quality: Appropriate, Attentive and Sharing      Speech:  normal      Thought Process/Content: Logical      Affective Functioning: Congruent      Level of consciousness:  Alert and Attentive      Response to Learning: Able to verbalize current knowledge/experience, Able to verbalize/acknowledge new learning, Able to retain information, Capable of insight and Progressing to goal      Endings: None Reported    Modes of Intervention: Socialization, Exploration, Problem-solving, Activity, Limit-setting and Reality-testing      Discipline Responsible: Psychoeducational Specialist      Signature:  Bruce Cruz
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
## 2019-05-23 NOTE — GROUP NOTE
Group Therapy Note    Date: May 23    Group Start Time: 1100  Group End Time: 8529  Group Topic: Psychotherapy    [facility]    EVELYN Curiel LSW           Patient's Goal: Communication/ interpersonal relationships     Notes:      Status After Intervention:  Improved    Participation Level: Interactive    Participation Quality: Appropriate, Attentive, Sharing and Supportive      Speech:  normal      Thought Process/Content: Logical  Linear      Affective Functioning: Congruent      Mood: euthymic      Level of consciousness:  Alert, Oriented x4 and Attentive      Response to Learning: Able to verbalize current knowledge/experience, Able to retain information and Capable of insight      Endings: None Reported    Modes of Intervention: Support      Discipline Responsible: /Counselor      Signature:   EVELYN Curiel LSW

## 2019-05-23 NOTE — GROUP NOTE
Group Therapy Note    Date: May 23    Group Start Time: 1350  Group End Time: 0840  Group Topic: Cognitive Skills    CZ LALITHA Harden, PRISCILA    Patient's Goal:  Improve cognitive skills, demonstrate increased interpersonal interaction      Status After Intervention:  Improved    Participation Level:  Active Listener and Interactive    Participation Quality: Appropriate, Attentive, Sharing and Supportive    Speech:  normal    Thought Process/Content: Logical    Affective Functioning: Congruent    Level of consciousness:  Alert, Oriented x4 and Attentive    Response to Learning: Able to verbalize current knowledge/experience, Capable of insight and Progressing to goal    Endings: None Reported    Modes of Intervention: Education, Socialization, Exploration, Problem-solving, Activity and Limit-setting    Discipline Responsible: Psychoeducational Specialist    Signature:  Cordelia Harden, [address]

## 2019-05-24 PROCEDURE — 6370000000 HC RX 637 (ALT 250 FOR IP): Performed by: NURSE PRACTITIONER

## 2019-05-24 PROCEDURE — 99232 SBSQ HOSP IP/OBS MODERATE 35: CPT | Performed by: NURSE PRACTITIONER

## 2019-05-24 PROCEDURE — 1240000000 HC EMOTIONAL WELLNESS R&B

## 2019-05-24 RX ORDER — TRAZODONE HYDROCHLORIDE 150 MG/1
150 TABLET ORAL NIGHTLY PRN
Status: DISCONTINUED | OUTPATIENT
Start: 2019-05-24 | End: 2019-05-31 | Stop reason: HOSPADM

## 2019-05-24 RX ADMIN — MIRTAZAPINE 45 MG: 30 TABLET, FILM COATED ORAL at 21:29

## 2019-05-24 RX ADMIN — TRAZODONE HYDROCHLORIDE 150 MG: 150 TABLET ORAL at 21:29

## 2019-05-24 RX ADMIN — MAGNESIUM HYDROXIDE 30 ML: 400 SUSPENSION ORAL at 15:31

## 2019-05-24 RX ADMIN — RANOLAZINE 1000 MG: 1000 TABLET, FILM COATED, EXTENDED RELEASE ORAL at 08:43

## 2019-05-24 RX ADMIN — POLYETHYLENE GLYCOL 3350 17 G: 17 POWDER, FOR SOLUTION ORAL at 08:46

## 2019-05-24 RX ADMIN — ATORVASTATIN CALCIUM 20 MG: 20 TABLET, FILM COATED ORAL at 21:29

## 2019-05-24 RX ADMIN — RANOLAZINE 1000 MG: 1000 TABLET, FILM COATED, EXTENDED RELEASE ORAL at 21:29

## 2019-05-24 RX ADMIN — PANTOPRAZOLE SODIUM 40 MG: 40 TABLET, DELAYED RELEASE ORAL at 08:39

## 2019-05-24 RX ADMIN — VITAMIN D, TAB 1000IU (100/BT) 2000 UNITS: 25 TAB at 08:41

## 2019-05-24 RX ADMIN — SPIRONOLACTONE 25 MG: 25 TABLET, FILM COATED ORAL at 08:39

## 2019-05-24 RX ADMIN — ASPIRIN 81 MG: 81 TABLET, COATED ORAL at 08:37

## 2019-05-24 RX ADMIN — FLUTICASONE PROPIONATE 1 SPRAY: 50 SPRAY, METERED NASAL at 08:39

## 2019-05-24 ASSESSMENT — PAIN DESCRIPTION - ORIENTATION: ORIENTATION: LEFT

## 2019-05-24 ASSESSMENT — PAIN DESCRIPTION - LOCATION: LOCATION: HIP

## 2019-05-24 ASSESSMENT — PAIN SCALES - GENERAL: PAINLEVEL_OUTOF10: 9
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
## 2019-05-24 NOTE — PLAN OF CARE
Problem: Anger Management/Homicidal Ideation:  Goal: Absence of homicidal ideation  Description  Absence of homicidal ideation  5/23/2019 2133 by Neeraj Machado RN  Outcome: Ongoing   Pt admits to HI towards his cousin. \"If I was not here I would be in [facility]\"     Problem: Altered Mood, Depressive Behavior:  Goal: Able to verbalize acceptance of life and situations over which he or she has no control  Description  Able to verbalize acceptance of life and situations over which he or she has no control  5/23/2019 2133 by Neeraj Machado RN  Outcome: Ongoing   Pt is visible in the milieu social with staff and peers. He attends group eats well at snack and accepts all medication. Problem: Falls - Risk of:  Goal: Will remain free from falls  Description  Will remain free from falls  5/23/2019 2133 by Neeraj Machado RN  Outcome: Ongoing   Pt remains free of falls and verbalizes understanding of individual fall risks. Pt wearing non skid footwear and encouraged to seek out staff for any assistance needed.

## 2019-05-24 NOTE — GROUP NOTE
Group Therapy Note    Date: May 24    Group Start Time: 1100  Group End Time: 9171  Group Topic: Psychotherapy    [facility]        Group Therapy Note    Attendees: 4/11         Patient's Goal:  Communication patters, interpersonal relationships     Notes: Active participant in group discussions; identified personal barriers and discussed opportunites for growth/positive coping     Status After Intervention:  Unchanged    Participation Level: Interactive    Participation Quality: Appropriate, Attentive and Sharing      Speech:  normal      Thought Process/Content: Logical  Linear      Affective Functioning: Congruent      Mood: euthymic      Level of consciousness:  Alert, Oriented x4 and Attentive      Response to Learning: Able to verbalize current knowledge/experience and Capable of insight      Endings: None Reported    Modes of Intervention: Support, Socialization, Problem-solving and Confrontation      Discipline Responsible: /Counselor      Signature:   EVELYN Krueger LSW

## 2019-05-24 NOTE — GROUP NOTE
Group Therapy Note    Date: May 24    Group Start Time: 0793  Group End Time: 0910  Group Topic: [address], [place]    Attendees: 11         Patient's Goal for Today:  \"Absord what I can to help\". Continue working on getting transferred to [place]. Notes:      Status After Intervention:  Improved    Participation Level:  Active Listener and Interactive    Participation Quality: Appropriate, Attentive and Sharing      Speech:  normal      Thought Process/Content: Logical      Affective Functioning: Congruent      Level of consciousness:  Alert and Attentive      Response to Learning: Able to verbalize current knowledge/experience, Able to verbalize/acknowledge new learning, Able to retain information, Capable of insight and Progressing to goal      Endings: None Reported    Modes of Intervention: Education, Support, Socialization, Exploration, Clarifying, Problem-solving, Confrontation, Limit-setting and Reality-testing      Discipline Responsible: Psychoeducational Specialist      Signature:  Maci Sinclair

## 2019-05-24 NOTE — GROUP NOTE
Group Therapy Note    Date: May 24    Group Start Time: 1440  Group End Time: 1525  Group Topic: Recreational    STMARIA DEL ROSARIO Tabor, [address]    Attendees: 12         Patient's Goal:  To demonstrate increased interpersonal skills. Notes:  Patient attended group and actively participated in task at hand. Patient conversed appropriately with peers and Michael Richmond. Status After Intervention:  Improved    Participation Level:  Active Listener and Interactive    Participation Quality: Appropriate, Attentive, Sharing and Supportive      Speech:  normal      Thought Process/Content: Logical      Affective Functioning: Congruent      Level of consciousness:  Alert and Attentive      Response to Learning: Able to verbalize current knowledge/experience, Able to verbalize/acknowledge new learning, Able to retain information, Capable of insight and Progressing to goal      Endings: None Reported    Modes of Intervention: Socialization, Exploration, Clarifying, Problem-solving, Activity, Media and Reality-testing      Discipline Responsible: Psychoeducational Specialist      Signature:  Raysa Yang

## 2019-05-24 NOTE — GROUP NOTE
Group Therapy Note    Date: May 24    Group Start Time: 1000  Group End Time: 3578  Group Topic: Recreational    [facility]    Jonathan Masker, [place]    Attendees: 5      Patient's Goal:  To demonstrate increased interpersonal skills. Notes:  Patient attended group and actively participated in task at hand. Status After Intervention:  Improved    Participation Level:  Active Listener and Interactive    Participation Quality: Appropriate, Attentive and Sharing      Speech:  normal      Thought Process/Content: Logical      Affective Functioning: Congruent      Level of consciousness:  Alert and Attentive      Response to Learning: Able to verbalize current knowledge/experience, Able to verbalize/acknowledge new learning, Able to retain information, Capable of insight and Progressing to goal      Endings: None Reported       Modes of Intervention: Socialization, Problem-solving, Activity, Confrontation, Limit-setting and Reality-testing      Discipline Responsible: Psychoeducational Specialist      Signature:  Marty Peng
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
## 2019-05-24 NOTE — GROUP NOTE
Group Therapy Note    Date: May 24    Group Start Time: 1330  Group End Time: 3040  Group Topic: Cognitive Skills    [facility] [address] [place] [address]        Group Therapy Note    Attendees: 11/20         Patient's Goal:  Patient will demonstrate increased interpersonal interaction     Notes:  Patient attended group and participated fully     Status After Intervention:  Improved    Participation Level:  Active Listener and Interactive    Participation Quality: Appropriate, Attentive and Sharing      Speech:  normal      Thought Process/Content: Logical      Affective Functioning: Congruent      Mood: euthymic      Level of consciousness:  Alert and Oriented x4      Response to Learning: Progressing to goal      Endings: None Reported    Modes of Intervention: Education, Socialization and Activity      Discipline Responsible: Psychoeducational Specialist      Signature:  Donald Hawkins

## 2019-05-24 NOTE — GROUP NOTE
Group Therapy Note    Date: May 23    Group Start Time: 0830  Group End Time: 0900  Group Topic: Wrap-Up    [facility] ANTWAN Murphy LPN    Patient actively participated in 2030 Wrap-up group therapy session.      Group Therapy Note    Attendees: 9               Signature:  Kimi Murphy LPN

## 2019-05-24 NOTE — GROUP NOTE
Group Therapy Note    Date: May 24    Group Start Time: 1600  Group End Time: 1700  Group Topic: Healthy Living/Wellness    [facility]    Jeri Sheth LPN        Group Therapy Note    Attendees: 12         Patient's Goal:  relaxation    Notes:  n/a    Status After Intervention:  Improved    Participation Level:  Active Listener    Participation Quality: Appropriate      Speech:  normal      Thought Process/Content: Logical      Affective Functioning: Flat      Mood: normal      Level of consciousness:  Alert and Oriented x4      Response to Learning: Able to verbalize current knowledge/experience      Endings: None Reported    Modes of Intervention: Activity      Discipline Responsible: Licensed Practical Nurse      Signature:  Jeri Sheth LPN

## 2019-05-25 PROCEDURE — 6370000000 HC RX 637 (ALT 250 FOR IP): Performed by: NURSE PRACTITIONER

## 2019-05-25 PROCEDURE — 1240000000 HC EMOTIONAL WELLNESS R&B

## 2019-05-25 RX ADMIN — RANOLAZINE 1000 MG: 1000 TABLET, FILM COATED, EXTENDED RELEASE ORAL at 21:56

## 2019-05-25 RX ADMIN — TRAZODONE HYDROCHLORIDE 150 MG: 150 TABLET ORAL at 21:56

## 2019-05-25 RX ADMIN — SPIRONOLACTONE 25 MG: 25 TABLET, FILM COATED ORAL at 08:01

## 2019-05-25 RX ADMIN — POLYETHYLENE GLYCOL 3350 17 G: 17 POWDER, FOR SOLUTION ORAL at 08:01

## 2019-05-25 RX ADMIN — ATENOLOL 25 MG: 25 TABLET ORAL at 08:01

## 2019-05-25 RX ADMIN — ASPIRIN 81 MG: 81 TABLET, COATED ORAL at 08:01

## 2019-05-25 RX ADMIN — VITAMIN D, TAB 1000IU (100/BT) 2000 UNITS: 25 TAB at 08:02

## 2019-05-25 RX ADMIN — FLUTICASONE PROPIONATE 1 SPRAY: 50 SPRAY, METERED NASAL at 08:03

## 2019-05-25 RX ADMIN — RANOLAZINE 1000 MG: 1000 TABLET, FILM COATED, EXTENDED RELEASE ORAL at 08:02

## 2019-05-25 RX ADMIN — ATORVASTATIN CALCIUM 20 MG: 20 TABLET, FILM COATED ORAL at 21:56

## 2019-05-25 RX ADMIN — MIRTAZAPINE 45 MG: 30 TABLET, FILM COATED ORAL at 21:56

## 2019-05-25 RX ADMIN — MAGNESIUM HYDROXIDE 30 ML: 400 SUSPENSION ORAL at 15:32

## 2019-05-25 RX ADMIN — PANTOPRAZOLE SODIUM 40 MG: 40 TABLET, DELAYED RELEASE ORAL at 08:01

## 2019-05-25 RX ADMIN — TIOTROPIUM BROMIDE 18 MCG: 18 CAPSULE ORAL; RESPIRATORY (INHALATION) at 08:02

## 2019-05-25 ASSESSMENT — PAIN SCALES - GENERAL: PAINLEVEL_OUTOF10: 9

## 2019-05-25 ASSESSMENT — PAIN DESCRIPTION - PAIN TYPE: TYPE: CHRONIC PAIN

## 2019-05-25 ASSESSMENT — PAIN DESCRIPTION - LOCATION: LOCATION: HIP

## 2019-05-25 NOTE — PLAN OF CARE
Problem: Anger Management/Homicidal Ideation:  Goal: Absence of homicidal ideation  Description  Absence of homicidal ideation  5/24/2019 2213 by Ivonne Tovar RN  Outcome: Ongoing  Note:   Pt continues to have homicidal ideation towards cousin. Problem: Altered Mood, Depressive Behavior:  Goal: Able to verbalize acceptance of life and situations over which he or she has no control  Description  Able to verbalize acceptance of life and situations over which he or she has no control  5/24/2019 2213 by Ivonne Tovar RN  Outcome: Ongoing  Note:   Pt states the reason why he continues to have homicidal ideation towards his cousin is because he was helping his wife but not him. Encouraged to participate in unit group programming and work with staff to learn how to accept and deal with life situations that are not within ability of control. Problem: Pain:  Goal: Pain level will decrease  Description  Pain level will decrease  Outcome: Ongoing  Note:   Pt c/o generalized pain. Pt offered PRN pain medication but declined. States they don't help anyway. Problem: Falls - Risk of:  Goal: Will remain free from falls  Description  Will remain free from falls  5/24/2019 2213 by Ivonne Tovar RN  Outcome: Ongoing  Note:   No falls reported or observed as of this documentation. Fall precaution continued and maintained. Pt wearing non skid footwear and encouraged to seek out staff for any assistance needed.

## 2019-05-25 NOTE — GROUP NOTE
Group Therapy Note    Date: May 24    Group Start Time: 0830  Group End Time: 0900  Group Topic: Wrap-Up    [facility]    Tono Weiss LPN    Patient actively participated in 2030 Wrap-up group therapy.      Group Therapy Note    Attendees: 12               Signature:  Tono Weiss LPN

## 2019-05-25 NOTE — GROUP NOTE
Group Therapy Note    Date: May 25    Group Start Time: 1100  Group End Time: 5257  Group Topic: Healthy Living/Wellness    [address], RN        Group Therapy Note    Attendees: 8/22         Patient's Goal:med ED    Notes:       Status After Intervention:  Improved    Participation Level:  Active Listener    Participation Quality: Appropriate      Speech:  normal      Thought Process/Content: Logical      Affective Functioning: Congruent      Mood: stable      Level of consciousness:  Alert      Response to Learning: Able to verbalize/acknowledge new learning      Endings: None Reported    Modes of Intervention: Education      Discipline Responsible: Registered Nurse      Signature:  Marcus Gomez RN

## 2019-05-25 NOTE — PLAN OF CARE
Patient denies suicidal ideations but admits to homicidal ideations towards his cousin. Patient rates his depression and anxiety an '8' on a scale of 0-10. Patient c/o chronic neck and left hip pain and states, \"It hurts all the time, I want to get into a pain clinic. \" Patient uses a 4 wheeled walker appropriately. He reports eating, drinking, and sleeping adequately. Patient is medication compliant, attends groups, and is cooperative with 1:1 talk time. Safety checks every 15 min; patient safety maintained.

## 2019-05-25 NOTE — GROUP NOTE
Group Therapy Note    Date: May 25    Group Start Time: 1000  Group End Time: 6602  Group Topic: Psychoeducation    [facility]    EVELYN Finley, LSW        Group Therapy Note    Attendees: 11         Patient's Goal:  To demonstrate increased interpersonal interaction. Notes:  Group topic was personal boundaries.     Status After Intervention:  Improved    Participation Level: Interactive    Participation Quality: Appropriate      Speech:  normal      Thought Process/Content: Logical      Affective Functioning: Congruent      Mood: anxious      Level of consciousness:  Alert      Response to Learning: Able to verbalize current knowledge/experience      Endings: None Reported    Modes of Intervention: Education      Discipline Responsible: /Counselor      Signature:  EVELYN Finley, LSW

## 2019-05-25 NOTE — GROUP NOTE
Group Therapy Note    Date: May 25    Group Start Time: 0900  Group End Time: 0930  Group Topic: Community Meeting    [facility] [address], [facility]        Group Therapy Note    Attendees: 11         Patient's Goal:   Patient will demonstrate increased interpersonal interaction     Notes:  Patient attended group and participated fully     Status After Intervention:  Improved    Participation Level:  Active Listener and Interactive    Participation Quality: Appropriate, Attentive and Sharing      Speech:  normal      Thought Process/Content: Logical      Affective Functioning: Congruent      Mood: euthymic      Level of consciousness:  Alert and Oriented x4      Response to Learning: Progressing to goal      Endings: None Reported    Modes of Intervention: Education, Support and Socialization      Discipline Responsible: Psychoeducational Specialist      Signature:  Donald Hawkins

## 2019-05-25 NOTE — GROUP NOTE
Group Therapy Note    Date: May 25    Group Start Time: 1600  Group End Time: 1630  Group Topic: Healthy Living/Wellness    [address] [place] [address]        Group Therapy Note    Attendees: 11/20         Patient's Goal:  Communication Skills    Notes:  Pt was appropriate and engaged    Status After Intervention:  Improved    Participation Level:  Active Listener and Interactive    Participation Quality: Appropriate, Attentive, Sharing and Supportive      Speech:  normal      Thought Process/Content: Logical  Linear      Affective Functioning: Congruent      Mood: Appropriate      Level of consciousness:  Alert and Oriented x4      Response to Learning: Capable of insight and Progressing to goal      Endings: None Reported    Modes of Intervention: Support, Socialization, Exploration and Activity      Discipline Responsible: Behavorial Health Tech      Signature:  Joanne Colon

## 2019-05-26 PROCEDURE — 6370000000 HC RX 637 (ALT 250 FOR IP): Performed by: NURSE PRACTITIONER

## 2019-05-26 PROCEDURE — 1240000000 HC EMOTIONAL WELLNESS R&B

## 2019-05-26 PROCEDURE — 99232 SBSQ HOSP IP/OBS MODERATE 35: CPT | Performed by: NURSE PRACTITIONER

## 2019-05-26 RX ORDER — BISACODYL 10 MG
10 SUPPOSITORY, RECTAL RECTAL DAILY PRN
Status: DISCONTINUED | OUTPATIENT
Start: 2019-05-26 | End: 2019-05-31 | Stop reason: HOSPADM

## 2019-05-26 RX ORDER — MIRTAZAPINE 15 MG/1
15 TABLET, FILM COATED ORAL NIGHTLY
Status: DISCONTINUED | OUTPATIENT
Start: 2019-05-26 | End: 2019-05-31 | Stop reason: HOSPADM

## 2019-05-26 RX ORDER — MIRTAZAPINE 30 MG/1
30 TABLET, FILM COATED ORAL DAILY
Status: DISCONTINUED | OUTPATIENT
Start: 2019-05-27 | End: 2019-05-31 | Stop reason: HOSPADM

## 2019-05-26 RX ADMIN — ATORVASTATIN CALCIUM 20 MG: 20 TABLET, FILM COATED ORAL at 21:48

## 2019-05-26 RX ADMIN — POLYETHYLENE GLYCOL 3350 17 G: 17 POWDER, FOR SOLUTION ORAL at 08:43

## 2019-05-26 RX ADMIN — RANOLAZINE 1000 MG: 1000 TABLET, FILM COATED, EXTENDED RELEASE ORAL at 21:48

## 2019-05-26 RX ADMIN — BISACODYL 10 MG: 10 SUPPOSITORY RECTAL at 15:08

## 2019-05-26 RX ADMIN — TRAZODONE HYDROCHLORIDE 150 MG: 150 TABLET ORAL at 21:49

## 2019-05-26 RX ADMIN — HYDROXYZINE HYDROCHLORIDE 25 MG: 25 TABLET, FILM COATED ORAL at 13:46

## 2019-05-26 RX ADMIN — VITAMIN D, TAB 1000IU (100/BT) 2000 UNITS: 25 TAB at 08:43

## 2019-05-26 RX ADMIN — ASPIRIN 81 MG: 81 TABLET, COATED ORAL at 08:43

## 2019-05-26 RX ADMIN — FLUTICASONE PROPIONATE 1 SPRAY: 50 SPRAY, METERED NASAL at 08:43

## 2019-05-26 RX ADMIN — PANTOPRAZOLE SODIUM 40 MG: 40 TABLET, DELAYED RELEASE ORAL at 08:43

## 2019-05-26 RX ADMIN — MIRTAZAPINE 15 MG: 15 TABLET, FILM COATED ORAL at 21:49

## 2019-05-26 RX ADMIN — RANOLAZINE 1000 MG: 1000 TABLET, FILM COATED, EXTENDED RELEASE ORAL at 08:43

## 2019-05-26 RX ADMIN — ATENOLOL 25 MG: 25 TABLET ORAL at 08:43

## 2019-05-26 RX ADMIN — SPIRONOLACTONE 25 MG: 25 TABLET, FILM COATED ORAL at 08:43

## 2019-05-26 RX ADMIN — TIOTROPIUM BROMIDE 18 MCG: 18 CAPSULE ORAL; RESPIRATORY (INHALATION) at 08:43

## 2019-05-26 NOTE — GROUP NOTE
Group Therapy Note    Date: May 26    Group Start Time: 1000  Group End Time: 9945  Group Topic: Psychoeducation    BABAR Arizmendi        Group Therapy Note    Attendees: 9/23         Patient's Goal:  Pt participated with mental health question ball game. Was able to process questions and discuss with group. Status After Intervention:  Unchanged    Participation Level:  Active Listener and Interactive    Participation Quality: Appropriate      Speech:  normal      Thought Process/Content: Logical      Affective Functioning: Congruent      Mood: euthymic      Level of consciousness:  Alert, Oriented x4 and Attentive      Response to Learning: Progressing to goal      Endings: None Reported    Modes of Intervention: Education, Support, Socialization and Exploration      Discipline Responsible: /Counselor      Signature:  BABAR Conley

## 2019-05-26 NOTE — PLAN OF CARE
Problem: Altered Mood, Depressive Behavior:  Goal: Able to verbalize acceptance of life and situations over which he or she has no control  Description  Able to verbalize acceptance of life and situations over which he or she has no control  Outcome: Ongoing  Note:   Patient denies suicidal ideation but admits to homicidal thoughts toward his cousin. Patient reports depression and anxiety are at an 8/10 due to another patient being intrusive and interrupting others during group time. Patient verbalizes understanding that he cannot control others actions and he should not let others behaviors affect his treatment. Patient social in day area with peers. Patient attends groups. Patient safety maintained q15 minute checks. Problem: Pain:  Goal: Pain level will decrease  Description  Pain level will decrease  Outcome: Ongoing  Note:   Patient has chronic neck and hip pain. Patient reports his pain control is \"heading in the right directions\". Patient reports he is feeling good with the progress that is being made with his pain control and he is hopefully that it will get to a desired level.

## 2019-05-26 NOTE — PLAN OF CARE
PSYCHOEDUCATION GROUP NOTE  Date: 5/26/2019 Start Time: 0900 End Time: 2014  Number Participants in Group: 7/23  Goal: Patient will demonstrate increased socialization, increased cognition, and will set a goal obtainable by 8PM.  Topic: community meeting/ goals group  Method: Goal Setting, Trivia, [employer]  Discipline Responsible:   OT  AT  [facility]. X RT [facility] Other   ? Participation Level:    None  Minimal   X Active Listener X Interactive    Monopolizing     ? Participation Quality:  X Appropriate ? Inappropriate   X Attentive ? Intrusive   X Sharing ? Resistant   X Supportive ? Lethargic   ? Affective:   ? Congruent ? Incongruent ? Blunted ? Flat   ? Constricted ? Anxious ? Elated ? Angry   ? Labile ? Depressed ? Other x appropriate   ? Cognitive:  X Alert X Oriented PPTP     Concentration X G ? F ? P   Attention Span X G  F  P   Short-Term Memory ? G ? F ? P   Long-Term Memory ?x G ? F ? P   ProblemSolving/  Decision Making X G ? F ? P   Ability to Process  Information X G ? F ? P     ? Contributing Factors   ? Delusional   ? Hallucinating   ? Flight of Ideas   ? Other:   ? Modes of Intervention:  x Education x Support ? Exploration   x Clarifying ? Problem Solving ? Confrontation   x Socialization ? Limit Setting ? Reality Testing   x Activity ? Movement ? Media   x Other: Trivia x Goal Setting ? ?   ? Response to Learning:  X Able to verbalize current knowledge/experience   X Able to verbalize/acknowledge new learning   X Able to retain information   X Capable of insight   ? Able to change behavior   X Progressing to goal   ? Other:    ?  Comments: Anishtrini Urvashi stated his goal for the day is to go to groups and rid negative thoughts. Mimi Giron stated he is not sure about how he is feeling today.

## 2019-05-26 NOTE — PROGRESS NOTES
Department of Psychiatry  Nurse Practitioner Progress Note    Chief Complaint: PTSD (post-traumatic stress disorder)     SUBJECTIVE:  The patient is seen today in the day area. He says he continues to feel depressed and is not sleeping well. He thinks it might help to split his dose of mirtazapine to 30 mg QAM / 15 mg QPM. He reports hip pain \"off the charts. \" Also states that he has not had a bowel movement in a week and a half and asks for a suppository. Chart and medications reviewed. At this time there is no safe alternative other than inpatient care. OBJECTIVE    Physical  /82   Pulse 61   Temp 97.7 °F (36.5 °C) (Oral)   Resp 14   Ht 5' 9\" (1.753 m)   Wt 234 lb (106.1 kg)   SpO2 97%   BMI 34.56 kg/m²      Mental Status Evaluation:  Orientation: alertness: alert   Mood:. Depression,  anxious      Affect:  brightened      Appearance:  age appropriate and bearded   Activity:  Psychomotor Agitation   Gait/Posture: Normal   Speech:  normal pitch and normal volume   Thought Process:  circumstantial   Thought Content:  Denies delusions and hallucinations   Sensorium:  person, place, time/date and situation   Cognition:  grossly intact   Memory: intact   Insight:  limited   Judgment: limited   Suicidal Ideations: denies suicidal ideation   Homicidal Ideations: Positive for homicidal ideation   Medication Side Effects: absent       Attention Span attention span appeared shorter than expected for age       Labs  Results for Landy Quinteros (MRN [id number]) as of 5/17/2019 15:10   Ref.  Range 5/14/2019 07:23 5/14/2019 07:58 5/14/2019 12:03 5/14/2019 16:39 5/14/2019 20:44   Sodium Latest Ref Range: 135 - 144 mmol/L 137       Potassium Latest Ref Range: 3.7 - 5.3 mmol/L 4.8       Chloride Latest Ref Range: 98 - 107 mmol/L 98       CO2 Latest Ref Range: 20 - 31 mmol/L 25       BUN Latest Ref Range: 8 - 23 mg/dL 17       Creatinine Latest Ref Range: 0.70 - 1.20 mg/dL 0.97       Bun/Cre Ratio Latest Ref Range: 9 - 20  NOT REPORTED       Anion Gap Latest Ref Range: 9 - 17 mmol/L 14       GFR Non- Latest Ref Range: >60 mL/min >60       GFR  Latest Ref Range: >60 mL/min >60       Glucose Latest Ref Range: 70 - 99 mg/dL 92       POC Glucose Latest Ref Range: 75 - 110 mg/dL  103 124 (H) 120 (H) 104   Calcium Latest Ref Range: 8.6 - 10.4 mg/dL 9.4       Albumin/Globulin Ratio Latest Ref Range: 1.0 - 2.5  NOT REPORTED       Total Protein Latest Ref Range: 6.4 - 8.3 g/dL 6.9       GFR Comment Unknown Pend       GFR Staging Unknown NOT REPORTED       Chol/HDL Ratio Latest Ref Range: <5  2.6       Cholesterol Latest Ref Range: <200 mg/dL 127       HDL Cholesterol Latest Ref Range: >40 mg/dL 48       LDL Cholesterol Latest Ref Range: 0 - 130 mg/dL 54       Triglycerides Latest Ref Range: <150 mg/dL 126       VLDL Latest Ref Range: 1 - 30 mg/dL NOT REPORTED       Albumin Latest Ref Range: 3.5 - 5.2 g/dL 4.0       Alk Phos Latest Ref Range: 40 - 129 U/L 87       ALT Latest Ref Range: 5 - 41 U/L 66 (H)       AST Latest Ref Range: <40 U/L 58 (H)       Bilirubin Latest Ref Range: 0.3 - 1.2 mg/dL 0.60       Hemoglobin A1C Latest Ref Range: 4.0 - 6.0 % 5.1       eAG (mg/dL) Latest Units: mg/dL 100       TSH Latest Ref Range: 0.30 - 5.00 mIU/L 1.92       Thyroxine, Free Latest Ref Range: 0.93 - 1.70 ng/dL 1.25       WBC Latest Ref Range: 3.5 - 11.0 k/uL 5.3       RBC Latest Ref Range: 4.5 - 5.9 m/uL 4.79       Hemoglobin Quant Latest Ref Range: 13.5 - 17.5 g/dL 14.7       Hematocrit Latest Ref Range: 41 - 53 % 44.6       MCV Latest Ref Range: 80 - 100 fL 93.1       MCH Latest Ref Range: 26 - 34 pg 30.7       MCHC Latest Ref Range: 31 - 37 g/dL 33.0       MPV Latest Ref Range: 6.0 - 12.0 fL 7.5       RDW Latest Ref Range: 11.5 - 14.9 % 14.5       Platelet Count Latest Ref Range: 150 - 450 k/uL 202       Platelet Estimate Unknown NOT REPORTED       Absolute Mono # Latest Ref Range: 0.1 - 1.3 k/uL 0.50 Eosinophils % Latest Ref Range: 0 - 4 % 8 (H)       Basophils # Latest Ref Range: 0.0 - 0.2 k/uL 0.00       Differential Type Unknown NOT REPORTED       Seg Neutrophils Latest Ref Range: 36 - 66 % 61       Segs Absolute Latest Ref Range: 1.3 - 9.1 k/uL 3.30       Lymphocytes Latest Ref Range: 24 - 44 % 22 (L)       Absolute Lymph # Latest Ref Range: 1.0 - 4.8 k/uL 1.20       Monocytes Latest Ref Range: 1 - 7 % 9 (H)       Absolute Eos # Latest Ref Range: 0.0 - 0.4 k/uL 0.40       Basophils Latest Ref Range: 0 - 2 % 0       Immature Granulocytes Latest Ref Range: 0 % NOT REPORTED       WBC Morphology Unknown NOT REPORTED       RBC Morphology Unknown NOT REPORTED       Absolute Immature Granulocyte Latest Ref Range: 0.00 - 0.30 k/uL NOT REPORTED       NRBC Automated Latest Units: per 100 WBC NOT REPORTED           Medications  Current Facility-Administered Medications   Medication Dose Route Frequency Provider Last Rate Last Dose    mirtazapine (REMERON) tablet 15 mg  15 mg Oral Nightly DIANNA Guerra - CNP        [START ON 5/27/2019] mirtazapine (REMERON) tablet 30 mg  30 mg Oral Daily Yenni Bonus, APRN - CNP        bisacodyl (DULCOLAX) suppository 10 mg  10 mg Rectal Daily PRN Yenni Escoto, APRN - CNP        traZODone (DESYREL) tablet 150 mg  150 mg Oral Nightly PRN Yenni Escoto, APRN - CNP   150 mg at 05/25/19 2156    acetaminophen (TYLENOL) tablet 650 mg  650 mg Oral Q4H PRN Teo Ruiz, APRN - CNP   650 mg at 05/22/19 2011    hydrOXYzine (ATARAX) tablet 25 mg  25 mg Oral TID PRN [facility] , APRN - CNP   25 mg at 05/26/19 1346    benztropine mesylate (COGENTIN) injection 2 mg  2 mg Intramuscular BID PRN Teo Ruiz, APRN - CNP        magnesium hydroxide (MILK OF MAGNESIA) 400 MG/5ML suspension 30 mL  30 mL Oral Daily PRN Teo , APRN - CNP   30 mL at 05/25/19 1532    aluminum & magnesium hydroxide-simethicone (MAALOX) 200-200-20 MG/5ML suspension 30 mL  30 mL Oral Q6H PRN Kraig Bone, APRN - CNP        nicotine polacrilex (NICORETTE) gum 2 mg  2 mg Oral Q2H PRN Kraig Bone, APRN - CNP        albuterol (PROVENTIL) nebulizer solution 2.5 mg  2.5 mg Nebulization Q4H PRN Kraig Bone, APRN - CNP        albuterol sulfate  (90 Base) MCG/ACT inhaler 2 puff  2 puff Inhalation Q4H PRN Bayron Bone, APRN - CNP   2 puff at 05/23/19 0903    aspirin EC tablet 81 mg  81 mg Oral Daily Bayron Bone, APRN - CNP   81 mg at 05/26/19 0843    atenolol (TENORMIN) tablet 25 mg  25 mg Oral Daily Bayron Bone, APRN - CNP   25 mg at 05/26/19 0843    atorvastatin (LIPITOR) tablet 20 mg  20 mg Oral Nightly Bayron Bone, APRN - CNP   20 mg at 05/25/19 2156    vitamin D (CHOLECALCIFEROL) tablet 2,000 Units  2,000 Units Oral Daily Bayron Bone, APRN - CNP   2,000 Units at 05/26/19 0843    fluticasone (FLONASE) 50 MCG/ACT nasal spray 1 spray  1 spray Nasal Daily Bayron Bone, APRN - CNP   1 spray at 05/26/19 0843    melatonin ER tablet 3 mg  3 mg Oral Nightly PRN Bayron Bone, APRN - CNP   3 mg at 05/23/19 2144    pantoprazole (PROTONIX) tablet 40 mg  40 mg Oral QAM AC Bayron Bone, APRN - CNP   40 mg at 05/26/19 0843    polyethylene glycol (GLYCOLAX) packet 17 g  17 g Oral Daily Bayron Bone, APRN - CNP   17 g at 05/26/19 0843    ranolazine (RANEXA) extended release tablet 1,000 mg  1,000 mg Oral BID Bayron Bone, APRN - CNP   1,000 mg at 05/26/19 6016    spironolactone (ALDACTONE) tablet 25 mg  25 mg Oral Daily Bayron Bone, APRN - CNP   25 mg at 05/26/19 0843    tiotropium (SPIRIVA) inhalation capsule 18 mcg  18 mcg Inhalation Daily Bayron Bone, APRN - CNP   18 mcg at 05/26/19 0843         mirtazapine  15 mg Oral Nightly    [START ON 5/27/2019] mirtazapine  30 mg Oral Daily    aspirin  81 mg Oral Daily    atenolol  25 mg Oral Daily    atorvastatin  20 mg Oral Nightly    vitamin D  2,000 Units Oral Daily    fluticasone  1 spray Nasal Daily    pantoprazole  40 mg Oral QAM AC    polyethylene glycol  17 g Oral Daily    ranolazine  1,000 mg Oral BID    spironolactone  25 mg Oral Daily    tiotropium  18 mcg Inhalation Daily       ASSESSMENT  PTSD (post-traumatic stress disorder)     Patient's Response to Treatment: positive    PLAN:  · Continue inpatient psychiatric treatment  · Supportive therapy with medication management. Reviewed risks and benefits as well as potential side effects with patient.   · Increase Trazodone to 150 mg at bedtime 5/24/2019  · Urinalysis ordered for 5/22/19 re: re-order Lasix and consider IM consult? · Will change mirtazapine dosing to 30 mg QAM & 15 mg QHS, per pt's request.  · Dulcolax suppository as needed for constipation  · Therapeutic activities and groups. · Discharge planning with social work.     Electronically signed by DIANNA Dove CNP on 5/26/2019 at 2:37 PM.

## 2019-05-27 PROCEDURE — 90833 PSYTX W PT W E/M 30 MIN: CPT | Performed by: NURSE PRACTITIONER

## 2019-05-27 PROCEDURE — 99232 SBSQ HOSP IP/OBS MODERATE 35: CPT | Performed by: NURSE PRACTITIONER

## 2019-05-27 PROCEDURE — 6370000000 HC RX 637 (ALT 250 FOR IP): Performed by: NURSE PRACTITIONER

## 2019-05-27 PROCEDURE — 1240000000 HC EMOTIONAL WELLNESS R&B

## 2019-05-27 RX ORDER — IBUPROFEN 800 MG/1
800 TABLET ORAL 2 TIMES DAILY PRN
Status: DISCONTINUED | OUTPATIENT
Start: 2019-05-27 | End: 2019-05-31 | Stop reason: HOSPADM

## 2019-05-27 RX ORDER — DOCUSATE SODIUM 100 MG/1
100 CAPSULE, LIQUID FILLED ORAL DAILY
Status: DISCONTINUED | OUTPATIENT
Start: 2019-05-27 | End: 2019-05-31 | Stop reason: HOSPADM

## 2019-05-27 RX ADMIN — IBUPROFEN 800 MG: 800 TABLET ORAL at 13:29

## 2019-05-27 RX ADMIN — RANOLAZINE 1000 MG: 1000 TABLET, FILM COATED, EXTENDED RELEASE ORAL at 08:05

## 2019-05-27 RX ADMIN — BISACODYL 10 MG: 10 SUPPOSITORY RECTAL at 21:15

## 2019-05-27 RX ADMIN — ASPIRIN 81 MG: 81 TABLET, COATED ORAL at 08:03

## 2019-05-27 RX ADMIN — MIRTAZAPINE 30 MG: 30 TABLET, FILM COATED ORAL at 08:04

## 2019-05-27 RX ADMIN — FLUTICASONE PROPIONATE 1 SPRAY: 50 SPRAY, METERED NASAL at 08:04

## 2019-05-27 RX ADMIN — SPIRONOLACTONE 25 MG: 25 TABLET, FILM COATED ORAL at 08:03

## 2019-05-27 RX ADMIN — VITAMIN D, TAB 1000IU (100/BT) 2000 UNITS: 25 TAB at 08:05

## 2019-05-27 RX ADMIN — PANTOPRAZOLE SODIUM 40 MG: 40 TABLET, DELAYED RELEASE ORAL at 08:03

## 2019-05-27 RX ADMIN — POLYETHYLENE GLYCOL 3350 17 G: 17 POWDER, FOR SOLUTION ORAL at 08:03

## 2019-05-27 RX ADMIN — TRAZODONE HYDROCHLORIDE 150 MG: 150 TABLET ORAL at 21:15

## 2019-05-27 RX ADMIN — DOCUSATE SODIUM 100 MG: 100 CAPSULE, LIQUID FILLED ORAL at 13:29

## 2019-05-27 RX ADMIN — RANOLAZINE 1000 MG: 1000 TABLET, FILM COATED, EXTENDED RELEASE ORAL at 21:15

## 2019-05-27 RX ADMIN — MIRTAZAPINE 15 MG: 15 TABLET, FILM COATED ORAL at 21:15

## 2019-05-27 RX ADMIN — TIOTROPIUM BROMIDE 18 MCG: 18 CAPSULE ORAL; RESPIRATORY (INHALATION) at 08:05

## 2019-05-27 RX ADMIN — ATORVASTATIN CALCIUM 20 MG: 20 TABLET, FILM COATED ORAL at 21:15

## 2019-05-27 RX ADMIN — ATENOLOL 25 MG: 25 TABLET ORAL at 08:04

## 2019-05-27 ASSESSMENT — PAIN DESCRIPTION - LOCATION: LOCATION: HIP

## 2019-05-27 ASSESSMENT — PAIN DESCRIPTION - ORIENTATION: ORIENTATION: LEFT

## 2019-05-27 ASSESSMENT — PAIN SCALES - GENERAL
PAINLEVEL_OUTOF10: 9
PAINLEVEL_OUTOF10: 6

## 2019-05-27 NOTE — GROUP NOTE
Group Therapy Note    Date: May 27    Group Start Time: 1600  Group End Time: 8869  Group Topic: Healthy Living/Wellness    [facility]        Group Therapy Note    Attendees:13/22         Patient's Goal:  Share positive choices with group    Notes:    Status After Intervention:  Improved    Participation Level: Active Listener and Interactive    Participation Quality: Appropriate      Speech:  normal      Thought Process/Content: Logical      Affective Functioning: Congruent      Mood: controlled      Level of consciousness:  Alert, Oriented x4 and Attentive      Response to Learning: Able to verbalize current knowledge/experience and Progressing to goal      Endings: None Reported    Modes of Intervention: Education and Support      Discipline Responsible: Behavorial Health Tech      Signature:   Francesca Padron

## 2019-05-27 NOTE — GROUP NOTE
Group Therapy Note    Date: May 27    Group Start Time: 1330  Group End Time: 1415  Group Topic: Recreational    [facility] [place], [address]    Attendees: 11         Patient's Goal:  To demonstrate increased interpersonal skills. Notes:  Patient attended group and actively participated in task at hand. Patient conversed appropriately with peers, intern and writer. Patient appeared uncomfortable for short time during group after female peer made comments towards him about being a pair. Status After Intervention:  Improved    Participation Level:  Active Listener and Interactive    Participation Quality: Appropriate, Attentive and Sharing      Speech:  normal      Thought Process/Content: Logical      Affective Functioning: Congruent      Level of consciousness:  Alert and Attentive      Response to Learning: Able to verbalize current knowledge/experience, Able to verbalize/acknowledge new learning, Able to retain information, Capable of insight and Progressing to goal      Endings: None Reported       Modes of Intervention: Support, Socialization, Exploration, Problem-solving, Activity and Reality-testing      Discipline Responsible: Psychoeducational Specialist      Signature:  Tara Souza

## 2019-05-27 NOTE — PLAN OF CARE
Problem: Anger Management/Homicidal Ideation:  Goal: Absence of homicidal ideation  Description  Absence of homicidal ideation  5/26/2019 2308 by Medina Andrea RN  Outcome: Ongoing  Note:   Patient reports homicidal ideations towards another patient on the unit at this time. Patient denies any specific issues or occurrences that happened with patient, but states \"there is just something not kosher with her\". Discussed coping skills and encouraged patient to focus on himself and his treatment. Patient verbalized understanding and that he would maintain control of his emotions. Reminded patient if he felt the issue was progressing or he could no longer maintain control he should notify staff. Patient verbalized understanding. 5/26/2019 1518 by Lisa Amador RN  Outcome: Ongoing     Problem: Altered Mood, Depressive Behavior:  Goal: Able to verbalize acceptance of life and situations over which he or she has no control  Description  Able to verbalize acceptance of life and situations over which he or she has no control  5/26/2019 2308 by Medina Andrea RN  Outcome: Ongoing  Note:   Patient denies suicidal ideations and homicidal ideations. Patient reports anxiety and depression are at an 8/10. Patient reports this is only due to an issue with another patient. Patient social with peers watching television throughout evening. Patient attends group. Patient is compliant with medications. Patient safety maintained q15 minute checks.   5/26/2019 1518 by Lisa Amador RN  Outcome: Ongoing

## 2019-05-27 NOTE — PLAN OF CARE
Pt. Up and out in dayroom. Pt. Uses 4 wheel walker without incident. Pt. Is out in milieu and social. Pt. Says he is depressed today. Denies feeling suicidal. Pt says he is depressed over his pain and upset he is still waiting on placement with the [facility]. Pt does verbalize homicidal thoughts towards his cousin but denies any plan. Pt is medication compliant. Attends most groups. Fair self care. Pt. Remains safe on the unit. Q 15 minute checks for safety maintained.

## 2019-05-27 NOTE — PROGRESS NOTES
Department of Psychiatry  Nurse Practitioner Progress Note    Chief Complaint: PTSD (post-traumatic stress disorder)     SUBJECTIVE:  The patient is seen today in the day area. He is the same as yesterday - he says that he continues to feel depressed and is not sleeping well due to his pain. He reports hip pain \"off the charts\" and is requesting ibuprofen. He is also c/o constipation. Provider will prescribe Colace daily. He is happy with this. He continues to remain flat and poorly reactive. He is focused on his HI towards his cousin however; he does state that it has decreased. [facility] Lug[facility] therefore; he has no place to go and will not have finances until 5/31. We will plan to discharge on 5/31. Today he is denying SI and hallucinations. He is endorsing HI, depression and anxiety. He stated that he is anxious due to another patient on the unit not being able to maintain her boundaries and behavior. We discuss this as well as his discharge plans. Chart and medications reviewed. At this time there is no safe alternative other than inpatient care. OBJECTIVE    Physical  /88   Pulse 57   Temp 98.8 °F (37.1 °C) (Oral)   Resp 16   Ht 5' 9\" (1.753 m)   Wt 234 lb (106.1 kg)   SpO2 97%   BMI 34.56 kg/m²      Mental Status Evaluation:  Orientation: alertness: alert   Mood:.  Depression,  anxious      Affect:  brightened      Appearance:  age appropriate and bearded   Activity:  Psychomotor Agitation   Gait/Posture: Normal   Speech:  normal pitch and normal volume   Thought Process:  circumstantial   Thought Content:  Denies delusions and hallucinations   Sensorium:  person, place, time/date and situation   Cognition:  grossly intact   Memory: intact   Insight:  limited   Judgment: limited   Suicidal Ideations: denies suicidal ideation   Homicidal Ideations: Positive for homicidal ideation   Medication Side Effects: absent       Attention Span attention span appeared shorter than expected for age       Labs  Results for Aniyah Rivas (MRN [id number]) as of 5/17/2019 15:10   Ref.  Range 5/14/2019 07:23 5/14/2019 07:58 5/14/2019 12:03 5/14/2019 16:39 5/14/2019 20:44   Sodium Latest Ref Range: 135 - 144 mmol/L 137       Potassium Latest Ref Range: 3.7 - 5.3 mmol/L 4.8       Chloride Latest Ref Range: 98 - 107 mmol/L 98       CO2 Latest Ref Range: 20 - 31 mmol/L 25       BUN Latest Ref Range: 8 - 23 mg/dL 17       Creatinine Latest Ref Range: 0.70 - 1.20 mg/dL 0.97       Bun/Cre Ratio Latest Ref Range: 9 - 20  NOT REPORTED       Anion Gap Latest Ref Range: 9 - 17 mmol/L 14       GFR Non- Latest Ref Range: >60 mL/min >60       GFR  Latest Ref Range: >60 mL/min >60       Glucose Latest Ref Range: 70 - 99 mg/dL 92       POC Glucose Latest Ref Range: 75 - 110 mg/dL  103 124 (H) 120 (H) 104   Calcium Latest Ref Range: 8.6 - 10.4 mg/dL 9.4       Albumin/Globulin Ratio Latest Ref Range: 1.0 - 2.5  NOT REPORTED       Total Protein Latest Ref Range: 6.4 - 8.3 g/dL 6.9       GFR Comment Unknown Pend       GFR Staging Unknown NOT REPORTED       Chol/HDL Ratio Latest Ref Range: <5  2.6       Cholesterol Latest Ref Range: <200 mg/dL 127       HDL Cholesterol Latest Ref Range: >40 mg/dL 48       LDL Cholesterol Latest Ref Range: 0 - 130 mg/dL 54       Triglycerides Latest Ref Range: <150 mg/dL 126       VLDL Latest Ref Range: 1 - 30 mg/dL NOT REPORTED       Albumin Latest Ref Range: 3.5 - 5.2 g/dL 4.0       Alk Phos Latest Ref Range: 40 - 129 U/L 87       ALT Latest Ref Range: 5 - 41 U/L 66 (H)       AST Latest Ref Range: <40 U/L 58 (H)       Bilirubin Latest Ref Range: 0.3 - 1.2 mg/dL 0.60       Hemoglobin A1C Latest Ref Range: 4.0 - 6.0 % 5.1       eAG (mg/dL) Latest Units: mg/dL 100       TSH Latest Ref Range: 0.30 - 5.00 mIU/L 1.92       Thyroxine, Free Latest Ref Range: 0.93 - 1.70 ng/dL 1.25       WBC Latest Ref Range: 3.5 - 11.0 k/uL 5.3       RBC Latest Ref Range: 4.5 - 5.9 m/uL 4.79       Hemoglobin Quant Latest Ref Range: 13.5 - 17.5 g/dL 14.7       Hematocrit Latest Ref Range: 41 - 53 % 44.6       MCV Latest Ref Range: 80 - 100 fL 93.1       MCH Latest Ref Range: 26 - 34 pg 30.7       MCHC Latest Ref Range: 31 - 37 g/dL 33.0       MPV Latest Ref Range: 6.0 - 12.0 fL 7.5       RDW Latest Ref Range: 11.5 - 14.9 % 14.5       Platelet Count Latest Ref Range: 150 - 450 k/uL 202       Platelet Estimate Unknown NOT REPORTED       Absolute Mono # Latest Ref Range: 0.1 - 1.3 k/uL 0.50       Eosinophils % Latest Ref Range: 0 - 4 % 8 (H)       Basophils # Latest Ref Range: 0.0 - 0.2 k/uL 0.00       Differential Type Unknown NOT REPORTED       Seg Neutrophils Latest Ref Range: 36 - 66 % 61       Segs Absolute Latest Ref Range: 1.3 - 9.1 k/uL 3.30       Lymphocytes Latest Ref Range: 24 - 44 % 22 (L)       Absolute Lymph # Latest Ref Range: 1.0 - 4.8 k/uL 1.20       Monocytes Latest Ref Range: 1 - 7 % 9 (H)       Absolute Eos # Latest Ref Range: 0.0 - 0.4 k/uL 0.40       Basophils Latest Ref Range: 0 - 2 % 0       Immature Granulocytes Latest Ref Range: 0 % NOT REPORTED       WBC Morphology Unknown NOT REPORTED       RBC Morphology Unknown NOT REPORTED       Absolute Immature Granulocyte Latest Ref Range: 0.00 - 0.30 k/uL NOT REPORTED       NRBC Automated Latest Units: per 100 WBC NOT REPORTED           Medications  Current Facility-Administered Medications   Medication Dose Route Frequency Provider Last Rate Last Dose    docusate sodium (COLACE) capsule 100 mg  100 mg Oral Daily Christina Schillings, APRN - CNP        ibuprofen (ADVIL;MOTRIN) tablet 800 mg  800 mg Oral BID PRN Christina Schillings, APRN - CNP        mirtazapine (REMERON) tablet 15 mg  15 mg Oral Nightly Carrolyn Curlin, APRN - CNP   15 mg at 05/26/19 2149    mirtazapine (REMERON) tablet 30 mg  30 mg Oral Daily Carrolyn Curlin, APRN - CNP   30 mg at 05/27/19 0804    bisacodyl (DULCOLAX) suppository 10 mg  10 mg Rectal Daily PRN DIANNA Jones CNP   10 mg at 05/26/19 1508    traZODone (DESYREL) tablet 150 mg  150 mg Oral Nightly PRN DIANNA Jones CNP   150 mg at 05/26/19 2149    acetaminophen (TYLENOL) tablet 650 mg  650 mg Oral Q4H PRN [facility], APRN - CNP   650 mg at 05/22/19 2011    hydrOXYzine (ATARAX) tablet 25 mg  25 mg Oral TID PRN [facility], APRN - CNP   25 mg at 05/26/19 1346    benztropine mesylate (COGENTIN) injection 2 mg  2 mg Intramuscular BID PRN [facility] C[facility]        magnesium hydroxide (MILK OF MAGNESIA) 400 MG/5ML suspension 30 mL  30 mL Oral Daily PRN [facility], APRN - CNP   30 mL at 05/25/19 1532    aluminum & magnesium hydroxide-simethicone (MAALOX) 200-200-20 MG/5ML suspension 30 mL  30 mL Oral Q6H PRN Joao ChinaDIANNA tsang CNP        nicotine polacrilex (NICORETTE) gum 2 mg  2 mg Oral Q2H PRN [facility]NA CNP        albuterol (PROVENTIL) nebulizer solution 2.5 mg  2.5 mg Nebulization Q4H PRN [facility]NA CNP        albuterol sulfate  (90 Base) MCG/ACT inhaler 2 puff  2 puff Inhalation Q4H PRN [facility], APRN - CNP   2 puff at 05/23/19 0903    aspirin EC tablet 81 mg  81 [facility]   81 mg at 05/27/19 0803    atenolol (TENORMIN) tablet 25 mg  25 mg Oral Daily [facility], APRN - CNP   25 mg at 05/27/19 0804    atorvastatin (LIPITOR) tablet 20 mg  20 mg Oral Nightly [facility], APRN - CNP   20 mg at 05/26/19 2148    vitamin D (CHOLECALCIFEROL) tablet 2,000 Units  2,000 Units Oral Daily [facility], APRN - CNP   2,000 Units at 05/27/19 0805    fluticasone (FLONASE) 50 MCG/ACT nasal spray 1 spray  1 spray Nasal Daily DIANNA Harper CNP   1 spray at 05/27/19 0804    melatonin ER tablet 3 mg  3 mg Oral Nightly PRN DIANNA Harper CNP   3 mg at 05/23/19 2144    pantoprazole (PROTONIX) tablet 40 mg  40 mg Oral QAM AC DIANNA Harper CNP   40 mg at 05/27/19 5282    polyethylene glycol (GLYCOLAX) packet 17 g  17 g Oral Daily [facility], APRN - CNP   17 g at 05/27/19 9579    ranolazine (RANEXA) extended release tablet 1,000 mg  1,000 mg Oral BID [facility], APRN - CNP   1,000 mg at 05/27/19 0805    spironolactone (ALDACTONE) tablet 25 mg  25 mg Oral Daily [facility], APRN - CNP   25 mg at 05/27/19 0803    tiotropium (SPIRIVA) inhalation capsule 18 mcg  18 mcg Inhalation Daily [facility], APRN - CNP   18 mcg at 05/27/19 0805         docusate sodium  100 mg Oral Daily    mirtazapine  15 mg Oral Nightly    mirtazapine  30 mg Oral Daily    aspirin  81 mg Oral Daily    atenolol  25 mg Oral Daily    atorvastatin  20 mg Oral Nightly    vitamin D  2,000 Units Oral Daily    fluticasone  1 spray Nasal Daily    pantoprazole  40 mg Oral QAM AC    polyethylene glycol  17 g Oral Daily    ranolazine  1,000 mg Oral BID    spironolactone  25 mg Oral Daily    tiotropium  18 mcg Inhalation Daily       ASSESSMENT  PTSD (post-traumatic stress disorder)     Patient's Response to Treatment: positive    PLAN:  · Continue inpatient psychiatric treatment  · Supportive therapy with medication management. Reviewed risks and benefits as well as potential side effects with patient.   · Increase Trazodone to 150 mg at bedtime 5/24/2019  · Urinalysis ordered for 5/22/19 re: re-order Lasix and consider IM consult? · Will change mirtazapine dosing to 30 mg QAM & 15 mg QHS, per pt's request.  · New Order - Colace 100mg daily beginning 5/27/19. · New Order - Ibuprofen 800mg BID PRN beginning 5/27/19. · Dulcolax suppository as needed for constipation  · Therapeutic activities and groups. · Therapeutic support, empathetic care and psycho education provided greater than 20 minutes. · Discharge planning with social work.     Electronically signed by DIANNA Mccord CNP on 5/27/2019 at 11:33 AM.

## 2019-05-28 PROCEDURE — 6370000000 HC RX 637 (ALT 250 FOR IP): Performed by: NURSE PRACTITIONER

## 2019-05-28 PROCEDURE — 1240000000 HC EMOTIONAL WELLNESS R&B

## 2019-05-28 PROCEDURE — 99232 SBSQ HOSP IP/OBS MODERATE 35: CPT | Performed by: NURSE PRACTITIONER

## 2019-05-28 RX ADMIN — VITAMIN D, TAB 1000IU (100/BT) 2000 UNITS: 25 TAB at 08:48

## 2019-05-28 RX ADMIN — ASPIRIN 81 MG: 81 TABLET, COATED ORAL at 08:48

## 2019-05-28 RX ADMIN — ATENOLOL 25 MG: 25 TABLET ORAL at 08:48

## 2019-05-28 RX ADMIN — TIOTROPIUM BROMIDE 18 MCG: 18 CAPSULE ORAL; RESPIRATORY (INHALATION) at 08:51

## 2019-05-28 RX ADMIN — PANTOPRAZOLE SODIUM 40 MG: 40 TABLET, DELAYED RELEASE ORAL at 08:48

## 2019-05-28 RX ADMIN — IBUPROFEN 800 MG: 800 TABLET ORAL at 14:20

## 2019-05-28 RX ADMIN — DOCUSATE SODIUM 100 MG: 100 CAPSULE, LIQUID FILLED ORAL at 08:48

## 2019-05-28 RX ADMIN — MIRTAZAPINE 30 MG: 30 TABLET, FILM COATED ORAL at 08:48

## 2019-05-28 RX ADMIN — MAGNESIUM HYDROXIDE 30 ML: 400 SUSPENSION ORAL at 17:08

## 2019-05-28 RX ADMIN — RANOLAZINE 1000 MG: 1000 TABLET, FILM COATED, EXTENDED RELEASE ORAL at 22:10

## 2019-05-28 RX ADMIN — ATORVASTATIN CALCIUM 20 MG: 20 TABLET, FILM COATED ORAL at 22:08

## 2019-05-28 RX ADMIN — TRAZODONE HYDROCHLORIDE 150 MG: 150 TABLET ORAL at 22:08

## 2019-05-28 RX ADMIN — SPIRONOLACTONE 25 MG: 25 TABLET, FILM COATED ORAL at 08:48

## 2019-05-28 RX ADMIN — Medication 3 MG: at 22:08

## 2019-05-28 RX ADMIN — RANOLAZINE 1000 MG: 1000 TABLET, FILM COATED, EXTENDED RELEASE ORAL at 08:48

## 2019-05-28 RX ADMIN — FLUTICASONE PROPIONATE 1 SPRAY: 50 SPRAY, METERED NASAL at 08:47

## 2019-05-28 RX ADMIN — MIRTAZAPINE 15 MG: 15 TABLET, FILM COATED ORAL at 22:08

## 2019-05-28 ASSESSMENT — PAIN DESCRIPTION - ONSET: ONSET: ON-GOING

## 2019-05-28 ASSESSMENT — PAIN DESCRIPTION - DESCRIPTORS: DESCRIPTORS: ACHING;CONSTANT

## 2019-05-28 ASSESSMENT — PAIN SCALES - GENERAL
PAINLEVEL_OUTOF10: 7
PAINLEVEL_OUTOF10: 0
PAINLEVEL_OUTOF10: 7
PAINLEVEL_OUTOF10: 2

## 2019-05-28 ASSESSMENT — PAIN - FUNCTIONAL ASSESSMENT: PAIN_FUNCTIONAL_ASSESSMENT: ACTIVITIES ARE NOT PREVENTED

## 2019-05-28 ASSESSMENT — PAIN DESCRIPTION - LOCATION: LOCATION: HIP

## 2019-05-28 ASSESSMENT — PAIN DESCRIPTION - PAIN TYPE: TYPE: CHRONIC PAIN

## 2019-05-28 ASSESSMENT — PAIN DESCRIPTION - ORIENTATION: ORIENTATION: LEFT

## 2019-05-28 ASSESSMENT — PAIN DESCRIPTION - FREQUENCY: FREQUENCY: CONTINUOUS

## 2019-05-28 ASSESSMENT — PAIN DESCRIPTION - PROGRESSION: CLINICAL_PROGRESSION: NOT CHANGED

## 2019-05-28 NOTE — PLAN OF CARE
Problem: Falls - Risk of:  Goal: Will remain free from falls  Description  Will remain free from falls  5/28/2019 1256 by Sofi Elise RN  Outcome: Ongoing     Problem: Falls - Risk of:  Goal: Absence of physical injury  Description  Absence of physical injury  5/28/2019 1256 by Sofi Elise RN  Outcome: Ongoing   Patient has not had any falls or injuries this shift so far. Patient encouraged to stay hydrated and change position slowly to reduce chances of falls. Patient voiced understanding.

## 2019-05-28 NOTE — GROUP NOTE
Group Therapy Note    Date: May 28    Group Start Time: 2357  Group End Time: 0915  Group Topic: Community Meeting    PRISCILA Foster    Patient's Goal:  Identify daily goal, review daily schedule and unit expectations    Notes:  Pt developed daily goal to use coping skills, talk and communicate with staff. Status After Intervention:  Improved    Participation Level:  Active Listener and Interactive    Participation Quality: Appropriate, Attentive and Sharing    Speech:  normal    Thought Process/Content: Logical    Affective Functioning: Congruent    Level of consciousness:  Alert, Oriented x4 and Attentive    Response to Learning: Able to verbalize current knowledge/experience, Capable of insight and Progressing to goal    Endings: None Reported    Modes of Intervention: Education, Exploration, Clarifying, Problem-solving and Activity    Discipline Responsible: Psychoeducational Specialist    Signature:  Sukumar Rodriguez

## 2019-05-28 NOTE — PLAN OF CARE
Problem: Anger Management/Homicidal Ideation:  Goal: Absence of homicidal ideation  Description  Absence of homicidal ideation  5/28/2019 1249 by Lauralyn Simmonds, RN  Outcome: Ongoing     Problem: Altered Mood, Depressive Behavior:  Goal: Able to verbalize acceptance of life and situations over which he or she has no control  Description  Able to verbalize acceptance of life and situations over which he or she has no control  5/28/2019 1249 by Lauralyn Simmonds, RN  Outcome: Ongoing   Patient reports homicidal ideations towards his cousin but denies any intent or plan. Patient reports \"I don't want to hurt him. I want him to get help. \" Patient reports learning coping skills in groups that he believes will help him to handle life and situations in which he has no control. Patient stated that specifically he has learned to seek help from his support group when he is in a circumstance he can't handle. Patient reports \"while I'm here- you guys are my support. \" Patient encouraged to continue attending groups. Patient encouraged to inform staff if his homicidal ideations become worse. Patient voiced understanding.

## 2019-05-28 NOTE — PLAN OF CARE
Problem: Anger Management/Homicidal Ideation:  Goal: Absence of homicidal ideation  Description  Absence of homicidal ideation  Outcome: Ongoing  Note:   Patient reports he is still having homicidal ideations towards his cousin. Patient reports he does not have a plan and he would not act on these feelings. Patient reports his homicidal ideations towards another patient on the unit have decreased today. Encouraged patient to notify staff if thoughts worsen. WIll continue to monitor. Problem: Altered Mood, Depressive Behavior:  Goal: Able to verbalize acceptance of life and situations over which he or she has no control  Description  Able to verbalize acceptance of life and situations over which he or she has no control  Outcome: Ongoing  Note:   Patient denies suicidal ideations. Patient admits to homicidal ideations toward his cousin without a plan. Patient reports depression is at a 9/10. Patient reports anxiety is decreasing. Patient reports anxiety is decreasing due to another patient \"being told off\". Patient social in day room with peers throughout the evening. Patient is medication compliant. Patient safety maintained q15 minute checks.

## 2019-05-28 NOTE — PROGRESS NOTES
mmol/L 4.8       Chloride Latest Ref Range: 98 - 107 mmol/L 98       CO2 Latest Ref Range: 20 - 31 mmol/L 25       BUN Latest Ref Range: 8 - 23 mg/dL 17       Creatinine Latest Ref Range: 0.70 - 1.20 mg/dL 0.97       Bun/Cre Ratio Latest Ref Range: 9 - 20  NOT REPORTED       Anion Gap Latest Ref Range: 9 - 17 mmol/L 14       GFR Non- Latest Ref Range: >60 mL/min >60       GFR  Latest Ref Range: >60 mL/min >60       Glucose Latest Ref Range: 70 - 99 mg/dL 92       POC Glucose Latest Ref Range: 75 - 110 mg/dL  103 124 (H) 120 (H) 104   Calcium Latest Ref Range: 8.6 - 10.4 mg/dL 9.4       Albumin/Globulin Ratio Latest Ref Range: 1.0 - 2.5  NOT REPORTED       Total Protein Latest Ref Range: 6.4 - 8.3 g/dL 6.9       GFR Comment Unknown Pend       GFR Staging Unknown NOT REPORTED       Chol/HDL Ratio Latest Ref Range: <5  2.6       Cholesterol Latest Ref Range: <200 mg/dL 127       HDL Cholesterol Latest Ref Range: >40 mg/dL 48       LDL Cholesterol Latest Ref Range: 0 - 130 mg/dL 54       Triglycerides Latest Ref Range: <150 mg/dL 126       VLDL Latest Ref Range: 1 - 30 mg/dL NOT REPORTED       Albumin Latest Ref Range: 3.5 - 5.2 g/dL 4.0       Alk Phos Latest Ref Range: 40 - 129 U/L 87       ALT Latest Ref Range: 5 - 41 U/L 66 (H)       AST Latest Ref Range: <40 U/L 58 (H)       Bilirubin Latest Ref Range: 0.3 - 1.2 mg/dL 0.60       Hemoglobin A1C Latest Ref Range: 4.0 - 6.0 % 5.1       eAG (mg/dL) Latest Units: mg/dL 100       TSH Latest Ref Range: 0.30 - 5.00 mIU/L 1.92       Thyroxine, Free Latest Ref Range: 0.93 - 1.70 ng/dL 1.25       WBC Latest Ref Range: 3.5 - 11.0 k/uL 5.3       RBC Latest Ref Range: 4.5 - 5.9 m/uL 4.79       Hemoglobin Quant Latest Ref Range: 13.5 - 17.5 g/dL 14.7       Hematocrit Latest Ref Range: 41 - 53 % 44.6       MCV Latest Ref Range: 80 - 100 fL 93.1       MCH Latest Ref Range: 26 - 34 pg 30.7       MCHC Latest Ref Range: 31 - 37 g/dL 33.0       MPV Latest Ref Range: 6.0 - 12.0 fL 7.5       RDW Latest Ref Range: 11.5 - 14.9 % 14.5       Platelet Count Latest Ref Range: 150 - 450 k/uL 202       Platelet Estimate Unknown NOT REPORTED       Absolute Mono # Latest Ref Range: 0.1 - 1.3 k/uL 0.50       Eosinophils % Latest Ref Range: 0 - 4 % 8 (H)       Basophils # Latest Ref Range: 0.0 - 0.2 k/uL 0.00       Differential Type Unknown NOT REPORTED       Seg Neutrophils Latest Ref Range: 36 - 66 % 61       Segs Absolute Latest Ref Range: 1.3 - 9.1 k/uL 3.30       Lymphocytes Latest Ref Range: 24 - 44 % 22 (L)       Absolute Lymph # Latest Ref Range: 1.0 - 4.8 k/uL 1.20       Monocytes Latest Ref Range: 1 - 7 % 9 (H)       Absolute Eos # Latest Ref Range: 0.0 - 0.4 k/uL 0.40       Basophils Latest Ref Range: 0 - 2 % 0       Immature Granulocytes Latest Ref Range: 0 % NOT REPORTED       WBC Morphology Unknown NOT REPORTED       RBC Morphology Unknown NOT REPORTED       Absolute Immature Granulocyte Latest Ref Range: 0.00 - 0.30 k/uL NOT REPORTED       NRBC Automated Latest Units: per 100 WBC NOT REPORTED           Medications  Current Facility-Administered Medications   Medication Dose Route Frequency Provider Last Rate Last Dose    docusate sodium (COLACE) capsule 100 mg  100 mg Oral Daily Christina Schillings, APRN - CNP   100 mg at 05/28/19 0848    ibuprofen (ADVIL;MOTRIN) tablet 800 mg  800 mg Oral BID PRN Christina Schillings, APRN - CNP   800 mg at 05/27/19 1329    mirtazapine (REMERON) tablet 15 mg  15 mg Oral Nightly Carrolyn Curlin, APRN - CNP   15 mg at 05/27/19 2115    mirtazapine (REMERON) tablet 30 mg  30 mg Oral Daily Carrolyn Curlin, APRN - CNP   30 mg at 05/28/19 0848    bisacodyl (DULCOLAX) suppository 10 mg  10 mg Rectal Daily PRN Carrolyn Curlin, APRN - CNP   10 mg at 05/27/19 2115    traZODone (DESYREL) tablet 150 mg  150 mg Oral Nightly PRN Carrolyn Curlin, APRN - CNP   150 mg at 05/27/19 2115    acetaminophen (TYLENOL) tablet 650 mg  650 mg Oral Q4H PRN TeoDIANNA Llanos - CNP   650 mg at 05/22/19 2011    hydrOXYzine (ATARAX) tablet 25 mg  25 mg Oral TID PRN DIANNA Krishnamurthy - CNP   25 mg at 05/26/19 1346    benztropine mesylate (COGENTIN) injection 2 mg  2 mg Intramuscular BID PRN Teo Ruiz APRN - CNP        magnesium hydroxide (MILK OF MAGNESIA) 400 MG/5ML suspension 30 mL  30 mL Oral Daily PRN Teo Ruiz APRN - CNP   30 mL at 05/25/19 1532    aluminum & magnesium hydroxide-simethicone (MAALOX) 200-200-20 MG/5ML suspension 30 mL  30 mL Oral Q6H PRN DIANNA Krishnamurthy - CNP        nicotine polacrilex (NICORETTE) gum 2 mg  2 mg Oral Q2H PRN DIANNA Krishnamurthy - CNP        albuterol (PROVENTIL) nebulizer solution 2.5 mg  2.5 mg Nebulization Q4H PRN DIANNA Krishnamurthy - CNP        albuterol sulfate  (90 Base) MCG/ACT inhaler 2 puff  2 puff Inhalation Q4H PRN DIANNA Krishnamurthy - CNP   2 puff at 05/23/19 0903    aspirin EC tablet 81 mg  81 mg Oral Daily DIANNA Krishnamurthy - CNP   81 mg at 05/28/19 0848    atenolol (TENORMIN) tablet 25 mg  25 mg Oral Daily DIANNA Krishnamurthy - CNP   25 mg at 05/28/19 0848    atorvastatin (LIPITOR) tablet 20 mg  20 mg Oral Nightly DIANNA Krishnamurthy - CNP   20 mg at 05/27/19 2115    vitamin D (CHOLECALCIFEROL) tablet 2,000 Units  2,000 Units Oral Daily DIANNA Krishnamurthy - CNP   2,000 Units at 05/28/19 0848    fluticasone (FLONASE) 50 MCG/ACT nasal spray 1 spray  1 spray Nasal Daily DIANNA Krishnamurthy - CNP   1 spray at 05/28/19 0847    melatonin ER tablet 3 mg  3 mg Oral Nightly PRN DIANNA Krishnamurthy - CNP   3 mg at 05/23/19 2144    pantoprazole (PROTONIX) tablet 40 mg  40 mg Oral QAM AC DIANNA Krishnamurthy - CNP   40 mg at 05/28/19 0848    polyethylene glycol (GLYCOLAX) packet 17 g  17 g Oral Daily DIANNA Krishnamurthy - CNP   17 g at 05/27/19 0803    ranolazine (RANEXA) extended release tablet 1,000 mg  1,000 mg Oral BID DIANNA Krishnamurthy - CNP   1,000 mg at 05/28/19 0848    spironolactone (ALDACTONE) tablet 25 mg  25 mg Oral Daily DIANNA Oliva CNP   25 mg at 05/28/19 0848    tiotropium [facility]) inhalation capsule 18 mcg  18 mcg Inhalation Daily DIANNA Oliva CNP   18 mcg at 05/28/19 5005         docusate sodium  100 mg Oral Daily    mirtazapine  15 mg Oral Nightly    mirtazapine  30 mg Oral Daily    aspirin  81 mg Oral Daily    atenolol  25 mg Oral Daily    atorvastatin  20 mg Oral Nightly    vitamin D  2,000 Units Oral Daily    fluticasone  1 spray Nasal Daily    pantoprazole  40 mg Oral QAM AC    polyethylene glycol  17 g Oral Daily    ranolazine  1,000 mg Oral BID    spironolactone  25 mg Oral Daily    tiotropium  18 mcg Inhalation Daily       ASSESSMENT  PTSD (post-traumatic stress disorder)     Patient's Response to Treatment: positive    PLAN:  · Continue inpatient psychiatric treatment  · Supportive therapy with medication management. Reviewed risks and benefits as well as potential side effects with patient.   · New Order - Colace 100mg daily beginning 5/27/19. · New Order - Ibuprofen 800mg BID PRN beginning 5/27/19. · Dulcolax suppository as needed for constipation  · Therapeutic activities and groups. · Discharge planning with social work.         Electronically signed by DIANNA Finley CNP on 5/28/2019 at 12:05 PM.

## 2019-05-28 NOTE — GROUP NOTE
Group Therapy Note    Date: May 28    Group Start Time: 1610  Group End Time: 1630  Group Topic: Healthy Living/Wellness    LA ROTHMAN    Kaila Murguia RN        Group Therapy Note    Attendees: 14/18         Patient's Goal:  To be prepared for discharge    Notes:  Patient was an active listener    Status After Intervention:  Unchanged    Participation Level:  Active Listener and Interactive    Participation Quality: Appropriate, Attentive, Sharing and Supportive      Speech:  normal      Thought Process/Content: Logical      Affective Functioning: Congruent      Mood: anxious and depressed      Level of consciousness:  Alert, Oriented x4 and Attentive      Response to Learning: Able to verbalize current knowledge/experience, Able to verbalize/acknowledge new learning, Able to retain information, Capable of insight, Able to change behavior and Progressing to goal      Endings: None Reported    Modes of Intervention: Education, Support, Socialization, Exploration, Clarifying, Problem-solving and Limit-setting      Discipline Responsible: Registered Nurse      Signature:  Kaila Murguia RN

## 2019-05-28 NOTE — GROUP NOTE
Group Therapy Note    Date: May 28    Group Start Time: 1330  Group End Time: 1410  Group Topic: Recreational    [facility] ROTHMAN    Rumalda Joreg, [place]    Attendees: 8      Patient's Goal:  To demonstrate increased interpersonal skills. Notes:  Patient attended group and actively participated in task at hand. Patient conversed appropriately with peers and Brie Sarmiento. Status After Intervention:  Improved       Participation Level:  Active Listener and Interactive       Participation Quality: Appropriate, Attentive and Sharing      Speech:  normal      Thought Process/Content: Logical      Affective Functioning: Congruent      Level of consciousness:  Alert, Oriented x4 and Attentive      Response to Learning: Able to verbalize current knowledge/experience, Able to verbalize/acknowledge new learning, Able to retain information, Capable of insight and Progressing to goal      Endings: None Reported       Modes of Intervention: Socialization, Exploration, Clarifying, Problem-solving, Activity, Limit-setting and Reality-testing      Discipline Responsible: Psychoeducational Specialist      Signature:  Noa Smith

## 2019-05-28 NOTE — PLAN OF CARE
Problem: Pain:  Goal: Pain level will decrease  Description  Pain level will decrease  5/28/2019 1252 by Joseph Villaseñor RN  Outcome: Ongoing     Problem: Pain:  Goal: Control of acute pain  Description  Control of acute pain  5/28/2019 1252 by Joseph Villaseñor RN  Outcome: Ongoing     Problem: Pain:  Goal: Control of chronic pain  Description  Control of chronic pain  5/28/2019 1252 by Joseph Villaseñor RN  Outcome: Ongoing   Patient reports chronic pain in left hip rated a 7. Patient accepted the non-pharmacological interventions of rest, relaxation and repositioning. Patient declined the need for any pain medication stating \"It doesn't help. \" Patient was satisfied with non-pharmacological interventions for pain. Patient encouraged to inform staff if he needs further pain intervention. Patient voiced understanding.

## 2019-05-28 NOTE — GROUP NOTE
Group Therapy Note    Date: May 28    Group Start Time: 1000  Group End Time: 8008  Group Topic: Recreational    [facility] LALITHA ROTHMAN    [place], [place]    Attendees: 5      Patient's Goal:  To demonstrate increased interpersonal skills. Notes:  Patient attended group and actively participated in task at hand. Patient conversed appropriately with peers, intern and writer. Status After Intervention:  Improved    Participation Level:  Active Listener and Interactive    Participation Quality: Appropriate, Attentive and Sharing      Speech:  normal      Thought Process/Content: Logical      Affective Functioning: Congruent      Level of consciousness:  Alert, Oriented x4 and Attentive      Response to Learning: Able to verbalize current knowledge/experience, Able to verbalize/acknowledge new learning, Able to retain information, Capable of insight and Progressing to goal      Endings: None Reported       Modes of Intervention: Socialization, Exploration, Clarifying, Problem-solving, Activity and Reality-testing      Discipline Responsible: Psychoeducational Specialist      Signature:  Kamala Day

## 2019-05-28 NOTE — GROUP NOTE
Group Therapy Note    Date: May 28    Group Start Time: 1100  Group End Time: 1619  Group Topic: Psychotherapy    LA ROTHMAN    EVELYN Olivo, BABAR        Group Therapy Note    Attendees: 6/14/19         Patient's Goal:  Interpersonal relationships and communication     Notes:      Status After Intervention:  Unchanged    Participation Level: Active Listener and Interactive    Participation Quality: Appropriate, Attentive and Sharing      Speech:  normal      Thought Process/Content: Logical  Linear      Affective Functioning: Congruent      Mood: euthymic      Level of consciousness:  Alert, Oriented x4 and Attentive      Response to Learning: Able to verbalize current knowledge/experience, Able to verbalize/acknowledge new learning and Capable of insight      Endings: None Reported    Modes of Intervention: Support      Discipline Responsible: /Counselor  Signature:   EVELYN Olivo, BABAR

## 2019-05-28 NOTE — FLOWSHEET NOTE
05/28/19 1400   Encounter Summary   Services provided to: Patient   Referral/Consult From:   (Spirituality group)   Continue Visiting   (5/28/19)   Complexity of Encounter Low   Length of Encounter 30 minutes   Spiritual/Druze   Type Spiritual support   Intervention   (Patient attended spirituality group led by Fr. Remi Ortega)

## 2019-05-28 NOTE — PLAN OF CARE
[facility] Interdisciplinary Treatment Plan Note     Review Date & Time: 5/28/19 7891    Admission Type:   Admission Type: Involuntary    Reason for admission:  Reason for Admission: Patient is homicidal with thoughts. Estimated Length of Stay:  14-21 days  Estimated Discharge Date Update:   to be determined by physician    PATIENT STRENGTHS:  Patient Strengths:Strengths: Communication, Connection to output provider, Medication Compliance, Positive Support, Social Skills  Patient Strengths and Limitations:Limitations: Difficult relationships / poor social skills  Addictive Behavior:Addictive Behavior  In the past 3 months, have you felt or has someone told you that you have a problem with:  : None  Do you have a history of Chemical Use?: No  Do you have a history of Alcohol Use?: No  Do you have a history of Street Drug Abuse?: Comment(med marijuana Rx )  Histroy of Prescripton Drug Abuse?: No  Medical Problems:   Past Medical History:   Diagnosis Date    Arthritis     WENDY KNEE    Bronchitis with chronic airway obstruction (HCC)     CAD (coronary artery disease)     CHF (congestive heart failure) (HCC)     CTS (carpal tunnel syndrome)     RIGHT    Diabetes mellitus ([facility] [address]     Diabetic neuropathy associated with diabetes mellitus due to underlying condition ([address]     Exposure to toxic chemical     [employer]-toxic water exposure    GERD (gastroesophageal reflux disease)     H/O cardiovascular stress test 07/08/2016    Abnormal.  Moderate perfusion defect of mild to moderate intensity in the inferolateral,inferior and inferoapical regions during stress imaging. Ef 45%. with regional wall motion abnormalities. H/O echocardiogram 2/17/16    EF >60%. LV wall thickness is mildly increased. Inferoseptal wall is abnormal in its motion not unusual status post open heart surgery. Normal aortic valve structure with ild aortic regurgitation.     Hip pain, left     Hx Have you actually had any thoughts of killing yourself? : No  6) Have you ever done anything, started to do anything, or prepared to do anything to end your life?: No  Change in result:  no  Change in plan of care:  no    EDUCATION:   Learner Progress Toward Treatment Goals:   Reviewed results and recommendations of this team, reviewed group plan and strategies, reviewed signs, symptoms and risk of self harm and violent behavior, reviewed goals and plan of care. Method:  individual education, small group, verbal education. Outcome:    Pt verbalized understanding, but needs reinforcement to obtain goals. PATIENT GOALS:  Short term:  Go to groups  Long term:  Stable housing, follow up with appointments     PLAN/TREATMENT RECOMMENDATIONS UPDATE:   Continue with group therapies, education of coping skills   Continue to monitor patient on unit. Medications provided/medication compliance by patient. Continue for plans to obtain long term goals after discharge.     SHORT-TERM GOALS UPDATE:  Time frame for Short-Term Goals:  8-14days     LONG-TERM GOALS UPDATE:  Time frame for Long-Term Goals:  6 months    Members Present in Team Meeting:     So Herrera

## 2019-05-29 PROCEDURE — 99232 SBSQ HOSP IP/OBS MODERATE 35: CPT | Performed by: NURSE PRACTITIONER

## 2019-05-29 PROCEDURE — 6370000000 HC RX 637 (ALT 250 FOR IP): Performed by: NURSE PRACTITIONER

## 2019-05-29 PROCEDURE — 1240000000 HC EMOTIONAL WELLNESS R&B

## 2019-05-29 RX ADMIN — Medication 3 MG: at 21:52

## 2019-05-29 RX ADMIN — VITAMIN D, TAB 1000IU (100/BT) 2000 UNITS: 25 TAB at 08:41

## 2019-05-29 RX ADMIN — PANTOPRAZOLE SODIUM 40 MG: 40 TABLET, DELAYED RELEASE ORAL at 08:41

## 2019-05-29 RX ADMIN — FLUTICASONE PROPIONATE 1 SPRAY: 50 SPRAY, METERED NASAL at 08:41

## 2019-05-29 RX ADMIN — ASPIRIN 81 MG: 81 TABLET, COATED ORAL at 08:42

## 2019-05-29 RX ADMIN — MIRTAZAPINE 30 MG: 30 TABLET, FILM COATED ORAL at 08:42

## 2019-05-29 RX ADMIN — ATENOLOL 25 MG: 25 TABLET ORAL at 08:41

## 2019-05-29 RX ADMIN — DOCUSATE SODIUM 100 MG: 100 CAPSULE, LIQUID FILLED ORAL at 08:41

## 2019-05-29 RX ADMIN — TIOTROPIUM BROMIDE 18 MCG: 18 CAPSULE ORAL; RESPIRATORY (INHALATION) at 08:41

## 2019-05-29 RX ADMIN — RANOLAZINE 1000 MG: 1000 TABLET, FILM COATED, EXTENDED RELEASE ORAL at 21:52

## 2019-05-29 RX ADMIN — TRAZODONE HYDROCHLORIDE 150 MG: 150 TABLET ORAL at 21:52

## 2019-05-29 RX ADMIN — SPIRONOLACTONE 25 MG: 25 TABLET, FILM COATED ORAL at 08:42

## 2019-05-29 RX ADMIN — MIRTAZAPINE 15 MG: 15 TABLET, FILM COATED ORAL at 21:52

## 2019-05-29 RX ADMIN — ATORVASTATIN CALCIUM 20 MG: 20 TABLET, FILM COATED ORAL at 21:52

## 2019-05-29 RX ADMIN — RANOLAZINE 1000 MG: 1000 TABLET, FILM COATED, EXTENDED RELEASE ORAL at 08:41

## 2019-05-29 ASSESSMENT — PAIN DESCRIPTION - PAIN TYPE: TYPE: CHRONIC PAIN

## 2019-05-29 ASSESSMENT — PAIN SCALES - GENERAL: PAINLEVEL_OUTOF10: 7

## 2019-05-29 ASSESSMENT — PAIN DESCRIPTION - LOCATION: LOCATION: HIP

## 2019-05-29 NOTE — GROUP NOTE
Group Therapy Note    Date: May 29    Group Start Time: 1430  Group End Time: 7466  Group Topic: Recovery    LA ROTHMAN    EVELYN Presley LSW        Group Therapy Note    Attendees: 14/19         Patient's Goal:  Increase understanding of addiction and the recovery process. Notes:  Pt is making progress AEB participating in group discussion about overall wellness. Pt shared ways to prioritize his mental health and cope with life stressors. Status After Intervention:  Improved    Participation Level:  Active Listener and Interactive    Participation Quality: Appropriate, Attentive, Sharing and Supportive      Speech:  normal      Thought Process/Content: Logical      Affective Functioning: Congruent      Mood: stable      Level of consciousness:  Alert, Oriented x4 and Attentive      Response to Learning: Able to verbalize current knowledge/experience, Able to verbalize/acknowledge new learning, Able to retain information, Capable of insight, Able to change behavior and Progressing to goal      Endings: None Reported    Modes of Intervention: Support, Socialization and Problem-solving      Discipline Responsible: /Counselor      Signature:  EVELYN Persley LSW

## 2019-05-29 NOTE — PROGRESS NOTES
Department of Psychiatry  Nurse Practitioner Progress Note    Chief Complaint: PTSD (post-traumatic stress disorder)     SUBJECTIVE:  The patient is seen in the day room today. He states he is feeling less depressed and anxious. He states she still does have some HI towards his cousin but this has lessened since admission. [facility] [address] [facility] therefore; he has no place to go and will not have finances until 5/31. We will plan to discharge on 5/31. He states he is less anxious today and last night was the first night he had a full nights sleep. He denies SI/AVH today. He feels helpless and hopeless at times about his current situation. Chart and medications reviewed. At this time there is no safe alternative other than inpatient care. OBJECTIVE    Physical  /71   Pulse 60   Temp 97.7 °F (36.5 °C) (Oral)   Resp 14   Ht 5' 9\" (1.753 m)   Wt 234 lb (106.1 kg)   SpO2 97%   BMI 34.56 kg/m²      Mental Status Evaluation:  Orientation: alertness: alert   Mood:. Depression,  anxious      Affect:  brightened      Appearance:  age appropriate and bearded   Activity:  Psychomotor Agitation   Gait/Posture: Normal   Speech:  normal pitch and normal volume   Thought Process:  circumstantial   Thought Content:  Denies delusions and hallucinations   Sensorium:  person, place, time/date and situation   Cognition:  grossly intact   Memory: intact   Insight:  limited   Judgment: limited   Suicidal Ideations: denies suicidal ideation   Homicidal Ideations: Positive for homicidal ideation, less intense   Medication Side Effects: absent       Attention Span attention span appeared shorter than expected for age       Labs  Results for Roshan Zamudio (MRN [id number]) as of 5/17/2019 15:10   Ref.  Range 5/14/2019 07:23 5/14/2019 07:58 5/14/2019 12:03 5/14/2019 16:39 5/14/2019 20:44   Sodium Latest Ref Range: 135 - 144 mmol/L 137       Potassium Latest Ref Range: 3.7 - 5.3 mmol/L 4.8       Chloride Latest Ref RDW Latest Ref Range: 11.5 - 14.9 % 14.5       Platelet Count Latest Ref Range: 150 - 450 k/uL 202       Platelet Estimate Unknown NOT REPORTED       Absolute Mono # Latest Ref Range: 0.1 - 1.3 k/uL 0.50       Eosinophils % Latest Ref Range: 0 - 4 % 8 (H)       Basophils # Latest Ref Range: 0.0 - 0.2 k/uL 0.00       Differential Type Unknown NOT REPORTED       Seg Neutrophils Latest Ref Range: 36 - 66 % 61       Segs Absolute Latest Ref Range: 1.3 - 9.1 k/uL 3.30       Lymphocytes Latest Ref Range: 24 - 44 % 22 (L)       Absolute Lymph # Latest Ref Range: 1.0 - 4.8 k/uL 1.20       Monocytes Latest Ref Range: 1 - 7 % 9 (H)       Absolute Eos # Latest Ref Range: 0.0 - 0.4 k/uL 0.40       Basophils Latest Ref Range: 0 - 2 % 0       Immature Granulocytes Latest Ref Range: 0 % NOT REPORTED       WBC Morphology Unknown NOT REPORTED       RBC Morphology Unknown NOT REPORTED       Absolute Immature Granulocyte Latest Ref Range: 0.00 - 0.30 k/uL NOT REPORTED       NRBC Automated Latest Units: per 100 WBC NOT REPORTED           Medications  Current Facility-Administered Medications   Medication Dose Route Frequency Provider Last Rate Last Dose    docusate sodium (COLACE) capsule 100 mg  100 mg Oral Daily Royal Lipa, APRN - CNP   100 mg at 05/29/19 0841    ibuprofen (ADVIL;MOTRIN) tablet 800 mg  800 mg Oral BID PRN Royal Lipa, APRN - CNP   800 mg at 05/28/19 1420    mirtazapine (REMERON) tablet 15 mg  15 mg Oral Nightly John Syed APRN - CNP   15 mg at 05/28/19 2208    mirtazapine (REMERON) tablet 30 mg  30 mg Oral Daily John Syed APRN - CNP   30 mg at 05/29/19 1320    bisacodyl (DULCOLAX) suppository 10 mg  10 mg Rectal Daily PRN John Syed APRN - CNP   10 mg at 05/27/19 2115    traZODone (DESYREL) tablet 150 mg  150 mg Oral Nightly PRN John Syed APRN - CNP   150 mg at 05/28/19 2208    acetaminophen (TYLENOL) tablet 650 mg  650 mg Oral Q4H PRN Mino Arzate APRN - CNP 650 mg at 05/22/19 2011    hydrOXYzine (ATARAX) tablet 25 mg  25 mg Oral TID PRN Spero Givens, APRN - CNP   25 mg at 05/26/19 1346    benztropine mesylate (COGENTIN) injection 2 mg  2 mg Intramuscular BID PRN Spero Givens, APRN - CNP        magnesium hydroxide (MILK OF MAGNESIA) 400 MG/5ML suspension 30 mL  30 mL Oral Daily PRN Spero Givens, APRN - CNP   30 mL at 05/28/19 1708    aluminum & magnesium hydroxide-simethicone (MAALOX) 200-200-20 MG/5ML suspension 30 mL  30 mL Oral Q6H PRN Spero Givens, APRN - CNP        nicotine polacrilex (NICORETTE) gum 2 mg  2 mg Oral Q2H PRN Spero Givens, APRN - CNP        albuterol (PROVENTIL) nebulizer solution 2.5 mg  2.5 mg Nebulization Q4H PRN Spero Givens, APRN - CNP        albuterol sulfate  (90 Base) MCG/ACT inhaler 2 puff  2 puff Inhalation Q4H PRN Spero Givens, APRN - CNP   2 puff at 05/23/19 0903    aspirin EC tablet 81 mg  81 mg Oral Daily Spero Givens, APRN - CNP   81 mg at 05/29/19 0842    atenolol (TENORMIN) tablet 25 mg  25 mg Oral Daily Spero Givens, APRN - CNP   25 mg at 05/29/19 0841    atorvastatin (LIPITOR) tablet 20 mg  20 mg Oral Nightly Spero Givens, APRN - CNP   20 mg at 05/28/19 2208    vitamin D (CHOLECALCIFEROL) tablet 2,000 Units  2,000 Units Oral Daily Spero Givens, APRN - CNP   2,000 Units at 05/29/19 0841    fluticasone (FLONASE) 50 MCG/ACT nasal spray 1 spray  1 spray Nasal Daily Spero Givens, APRN - CNP   1 spray at 05/29/19 0841    melatonin ER tablet 3 mg  3 mg Oral Nightly PRN Spero Givens, APRN - CNP   3 mg at 05/28/19 2208    pantoprazole (PROTONIX) tablet 40 mg  40 mg Oral QAM AC Spero Givens, APRN - CNP   40 mg at 05/29/19 0841    polyethylene glycol (GLYCOLAX) packet 17 g  17 g Oral Daily Spero Givens, APRN - CNP   17 g at 05/27/19 0803    ranolazine (RANEXA) extended release tablet 1,000 mg  1,000 mg Oral BID Spero Givens, APRN - CNP   1,000 mg at 05/29/19 0841    spironolactone

## 2019-05-29 NOTE — GROUP NOTE
Group Therapy Note    Date: May 29    Group Start Time: 1000  Group End Time: 4524  Group Topic: Recreational    [facility] BRIEI A    PRISCILA Black    Patient's Goal:  Participate in leisure skills group, demonstrate increased interpersonal interaction     Notes:  Pt attended group, participated and was supportive of peers. Status After Intervention:  Improved    Participation Level:  Active Listener and Interactive    Participation Quality: Appropriate, Attentive, Sharing and Supportive    Speech:  normal    Thought Process/Content: Logical    Affective Functioning: Congruent    Level of consciousness:  Alert, Oriented x4 and Attentive    Response to Learning: Able to verbalize current knowledge/experience, Capable of insight and Progressing to goal    Endings: None Reported    Modes of Intervention: Education, Socialization, Exploration, Problem-solving, Activity and Limit-setting    Discipline Responsible: Psychoeducational Specialist    Signature:  Sukumar Balck

## 2019-05-29 NOTE — GROUP NOTE
Group Therapy Note    Date: May 29    Group Start Time: 1100  Group End Time: 7931  Group Topic: Psychotherapy    LA Choi 26, MSW, LSW        Group Therapy Note    Attendees: 8/10         Patient's Goal: Communication and interpersonal relationships     Notes:      Status After Intervention:  Unchanged    Participation Level:  Active Listener and Interactive    Participation Quality: Appropriate and Attentive      Speech:  normal      Thought Process/Content: Logical      Affective Functioning: Congruent      Mood: euthymic      Level of consciousness:  Alert and Oriented x4      Response to Learning: Able to verbalize current knowledge/experience and Capable of insight      Endings: None Reported    Modes of Intervention: Support      Discipline Responsible: /Counselor

## 2019-05-30 PROCEDURE — 1240000000 HC EMOTIONAL WELLNESS R&B

## 2019-05-30 PROCEDURE — 6370000000 HC RX 637 (ALT 250 FOR IP): Performed by: NURSE PRACTITIONER

## 2019-05-30 PROCEDURE — 99232 SBSQ HOSP IP/OBS MODERATE 35: CPT | Performed by: NURSE PRACTITIONER

## 2019-05-30 RX ADMIN — VITAMIN D, TAB 1000IU (100/BT) 2000 UNITS: 25 TAB at 09:00

## 2019-05-30 RX ADMIN — TIOTROPIUM BROMIDE 18 MCG: 18 CAPSULE ORAL; RESPIRATORY (INHALATION) at 09:05

## 2019-05-30 RX ADMIN — SPIRONOLACTONE 25 MG: 25 TABLET, FILM COATED ORAL at 09:00

## 2019-05-30 RX ADMIN — IBUPROFEN 800 MG: 800 TABLET ORAL at 15:31

## 2019-05-30 RX ADMIN — ATORVASTATIN CALCIUM 20 MG: 20 TABLET, FILM COATED ORAL at 21:32

## 2019-05-30 RX ADMIN — Medication 3 MG: at 21:32

## 2019-05-30 RX ADMIN — FLUTICASONE PROPIONATE 1 SPRAY: 50 SPRAY, METERED NASAL at 08:59

## 2019-05-30 RX ADMIN — DOCUSATE SODIUM 100 MG: 100 CAPSULE, LIQUID FILLED ORAL at 09:00

## 2019-05-30 RX ADMIN — RANOLAZINE 1000 MG: 1000 TABLET, FILM COATED, EXTENDED RELEASE ORAL at 08:59

## 2019-05-30 RX ADMIN — POLYETHYLENE GLYCOL 3350 17 G: 17 POWDER, FOR SOLUTION ORAL at 09:01

## 2019-05-30 RX ADMIN — ATENOLOL 25 MG: 25 TABLET ORAL at 08:59

## 2019-05-30 RX ADMIN — PANTOPRAZOLE SODIUM 40 MG: 40 TABLET, DELAYED RELEASE ORAL at 09:00

## 2019-05-30 RX ADMIN — MIRTAZAPINE 30 MG: 30 TABLET, FILM COATED ORAL at 09:00

## 2019-05-30 RX ADMIN — MIRTAZAPINE 15 MG: 15 TABLET, FILM COATED ORAL at 21:32

## 2019-05-30 RX ADMIN — ASPIRIN 81 MG: 81 TABLET, COATED ORAL at 08:59

## 2019-05-30 RX ADMIN — RANOLAZINE 1000 MG: 1000 TABLET, FILM COATED, EXTENDED RELEASE ORAL at 21:32

## 2019-05-30 RX ADMIN — TRAZODONE HYDROCHLORIDE 150 MG: 150 TABLET ORAL at 21:32

## 2019-05-30 ASSESSMENT — PAIN SCALES - GENERAL: PAINLEVEL_OUTOF10: 6

## 2019-05-30 ASSESSMENT — PAIN DESCRIPTION - LOCATION: LOCATION: NECK;HIP

## 2019-05-30 ASSESSMENT — PAIN DESCRIPTION - ORIENTATION: ORIENTATION: LEFT

## 2019-05-30 ASSESSMENT — PAIN DESCRIPTION - PAIN TYPE: TYPE: CHRONIC PAIN

## 2019-05-30 NOTE — GROUP NOTE
Group Therapy Note    Date: May 30    Group Start Time: 2158  Group End Time: 1700  Group Topic: Group Documentation    [facility] LALITHA ROTHMAN    Allyson Lauren        Group Therapy Note    Attendees: 12/19         Patient's Goal:  Attend, participate, and support others in group.   Notes:  Failures -- What we learn from these events    Status After Intervention:  Improved    Participation Level: Interactive    Participation Quality: Appropriate      Speech:  normal      Thought Process/Content: Logical  Flight of ideas      Affective Functioning: Congruent      Mood: anxious      Level of consciousness:  Alert and Attentive      Response to Learning: Able to verbalize current knowledge/experience, Able to verbalize/acknowledge new learning and Capable of insight      Endings: None Reported    Modes of Intervention: Education, Support, Exploration and Problem-solving      Discipline Responsible: [address] [facility] [place]      Signature:  Allyson Lauren

## 2019-05-30 NOTE — GROUP NOTE
Group Therapy Note    Date: May 30    Group Start Time: 1000  Group End Time: 6118  Group Topic: Recreational    [facility]    Lyn Hsieh, CTRS    Patient's Goal:  Participate in leisure skills activity, demonstrate increased interpersonal interaction. Notes:  Pt participated in group activity and was supportive of peers. Status After Intervention:  Improved    Participation Level:  Active Listener and Interactive    Participation Quality: Appropriate, Attentive, Sharing and Supportive    Speech:  normal    Thought Process/Content: Logical    Affective Functioning: Congruent    Level of consciousness:  Alert, Oriented x4 and Attentive    Response to Learning: Able to verbalize current knowledge/experience, Capable of insight and Progressing to goal    Endings: None Reported    Modes of Intervention: Education, Support, Socialization, Exploration, Problem-solving, Activity and Limit-setting    Discipline Responsible: Psychoeducational Specialist    Signature:  Lyn Hsieh, [address]

## 2019-05-30 NOTE — PLAN OF CARE
Problem: Anger Management/Homicidal Ideation:  Goal: Absence of homicidal ideation  Description  Absence of homicidal ideation  5/30/2019 1754 by Juan Luis Armstrong RN  Outcome: Ongoing  Patient is alert, currently in day room. Patient is currently denying suicidal/homicidal thoughts, admitting to depression and anxiety, stated improved since admission. Patient takes all medications without concern, denies any side effects. Patient reports adequate sleep and appetite. Patient is visible on unit, attends unit programming. Patient states he is being discharged tomorrow, will be discharged to a hotel with wife. Will continue to reinforce, monitor and ensure safety.

## 2019-05-30 NOTE — GROUP NOTE
Group Therapy Note    Date: May 30    Group Start Time: 9005  Group End Time: 0920  Group Topic: Community Meeting    PRISCILA Gallagher    Patient's Goal:  Identify daily goal, review daily schedule and unit expectations     Notes:  Pt developed daily goal to control temper, discharge planning, go to groups. Status After Intervention:  Improved    Participation Level:  Active Listener and Interactive    Participation Quality: Appropriate, Attentive, Sharing and Supportive    Speech:  normal    Thought Process/Content: Logical    Affective Functioning: Congruent    Level of consciousness:  Alert, Oriented x4 and Attentive    Response to Learning: Able to verbalize current knowledge/experience, Capable of insight and Progressing to goal    Endings: None Reported    Modes of Intervention: Education, Socialization, Exploration, Clarifying and Problem-solving    Discipline Responsible: Psychoeducational Specialist    Signature:  Sukumar Medeiros

## 2019-05-30 NOTE — PROGRESS NOTES
Department of Psychiatry  Nurse Practitioner Progress Note    Chief Complaint: PTSD (post-traumatic stress disorder)     SUBJECTIVE:  The patient is seen in the day room today. He feels his mood is much improved and he is brighter upon approach. He is planing on being discharged tomorrow to live at a hotel with his wife. He is waiting to hear from her what the room number is. He is tolerating medicine without side effects. He is less anxious. He is sleeping better, but still has a significant amount of pain at times. Is waiting to go to [facility] in [facility]. He is no longer homicidal towards his cousin, but states he feels his cousin needs to apologize for his actions. He feels helpless and hopeless at times about his current situation. Chart and medications reviewed. At this time there is no safe alternative other than inpatient care. OBJECTIVE    Physical  /73   Pulse 56   Temp 97.8 °F (36.6 °C) (Oral)   Resp 14   Ht 5' 9\" (1.753 m)   Wt 234 lb (106.1 kg)   SpO2 97%   BMI 34.56 kg/m²      Mental Status Evaluation:  Orientation: alertness: alert   Mood:. euthymic      Affect:  brightened      Appearance:  age appropriate and bearded   Activity:  Psychomotor Agitation   Gait/Posture: Normal   Speech:  normal pitch and normal volume   Thought Process:  circumstantial   Thought Content:  Denies delusions and hallucinations   Sensorium:  person, place, time/date and situation   Cognition:  grossly intact   Memory: intact   Insight:  limited   Judgment: limited   Suicidal Ideations: denies suicidal ideation   Homicidal Ideations: Denies homicidal ideation   Medication Side Effects: absent       Attention Span attention span appeared shorter than expected for age       Labs  Results for Skinny Witt (MRN [id number]) as of 5/17/2019 15:10   Ref.  Range 5/14/2019 07:23 5/14/2019 07:58 5/14/2019 12:03 5/14/2019 16:39 5/14/2019 20:44   Sodium Latest Ref Range: 135 - 144 mmol/L 137       Potassium Latest Ref MPV Latest Ref Range: 6.0 - 12.0 fL 7.5       RDW Latest Ref Range: 11.5 - 14.9 % 14.5       Platelet Count Latest Ref Range: 150 - 450 k/uL 202       Platelet Estimate Unknown NOT REPORTED       Absolute Mono # Latest Ref Range: 0.1 - 1.3 k/uL 0.50       Eosinophils % Latest Ref Range: 0 - 4 % 8 (H)       Basophils # Latest Ref Range: 0.0 - 0.2 k/uL 0.00       Differential Type Unknown NOT REPORTED       Seg Neutrophils Latest Ref Range: 36 - 66 % 61       Segs Absolute Latest Ref Range: 1.3 - 9.1 k/uL 3.30       Lymphocytes Latest Ref Range: 24 - 44 % 22 (L)       Absolute Lymph # Latest Ref Range: 1.0 - 4.8 k/uL 1.20       Monocytes Latest Ref Range: 1 - 7 % 9 (H)       Absolute Eos # Latest Ref Range: 0.0 - 0.4 k/uL 0.40       Basophils Latest Ref Range: 0 - 2 % 0       Immature Granulocytes Latest Ref Range: 0 % NOT REPORTED       WBC Morphology Unknown NOT REPORTED       RBC Morphology Unknown NOT REPORTED       Absolute Immature Granulocyte Latest Ref Range: 0.00 - 0.30 k/uL NOT REPORTED       NRBC Automated Latest Units: per 100 WBC NOT REPORTED           Medications  Current Facility-Administered Medications   Medication Dose Route Frequency Provider Last Rate Last Dose    docusate sodium (COLACE) capsule 100 mg  100 mg Oral Daily Llana Baseman, APRN - CNP   100 mg at 05/30/19 0900    ibuprofen (ADVIL;MOTRIN) tablet 800 mg  800 mg Oral BID PRN ana Baseman, APRN - CNP   800 mg at 05/28/19 1420    mirtazapine (REMERON) tablet 15 mg  15 mg Oral Nightly Ashley Guadalajara, APRN - CNP   15 mg at 05/29/19 2152    mirtazapine (REMERON) tablet 30 mg  30 mg Oral Daily Ashley Guadalajara, APRN - CNP   30 mg at 05/30/19 0900    bisacodyl (DULCOLAX) suppository 10 mg  10 mg Rectal Daily PRN Ashley Guadalajara, APRN - CNP   10 mg at 05/27/19 2115    traZODone (DESYREL) tablet 150 mg  150 mg Oral Nightly PRN Ashley Guadalajara, APRN - CNP   150 mg at 05/29/19 7586    acetaminophen (TYLENOL) tablet 650 mg 650 mg Oral Q4H PRN Colette Heberwyatt, APRN - CNP   650 mg at 05/22/19 2011    hydrOXYzine (ATARAX) tablet 25 mg  25 mg Oral TID PRN Colette Ansari, APRN - CNP   25 mg at 05/26/19 1346    benztropine mesylate (COGENTIN) injection 2 mg  2 mg Intramuscular BID PRN Colette Ansari, APRN - CNP        magnesium hydroxide (MILK OF MAGNESIA) 400 MG/5ML suspension 30 mL  30 mL Oral Daily PRN Colette Ansari, APRN - CNP   30 mL at 05/28/19 1708    aluminum & magnesium hydroxide-simethicone (MAALOX) 200-200-20 MG/5ML suspension 30 mL  30 mL Oral Q6H PRN Colette Ansari, APRN - CNP        nicotine polacrilex (NICORETTE) gum 2 mg  2 mg Oral Q2H PRN Colette Ansari, APRN - CNP        albuterol (PROVENTIL) nebulizer solution 2.5 mg  2.5 mg Nebulization Q4H PRN Colette Ansari, APRN - CNP        albuterol sulfate  (90 Base) MCG/ACT inhaler 2 puff  2 puff Inhalation Q4H PRN Colette Ansari, APRN - CNP   2 puff at 05/23/19 0903    aspirin EC tablet 81 mg  81 mg Oral Daily Colette Heberwyatt, APRN - CNP   81 mg at 05/30/19 0859    atenolol (TENORMIN) tablet 25 mg  25 mg Oral Daily Colette Ansari, APRN - CNP   25 mg at 05/30/19 0859    atorvastatin (LIPITOR) tablet 20 mg  20 mg Oral Nightly Colette Ansari, APRN - CNP   20 mg at 05/29/19 2152    vitamin D (CHOLECALCIFEROL) tablet 2,000 Units  2,000 Units Oral Daily Colette Ansari, APRN - CNP   2,000 Units at 05/30/19 0900    fluticasone (FLONASE) 50 MCG/ACT nasal spray 1 spray  1 spray Nasal Daily Colette Ansari, APRN - CNP   1 spray at 05/30/19 0859    melatonin ER tablet 3 mg  3 mg Oral Nightly PRN Colette Ansari, APRN - CNP   3 mg at 05/29/19 2152    pantoprazole (PROTONIX) tablet 40 mg  40 mg Oral QAM AC DIANNA Gonsalez CNP   40 mg at 05/30/19 0900    polyethylene glycol (GLYCOLAX) packet 17 g  17 g Oral Daily DIANNA Gonsalez CNP   17 g at 05/30/19 0901    ranolazine (RANEXA) extended release tablet 1,000 mg  1,000 mg Oral BID DIANNA Gonsalez CNP

## 2019-05-30 NOTE — PLAN OF CARE
Problem: Falls - Risk of:  Goal: Will remain free from falls  Description  Will remain free from falls  Outcome: Ongoing     Problem: Falls - Risk of:  Goal: Absence of physical injury  Description  Absence of physical injury  Outcome: Ongoing  Note:   Pt remains free of falls and verbalizes understanding of individual fall risks. Pt wearing non skid footwear and encouraged to seek out staff for any assistance needed.

## 2019-05-30 NOTE — GROUP NOTE
Group Therapy Note    Date: May 30    Group Start Time: 1100  Group End Time: 5193  Group Topic: Psychotherapy    [facility]    EVELYN Santillan, BABAR        Group Therapy Note    Attendees: 10/10         Patient's Goal:  Interpersonal relationships, communication         Notes:      Status After Intervention:  Improved    Participation Level: Active Listener and Interactive    Participation Quality: Appropriate and Attentive      Speech:  normal      Thought Process/Content: Logical      Affective Functioning: Congruent      Mood: euthymic      Level of consciousness:  Alert, Oriented x4 and Attentive      Response to Learning: Able to verbalize current knowledge/experience, Able to verbalize/acknowledge new learning and Capable of insight      Endings: None Reported    Modes of Intervention: Support      Discipline Responsible: /Counselor      Signature:   EVELYN Santillan, BABAR

## 2019-05-30 NOTE — GROUP NOTE
Group Therapy Note    Date: May 30    Group Start Time: 1330  Group End Time: 3783  Group Topic: Recreational    [facility] [address], [facility]        Group Therapy Note    Attendees: 11         Patient's Goal:  Patient will demonstrate increased interpersonal interaction     Notes:  Patient attended group and participated fully     Status After Intervention:  Improved    Participation Level:  Active Listener and Interactive    Participation Quality: Appropriate, Attentive and Sharing      Speech:  normal      Thought Process/Content: Logical      Affective Functioning: Congruent      Mood: euthymic      Level of consciousness:  Alert and Oriented x4      Response to Learning: Progressing to goal      Endings: None Reported    Modes of Intervention: Socialization and Activity      Discipline Responsible: Psychoeducational Specialist      Signature:  Anna Jeffers

## 2019-05-31 ENCOUNTER — APPOINTMENT (OUTPATIENT)
Dept: GENERAL RADIOLOGY | Age: 64
End: 2019-05-31
Payer: COMMERCIAL

## 2019-05-31 ENCOUNTER — HOSPITAL ENCOUNTER (EMERGENCY)
Age: 64
Discharge: ANOTHER HOSPITAL | End: 2019-06-01
Attending: EMERGENCY MEDICINE
Payer: COMMERCIAL

## 2019-05-31 VITALS
TEMPERATURE: 98.3 F | HEIGHT: 69 IN | HEART RATE: 56 BPM | BODY MASS INDEX: 34.66 KG/M2 | RESPIRATION RATE: 14 BRPM | OXYGEN SATURATION: 97 % | DIASTOLIC BLOOD PRESSURE: 86 MMHG | SYSTOLIC BLOOD PRESSURE: 134 MMHG | WEIGHT: 234 LBS

## 2019-05-31 DIAGNOSIS — F32.A DEPRESSION WITH SUICIDAL IDEATION: Primary | ICD-10-CM

## 2019-05-31 DIAGNOSIS — R45.851 DEPRESSION WITH SUICIDAL IDEATION: Primary | ICD-10-CM

## 2019-05-31 LAB
ABSOLUTE EOS #: 0.23 K/UL (ref 0–0.4)
ABSOLUTE IMMATURE GRANULOCYTE: ABNORMAL K/UL (ref 0–0.3)
ABSOLUTE LYMPH #: 1.29 K/UL (ref 1–4.8)
ABSOLUTE MONO #: 0.38 K/UL (ref 0–1)
AMPHETAMINE SCREEN URINE: NEGATIVE
BARBITURATE SCREEN URINE: NEGATIVE
BASOPHILS # BLD: 1 % (ref 0–2)
BASOPHILS ABSOLUTE: 0.08 K/UL (ref 0–0.2)
BENZODIAZEPINE SCREEN, URINE: NEGATIVE
BILIRUBIN URINE: NEGATIVE
BUPRENORPHINE URINE: ABNORMAL
CANNABINOID SCREEN URINE: POSITIVE
COCAINE METABOLITE, URINE: NEGATIVE
COLOR: YELLOW
COMMENT UA: NORMAL
DIFFERENTIAL TYPE: ABNORMAL
EOSINOPHILS RELATIVE PERCENT: 3 % (ref 0–5)
GLUCOSE URINE: NEGATIVE
HCT VFR BLD CALC: 41.3 % (ref 41–53)
HEMOGLOBIN: 14 G/DL (ref 13.5–17.5)
IMMATURE GRANULOCYTES: ABNORMAL %
KETONES, URINE: NEGATIVE
LEUKOCYTE ESTERASE, URINE: NEGATIVE
LYMPHOCYTES # BLD: 17 % (ref 13–44)
MCH RBC QN AUTO: 31.6 PG (ref 26–34)
MCHC RBC AUTO-ENTMCNC: 34 G/DL (ref 31–37)
MCV RBC AUTO: 93 FL (ref 80–100)
MDMA URINE: ABNORMAL
METHADONE SCREEN, URINE: NEGATIVE
METHAMPHETAMINE, URINE: NEGATIVE
MONOCYTES # BLD: 5 % (ref 5–9)
MORPHOLOGY: ABNORMAL
NITRITE, URINE: NEGATIVE
NRBC AUTOMATED: ABNORMAL PER 100 WBC
OPIATES, URINE: NEGATIVE
OXYCODONE SCREEN URINE: NEGATIVE
PDW BLD-RTO: 14.2 % (ref 12.1–15.2)
PH UA: 6.5 (ref 5–8)
PHENCYCLIDINE, URINE: NEGATIVE
PLATELET # BLD: 203 K/UL (ref 140–450)
PLATELET ESTIMATE: ABNORMAL
PMV BLD AUTO: ABNORMAL FL (ref 6–12)
PROPOXYPHENE, URINE: NEGATIVE
PROTEIN UA: NEGATIVE
RBC # BLD: 4.44 M/UL (ref 4.5–5.9)
RBC # BLD: ABNORMAL 10*6/UL
SEG NEUTROPHILS: 74 % (ref 39–75)
SEGMENTED NEUTROPHILS ABSOLUTE COUNT: 5.62 K/UL (ref 2.1–6.5)
SPECIFIC GRAVITY UA: 1.01 (ref 1–1.03)
TEST INFORMATION: ABNORMAL
TRICYCLIC ANTIDEPRESSANTS, UR: NEGATIVE
TURBIDITY: CLEAR
URINE HGB: NEGATIVE
UROBILINOGEN, URINE: NORMAL
WBC # BLD: 7.6 K/UL (ref 3.5–11)
WBC # BLD: ABNORMAL 10*3/UL

## 2019-05-31 PROCEDURE — 99238 HOSP IP/OBS DSCHRG MGMT 30/<: CPT | Performed by: NURSE PRACTITIONER

## 2019-05-31 PROCEDURE — 6370000000 HC RX 637 (ALT 250 FOR IP): Performed by: NURSE PRACTITIONER

## 2019-05-31 PROCEDURE — 84484 ASSAY OF TROPONIN QUANT: CPT

## 2019-05-31 PROCEDURE — 6370000000 HC RX 637 (ALT 250 FOR IP): Performed by: EMERGENCY MEDICINE

## 2019-05-31 PROCEDURE — 99285 EMERGENCY DEPT VISIT HI MDM: CPT

## 2019-05-31 PROCEDURE — 85025 COMPLETE CBC W/AUTO DIFF WBC: CPT

## 2019-05-31 PROCEDURE — 81003 URINALYSIS AUTO W/O SCOPE: CPT

## 2019-05-31 PROCEDURE — 80053 COMPREHEN METABOLIC PANEL: CPT

## 2019-05-31 PROCEDURE — 36415 COLL VENOUS BLD VENIPUNCTURE: CPT

## 2019-05-31 PROCEDURE — 93005 ELECTROCARDIOGRAM TRACING: CPT | Performed by: EMERGENCY MEDICINE

## 2019-05-31 PROCEDURE — 80306 DRUG TEST PRSMV INSTRMNT: CPT

## 2019-05-31 PROCEDURE — 71045 X-RAY EXAM CHEST 1 VIEW: CPT

## 2019-05-31 PROCEDURE — 5130000000 HC BRIDGE APPOINTMENT

## 2019-05-31 PROCEDURE — 80307 DRUG TEST PRSMV CHEM ANLYZR: CPT

## 2019-05-31 PROCEDURE — G0480 DRUG TEST DEF 1-7 CLASSES: HCPCS

## 2019-05-31 RX ORDER — RANOLAZINE 1000 MG/1
1000 TABLET, EXTENDED RELEASE ORAL 2 TIMES DAILY
Qty: 60 TABLET | Refills: 3 | Status: CANCELLED | OUTPATIENT
Start: 2019-05-31

## 2019-05-31 RX ORDER — NITROGLYCERIN 0.4 MG/1
0.4 TABLET SUBLINGUAL EVERY 5 MIN PRN
Status: DISCONTINUED | OUTPATIENT
Start: 2019-05-31 | End: 2019-06-01 | Stop reason: HOSPADM

## 2019-05-31 RX ORDER — TRAZODONE HYDROCHLORIDE 150 MG/1
150 TABLET ORAL NIGHTLY PRN
Qty: 30 TABLET | Refills: 0 | Status: SHIPPED | OUTPATIENT
Start: 2019-05-31

## 2019-05-31 RX ORDER — MIRTAZAPINE 15 MG/1
15 TABLET, FILM COATED ORAL NIGHTLY
Qty: 30 TABLET | Refills: 0 | Status: SHIPPED | OUTPATIENT
Start: 2019-05-31

## 2019-05-31 RX ORDER — ASPIRIN 81 MG/1
324 TABLET, CHEWABLE ORAL ONCE
Status: COMPLETED | OUTPATIENT
Start: 2019-05-31 | End: 2019-05-31

## 2019-05-31 RX ORDER — MIRTAZAPINE 30 MG/1
30 TABLET, FILM COATED ORAL DAILY
Qty: 30 TABLET | Refills: 0 | Status: SHIPPED | OUTPATIENT
Start: 2019-05-31

## 2019-05-31 RX ADMIN — DOCUSATE SODIUM 100 MG: 100 CAPSULE, LIQUID FILLED ORAL at 08:54

## 2019-05-31 RX ADMIN — ASPIRIN 81 MG: 81 TABLET, COATED ORAL at 08:55

## 2019-05-31 RX ADMIN — POLYETHYLENE GLYCOL 3350 17 G: 17 POWDER, FOR SOLUTION ORAL at 08:56

## 2019-05-31 RX ADMIN — ATENOLOL 25 MG: 25 TABLET ORAL at 08:54

## 2019-05-31 RX ADMIN — RANOLAZINE 1000 MG: 1000 TABLET, FILM COATED, EXTENDED RELEASE ORAL at 08:54

## 2019-05-31 RX ADMIN — SPIRONOLACTONE 25 MG: 25 TABLET, FILM COATED ORAL at 08:55

## 2019-05-31 RX ADMIN — FLUTICASONE PROPIONATE 1 SPRAY: 50 SPRAY, METERED NASAL at 08:56

## 2019-05-31 RX ADMIN — MIRTAZAPINE 30 MG: 30 TABLET, FILM COATED ORAL at 08:55

## 2019-05-31 RX ADMIN — ASPIRIN 81 MG 324 MG: 81 TABLET ORAL at 23:08

## 2019-05-31 RX ADMIN — Medication 0.4 MG: at 23:13

## 2019-05-31 RX ADMIN — TIOTROPIUM BROMIDE 18 MCG: 18 CAPSULE ORAL; RESPIRATORY (INHALATION) at 08:53

## 2019-05-31 RX ADMIN — HYDROXYZINE HYDROCHLORIDE 25 MG: 25 TABLET, FILM COATED ORAL at 14:38

## 2019-05-31 RX ADMIN — VITAMIN D, TAB 1000IU (100/BT) 2000 UNITS: 25 TAB at 08:54

## 2019-05-31 RX ADMIN — PANTOPRAZOLE SODIUM 40 MG: 40 TABLET, DELAYED RELEASE ORAL at 08:54

## 2019-05-31 RX ADMIN — IBUPROFEN 800 MG: 800 TABLET ORAL at 08:53

## 2019-05-31 ASSESSMENT — PAIN SCALES - GENERAL
PAINLEVEL_OUTOF10: 9
PAINLEVEL_OUTOF10: 7

## 2019-05-31 ASSESSMENT — PAIN DESCRIPTION - LOCATION
LOCATION: CHEST
LOCATION: CHEST
LOCATION: GENERALIZED

## 2019-05-31 ASSESSMENT — PAIN DESCRIPTION - ORIENTATION
ORIENTATION: LEFT
ORIENTATION: LEFT

## 2019-05-31 ASSESSMENT — PAIN DESCRIPTION - PAIN TYPE
TYPE: ACUTE PAIN
TYPE: CHRONIC PAIN
TYPE: ACUTE PAIN

## 2019-05-31 ASSESSMENT — PATIENT HEALTH QUESTIONNAIRE - PHQ9: SUM OF ALL RESPONSES TO PHQ QUESTIONS 1-9: 24

## 2019-05-31 NOTE — CARE COORDINATION
Bridge Appointment completed: Reviewed Discharge Instructions with patient. Patient verbalizes understanding and agreement with the discharge plan using the teachback method. Discharge Arrangements:  [address] notified:   Discharge destination/address:[address]    Transported by:  [employer]

## 2019-05-31 NOTE — BH NOTE
Patient is being discharged 5/31. Writer called [facility] [phone] to schedule transportation.  Per Uvaldo Tapia will pick pt up between now and 5:08pm.    CONF #  [id number]

## 2019-05-31 NOTE — GROUP NOTE
Group Therapy Note    Date: May 31    Group Start Time: 0900  Group End Time: 0920  Group Topic: Community Meeting    LA ROTHMAN    [place], [place]    Attendees: 10         Patient's Goal for Today:  Discharge planning. Get in contact with bank. File for a home loan. Notes:      Status After Intervention:  Improved    Participation Level:  Active Listener and Interactive    Participation Quality: Appropriate, Attentive and Sharing      Speech:  normal      Thought Process/Content: Logical      Affective Functioning: Congruent      Level of consciousness:  Alert, Oriented x4 and Attentive      Response to Learning: Able to verbalize current knowledge/experience, Able to verbalize/acknowledge new learning, Able to retain information, Capable of insight and Progressing to goal      Endings: None Reported    Modes of Intervention: Support, Socialization, Exploration, Clarifying, Problem-solving, Confrontation, Limit-setting and Reality-testing      Discipline Responsible: Psychoeducational Specialist      Signature:  Marsha Brock

## 2019-05-31 NOTE — DISCHARGE SUMMARY
DISCHARGE SUMMARY  Patient ID:  Qing Ramirez  [id number]  36 y.o.  1955    Admit date: 5/13/2019    Discharge date and time: 5/31/2019  10:58 AM     Admitting Physician: Lupis Garvin MD     Discharge Physician:  DIANNA Diaz - CNP    Admission Diagnoses: Homicidal ideation [R45.850]  PTSD (post-traumatic stress disorder) [F43.10]    Discharge Diagnoses:   PTSD (post-traumatic stress disorder)     Patient Active Problem List   Diagnosis Code    ASHD (arteriosclerotic heart disease) I25.10    PTSD (post-traumatic stress disorder) F43.10    Precordial pain R07.2    History of coronary artery bypass surgery Z95.1    Obstructive apnea G47.33    Hypertension I10    Hyperlipidemia E78.5    Chronic obstructive pulmonary disease ([facility] [address] J44.9    Vitamin D deficiency disease E55.9    Unstable angina (HCC) I20.0    Post traumatic stress disorder F43.10    CHF (congestive heart failure), NYHA class II, chronic, diastolic (HCC) D80.79    Bilateral lower leg cellulitis L03.116, L03.115    Homicidal ideation R45.850        Admission Condition: poor    Discharged Condition: stable    Indication for Admission: threat to self    History of Present Illnes (present tense wording indicates findings from admission exam on 5/13/2019 and are not necessarily indicative of current findings): Hospital Course:   Upon admission, Qing Ramirez was provided a safe secure environment, introduced to unit milieu. Patient participated in groups and individual therapies. Meds were adjusted. After few days of hospital care, patient began to feel improvement. Depression lifted, thoughts to harm self ceased. Sleep improved, appetite was good. On morning rounds 5/31/2019, patient endorsed feeling ready for discharge. Patient denies suicidal or homicidal ideations, denies hallucinations or delusions. Denies SE's from meds.   It was decided that pt had achieved maximum benefit from hospital care and can be discharged Consults:   Internal Medicine    Significant Diagnostic Studies: Routine labs and diagnostics    Treatments: Psychotropic medications, therapy with group, milieu, and 1:1 with nurses, social workers, PAAlvarezC/CNP, and Attending physician. Discharge Medications:  Current Discharge Medication List      CONTINUE these medications which have CHANGED    Details   !! mirtazapine (REMERON) 15 MG tablet Take 1 tablet by mouth nightly  Qty: 30 tablet, Refills: 0      !! mirtazapine (REMERON) 30 MG tablet Take 1 tablet by mouth daily  Qty: 30 tablet, Refills: 0      traZODone (DESYREL) 150 MG tablet Take 1 tablet by mouth nightly as needed for Sleep  Qty: 30 tablet, Refills: 0       !! - Potential duplicate medications found. Please discuss with provider.       CONTINUE these medications which have NOT CHANGED    Details   melatonin 3 MG TABS tablet Take 3 mg by mouth nightly as needed      spironolactone (ALDACTONE) 25 MG tablet Take 25 mg by mouth daily      albuterol (PROVENTIL) (2.5 MG/3ML) 0.083% nebulizer solution Take 2.5 mg by nebulization every 4 hours as needed for Wheezing or Shortness of Breath      albuterol sulfate HFA (PROAIR HFA) 108 (90 Base) MCG/ACT inhaler Inhale 2 puffs into the lungs every 4 hours as needed for Wheezing or Shortness of Breath  Qty: 1 Inhaler, Refills: 2      tiotropium (SPIRIVA RESPIMAT) 2.5 MCG/ACT AERS inhaler Inhale 2 puffs into the lungs daily  Qty: 1 Inhaler, Refills: 5      pantoprazole (PROTONIX) 40 MG tablet Take 1 tablet by mouth every morning (before breakfast)  Qty: 30 tablet, Refills: 5      RANEXA 500 MG extended release tablet Take 1,000 mg by mouth 2 times daily   Refills: 1      atorvastatin (LIPITOR) 20 MG tablet TAKE 1 TABLET BY MOUTH EVERY DAY  Qty: 30 tablet, Refills: 5      atenolol (TENORMIN) 25 MG tablet Take 25 mg by mouth daily      Cholecalciferol (VITAMIN D) 2000 UNITS CAPS capsule Take 1 capsule by mouth daily      aspirin 81 MG tablet Take 81 mg by mouth daily      fluticasone (FLONASE) 50 MCG/ACT nasal spray 1 spray by Nasal route daily       polyethylene glycol (GLYCOLAX) powder Take 17 g by mouth daily         STOP taking these medications       doxycycline hyclate (VIBRA-TABS) 100 MG tablet Comments:   Reason for Stopping:                  Discharge Exam:  Level of consciousness:  Within normal limits  Appearance: Street clothes, seated, with good grooming  Behavior/Motor: No abnormalities noted  Attitude toward examiner:  Cooperative, attentive, good eye contact  Speech:  spontaneous, normal rate, normal volume and well articulated  Mood:  euthymic  Affect:  mood congruent  Thought processes:  linear, goal directed and coherent  Thought content:  Homocidal ideation denies  Suicidal Ideation:  denies suicidal ideation  Delusions:  no evidence of delusions  Perceptual Disturbance:  denies any perceptual disturbance  Cognition:  In tact  Memory: age appropriate  Insight & Judgement: fair  Medication side effects:  denies     Disposition: home    Patient Instructions: Activity: activity as tolerated    [facility]    Time Spent: 35 minutes    Engagement: Patient displayed a good level of engagement with the treatments offered during this admission. Discharge planning, findings, and recommendations were discussed with the patient and treatment team.    Signed:  Celina Tripathi CNP  5/31/2019  10:58 AM  Dragon voice recognition software used in portions of this document.

## 2019-05-31 NOTE — GROUP NOTE
Group Therapy Note    Date: May 31    Group Start Time: 1000  Group End Time: 7080  Group Topic: Recreational    [facility] L[facility] [place], [place]    Attendees: 3       Patient's Goal:  To demonstrate increased interpersonal skills. Notes:  Patient attended group and actively participated in task at hand. Patient conversed appropriately with peers and Symone Smith. Status After Intervention:  Improved    Participation Level:  Active Listener and Interactive    Participation Quality: Appropriate, Attentive and Sharing      Speech:  normal      Thought Process/Content: Logical      Affective Functioning: Congruent      Level of consciousness:  Alert, Oriented x4 and Attentive      Response to Learning: Able to verbalize current knowledge/experience, Able to verbalize/acknowledge new learning, Able to retain information, Capable of insight and Progressing to goal      Endings: None Reported       Modes of Intervention: Socialization, Exploration, Clarifying, Problem-solving, Activity, Limit-setting and Reality-testing      Discipline Responsible: Psychoeducational Specialist      Signature:  Emily Elmore

## 2019-05-31 NOTE — PROGRESS NOTES
CLINICAL PHARMACY NOTE: MEDS TO [address] Select Patient?: Yes  Total # of Prescriptions Filled: 3   The following medications were delivered to the patient:  · Mirtazapine 15 mg  · Mirtazapine 30 mg  · Trazodone  ·   ·   Total # of Interventions Completed: 0  Time Spent (min): 30    Additional Documentation:

## 2019-05-31 NOTE — GROUP NOTE
Group Therapy Note    Date: May 30    Group Start Time: 2030  Group End Time: 2114  Group Topic: Wrap-Up    LA Agosto        Group Therapy Note    Attendees: 13       Patient's Goal:  D/C to [facility], going to [place]    Notes:  Pt discussed FU plans    Status After Intervention:  Improved    Participation Level:  Active Listener    Participation Quality: Supportive      Speech:  normal      Thought Process/Content: Logical      Affective Functioning: Congruent      Mood: depressed      Level of consciousness:  Alert      Response to Learning: Progressing to goal      Endings: None Reported    Modes of Intervention: Problem-solving      Discipline Responsible: [employer]      Signature:  Kg Agosto

## 2019-05-31 NOTE — BH NOTE
[address] [facility]  Discharge Note    Pt discharged with followings belongings:   Dentures: None  Vision - Corrective Lenses: Glasses  Hearing Aid: None  Jewelry: Necklace  Body Piercings Removed: N/A  Clothing: Footwear, Socks, Other (Comment)(shorts)  Were All Patient Medications Collected?: Not Applicable  Other Valuables: Jose Martin Rounds sent home with patient. Valuables retrieved from safe, Security envelope number:  [id number], [id number] and returned to patient. Patient left department with Departure Mode: By self, In cab via Mobility at Departure: Wheelchair, Ambulatory, discharged to Discharged to: Other (Comment)([address]). Patient education on aftercare instructions: Yes  Information faxed to [place]  by RN Patient verbalize understanding of AVS:  Yes.     Status EXAM upon discharge:  Status and Exam  Normal: Yes  Facial Expression: Brightened  Affect: Appropriate  Level of Consciousness: Alert  Mood:Normal: No  Mood: Anxious, Depressed  Motor Activity:Normal: Yes  Motor Activity: Decreased  Interview Behavior: Cooperative  Preception: [place] to Person, Tennie Crimes to Time, [place] to Place, [place] to Situation  Attention:Normal: Yes  Attention: Distractible  Thought Processes: (logical )  Thought Content:Normal: No  Thought Content: Preoccupations  Hallucinations: None  Delusions: No  Memory:Normal: Yes  Memory: Poor Recent  Insight and Judgment: No  Insight and Judgment: Poor Judgment, Poor Insight  Present Suicidal Ideation: No  Present Homicidal Ideation: No      Metabolic Screening:    Lab Results   Component Value Date    LABA1C 5.1 05/14/2019       Lab Results   Component Value Date    CHOL 127 05/14/2019    CHOL 107 02/24/2019    CHOL 155 12/17/2018    CHOL 131 07/05/2017    CHOL 119 07/08/2016     Lab Results   Component Value Date    TRIG 126 05/14/2019    TRIG 93 02/24/2019    TRIG 39 12/17/2018    TRIG 89 07/05/2017    TRIG 116 07/08/2016     Lab Results Component Value Date    HDL 48 05/14/2019    HDL 35 (L) 02/24/2019    HDL 54 12/17/2018    HDL 40 (L) 07/05/2017    HDL 35 (L) 07/08/2016     No components found for: LDLCAL  No results found for: Hua Ramirez, RN

## 2019-05-31 NOTE — PLAN OF CARE
Problem: Falls - Risk of:  Goal: Will remain free from falls  Description  Will remain free from falls  Outcome: Ongoing     Problem: Falls - Risk of:  Goal: Absence of physical injury  Description  Absence of physical injury  Outcome: Ongoing  Note:   Pt remains free of falls and verbalizes understanding of individual fall risks. Pt wearing non skid footwear and encouraged to seek out staff for any assistance needed. Problem: Anger Management/Homicidal Ideation:  Goal: Absence of homicidal ideation  Description  Absence of homicidal ideation  5/31/2019 0242 by Aminta Crowley RN  Outcome: Ongoing  Note:   Pt denies thoughts of harming others and is agreeable to seeking out should thoughts of harming others arise. Safe environment maintained. Q15 minute checks for safety cont per unit policy. Will cont to monitor for safety and provides support and reassurance as needed.

## 2019-05-31 NOTE — GROUP NOTE
Group Therapy Note    Date: May 31    Group Start Time: 1330  Group End Time: 1430  Group Topic: Cognitive Skills    PRISCILA Valdes    Patient's Goal:  Demonstrate improved mood, demonstrate increased interpersonal interaction     Notes:  Pt was interactive and supportive of peers in group. Status After Intervention:  Improved    Participation Level:  Active Listener and Interactive    Participation Quality: Appropriate, Attentive, Sharing and Supportive    Speech:  normal    Thought Process/Content: Logical    Affective Functioning: Congruent    Level of consciousness:  Alert, Oriented x4 and Attentive    Response to Learning: Able to verbalize current knowledge/experience, Capable of insight and Progressing to goal    Endings: None Reported    Modes of Intervention: Education, Support, Socialization and Exploration    Discipline Responsible: Psychoeducational Specialist    Signature:  Oj Monroy, [address]

## 2019-05-31 NOTE — DISCHARGE INSTR - OTHER ORDERS
Make sure to take all medications prescribed by your Dr. Dayne Alvarez not double up on doses. If you miss or skip a dose make sure to take the next scheduled dose. Make sure to not do any illicit drugs or alcohol. Make sure to attend all follow up appointments.

## 2019-06-01 VITALS
OXYGEN SATURATION: 97 % | RESPIRATION RATE: 13 BRPM | DIASTOLIC BLOOD PRESSURE: 94 MMHG | WEIGHT: 244.2 LBS | TEMPERATURE: 98.4 F | HEIGHT: 69 IN | SYSTOLIC BLOOD PRESSURE: 127 MMHG | HEART RATE: 62 BPM | BODY MASS INDEX: 36.17 KG/M2

## 2019-06-01 LAB
ACETAMINOPHEN LEVEL: <5 UG/ML (ref 10–30)
ALBUMIN SERPL-MCNC: 4.5 G/DL (ref 3.5–5.2)
ALBUMIN/GLOBULIN RATIO: ABNORMAL (ref 1–2.5)
ALP BLD-CCNC: 88 U/L (ref 40–129)
ALT SERPL-CCNC: 15 U/L (ref 5–41)
ANION GAP SERPL CALCULATED.3IONS-SCNC: 15 MMOL/L (ref 9–17)
AST SERPL-CCNC: 15 U/L
BILIRUB SERPL-MCNC: 0.34 MG/DL (ref 0.3–1.2)
BNP INTERPRETATION: NORMAL
BUN BLDV-MCNC: 20 MG/DL (ref 8–23)
BUN/CREAT BLD: 20 (ref 9–20)
CALCIUM SERPL-MCNC: 9.6 MG/DL (ref 8.6–10.4)
CHLORIDE BLD-SCNC: 95 MMOL/L (ref 98–107)
CO2: 20 MMOL/L (ref 20–31)
CREAT SERPL-MCNC: 0.98 MG/DL (ref 0.7–1.2)
ETHANOL PERCENT: <0.01 %
ETHANOL: <10 MG/DL
GFR AFRICAN AMERICAN: >60 ML/MIN
GFR NON-AFRICAN AMERICAN: >60 ML/MIN
GFR SERPL CREATININE-BSD FRML MDRD: ABNORMAL ML/MIN/{1.73_M2}
GFR SERPL CREATININE-BSD FRML MDRD: ABNORMAL ML/MIN/{1.73_M2}
GLUCOSE BLD-MCNC: 117 MG/DL (ref 70–99)
POTASSIUM SERPL-SCNC: 4.5 MMOL/L (ref 3.7–5.3)
PRO-BNP: 119 PG/ML
SALICYLATE LEVEL: <1 MG/DL (ref 3–10)
SODIUM BLD-SCNC: 130 MMOL/L (ref 135–144)
TOTAL PROTEIN: 6.9 G/DL (ref 6.4–8.3)
TROPONIN INTERP: NORMAL
TROPONIN INTERP: NORMAL
TROPONIN T: <0.03 NG/ML
TROPONIN T: <0.03 NG/ML
TROPONIN, HIGH SENSITIVITY: NORMAL NG/L (ref 0–22)
TROPONIN, HIGH SENSITIVITY: NORMAL NG/L (ref 0–22)

## 2019-06-01 PROCEDURE — 83880 ASSAY OF NATRIURETIC PEPTIDE: CPT

## 2019-06-01 PROCEDURE — 84484 ASSAY OF TROPONIN QUANT: CPT

## 2019-06-01 PROCEDURE — 6370000000 HC RX 637 (ALT 250 FOR IP): Performed by: EMERGENCY MEDICINE

## 2019-06-01 RX ORDER — IBUPROFEN 200 MG
600 TABLET ORAL ONCE
Status: COMPLETED | OUTPATIENT
Start: 2019-06-01 | End: 2019-06-01

## 2019-06-01 RX ADMIN — IBUPROFEN 600 MG: 200 TABLET, FILM COATED ORAL at 07:55

## 2019-06-01 ASSESSMENT — PAIN SCALES - GENERAL
PAINLEVEL_OUTOF10: 6
PAINLEVEL_OUTOF10: 3

## 2019-06-01 NOTE — ED PROVIDER NOTES
eMERGENCY dEPARTMENT eNCOUnter      [address] [facility]    Chief Complaint   Patient presents with    Suicidal     Pt has suicidal plan to take his sleeping pills and not wake up. Pt is unsure if wife if going to break up with him. Pt was just released from [facility] today at 1500, pt was there for PTSD relapse. RYANNE Chambers is a 59 y.o. male who presentsto ED from home  By private car  With complaint of suicidal ideation  Onset 1 day  Patient was released from the psych facility today for depression and suicidal ideation. After reaching home his wife threatened to leave him and patient had suicidal thoughts again. Patient had a plan of taking his psychiatric pills and overdose on it. Patient states\"I just don't care, I want to die\"  Patient also had chest pain which was left sided with no radiation and lasted a short while. He denies shortness of breath. PAST MEDICAL HISTORY    Past Medical History:   Diagnosis Date    Arthritis     WENDY KNEE    Bronchitis with chronic airway obstruction (HCC)     CAD (coronary artery disease)     CHF (congestive heart failure) (HCC)     CTS (carpal tunnel syndrome)     RIGHT    Diabetes mellitus ([address]     Diabetic neuropathy associated with diabetes mellitus due to underlying condition ([address]     Exposure to toxic chemical     [employer]-toxic water exposure    GERD (gastroesophageal reflux disease)     H/O cardiovascular stress test 07/08/2016    Abnormal.  Moderate perfusion defect of mild to moderate intensity in the inferolateral,inferior and inferoapical regions during stress imaging. Ef 45%. with regional wall motion abnormalities.  H/O echocardiogram 2/17/16    EF >60%. LV wall thickness is mildly increased. Inferoseptal wall is abnormal in its motion not unusual status post open heart surgery. Normal aortic valve structure with ild aortic regurgitation.     Hip pain, left     Hx of cardiac cath 0.083% nebulizer solution Take 2.5 mg by nebulization every 4 hours as needed for Wheezing or Shortness of Breath      albuterol sulfate HFA (PROAIR HFA) 108 (90 Base) MCG/ACT inhaler Inhale 2 puffs into the lungs every 4 hours as needed for Wheezing or Shortness of Breath 1 Inhaler 2    tiotropium (SPIRIVA RESPIMAT) 2.5 MCG/ACT AERS inhaler Inhale 2 puffs into the lungs daily 1 Inhaler 5    pantoprazole (PROTONIX) 40 MG tablet Take 1 tablet by mouth every morning (before breakfast) 30 tablet 5    RANEXA 500 MG extended release tablet Take 1,000 mg by mouth 2 times daily   1    atorvastatin (LIPITOR) 20 MG tablet TAKE 1 TABLET BY MOUTH EVERY DAY 30 tablet 5    atenolol (TENORMIN) 25 MG tablet Take 25 mg by mouth daily      Cholecalciferol (VITAMIN D) 2000 UNITS CAPS capsule Take 1 capsule by mouth daily      aspirin 81 MG tablet Take 81 mg by mouth daily      fluticasone (FLONASE) 50 MCG/ACT nasal spray 1 spray by Nasal route daily          ALLERGIES    Allergies   Allergen Reactions    Codeine Other (See Comments)    Hydrocodone     Pcn [Penicillins] Other (See Comments)    Sulfa Antibiotics Other (See Comments)       FAMILY HISTORY    Family History   Adopted: Yes   Family history unknown: Yes       SOCIAL HISTORY    Social History     Socioeconomic History    Marital status:      Spouse name: None    Number of children: None    Years of education: None    Highest education level: None   Occupational History    None   Social Needs    Financial resource strain: None    Food insecurity:     Worry: None     Inability: None    Transportation needs:     Medical: None     Non-medical: None   Tobacco Use    Smoking status: Former Smoker     Packs/day: 1.00     Years: 19.00     Pack years: 19.00     Types: Cigarettes     Last attempt to quit: 2006     Years since quittin.7    Smokeless tobacco: Former User     Quit date: 2000   Substance and Sexual Activity    Alcohol use:  No  Drug use: Yes     Frequency: 1.0 times per week     Types: Marijuana     Comment: Pt smokes marijuana therapeutically.  Sexual activity: Yes     Partners: Female   Lifestyle    Physical activity:     Days per week: None     Minutes per session: None    Stress: None   Relationships    Social connections:     Talks on phone: None     Gets together: None     Attends Restoration service: None     Active member of club or organization: None     Attends meetings of clubs or organizations: None     Relationship status: None    Intimate partner violence:     Fear of current or ex partner: None     Emotionally abused: None     Physically abused: None     Forced sexual activity: None   Other Topics Concern    None   Social History Narrative    None       REVIEW OF SYSTEMS    Constitutional:  Denies fever, chills, weight loss or weakness   Eyes:  Denies photophobia or discharge   HENT:  Denies sore throat or ear pain   Respiratory:  Denies cough or shortness of breath   Cardiovascular:  c/o chest pain,but denies palpitations or swelling   GI:  Denies abdominal pain, nausea, vomiting, or diarrhea   Musculoskeletal:  Denies back pain   Skin:  Denies rash   Neurologic:  Denies headache, focal weakness or sensory changes   Endocrine:  Denies polyuria or polydypsia   Lymphatic:  Denies swollen glands   Psychiatric:  Denies depression, suicidal ideation or homicidal ideation   All systems negative except as marked. PHYSICAL EXAM    VITAL SIGNS: BP (!) 140/84   Pulse 58   Temp 98.4 °F (36.9 °C) (Oral)   Resp 13   Ht 5' 9\" (1.753 m)   Wt 244 lb 3.2 oz (110.8 kg)   SpO2 95%   BMI 36.06 kg/m²    Constitutional: No acute distress, Non-toxic appearance. HENT:  Normocephalic, Atraumatic, Bilateral external ears normal, Oropharynx moist, No oral exudates, Nose normal. Neck- Normal range of motion, No tenderness, Supple, No stridor. Eyes:  PERRL, EOMI, Conjunctiva normal, No discharge.    Respiratory:  Normal breath <5 (*)     All other components within normal limits   TROPONIN   URINALYSIS   ETHANOL   TROPONIN   BRAIN NATRIURETIC PEPTIDE             Summation      Patient Course:     ED Medications administered this visit:    Medications   nitroGLYCERIN (NITROSTAT) SL tablet 0.4 mg (0.4 mg Sublingual Given 5/31/19 2313)   aspirin chewable tablet 324 mg (324 mg Oral Given 5/31/19 2308)       New Prescriptions from this visit:    New Prescriptions    No medications on file       Follow-up:  No follow-up provider specified. Final Impression:   1.  Depression with suicidal ideation               (Please note that portions of this note were completed with a voice recognition program.  Efforts were made to edit the dictations but occasionally words are mis-transcribed.)            Enriqueta Cruz MD  06/01/19 9200       Enriqueta Cruz MD  06/01/19 3562

## 2019-06-01 NOTE — ED NOTES
Pt declines transfer to [place] in [facility].       Chace Alves RN  06/01/19 0366

## 2019-06-01 NOTE — PROGRESS NOTES
Pt requests nurse call son to update about transfer. Son, Wen Holden called multiple times with no answer.

## 2019-06-02 LAB
EKG ATRIAL RATE: 59 BPM
EKG P AXIS: 68 DEGREES
EKG P-R INTERVAL: 216 MS
EKG Q-T INTERVAL: 436 MS
EKG QRS DURATION: 78 MS
EKG QTC CALCULATION (BAZETT): 431 MS
EKG R AXIS: -19 DEGREES
EKG T AXIS: 4 DEGREES
EKG VENTRICULAR RATE: 59 BPM

## 2019-06-02 PROCEDURE — 93010 ELECTROCARDIOGRAM REPORT: CPT | Performed by: INTERNAL MEDICINE

## 2019-06-03 NOTE — CARE COORDINATION
Name: Christiano Kim    : 1955    Discharge Date: 2019    Primary Auth/Cert #: [id number]    Discharge Medications:      Medication List      CHANGE how you take these medications    * mirtazapine 15 MG tablet  Commonly known as:  REMERON  Take 1 tablet by mouth nightly  What changed:    · medication strength  · how much to take  Notes to patient:  For sleep      * mirtazapine 30 MG tablet  Commonly known as:  REMERON  Take 1 tablet by mouth daily  What changed: You were already taking a medication with the same name, and this prescription was added. Make sure you understand how and when to take each. Notes to patient:  19     traZODone 150 MG tablet  Commonly known as:  DESYREL  Take 1 tablet by mouth nightly as needed for Sleep  What changed:    · medication strength  · how much to take  Notes to patient:  As needed for sleep          * This list has 2 medication(s) that are the same as other medications prescribed for you. Read the directions carefully, and ask your doctor or other care provider to review them with you. CONTINUE taking these medications    * albuterol sulfate  (90 Base) MCG/ACT inhaler  Commonly known as:  PROAIR HFA  Inhale 2 puffs into the lungs every 4 hours as needed for Wheezing or Shortness of Breath  Notes to patient:  As needed for asthma      * albuterol (2.5 MG/3ML) 0.083% nebulizer solution  Commonly known as:  PROVENTIL  Notes to patient:  DISREGARD, DUPLICATE ORDER      aspirin 81 MG tablet  Notes to patient: To lower pain      atenolol 25 MG tablet  Commonly known as:  TENORMIN  Notes to patient:   To lower blood pressure      atorvastatin 20 MG tablet  Commonly known as:  LIPITOR  TAKE 1 TABLET BY MOUTH EVERY DAY  Notes to patient:  For high cholesterol      fluticasone 50 MCG/ACT nasal spray  Commonly known as:  FLONASE  Notes to patient:  Nasal spray      melatonin 3 MG Tabs tablet  Notes to patient:  As needed for sleep      pantoprazole 40 MG tablet  Commonly known as:  PROTONIX  Take 1 tablet by mouth every morning (before breakfast)  Notes to patient: For GERD     polyethylene glycol powder  Commonly known as:  GLYCOLAX  Notes to patient:  For constipation      RANEXA 500 MG extended release tablet  Generic drug:  ranolazine  Notes to patient: To treat chronic chest pain      spironolactone 25 MG tablet  Commonly known as:  ALDACTONE  Notes to patient: To lower blood pressure      tiotropium 2.5 MCG/ACT Aers inhaler  Commonly known as:  SPIRIVA RESPIMAT  Inhale 2 puffs into the lungs daily  Notes to patient:  For asthma      vitamin D 2000 units Caps capsule  Notes to patient:  Vitamin          * This list has 2 medication(s) that are the same as other medications prescribed for you. Read the directions carefully, and ask your doctor or other care provider to review them with you. STOP taking these medications    doxycycline hyclate 100 MG tablet  Commonly known as:  VIBRA-TABS           Where to Get Your Medications      These medications were sent to [place] [address]    Phone:  [phone]   · mirtazapine 15 MG tablet  · mirtazapine 30 MG tablet  · traZODone 150 MG tablet      Pharmacy Instructions:      Medications was sent and filled at [facility]                   Follow Up Appointment: Fransico Lee DO  [address] [place] [id number]  [address] [place], [place] [id number] [address]  [W]: [phone]  [F]: [phone]  Attention: Massiel Ruiz on 6/6/2019  Hospital discharge appointment: Miguelito Escamilla @ 1:30pm for 1.5 hours. There will be 3 additional groups on 6/13, 6/20, and 6/27 at 1:30 as well. Staff will fax discharge paperwork prior to appointment date.

## 2019-07-22 ENCOUNTER — TELEPHONE (OUTPATIENT)
Dept: CARDIOLOGY CLINIC | Age: 64
End: 2019-07-22

## 2019-07-25 ENCOUNTER — TELEPHONE (OUTPATIENT)
Dept: CARDIOLOGY CLINIC | Age: 64
End: 2019-07-25

## 2019-08-12 RX ORDER — ALBUTEROL SULFATE 90 UG/1
AEROSOL, METERED RESPIRATORY (INHALATION)
Qty: 18 G | Refills: 2 | Status: SHIPPED | OUTPATIENT
Start: 2019-08-12 | End: 2021-06-01

## 2019-08-12 NOTE — TELEPHONE ENCOUNTER
MG tablet Take 81 mg by mouth daily    Historical Provider, MD   fluticasone (FLONASE) 50 MCG/ACT nasal spray 1 spray by Nasal route daily     Historical Provider, MD       Allergies:  Codeine;  Hydrocodone; Pcn [penicillins]; and Sulfa antibiotics    Hemoglobin A1C (%)   Date Value   05/14/2019 5.1             ( goal A1C is < 7)   No results found for: LABMICR  LDL Cholesterol (mg/dL)   Date Value   05/14/2019 54   02/24/2019 53       (goal LDL is <100)   AST (U/L)   Date Value   05/31/2019 15     ALT (U/L)   Date Value   05/31/2019 15     BUN (mg/dL)   Date Value   05/31/2019 20     BP Readings from Last 3 Encounters:   06/01/19 (!) 127/94   05/13/19 120/67   05/12/19 122/64          (goal 120/80)

## 2019-09-19 RX ORDER — ATENOLOL 25 MG/1
25 TABLET ORAL DAILY
Qty: 90 TABLET | Refills: 1 | Status: SHIPPED | OUTPATIENT
Start: 2019-09-19

## 2019-09-19 RX ORDER — ATENOLOL 25 MG/1
25 TABLET ORAL DAILY
Qty: 90 TABLET | Refills: 1 | Status: CANCELLED | OUTPATIENT
Start: 2019-09-19

## 2019-09-19 RX ORDER — NITROGLYCERIN 0.4 MG/1
TABLET SUBLINGUAL
Qty: 25 TABLET | Refills: 3 | Status: SHIPPED | OUTPATIENT
Start: 2019-09-19 | End: 2020-07-15 | Stop reason: SDUPTHER

## 2019-09-19 RX ORDER — NITROGLYCERIN 0.4 MG/1
TABLET SUBLINGUAL
Qty: 25 TABLET | Refills: 3 | Status: CANCELLED | OUTPATIENT
Start: 2019-09-19

## 2019-09-24 RX ORDER — ONDANSETRON 4 MG/1
TABLET, ORALLY DISINTEGRATING ORAL
Qty: 10 TABLET | Refills: 2 | OUTPATIENT
Start: 2019-09-24

## 2019-09-27 ENCOUNTER — TELEPHONE (OUTPATIENT)
Dept: CARDIOLOGY CLINIC | Age: 64
End: 2019-09-27

## 2019-09-27 NOTE — TELEPHONE ENCOUNTER
[address] Fax [phone] ph. [phone] requesting a letter explaining why pt takes Atenolol, Ranexa, Viagra and Lasix and that they are safe to take with all his medications.     Fax Attn: Dr. Gary Pineda Date Due    Hepatitis C screen  1955    Pneumococcal 0-64 years Vaccine (1 of 1 - PPSV23) 05/13/1961    HIV screen  05/13/1970    DTaP/Tdap/Td vaccine (1 - Tdap) 05/13/1974    Shingles Vaccine (1 of 2) 05/13/2005    Flu vaccine (1) 09/01/2019    Lipid screen  05/14/2020    Potassium monitoring  05/31/2020    Creatinine monitoring  05/31/2020    Diabetes screen  05/14/2022    Colon cancer screen colonoscopy  11/27/2027             (applicable per patient's age: Cancer Screenings, Depression Screening, Fall Risk Screening, Immunizations)    Hemoglobin A1C (%)   Date Value   05/14/2019 5.1     LDL Cholesterol (mg/dL)   Date Value   05/14/2019 54     AST (U/L)   Date Value   05/31/2019 15     ALT (U/L)   Date Value   05/31/2019 15     BUN (mg/dL)   Date Value   05/31/2019 20      (goal A1C is < 7)   (goal LDL is <100) need 30-50% reduction from baseline     BP Readings from Last 3 Encounters:   06/01/19 (!) 127/94   05/13/19 120/67   05/12/19 122/64    (goal /80)      All Future Testing planned in [facility]:  Lab Frequency Next Occurrence   NM MYOCARDIAL SPECT REST EXERCISE OR RX Once 10/31/2019   Stress test, lexiscan Once 12/24/2018   Comprehensive Metabolic Panel Once 26/19/0958   Lipid Panel Once 12/17/2019   Magnesium Once 12/17/2019   EKG 12 Lead Once 12/17/2019   TSH with Reflex Once 12/17/2019   CBC Auto Differential Once 12/17/2019   Vitamin D 25 Hydroxy Once 12/17/2019       Next Visit Date:  Future Appointments   Date Time Provider April Cutler   12/9/2019  9:00 AM MD Aleida Smith [facility]            Patient Active Problem List:     ASHD (arteriosclerotic heart disease)     PTSD (post-traumatic stress disorder)     Precordial pain History of coronary artery bypass surgery     Obstructive apnea     Hypertension     Hyperlipidemia     Chronic obstructive pulmonary disease (HCC)     Vitamin D deficiency disease     Unstable angina (HCC)     Post traumatic stress disorder     CHF (congestive heart failure), NYHA class II, chronic, diastolic (HCC)     Bilateral lower leg cellulitis     Homicidal ideation

## 2020-04-28 RX ORDER — RANOLAZINE 500 MG/1
1000 TABLET, FILM COATED, EXTENDED RELEASE ORAL 2 TIMES DAILY
Qty: 60 TABLET | Refills: 1 | Status: SHIPPED | OUTPATIENT
Start: 2020-04-28 | End: 2020-07-15

## 2020-04-28 RX ORDER — ALBUTEROL SULFATE 2.5 MG/3ML
2.5 SOLUTION RESPIRATORY (INHALATION) EVERY 4 HOURS PRN
Qty: 120 EACH | Refills: 5 | Status: SHIPPED | OUTPATIENT
Start: 2020-04-28 | End: 2021-04-30

## 2020-06-18 ENCOUNTER — TELEPHONE (OUTPATIENT)
Dept: FAMILY MEDICINE CLINIC | Age: 65
End: 2020-06-18

## 2020-06-18 RX ORDER — NEBULIZER ACCESSORIES
1 KIT MISCELLANEOUS DAILY PRN
Qty: 1 KIT | Refills: 1 | Status: SHIPPED | OUTPATIENT
Start: 2020-06-18 | End: 2020-07-15 | Stop reason: SDUPTHER

## 2020-06-25 ENCOUNTER — TELEPHONE (OUTPATIENT)
Dept: FAMILY MEDICINE CLINIC | Age: 65
End: 2020-06-25

## 2020-06-26 NOTE — TELEPHONE ENCOUNTER
For durable medical equipment he needs a face-to-face encounter
Have patient schedule an appointment for f2f
Not seen in this practice since 3/12/19
Unstable angina (HCC)     Post traumatic stress disorder     CHF (congestive heart failure), NYHA class II, chronic, diastolic (HCC)     Bilateral lower leg cellulitis     Homicidal ideation

## 2020-07-15 ENCOUNTER — OFFICE VISIT (OUTPATIENT)
Dept: FAMILY MEDICINE CLINIC | Age: 65
End: 2020-07-15
Payer: OTHER GOVERNMENT

## 2020-07-15 VITALS
DIASTOLIC BLOOD PRESSURE: 84 MMHG | BODY MASS INDEX: 34.07 KG/M2 | WEIGHT: 230 LBS | SYSTOLIC BLOOD PRESSURE: 136 MMHG | OXYGEN SATURATION: 99 % | HEIGHT: 69 IN | HEART RATE: 62 BPM

## 2020-07-15 PROCEDURE — 99214 OFFICE O/P EST MOD 30 MIN: CPT | Performed by: FAMILY MEDICINE

## 2020-07-15 PROCEDURE — 3017F COLORECTAL CA SCREEN DOC REV: CPT | Performed by: FAMILY MEDICINE

## 2020-07-15 PROCEDURE — 1123F ACP DISCUSS/DSCN MKR DOCD: CPT | Performed by: FAMILY MEDICINE

## 2020-07-15 PROCEDURE — G8926 SPIRO NO PERF OR DOC: HCPCS | Performed by: FAMILY MEDICINE

## 2020-07-15 PROCEDURE — 1036F TOBACCO NON-USER: CPT | Performed by: FAMILY MEDICINE

## 2020-07-15 PROCEDURE — 3023F SPIROM DOC REV: CPT | Performed by: FAMILY MEDICINE

## 2020-07-15 PROCEDURE — 4040F PNEUMOC VAC/ADMIN/RCVD: CPT | Performed by: FAMILY MEDICINE

## 2020-07-15 PROCEDURE — G8419 CALC BMI OUT NRM PARAM NOF/U: HCPCS | Performed by: FAMILY MEDICINE

## 2020-07-15 PROCEDURE — G8427 DOCREV CUR MEDS BY ELIG CLIN: HCPCS | Performed by: FAMILY MEDICINE

## 2020-07-15 RX ORDER — NITROGLYCERIN 0.4 MG/1
TABLET SUBLINGUAL
Qty: 25 TABLET | Refills: 3 | Status: SHIPPED | OUTPATIENT
Start: 2020-07-15

## 2020-07-15 RX ORDER — RANOLAZINE 500 MG/1
1000 TABLET, FILM COATED, EXTENDED RELEASE ORAL 2 TIMES DAILY
Qty: 60 TABLET | Refills: 5 | Status: SHIPPED | OUTPATIENT
Start: 2020-07-15 | End: 2020-11-16 | Stop reason: SDUPTHER

## 2020-07-15 RX ORDER — NEBULIZER ACCESSORIES
1 KIT MISCELLANEOUS EVERY 4 HOURS PRN
Qty: 1 KIT | Refills: 0 | Status: SHIPPED | OUTPATIENT
Start: 2020-07-15 | End: 2021-06-01

## 2020-07-15 ASSESSMENT — ENCOUNTER SYMPTOMS: GASTROINTESTINAL NEGATIVE: 1

## 2020-07-15 ASSESSMENT — PATIENT HEALTH QUESTIONNAIRE - PHQ9
2. FEELING DOWN, DEPRESSED OR HOPELESS: 0
SUM OF ALL RESPONSES TO PHQ QUESTIONS 1-9: 0
SUM OF ALL RESPONSES TO PHQ9 QUESTIONS 1 & 2: 0
SUM OF ALL RESPONSES TO PHQ QUESTIONS 1-9: 0
1. LITTLE INTEREST OR PLEASURE IN DOING THINGS: 0

## 2020-07-15 NOTE — PROGRESS NOTES
Name: Mayra South  : 1955         Chief Complaint:     Chief Complaint   Patient presents with    Hypertension    Hyperlipidemia    COPD       History of Present Illness:      Mayra South is a 72 y.o.  male who presents with Hypertension; Hyperlipidemia; and COPD      HPI     Patient last seen 2019, moved to [place], initially because of the need for psychiatric hospitalization through the [facility] there, moved back to this area in January and is here to reestablish care for CAD. He reports he has been off ranexa since approx  d/t VA coverage, needs to see dr again in [facility]. Notices a difference, more chest pain and can't exercise as much. imdur causes hypotension even on / pill so he can't take it. States bp runs low because he was a runner prior to knees giving out. COPD stable, does require albuterol more in warm weather. Albuterol does help with dyspnea on exertion but only when given by nebulizer. The inhaler does not tend to help him. Does not have much cough. Former smoker. Nebulizer machine is over 8years old and was given to him by a friend. PTSD, had had extensive difficulty several months ago, again required hospitalization. He says this is because the [place] \"screwed up his meds. \"  Currently taking Remeron 30 mg in the morning and 15 mg at night and this seems to be a good regimen for him. Several months ago he was also on Zoloft but was able to wean off of it. Chronic neck pain, pain and spasms in bilateral hands. Was referred to neurosurgery 2 years ago but was unable to go to appointment due to transportation. Again he does continue to have trouble and is requesting repeat referral.  Walks with a walker.     Past Medical History:     Past Medical History:   Diagnosis Date    Arthritis     WENDY KNEE    Bronchitis with chronic airway obstruction (HCC)     CAD (coronary artery disease)     CHF (congestive heart failure) (HCC)     CTS (carpal tunnel syndrome)     RIGHT    Diabetes mellitus ([address]     Diabetic neuropathy associated with diabetes mellitus due to underlying condition ([address]     Exposure to toxic chemical     [employer] base-toxic water exposure    GERD (gastroesophageal reflux disease)     H/O cardiovascular stress test 07/08/2016    Abnormal.  Moderate perfusion defect of mild to moderate intensity in the inferolateral,inferior and inferoapical regions during stress imaging. Ef 45%. with regional wall motion abnormalities.  H/O echocardiogram 2/17/16    EF >60%. LV wall thickness is mildly increased. Inferoseptal wall is abnormal in its motion not unusual status post open heart surgery. Normal aortic valve structure with ild aortic regurgitation.  Hip pain, left     Hx of cardiac cath 07/08/2016    LMCA: Normal 0% stenosis. LAD: Chronic occlusion 100%. Lesion on Mid LAD: 100% stenosis. LCx: single stenosis. Lesion on Mid CX: 80% stenosis. RCA: Lesion on Mid RCA: 70% stenosis.     Hyperlipidemia     Hypertension     MI (myocardial infarction) ([address] 2015    Pneumonia     PTSD (post-traumatic stress disorder)     S/P CABG (coronary artery bypass graft) 10/23/15    Sleep apnea     Ventricular fibrillation ([address] 10/18/2015    x 2 DURING HEART ATTACK        Past Surgical History:     Past Surgical History:   Procedure Laterality Date    CARDIAC SURGERY      10/21/2015 3 vessel CABG    CARPAL TUNNEL RELEASE      CARPAL TUNNEL RELEASE Right     [facility]  2012    [facility]    COLONOSCOPY  11/27/2017    CORONARY ARTERY BYPASS GRAFT  10/23/15    Dr. Roland Mota      left middle finger    FRACTURE SURGERY      left wrist    JOINT REPLACEMENT      right knee    PILONIDAL CYST EXCISION      1974    PILONIDAL CYST EXCISION      HI COLONOSCOPY W/BIOPSY SINGLE/MULTIPLE N/A 11/27/2017    COLONOSCOPY WITH BIOPSY/ polypectomy performed by Ariadna Castillo MD at [address] [facility] EGD TRANSORAL BIOPSY SINGLE/MULTIPLE N/A 11/27/2017    EGD BIOPSY performed by Ariadna Castillo MD at [address]      left wrist        Medications:       Prior to Admission medications    Medication Sig Start Date End Date Taking? Authorizing Provider   Respiratory Therapy Supplies (NEBULIZER/TUBING/MOUTHPIECE) KIT 1 kit by Does not apply route every 4 hours as needed (albuterol for COPD) 7/15/20  Yes Essence Guillen DO   RANEXA 500 MG extended release tablet Take 2 tablets by mouth 2 times daily 7/15/20  Yes Essence Guillen DO   nitroGLYCERIN (NITROSTAT) 0.4 MG SL tablet up to max of 3 total doses.  If no relief after 1 dose, call 911. 7/15/20  Yes Essence Guillen DO   albuterol (PROVENTIL) (2.5 MG/3ML) 0.083% nebulizer solution Take 3 mLs by nebulization every 4 hours as needed for Wheezing or Shortness of Breath 4/28/20  Yes Essence Guillen DO   atenolol (TENORMIN) 25 MG tablet Take 1 tablet by mouth daily 9/19/19  Yes Randon Rinne, MD   albuterol sulfate  (90 Base) MCG/ACT inhaler inhale 2 puffs by mouth every 4 hours if needed for wheezing or shortness of breath 8/12/19  Yes Essence Guillen DO   mirtazapine (REMERON) 15 MG tablet Take 1 tablet by mouth nightly 5/31/19  Yes DIANNA Grant CNP   mirtazapine (REMERON) 30 MG tablet Take 1 tablet by mouth daily 5/31/19  Yes DIANNA Grant CNP   traZODone (DESYREL) 150 MG tablet Take 1 tablet by mouth nightly as needed for Sleep 5/31/19  Yes DIANNA Grant CNP   melatonin 3 MG TABS tablet Take 3 mg by mouth nightly as needed   Yes Historical Provider, MD   polyethylene glycol (GLYCOLAX) powder Take 17 g by mouth daily   Yes Historical Provider, MD   spironolactone (ALDACTONE) 25 MG tablet Take 25 mg by mouth daily   Yes Historical Provider, MD   tiotropium (SPIRIVA RESPIMAT) 2.5 MCG/ACT AERS inhaler Inhale 2 puffs into the lungs daily 3/11/19  Yes Homar Flaco, DO   pantoprazole (PROTONIX) 40 MG tablet Take 1 tablet by mouth every morning (before breakfast) 3/6/19  Yes Homar Flaco, DO   atorvastatin (LIPITOR) 20 MG tablet TAKE 1 TABLET BY MOUTH EVERY DAY 12/6/18  Yes Homar Flaco, DO   Cholecalciferol (VITAMIN D) 2000 UNITS CAPS capsule Take 1 capsule by mouth daily   Yes Historical Provider, MD   aspirin 81 MG tablet Take 81 mg by mouth daily   Yes Historical Provider, MD   fluticasone (FLONASE) 50 MCG/ACT nasal spray 1 spray by Nasal route daily    Yes Historical Provider, MD        Allergies:       Codeine; Hydrocodone; Pcn [penicillins]; and Sulfa antibiotics    Social History:     Tobacco:    reports that he quit smoking about 13 years ago. His smoking use included cigarettes. He has a 19.00 pack-year smoking history. He quit smokeless tobacco use about 19 years ago. Alcohol:      reports no history of alcohol use. Drug Use:  reports current drug use. Frequency: 1.00 time per week. Drug: Marijuana. Family History:     Family History   Adopted: Yes   Family history unknown: Yes       Review of Systems:     Positive and Negative as described in HPI    Review of Systems   Constitutional: Negative. Gastrointestinal: Negative. Genitourinary: Negative. Physical Exam:     Vitals:  /84   Pulse 62   Ht 5' 9\" (1.753 m)   Wt 230 lb (104.3 kg)   SpO2 99%   BMI 33.97 kg/m²   Physical Exam  Vitals signs and nursing note reviewed. Constitutional:       General: He is not in acute distress. Appearance: Normal appearance. He is well-developed. He is not ill-appearing. Eyes:      Conjunctiva/sclera: Conjunctivae normal.   Cardiovascular:      Rate and Rhythm: Normal rate and regular rhythm. Heart sounds: Normal heart sounds. Comments: No peripheral edema. Pulmonary:      Effort: Pulmonary effort is normal.      Breath sounds: Normal breath sounds.    Musculoskeletal:      Comments: Bilateral hands 5/5 strength and no muscle atrophy. Does have flexion contracture of left fourth and fifth PIP and flexible flexion deformity of right fifth PIP. Skin:     General: Skin is warm and dry. Neurological:      Mental Status: He is oriented to person, place, and time. Psychiatric:         Mood and Affect: Mood normal.         Behavior: Behavior normal.         Judgment: Judgment normal.         Data:     Lab Results   Component Value Date     05/31/2019    K 4.5 05/31/2019    CL 95 05/31/2019    CO2 20 05/31/2019    BUN 20 05/31/2019    CREATININE 0.98 05/31/2019    GLUCOSE 117 05/31/2019    GLUCOSE 258 01/31/2012    PROT 6.9 05/31/2019    LABALBU 4.5 05/31/2019    LABALBU 4.3 09/08/2011    BILITOT 0.34 05/31/2019    ALKPHOS 88 05/31/2019    AST 15 05/31/2019    ALT 15 05/31/2019     Lab Results   Component Value Date    WBC 7.6 05/31/2019    RBC 4.44 05/31/2019    RBC 5.49 01/31/2012    HGB 14.0 05/31/2019    HCT 41.3 05/31/2019    MCV 93.0 05/31/2019    MCH 31.6 05/31/2019    MCHC 34.0 05/31/2019    RDW 14.2 05/31/2019     05/31/2019     01/31/2012    MPV NOT REPORTED 05/31/2019     Lab Results   Component Value Date    TSH 1.92 05/14/2019     Lab Results   Component Value Date    CHOL 127 05/14/2019    HDL 48 05/14/2019    PSA 0.52 07/05/2017    LABA1C 5.1 05/14/2019     5/10/18 mri c spine  1. There is multilevel spinal canal stenosis at the C3-C4 through C7-T1 level    secondary to a combination of congenital/developmental spinal canal stenosis    from short pedicles and discogenic disease at these levels. This spinal canal    stenosis appears most severe at the C6-C7 level where there is a left    paracentral disc-osteophyte complex which causes severe flattening of the left    side of the spinal cord.         2.  There is multilevel foraminal narrowing at the C4-C5 through C6-C7 level    secondary to uncovertebral arthropathy.         3. Mild levoconvex scoliosis of the cervicothoracic junction with [facility]    Clerical Team:  Rodriguez Sharif received counseling on the following healthy behaviors: medication adherence  Reviewed prior labs and health maintenance. Continue current medications, diet and exercise. Discussed use, benefit, and side effects of prescribed medications. Barriers to medication compliance addressed. Patient given educational materials - see patient instructions. All patient questions answered. Patient voiced understanding.      Electronically signed by Blane Sandoval DO on 7/15/2020 at 4:06 PM   [address]  Dept: [phone]

## 2020-07-15 NOTE — PATIENT INSTRUCTIONS
SURVEY:    You may be receiving a survey from [employer] regarding your visit today. You may get this in the mail, through your MyChart or in your email. Please complete the survey to enable us to provide the highest quality of care to you and your family. If you cannot score us as very good ( 5 Stars) on any question, please feel free to call the office to discuss how we could have made your experience exceptional.     Thank you.     Clinical Care Team:  Dr. Yamila Severino, DO Jose Hathaway, [address] [place] [address] Team:  [address] [place] [address]

## 2020-08-14 ENCOUNTER — TELEPHONE (OUTPATIENT)
Dept: FAMILY MEDICINE CLINIC | Age: 65
End: 2020-08-14

## 2020-08-14 PROBLEM — L03.116 BILATERAL LOWER LEG CELLULITIS: Status: RESOLVED | Noted: 2019-05-11 | Resolved: 2020-08-14

## 2020-08-14 PROBLEM — I20.0 UNSTABLE ANGINA (HCC): Status: RESOLVED | Noted: 2019-02-23 | Resolved: 2020-08-14

## 2020-08-14 PROBLEM — R45.850 HOMICIDAL IDEATION: Status: RESOLVED | Noted: 2019-05-13 | Resolved: 2020-08-14

## 2020-08-14 PROBLEM — L03.115 BILATERAL LOWER LEG CELLULITIS: Status: RESOLVED | Noted: 2019-05-11 | Resolved: 2020-08-14

## 2020-08-14 PROBLEM — R07.2 PRECORDIAL PAIN: Status: RESOLVED | Noted: 2017-04-04 | Resolved: 2020-08-14

## 2020-08-14 RX ORDER — NEBULIZER ACCESSORIES
KIT MISCELLANEOUS
Qty: 1 DEVICE | Refills: 5 | Status: SHIPPED | OUTPATIENT
Start: 2020-08-14 | End: 2020-08-14 | Stop reason: SDUPTHER

## 2020-08-14 RX ORDER — NEBULIZER ACCESSORIES
KIT MISCELLANEOUS
Qty: 1 DEVICE | Refills: 5 | Status: SHIPPED | OUTPATIENT
Start: 2020-08-14 | End: 2021-06-01

## 2020-08-14 NOTE — TELEPHONE ENCOUNTER
Patient called Neurosurgeon back to schedule - they want Dr Blaine Green to order a new MRI (last done 2018) - patient also asking for a mask for his nebulizer to be sent to [facility] Maintenance   Topic Date Due    Hepatitis C screen  1955    HIV screen  05/13/1970    DTaP/Tdap/Td vaccine (1 - Tdap) 05/13/1974    Shingles Vaccine (1 of 2) 05/13/2005    Pneumococcal 65+ years Vaccine (1 of 1 - PPSV23) 05/13/2020    Lipid screen  05/14/2020    Potassium monitoring  05/31/2020    Creatinine monitoring  05/31/2020    Flu vaccine (1) 09/01/2020    Diabetes screen  05/14/2022    Colon cancer screen colonoscopy  11/27/2027    AAA screen  Completed    Hepatitis A vaccine  Aged Out    Hepatitis B vaccine  Aged Out    Hib vaccine  Aged Out    Meningococcal (ACWY) vaccine  Aged Out             (applicable per patient's age: Cancer Screenings, Depression Screening, Fall Risk Screening, Immunizations)    Hemoglobin A1C (%)   Date Value   05/14/2019 5.1     LDL Cholesterol (mg/dL)   Date Value   05/14/2019 54     AST (U/L)   Date Value   05/31/2019 15     ALT (U/L)   Date Value   05/31/2019 15     BUN (mg/dL)   Date Value   05/31/2019 20      (goal A1C is < 7)   (goal LDL is <100) need 30-50% reduction from baseline     BP Readings from Last 3 Encounters:   07/15/20 136/84   06/01/19 (!) 127/94   05/13/19 120/67    (goal /80)      All Future Testing planned in [facility]:  Lab Frequency Next Occurrence       Next Visit Date:  No future appointments.          Patient Active Problem List:     ASHD (arteriosclerotic heart disease)     PTSD (post-traumatic stress disorder)     Precordial pain     History of coronary artery bypass surgery     Obstructive apnea     Hypertension     Hyperlipidemia     Chronic obstructive pulmonary disease (HCC)     Vitamin D deficiency disease     Unstable angina (HCC)     Post traumatic stress disorder     CHF (congestive heart failure), NYHA class II, chronic, diastolic ([facility]     Bilateral lower leg cellulitis     Homicidal ideation

## 2020-08-21 ENCOUNTER — HOSPITAL ENCOUNTER (OUTPATIENT)
Dept: MRI IMAGING | Age: 65
Discharge: HOME | End: 2020-08-23
Payer: COMMERCIAL

## 2020-08-21 PROCEDURE — 72141 MRI NECK SPINE W/O DYE: CPT

## 2020-10-22 ENCOUNTER — OFFICE VISIT (OUTPATIENT)
Dept: UROLOGY | Age: 65
End: 2020-10-22
Payer: COMMERCIAL

## 2020-10-22 VITALS
SYSTOLIC BLOOD PRESSURE: 150 MMHG | DIASTOLIC BLOOD PRESSURE: 80 MMHG | BODY MASS INDEX: 34.36 KG/M2 | WEIGHT: 232 LBS | HEIGHT: 69 IN

## 2020-10-22 PROCEDURE — G8417 CALC BMI ABV UP PARAM F/U: HCPCS | Performed by: PHYSICIAN ASSISTANT

## 2020-10-22 PROCEDURE — G8427 DOCREV CUR MEDS BY ELIG CLIN: HCPCS | Performed by: PHYSICIAN ASSISTANT

## 2020-10-22 PROCEDURE — 3017F COLORECTAL CA SCREEN DOC REV: CPT | Performed by: PHYSICIAN ASSISTANT

## 2020-10-22 PROCEDURE — 1036F TOBACCO NON-USER: CPT | Performed by: PHYSICIAN ASSISTANT

## 2020-10-22 PROCEDURE — 99204 OFFICE O/P NEW MOD 45 MIN: CPT | Performed by: PHYSICIAN ASSISTANT

## 2020-10-22 PROCEDURE — 1123F ACP DISCUSS/DSCN MKR DOCD: CPT | Performed by: PHYSICIAN ASSISTANT

## 2020-10-22 PROCEDURE — 4040F PNEUMOC VAC/ADMIN/RCVD: CPT | Performed by: PHYSICIAN ASSISTANT

## 2020-10-22 PROCEDURE — G8484 FLU IMMUNIZE NO ADMIN: HCPCS | Performed by: PHYSICIAN ASSISTANT

## 2020-10-22 RX ORDER — FLUCONAZOLE 100 MG/1
100 TABLET ORAL DAILY
Qty: 7 TABLET | Refills: 0 | Status: SHIPPED | OUTPATIENT
Start: 2020-10-22 | End: 2020-10-29

## 2020-10-22 RX ORDER — SENNOSIDES 8.6 MG
TABLET ORAL
COMMUNITY
Start: 2020-10-19 | End: 2021-06-01

## 2020-10-22 RX ORDER — DOXAZOSIN MESYLATE 1 MG/1
1 TABLET ORAL NIGHTLY
COMMUNITY
End: 2020-10-22

## 2020-10-22 RX ORDER — OXYCODONE HYDROCHLORIDE AND ACETAMINOPHEN 5; 325 MG/1; MG/1
1-2 TABLET ORAL EVERY 4 HOURS PRN
COMMUNITY
Start: 2020-10-19 | End: 2020-10-26

## 2020-10-22 RX ORDER — TERAZOSIN 2 MG/1
2 CAPSULE ORAL NIGHTLY
Qty: 30 CAPSULE | Refills: 11 | Status: SHIPPED | OUTPATIENT
Start: 2020-10-22 | End: 2021-08-19 | Stop reason: SDUPTHER

## 2020-10-22 ASSESSMENT — ENCOUNTER SYMPTOMS
CONSTIPATION: 1
EYE REDNESS: 0
EYE PAIN: 0
NAUSEA: 0
ABDOMINAL PAIN: 0
SHORTNESS OF BREATH: 0
VOMITING: 0
WHEEZING: 0
BACK PAIN: 0
COUGH: 0
COLOR CHANGE: 0

## 2020-10-22 NOTE — PATIENT INSTRUCTIONS
FOR CONSTIPATION    It is very important to have regular, soft, daily bowel movements, because it WILL improve your urinary symptoms. Take Miralax (or generic equivalent) 17g once daily (one capful). Take every day, DO NOT skip days, as it must be taken daily in order to be effective. DO NOT take just \"as needed\". It is safe to take long term and is recommended for your symptoms. If one dose daily causes loose stools/diarrhea, decrease to 1/2 capful or 1/4 capful. If you cannot tolerate this medication, please notify our office. If one dose daily of Miralax is not sufficient to produce a soft, easy to pass daily stool, you may also add an over-the-counter stool softener capsule. For example: colace (docusate). For Miralax to have maximal effectiveness, be sure to increase your water intake - aim for 80 oz daily unless you are on a fluid-restriction from another provider.

## 2020-10-29 ENCOUNTER — TELEPHONE (OUTPATIENT)
Dept: UROLOGY | Age: 65
End: 2020-10-29

## 2020-10-29 ENCOUNTER — OFFICE VISIT (OUTPATIENT)
Dept: UROLOGY | Age: 65
End: 2020-10-29
Payer: COMMERCIAL

## 2020-10-29 VITALS — SYSTOLIC BLOOD PRESSURE: 130 MMHG | BODY MASS INDEX: 33.37 KG/M2 | DIASTOLIC BLOOD PRESSURE: 70 MMHG | WEIGHT: 226 LBS

## 2020-10-29 PROCEDURE — 3017F COLORECTAL CA SCREEN DOC REV: CPT | Performed by: PHYSICIAN ASSISTANT

## 2020-10-29 PROCEDURE — G8427 DOCREV CUR MEDS BY ELIG CLIN: HCPCS | Performed by: PHYSICIAN ASSISTANT

## 2020-10-29 PROCEDURE — 1123F ACP DISCUSS/DSCN MKR DOCD: CPT | Performed by: PHYSICIAN ASSISTANT

## 2020-10-29 PROCEDURE — 99214 OFFICE O/P EST MOD 30 MIN: CPT | Performed by: PHYSICIAN ASSISTANT

## 2020-10-29 PROCEDURE — G8417 CALC BMI ABV UP PARAM F/U: HCPCS | Performed by: PHYSICIAN ASSISTANT

## 2020-10-29 PROCEDURE — 4040F PNEUMOC VAC/ADMIN/RCVD: CPT | Performed by: PHYSICIAN ASSISTANT

## 2020-10-29 PROCEDURE — 1036F TOBACCO NON-USER: CPT | Performed by: PHYSICIAN ASSISTANT

## 2020-10-29 PROCEDURE — G8484 FLU IMMUNIZE NO ADMIN: HCPCS | Performed by: PHYSICIAN ASSISTANT

## 2020-10-29 ASSESSMENT — ENCOUNTER SYMPTOMS
CONSTIPATION: 0
WHEEZING: 0
NAUSEA: 0
COLOR CHANGE: 0
SHORTNESS OF BREATH: 0
ABDOMINAL PAIN: 0
EYE REDNESS: 0
BACK PAIN: 0
VOMITING: 0
EYE PAIN: 0
COUGH: 0

## 2020-10-29 NOTE — PROGRESS NOTES
Pt had rodriguez catheter for urinary retention. Balloon deflated on catheter and catheter removed. Patient tolerated procedure well. Pt instructed to drink plenty of fluids, try to urinate q 2 hrs, call office in 6 hrs with update of amount voided, and if not voiding will need to return to office to have rodriguez replaced. Also instructed to return to office or ER if goes >6 hrs without voiding or has strong urge to void/suprapubic discomfort and cannot urinate. Pt verbalizes understanding of plan. F/u 2 weeks.

## 2020-10-29 NOTE — TELEPHONE ENCOUNTER
Patient called and said he is urinating okay. I told  him to call if  he has trouble. I told him if he can't urinate after hours then he would need to go to the ER.

## 2020-10-29 NOTE — PROGRESS NOTES
HPI:      Patient is a 72 y.o. male in no acute distress. He is alert and oriented to person, place, and time. History:  He is status post C3-C7 laminectomy on 10/12/2020. Patient was on terazosin 2mg for BPH in the past but was not given it at time of surgery. Patient states that he has had reaction to Flomax in the past but is able to tolerate Terazosin. Patient did have acute urinary retention with a bladder scan of over 800 mL. Therefore Russell catheter was placed. Patient also was noted to have significant constipation. He was discharged from the hospital on 10/19/2020 on Cardura 1 mg. He does have itching and irritation on the tip of his penis with some white discharge around it as well. He has never been seen by urology before. He has never had issues with urinary retention in the past.     Today:  Patient returns today for urinary retention, BPH and yeast dermatitis of his penis. Patient has resumed his Terazosin. Patient does state that his yeast dermatitis has improved but it is not completely resolved. Patient is having a daily bowel movement. He is drinking plenty of water. He is avoiding bladder irritants. He denies any fever, chills, hematuria. Does have some leaking around his catheter. He states that he cannot wait for the catheter to be removed.     Past Medical History:   Diagnosis Date    Arthritis     WENDY KNEE    Bronchitis with chronic airway obstruction (HCC)     CAD (coronary artery disease)     CHF (congestive heart failure) (HCC)     CTS (carpal tunnel syndrome)     RIGHT    Diabetes mellitus ([facility]     Diabetic neuropathy associated with diabetes mellitus due to underlying condition ([facility]     Exposure to toxic chemical     [employer] base-toxic water exposure    GERD (gastroesophageal reflux disease)     H/O cardiovascular stress test 07/08/2016    Abnormal.  Moderate perfusion defect of mild to moderate intensity in the inferolateral,inferior and inferoapical regions during stress imaging. Ef 45%. with regional wall motion abnormalities.  H/O echocardiogram 2/17/16    EF >60%. LV wall thickness is mildly increased. Inferoseptal wall is abnormal in its motion not unusual status post open heart surgery. Normal aortic valve structure with ild aortic regurgitation.  Hip pain, left     Hx of cardiac cath 07/08/2016    LMCA: Normal 0% stenosis. LAD: Chronic occlusion 100%. Lesion on Mid LAD: 100% stenosis. LCx: single stenosis. Lesion on Mid CX: 80% stenosis. RCA: Lesion on Mid RCA: 70% stenosis.  Hyperlipidemia     Hypertension     MI (myocardial infarction) ([facility] 2015    Pneumonia     PTSD (post-traumatic stress disorder)     S/P CABG (coronary artery bypass graft) 10/23/15    Sleep apnea     Ventricular fibrillation ([facility] 10/18/2015    x 2 DURING HEART ATTACK     Past Surgical History:   Procedure Laterality Date    CARDIAC SURGERY      10/21/2015 3 vessel CABG    CARPAL TUNNEL RELEASE      CARPAL TUNNEL RELEASE Right     [facility]    COLONOSCOPY  11/27/2017    CORONARY ARTERY BYPASS GRAFT  10/23/15    Dr. Chica Kraus      left middle finger    FRACTURE SURGERY      left wrist    JOINT REPLACEMENT      right knee    PILONIDAL CYST EXCISION      1974    PILONIDAL CYST EXCISION      CA COLONOSCOPY W/BIOPSY SINGLE/MULTIPLE N/A 11/27/2017    COLONOSCOPY WITH BIOPSY/ polypectomy performed by Narendra Talamantes MD at [address] [facility] EGD TRANSORAL BIOPSY SINGLE/MULTIPLE N/A 11/27/2017    EGD BIOPSY performed by Narendra Talamantes MD at [address] [facility]      left wrist     Outpatient Encounter Medications as of 10/29/2020   Medication Sig Dispense Refill    miconazole (MICOTIN) 2 % cream Apply topically 2 times daily to penis X 14 days .  1 Tube 0    senna (SENOKOT) 8.6 MG TABS tablet TAKE daily      fluticasone (FLONASE) 50 MCG/ACT nasal spray 1 spray by Nasal route daily        No facility-administered encounter medications on file as of 10/29/2020. Current Outpatient Medications on File Prior to Visit   Medication Sig Dispense Refill    senna (SENOKOT) 8.6 MG TABS tablet TAKE 1 TABLET BY MOUTH NIGHTLY      terazosin (HYTRIN) 2 MG capsule Take 1 capsule by mouth nightly 30 capsule 11    fluconazole (DIFLUCAN) 100 MG tablet Take 1 tablet by mouth daily for 7 days 7 tablet 0    Respiratory Therapy Supplies (ADULT MASK) MARKUS Use with nebulizer 1 Device 5    Respiratory Therapy Supplies (NEBULIZER/TUBING/MOUTHPIECE) KIT 1 kit by Does not apply route every 4 hours as needed (albuterol for COPD) 1 kit 0    RANEXA 500 MG extended release tablet Take 2 tablets by mouth 2 times daily 60 tablet 5    nitroGLYCERIN (NITROSTAT) 0.4 MG SL tablet up to max of 3 total doses.  If no relief after 1 dose, call 911. 25 tablet 3    albuterol (PROVENTIL) (2.5 MG/3ML) 0.083% nebulizer solution Take 3 mLs by nebulization every 4 hours as needed for Wheezing or Shortness of Breath 120 each 5    atenolol (TENORMIN) 25 MG tablet Take 1 tablet by mouth daily 90 tablet 1    albuterol sulfate  (90 Base) MCG/ACT inhaler inhale 2 puffs by mouth every 4 hours if needed for wheezing or shortness of breath 18 g 2    mirtazapine (REMERON) 15 MG tablet Take 1 tablet by mouth nightly 30 tablet 0    mirtazapine (REMERON) 30 MG tablet Take 1 tablet by mouth daily 30 tablet 0    traZODone (DESYREL) 150 MG tablet Take 1 tablet by mouth nightly as needed for Sleep 30 tablet 0    melatonin 3 MG TABS tablet Take 3 mg by mouth nightly as needed      polyethylene glycol (GLYCOLAX) powder Take 17 g by mouth daily      spironolactone (ALDACTONE) 25 MG tablet Take 25 mg by mouth daily      tiotropium (SPIRIVA RESPIMAT) 2.5 MCG/ACT AERS inhaler Inhale 2 puffs into the lungs daily 1 Inhaler 5    pantoprazole (PROTONIX) Patient in no acute distress; Neuro: alert and oriented to person place and time. Neck: Brace in place  Psych: Mood and affect normal.  Skin: Normal  Lungs: Respiratory effort normal  Cardiovascular:  Normal peripheral pulses  Abdomen: Soft, non-tender, non-distended with no CVA, flank pain, hepatosplenomegaly or hernia. Kidneys normal.  Bladder non-tender and not distended. Lymphatics: no palpable lymphadenopathy  Russell catheter in place  Yeast dermatitis of tip of penis  Groin no presence of yeast      Lab Results   Component Value Date    BUN 20 05/31/2019     Lab Results   Component Value Date    CREATININE 0.98 05/31/2019     Lab Results   Component Value Date    PSA 0.52 07/05/2017       ASSESSMENT:   Diagnosis Orders   1. Yeast dermatitis of penis     2. Urinary retention     3. BPH with obstruction/lower urinary tract symptoms           PLAN:  miconazole to penis bid X 14 days    remove Russell catheter today    Continue Hytrin    · Drink plenty of fluids, aim for 80 oz daily for the next few days. · It is normal to feel a little discomfort the next few times you void. · Try to urinate every 2-3 hours while awake (even if you don't feel like you have to void). · If you DO urinate, please record the amount. · If you do NOT urinate within about 6 hours OR if you feel the strong uncomfortable urge to void but are not able - you'll need to come back to the office to get the catheter replaced. · Either way, call our office around 3:30 pm and let us know the void amounts (if you are urinating) or let us know that you're on your way back to the office (if you are not urinating).      Follow-up in 2 weeks with [facility]

## 2020-11-12 ENCOUNTER — OFFICE VISIT (OUTPATIENT)
Dept: UROLOGY | Age: 65
End: 2020-11-12
Payer: COMMERCIAL

## 2020-11-12 VITALS
WEIGHT: 226 LBS | SYSTOLIC BLOOD PRESSURE: 148 MMHG | BODY MASS INDEX: 33.47 KG/M2 | HEIGHT: 69 IN | DIASTOLIC BLOOD PRESSURE: 90 MMHG

## 2020-11-12 PROCEDURE — 51798 US URINE CAPACITY MEASURE: CPT | Performed by: PHYSICIAN ASSISTANT

## 2020-11-12 PROCEDURE — 4040F PNEUMOC VAC/ADMIN/RCVD: CPT | Performed by: PHYSICIAN ASSISTANT

## 2020-11-12 PROCEDURE — 1123F ACP DISCUSS/DSCN MKR DOCD: CPT | Performed by: PHYSICIAN ASSISTANT

## 2020-11-12 PROCEDURE — 1036F TOBACCO NON-USER: CPT | Performed by: PHYSICIAN ASSISTANT

## 2020-11-12 PROCEDURE — 99213 OFFICE O/P EST LOW 20 MIN: CPT | Performed by: PHYSICIAN ASSISTANT

## 2020-11-12 PROCEDURE — 3017F COLORECTAL CA SCREEN DOC REV: CPT | Performed by: PHYSICIAN ASSISTANT

## 2020-11-12 PROCEDURE — G8417 CALC BMI ABV UP PARAM F/U: HCPCS | Performed by: PHYSICIAN ASSISTANT

## 2020-11-12 PROCEDURE — G8427 DOCREV CUR MEDS BY ELIG CLIN: HCPCS | Performed by: PHYSICIAN ASSISTANT

## 2020-11-12 PROCEDURE — G8484 FLU IMMUNIZE NO ADMIN: HCPCS | Performed by: PHYSICIAN ASSISTANT

## 2020-11-12 ASSESSMENT — ENCOUNTER SYMPTOMS
WHEEZING: 0
COUGH: 0
BACK PAIN: 0
NAUSEA: 0
SHORTNESS OF BREATH: 0
ABDOMINAL PAIN: 0
COLOR CHANGE: 0
EYE REDNESS: 0
CONSTIPATION: 0
VOMITING: 0

## 2020-11-12 NOTE — PROGRESS NOTES
Moderate perfusion defect of mild to moderate intensity in the inferolateral,inferior and inferoapical regions during stress imaging. Ef 45%. with regional wall motion abnormalities.  H/O echocardiogram 2/17/16    EF >60%. LV wall thickness is mildly increased. Inferoseptal wall is abnormal in its motion not unusual status post open heart surgery. Normal aortic valve structure with ild aortic regurgitation.  Hip pain, left     Hx of cardiac cath 07/08/2016    LMCA: Normal 0% stenosis. LAD: Chronic occlusion 100%. Lesion on Mid LAD: 100% stenosis. LCx: single stenosis. Lesion on Mid CX: 80% stenosis. RCA: Lesion on Mid RCA: 70% stenosis.     Hyperlipidemia     Hypertension     MI (myocardial infarction) ([facility] 2015    Pneumonia     PTSD (post-traumatic stress disorder)     S/P CABG (coronary artery bypass graft) 10/23/15    Sleep apnea     Ventricular fibrillation ([facility] [address] 10/18/2015    x 2 DURING HEART ATTACK     Past Surgical History:   Procedure Laterality Date    CARDIAC SURGERY      10/21/2015 3 vessel CABG    CARPAL TUNNEL RELEASE      CARPAL TUNNEL RELEASE Right     [facility]  2012    [facility]    COLONOSCOPY  11/27/2017    CORONARY ARTERY BYPASS GRAFT  10/23/15    Dr. Jericho Back      left middle finger    FRACTURE SURGERY      left wrist    JOINT REPLACEMENT      right knee    PILONIDAL CYST EXCISION      1974    PILONIDAL CYST EXCISION      CO COLONOSCOPY W/BIOPSY SINGLE/MULTIPLE N/A 11/27/2017    COLONOSCOPY WITH BIOPSY/ polypectomy performed by Valentina Jonas MD at [address] [facility] EGD TRANSORAL BIOPSY SINGLE/MULTIPLE N/A 11/27/2017    EGD BIOPSY performed by Valentina Jonas MD at [facility]      left wrist     Outpatient Encounter Medications as of 11/12/2020   Medication Sig Dispense Refill    miconazole (MICOTIN) 2 % cream Apply topically 2 times daily to penis X 81 MG tablet Take 81 mg by mouth daily      fluticasone (FLONASE) 50 MCG/ACT nasal spray 1 spray by Nasal route daily        No facility-administered encounter medications on file as of 11/12/2020. Current Outpatient Medications on File Prior to Visit   Medication Sig Dispense Refill    miconazole (MICOTIN) 2 % cream Apply topically 2 times daily to penis X 14 days . 1 Tube 0    senna (SENOKOT) 8.6 MG TABS tablet TAKE 1 TABLET BY MOUTH NIGHTLY      terazosin (HYTRIN) 2 MG capsule Take 1 capsule by mouth nightly 30 capsule 11    Respiratory Therapy Supplies (ADULT MASK) MARKUS Use with nebulizer 1 Device 5    Respiratory Therapy Supplies (NEBULIZER/TUBING/MOUTHPIECE) KIT 1 kit by Does not apply route every 4 hours as needed (albuterol for COPD) 1 kit 0    RANEXA 500 MG extended release tablet Take 2 tablets by mouth 2 times daily 60 tablet 5    nitroGLYCERIN (NITROSTAT) 0.4 MG SL tablet up to max of 3 total doses.  If no relief after 1 dose, call 911. 25 tablet 3    albuterol (PROVENTIL) (2.5 MG/3ML) 0.083% nebulizer solution Take 3 mLs by nebulization every 4 hours as needed for Wheezing or Shortness of Breath 120 each 5    atenolol (TENORMIN) 25 MG tablet Take 1 tablet by mouth daily 90 tablet 1    albuterol sulfate  (90 Base) MCG/ACT inhaler inhale 2 puffs by mouth every 4 hours if needed for wheezing or shortness of breath 18 g 2    mirtazapine (REMERON) 15 MG tablet Take 1 tablet by mouth nightly 30 tablet 0    mirtazapine (REMERON) 30 MG tablet Take 1 tablet by mouth daily 30 tablet 0    traZODone (DESYREL) 150 MG tablet Take 1 tablet by mouth nightly as needed for Sleep 30 tablet 0    melatonin 3 MG TABS tablet Take 3 mg by mouth nightly as needed      polyethylene glycol (GLYCOLAX) powder Take 17 g by mouth daily      spironolactone (ALDACTONE) 25 MG tablet Take 25 mg by mouth daily      tiotropium (SPIRIVA RESPIMAT) 2.5 MCG/ACT AERS inhaler Inhale 2 puffs into the lungs daily 1 Inhaler 5    pantoprazole (PROTONIX) 40 MG tablet Take 1 tablet by mouth every morning (before breakfast) (Patient taking differently: Take 40 mg by mouth 2 times daily ) 30 tablet 5    atorvastatin (LIPITOR) 20 MG tablet TAKE 1 TABLET BY MOUTH EVERY DAY 30 tablet 5    Cholecalciferol (VITAMIN D) 2000 UNITS CAPS capsule Take 1 capsule by mouth daily      aspirin 81 MG tablet Take 81 mg by mouth daily      fluticasone (FLONASE) 50 MCG/ACT nasal spray 1 spray by Nasal route daily        No current facility-administered medications on file prior to visit. Codeine; Flomax [tamsulosin]; Hydrocodone; Pcn [penicillins]; and Sulfa antibiotics  Family History   Adopted: Yes   Family history unknown: Yes     Social History     Tobacco Use   Smoking Status Former Smoker    Packs/day: 1.00    Years: 19.00    Pack years: 19.00    Types: Cigarettes    Last attempt to quit: 2006    Years since quittin.2   Smokeless Tobacco Former User    Quit date: 2000       Social History     Substance and Sexual Activity   Alcohol Use No       Review of Systems   Constitutional: Negative for appetite change, chills and fever. Eyes: Negative for redness and visual disturbance. Respiratory: Negative for cough, shortness of breath and wheezing. Cardiovascular: Negative for chest pain and leg swelling. Gastrointestinal: Negative for abdominal pain, constipation, nausea and vomiting. Genitourinary: Positive for penile pain. Negative for decreased urine volume, difficulty urinating, discharge, dysuria, enuresis, flank pain, frequency, hematuria, scrotal swelling, testicular pain and urgency. Musculoskeletal: Positive for neck stiffness. Negative for back pain, joint swelling and myalgias. Skin: Negative for color change, rash and wound. Neurological: Negative for dizziness, tremors and numbness. Hematological: Negative for adenopathy. Does not bruise/bleed easily.        BP (!) 148/90 (Site: Right Upper Arm, Position: Sitting, Cuff Size: Medium Adult)   Ht 5' 9\" (1.753 m)   Wt 226 lb (102.5 kg)   BMI 33.37 kg/m²       PHYSICAL EXAM:  Constitutional: Patient in no acute distress; Neuro: alert and oriented to person place and time. Neck: Brace in place  Psych: Mood and affect normal.  Lungs: Respiratory effort normal  Cardiovascular:  Normal peripheral pulses  Abdomen: Soft, non-tender, non-distended with no CVA, flank pain, hepatosplenomegaly or hernia. Bladder non-tender and not distended. Lymphatics: no palpable lymphadenopathy  Penis normal  Urethral meatus -circumcised -the very ventral aspect of his meatus there is a very superficial degree of ulceration likely secondary to prior catheter placement. Scrotal exam normal  Testicles normal bilaterally  Epididymis normal bilaterally  Rectal: Deferred      Lab Results   Component Value Date    BUN 20 05/31/2019     Lab Results   Component Value Date    CREATININE 0.98 05/31/2019     Lab Results   Component Value Date    PSA 0.52 07/05/2017       ASSESSMENT:   Diagnosis Orders   1. BPH with obstruction/lower urinary tract symptoms  NC MEASUREMENT,POST-VOID RESIDUAL VOLUME BY US,NON-IMAGING   2. Urinary retention  NC MEASUREMENT,POST-VOID RESIDUAL VOLUME BY US,NON-IMAGING   3. Yeast dermatitis of penis           PLAN:  For the minimal ulceration of the medus he needs to give this time to continue to heal    Continue Hytrin    He is now emptying well.     It is okay for him to stop antifungal cream    Follow-up in 6 months with a PVR

## 2020-11-16 RX ORDER — RANOLAZINE 500 MG/1
1000 TABLET, FILM COATED, EXTENDED RELEASE ORAL 2 TIMES DAILY
Qty: 60 TABLET | Refills: 5 | Status: SHIPPED | OUTPATIENT
Start: 2020-11-16

## 2020-11-24 ENCOUNTER — TELEPHONE (OUTPATIENT)
Dept: FAMILY MEDICINE CLINIC | Age: 65
End: 2020-11-24

## 2020-11-24 NOTE — TELEPHONE ENCOUNTER
Carole Carpenter called stating he had neck surgery done by Dr. Malina Marroquin. He said he was told by their office that he needed to have aquatic therapy and regular therapy done. They did not order this. He is calling to have Dr. Adali Rivera order this for him.     Health Maintenance   Topic Date Due    Hepatitis C screen  1955    HIV screen  05/13/1970    DTaP/Tdap/Td vaccine (1 - Tdap) 05/13/1974    Shingles Vaccine (1 of 2) 05/13/2005    Pneumococcal 65+ years Vaccine (1 of 1 - PPSV23) 05/13/2020    Lipid screen  05/14/2020    Potassium monitoring  05/31/2020    Creatinine monitoring  05/31/2020    Flu vaccine (1) 09/01/2020    Diabetes screen  05/14/2022    Colon cancer screen colonoscopy  11/27/2027    AAA screen  Completed    Hepatitis A vaccine  Aged Out    Hepatitis B vaccine  Aged Out    Hib vaccine  Aged Out    Meningococcal (ACWY) vaccine  Aged Out             (applicable per patient's age: Cancer Screenings, Depression Screening, Fall Risk Screening, Immunizations)    Hemoglobin A1C (%)   Date Value   05/14/2019 5.1     LDL Cholesterol (mg/dL)   Date Value   05/14/2019 54     AST (U/L)   Date Value   05/31/2019 15     ALT (U/L)   Date Value   05/31/2019 15     BUN (mg/dL)   Date Value   05/31/2019 20      (goal A1C is < 7)   (goal LDL is <100) need 30-50% reduction from baseline     BP Readings from Last 3 Encounters:   11/12/20 (!) 148/90   10/29/20 130/70   10/22/20 (!) 150/80    (goal /80)      All Future Testing planned in [facility]:  Lab Frequency Next Occurrence       Next Visit Date:  Future Appointments   Date Time Provider April Cutler   5/13/2021  9:00 AM JAYDEN Roman urol [facility]            Patient Active Problem List:     ASHD (arteriosclerotic heart disease)     History of coronary artery bypass surgery     Obstructive apnea     Hypertension     Hyperlipidemia     Chronic obstructive pulmonary disease (HCC)     Vitamin D deficiency disease     Post traumatic stress disorder     CHF (congestive heart failure), NYHA class II, chronic, diastolic ([facility]

## 2021-02-09 ENCOUNTER — TELEPHONE (OUTPATIENT)
Dept: CARDIOLOGY CLINIC | Age: 66
End: 2021-02-09

## 2021-02-09 DIAGNOSIS — E55.9 VITAMIN D DEFICIENCY DISEASE: ICD-10-CM

## 2021-02-09 DIAGNOSIS — I10 ESSENTIAL HYPERTENSION: Primary | ICD-10-CM

## 2021-02-09 DIAGNOSIS — E78.5 HYPERLIPIDEMIA, UNSPECIFIED HYPERLIPIDEMIA TYPE: ICD-10-CM

## 2021-02-09 DIAGNOSIS — Z95.1 HISTORY OF CORONARY ARTERY BYPASS SURGERY: ICD-10-CM

## 2021-04-29 ENCOUNTER — TELEPHONE (OUTPATIENT)
Dept: FAMILY MEDICINE CLINIC | Age: 66
End: 2021-04-29

## 2021-04-29 NOTE — TELEPHONE ENCOUNTER
Last OV: 7/15/2020 ADHD   Last RX:   Next scheduled apt: Visit date not found      Sure scripts request  RX pending

## 2021-04-29 NOTE — TELEPHONE ENCOUNTER
Albuterol  Nebulizer solution    Yves    Last check up 7/15/20. No future appointments.     Health Maintenance   Topic Date Due    Hepatitis C screen  Never done    HIV screen  Never done    COVID-19 Vaccine (1) Never done    DTaP/Tdap/Td vaccine (1 - Tdap) Never done    Shingles Vaccine (1 of 2) Never done    Pneumococcal 65+ years Vaccine (1 of 1 - PPSV23) Never done    Lipid screen  05/14/2020    Potassium monitoring  05/31/2020    Creatinine monitoring  05/31/2020    Flu vaccine (Season Ended) 09/01/2021    Diabetes screen  05/14/2022    Colon cancer screen colonoscopy  11/27/2027    AAA screen  Completed    Hepatitis A vaccine  Aged Out    Hepatitis B vaccine  Aged Out    Hib vaccine  Aged Out    Meningococcal (ACWY) vaccine  Aged Out             (applicable per patient's age: Cancer Screenings, Depression Screening, Fall Risk Screening, Immunizations)    Hemoglobin A1C (%)   Date Value   05/14/2019 5.1     LDL Cholesterol (mg/dL)   Date Value   05/14/2019 54     AST (U/L)   Date Value   05/31/2019 15     ALT (U/L)   Date Value   05/31/2019 15     BUN (mg/dL)   Date Value   05/31/2019 20      (goal A1C is < 7)   (goal LDL is <100) need 30-50% reduction from baseline     BP Readings from Last 3 Encounters:   11/12/20 (!) 148/90   10/29/20 130/70   10/22/20 (!) 150/80    (goal /80)      All Future Testing planned in [facility]:  Lab Frequency Next Occurrence   CBC Auto Differential Once 05/25/2021   Comprehensive Metabolic Panel Once 91/12/3546   Vitamin D 25 Hydroxy Once 05/25/2021   Lipid Panel Once 05/25/2021   TSH with Reflex Once 05/25/2021   Magnesium Once 05/25/2021   EKG 12 Lead Once 04/01/2021   XR CHEST (2 VW) Once 05/13/2021       Next Visit Date:  Future Appointments   Date Time Provider April Cutler   5/13/2021  9:00 AM JAYDEN Wilson urol [facility]   6/1/2021  3:00 PM MD Edwin Sprague [facility]            Patient Active Problem List:     ASHD (arteriosclerotic heart disease)     History of coronary artery bypass surgery     Obstructive apnea     Hypertension     Hyperlipidemia     Chronic obstructive pulmonary disease (HCC)     Vitamin D deficiency disease     Post traumatic stress disorder     CHF (congestive heart failure), NYHA class II, chronic, diastolic ([address]

## 2021-04-30 RX ORDER — ALBUTEROL SULFATE 2.5 MG/3ML
SOLUTION RESPIRATORY (INHALATION)
Qty: 300 ML | Refills: 1 | Status: SHIPPED | OUTPATIENT
Start: 2021-04-30 | End: 2021-06-01

## 2021-05-10 ENCOUNTER — TELEPHONE (OUTPATIENT)
Dept: FAMILY MEDICINE CLINIC | Age: 66
End: 2021-05-10

## 2021-05-10 DIAGNOSIS — Z98.890 H/O CERVICAL SPINE SURGERY: ICD-10-CM

## 2021-05-10 DIAGNOSIS — M48.02 CERVICAL STENOSIS OF SPINE: Primary | ICD-10-CM

## 2021-05-10 NOTE — TELEPHONE ENCOUNTER
Angeline Holt would like to know if he can get a referral for some aqua therapy. He said since having his surgery his neck has been stiff. Angelien Holt said he can make an appointment if he needs to.     Health Maintenance   Topic Date Due    Hepatitis C screen  Never done    HIV screen  Never done    COVID-19 Vaccine (1) Never done    DTaP/Tdap/Td vaccine (1 - Tdap) Never done    Shingles Vaccine (1 of 2) Never done    Pneumococcal 65+ years Vaccine (1 of 1 - PPSV23) Never done    Lipid screen  05/14/2020    Potassium monitoring  05/31/2020    Creatinine monitoring  05/31/2020    Flu vaccine (Season Ended) 09/01/2021    Diabetes screen  05/14/2022    Colon cancer screen colonoscopy  11/27/2027    AAA screen  Completed    Hepatitis A vaccine  Aged Out    Hepatitis B vaccine  Aged Out    Hib vaccine  Aged Out    Meningococcal (ACWY) vaccine  Aged Out             (applicable per patient's age: Cancer Screenings, Depression Screening, Fall Risk Screening, Immunizations)    Hemoglobin A1C (%)   Date Value   05/14/2019 5.1     LDL Cholesterol (mg/dL)   Date Value   05/14/2019 54     AST (U/L)   Date Value   05/31/2019 15     ALT (U/L)   Date Value   05/31/2019 15     BUN (mg/dL)   Date Value   05/31/2019 20      (goal A1C is < 7)   (goal LDL is <100) need 30-50% reduction from baseline     BP Readings from Last 3 Encounters:   11/12/20 (!) 148/90   10/29/20 130/70   10/22/20 (!) 150/80    (goal /80)      All Future Testing planned in [facility]:  Lab Frequency Next Occurrence   CBC Auto Differential Once 05/25/2021   Comprehensive Metabolic Panel Once 63/85/8925   Vitamin D 25 Hydroxy Once 05/25/2021   Lipid Panel Once 05/25/2021   TSH with Reflex Once 05/25/2021   Magnesium Once 05/25/2021   EKG 12 Lead Once 04/01/2021   XR CHEST (2 VW) Once 05/13/2021       Next Visit Date:  Future Appointments   Date Time Provider April Cutler   5/13/2021  9:00 AM JAYDEN Hung urol [facility]   6/1/2021  3:00 PM Enoch Paulino MD Ruddy Factor [facility]            Patient Active Problem List:     ASHD (arteriosclerotic heart disease)     History of coronary artery bypass surgery     Obstructive apnea     Hypertension     Hyperlipidemia     Chronic obstructive pulmonary disease (HCC)     Vitamin D deficiency disease     Post traumatic stress disorder     CHF (congestive heart failure), NYHA class II, chronic, diastolic ([facility] [address]

## 2021-05-26 ENCOUNTER — HOSPITAL ENCOUNTER (OUTPATIENT)
Age: 66
Discharge: HOME | End: 2021-05-26
Payer: MEDICAID

## 2021-05-26 ENCOUNTER — HOSPITAL ENCOUNTER (OUTPATIENT)
Age: 66
Discharge: HOME | End: 2021-05-28
Payer: MEDICAID

## 2021-05-26 ENCOUNTER — HOSPITAL ENCOUNTER (OUTPATIENT)
Dept: GENERAL RADIOLOGY | Age: 66
Discharge: HOME | End: 2021-05-28
Payer: MEDICAID

## 2021-05-26 DIAGNOSIS — Z95.1 HISTORY OF CORONARY ARTERY BYPASS SURGERY: ICD-10-CM

## 2021-05-26 DIAGNOSIS — E55.9 VITAMIN D DEFICIENCY DISEASE: ICD-10-CM

## 2021-05-26 DIAGNOSIS — I10 ESSENTIAL HYPERTENSION: ICD-10-CM

## 2021-05-26 DIAGNOSIS — E78.5 HYPERLIPIDEMIA, UNSPECIFIED HYPERLIPIDEMIA TYPE: ICD-10-CM

## 2021-05-26 LAB
ABSOLUTE BANDS #: 0.07 K/UL (ref 0–1)
ABSOLUTE EOS #: 0.26 K/UL (ref 0–0.4)
ABSOLUTE IMMATURE GRANULOCYTE: ABNORMAL K/UL (ref 0–0.3)
ABSOLUTE LYMPH #: 1.24 K/UL (ref 1–4.8)
ABSOLUTE MONO #: 0.46 K/UL (ref 0–1)
ALBUMIN SERPL-MCNC: 3.9 G/DL (ref 3.5–5.2)
ALBUMIN/GLOBULIN RATIO: ABNORMAL (ref 1–2.5)
ALP BLD-CCNC: 84 U/L (ref 40–129)
ALT SERPL-CCNC: 8 U/L (ref 5–41)
ANION GAP SERPL CALCULATED.3IONS-SCNC: 8 MMOL/L (ref 9–17)
AST SERPL-CCNC: 10 U/L
BANDS: 1 % (ref 0–10)
BASOPHILS # BLD: ABNORMAL % (ref 0–2)
BASOPHILS ABSOLUTE: ABNORMAL K/UL (ref 0–0.2)
BILIRUB SERPL-MCNC: 0.37 MG/DL (ref 0.3–1.2)
BUN BLDV-MCNC: 10 MG/DL (ref 8–23)
BUN/CREAT BLD: 12 (ref 9–20)
CALCIUM SERPL-MCNC: 9.1 MG/DL (ref 8.6–10.4)
CHLORIDE BLD-SCNC: 96 MMOL/L (ref 98–107)
CHOLESTEROL/HDL RATIO: 5.4
CHOLESTEROL: 188 MG/DL
CO2: 26 MMOL/L (ref 20–31)
CREAT SERPL-MCNC: 0.86 MG/DL (ref 0.7–1.2)
DIFFERENTIAL TYPE: ABNORMAL
EOSINOPHILS RELATIVE PERCENT: 4 % (ref 0–5)
GFR AFRICAN AMERICAN: >60 ML/MIN
GFR NON-AFRICAN AMERICAN: >60 ML/MIN
GFR SERPL CREATININE-BSD FRML MDRD: ABNORMAL ML/MIN/{1.73_M2}
GFR SERPL CREATININE-BSD FRML MDRD: ABNORMAL ML/MIN/{1.73_M2}
GLUCOSE BLD-MCNC: 100 MG/DL (ref 70–99)
HCT VFR BLD CALC: 41 % (ref 41–53)
HDLC SERPL-MCNC: 35 MG/DL
HEMOGLOBIN: 14.1 G/DL (ref 13.5–17.5)
IMMATURE GRANULOCYTES: ABNORMAL %
LDL CHOLESTEROL: 136 MG/DL (ref 0–130)
LYMPHOCYTES # BLD: 19 % (ref 13–44)
MAGNESIUM: 2 MG/DL (ref 1.6–2.6)
MCH RBC QN AUTO: 29.7 PG (ref 26–34)
MCHC RBC AUTO-ENTMCNC: 34.4 G/DL (ref 31–37)
MCV RBC AUTO: 86.5 FL (ref 80–100)
MONOCYTES # BLD: 7 % (ref 5–9)
MORPHOLOGY: ABNORMAL
MORPHOLOGY: ABNORMAL
NRBC AUTOMATED: ABNORMAL PER 100 WBC
PATIENT FASTING?: YES
PDW BLD-RTO: 16.3 % (ref 12.1–15.2)
PLATELET # BLD: 249 K/UL (ref 140–450)
PLATELET ESTIMATE: ABNORMAL
PMV BLD AUTO: ABNORMAL FL
POTASSIUM SERPL-SCNC: 4.7 MMOL/L (ref 3.7–5.3)
RBC # BLD: 4.74 M/UL (ref 4.5–5.9)
RBC # BLD: ABNORMAL 10*6/UL
SEG NEUTROPHILS: 69 % (ref 39–75)
SEGMENTED NEUTROPHILS ABSOLUTE COUNT: 4.47 K/UL (ref 2.1–6.5)
SODIUM BLD-SCNC: 130 MMOL/L (ref 135–144)
TOTAL PROTEIN: 6.7 G/DL (ref 6.4–8.3)
TRIGL SERPL-MCNC: 85 MG/DL
TSH SERPL DL<=0.05 MIU/L-ACNC: 0.89 MIU/L (ref 0.3–5)
VLDLC SERPL CALC-MCNC: ABNORMAL MG/DL (ref 1–30)
WBC # BLD: 6.5 K/UL (ref 3.5–11)
WBC # BLD: ABNORMAL 10*3/UL

## 2021-05-26 PROCEDURE — 80053 COMPREHEN METABOLIC PANEL: CPT

## 2021-05-26 PROCEDURE — 36415 COLL VENOUS BLD VENIPUNCTURE: CPT

## 2021-05-26 PROCEDURE — 85025 COMPLETE CBC W/AUTO DIFF WBC: CPT

## 2021-05-26 PROCEDURE — 82306 VITAMIN D 25 HYDROXY: CPT

## 2021-05-26 PROCEDURE — 93005 ELECTROCARDIOGRAM TRACING: CPT

## 2021-05-26 PROCEDURE — 83735 ASSAY OF MAGNESIUM: CPT

## 2021-05-26 PROCEDURE — 71046 X-RAY EXAM CHEST 2 VIEWS: CPT

## 2021-05-26 PROCEDURE — 80061 LIPID PANEL: CPT

## 2021-05-26 PROCEDURE — 84443 ASSAY THYROID STIM HORMONE: CPT

## 2021-05-27 LAB
EKG ATRIAL RATE: 65 BPM
EKG P AXIS: 51 DEGREES
EKG P-R INTERVAL: 196 MS
EKG Q-T INTERVAL: 412 MS
EKG QRS DURATION: 86 MS
EKG QTC CALCULATION (BAZETT): 428 MS
EKG R AXIS: 18 DEGREES
EKG T AXIS: 17 DEGREES
EKG VENTRICULAR RATE: 65 BPM
VITAMIN D 25-HYDROXY: 40.4 NG/ML (ref 30–100)

## 2021-05-27 PROCEDURE — 93010 ELECTROCARDIOGRAM REPORT: CPT | Performed by: INTERNAL MEDICINE

## 2021-06-01 ENCOUNTER — HOSPITAL ENCOUNTER (OUTPATIENT)
Dept: PHYSICAL THERAPY | Age: 66
Setting detail: THERAPIES SERIES
Discharge: HOME | End: 2021-06-01
Payer: MEDICAID

## 2021-06-01 ENCOUNTER — OFFICE VISIT (OUTPATIENT)
Dept: CARDIOLOGY CLINIC | Age: 66
End: 2021-06-01
Payer: MEDICAID

## 2021-06-01 VITALS
SYSTOLIC BLOOD PRESSURE: 140 MMHG | DIASTOLIC BLOOD PRESSURE: 80 MMHG | WEIGHT: 216 LBS | BODY MASS INDEX: 31.9 KG/M2 | OXYGEN SATURATION: 95 % | HEART RATE: 68 BPM

## 2021-06-01 DIAGNOSIS — E55.9 VITAMIN D DEFICIENCY DISEASE: Primary | ICD-10-CM

## 2021-06-01 DIAGNOSIS — I10 ESSENTIAL HYPERTENSION: ICD-10-CM

## 2021-06-01 DIAGNOSIS — E78.5 HYPERLIPIDEMIA, UNSPECIFIED HYPERLIPIDEMIA TYPE: ICD-10-CM

## 2021-06-01 DIAGNOSIS — Z95.1 HISTORY OF CORONARY ARTERY BYPASS SURGERY: ICD-10-CM

## 2021-06-01 PROCEDURE — 97161 PT EVAL LOW COMPLEX 20 MIN: CPT

## 2021-06-01 PROCEDURE — 99214 OFFICE O/P EST MOD 30 MIN: CPT | Performed by: INTERNAL MEDICINE

## 2021-06-01 ASSESSMENT — PAIN SCALES - GENERAL: PAINLEVEL_OUTOF10: 2

## 2021-06-01 NOTE — LETTER
Konrad Dickerson M.D. [address] [place] [address]  [phone]        2021        Norman Kolb DO  [address] [address]    RE:   Ankita Etienne  :  1955    Dear Dr. Franklin Ee:    CHIEF COMPLAINT:  1. Coronary artery disease. 2.  Hyperlipidemia. 3.  Status post cervical disk surgery in 10/2020 by Dr. Kayla Cardenas. HISTORY OF PRESENT ILLNESS:  I had the pleasure of seeing Mr. Jenn Rouse in our office on 2021. He is a 51-year-old gentleman who had a myocardial infarction on 10/18/2015. He had ventricular fibrillation while being loaded into a helicopter and was defibrillated. He went to [place], where he had emergency catheterization that showed 99% disease in the OM branch of the circumflex with thrombus, 70% to 75% disease in the LAD, 70% RCA with an EF of 50%. Aortic balloon pump was placed and he had bypass surgery by Dr. Horace Hood on 10/23/2015, with a LIMA to the LAD, vein graft to the OM and a vein graft to the distal right coronary artery. He had another catheterization on 2016, because of chest pain, that showed occluded LAD, circumflex had 80% disease, right coronary artery had 70% disease. His bypass grafts were patent as well as the LIMA. His EF was 55% to 60%, treated medically. He has a long history of atypical chest pain and chest discomfort. I last saw him on 2019, and at that time, he was doing well. He did have cervical disk disease and had surgery by Dr. Kayla Cardenas on 10/12/2020, with cervical fusion. He has made a good recovery and weakness of his left hand has markedly improved. He denies any chest pain or chest discomfort. He was seen by the [place] in [place] and was told to stop his Lipitor because his cholesterol was \"too low. \"    He has had no unusual shortness of breath. He is not exercising. He denies any PND, orthopnea or pedal edema. CARDIAC RISK FACTORS:  Known CAD:  Positive. Bypass Surgery:  Positive. Peripheral Vascular Disease:  Positive. Diabetes:  Negative. Hypertension:  Positive. Hyperlipidemia:  Positive. Smoking:  Negative. MEDICATIONS AT THIS TIME:  He is on aspirin 81 mg daily, Tenormin 25 mg daily, Remeron 15 mg nightly with 30 mg daily, Protonix 40 mg b.i.d., Ranexa 500 mg 2 tablets b.i.d., Aldactone 25 mg daily, Hytrin 2 mg daily, Desyrel 150 mg nightly. PAST MEDICAL AND SURGICAL HISTORY:  1.  Wrist surgery many years ago. 2.  Tonsillectomy. 3.  Right knee replaced many years ago. 4.  Bypass surgery, as stated previously. 5.  Sleep apnea, on a CPAP mask. 6.  Posttraumatic stress syndrome from being in [facility].  7.  Cervical stenosis from neck injury, with cervical disk laminectomy and fusion on 10/12/2020, with a good end result. 8.  Hyperlipidemia, not on Lipitor now because his cholesterol was \"too low. \"  9. Arthritis of left hip, with eventually having right and left hip replacement. 10.  Carpal tunnel disease. 11.  Pilonidal cyst.  12.  Left middle finger surgery. FAMILY HISTORY:  No significant heart disease in the family. SOCIAL HISTORY:  He is 77years old, , one son in [facility]. Quit smoking 29 years ago. Does not drink alcohol. He rides a bike. REVIEW OF SYSTEMS:  Cardiac as above. Other systems reviewed including constitutional, eyes, ears, nose and throat, cardiovascular, respiratory, GI, , musculoskeletal, integumentary, neurologic, endocrine, hematologic and allergic/immunologic are negative except for what is described above. No weight loss or weight gain. No change in bowel habits. No blood in stool. No fevers, sweats or chills. PHYSICAL EXAMINATION:  VITAL SIGNS:  His blood pressure was 140/80 with a heart rate of 68 and regular. Respiratory rate 18. O2 saturation 95%. Weight 216 pounds. GENERAL:  He is a pleasant 72-year-old gentleman. Denied pain.   He was oriented to person, place and time. Answered questions appropriately. SKIN:  No unusual skin changes. HEENT:  The pupils are equally round and intact. Mucous membranes were dry. NECK:  No JVD. Good carotid pulses. No carotid bruits. No lymphadenopathy or thyromegaly. CARDIOVASCULAR EXAM:  S1 and S2 were normal.  No S3 or S4. Soft systolic blowing type murmur. No diastolic murmur. PMI was normal.  No lift, thrust, or pericardial friction rub. LUNGS:  Quite clear to auscultation and percussion. ABDOMEN:  Soft and nontender. Good bowel sounds. EXTREMITIES:  Good femoral pulses. Good pedal pulses. No pedal edema. Skin was warm and dry. No calf tenderness. Nail beds pink. Good cap refill. PULSES:  Bilateral symmetrical radial, brachial and carotid pulses. LABORATORY DATA:  His sodium was 130, potassium 4.7, BUN 10, creatinine 0.86, GFR greater than 60, magnesium 2.0. His cholesterol was 188, HDL 35, , triglycerides 85. ALT was 8, AST was 10. TSH was 0.89. Vitamin D was 40.4. White count 6.5, hemoglobin 14.1 with a platelet count 018,203. EKG showed sinus rhythm with a possible old anterior myocardial infarction, which is unchanged from previous EKGs. Chest x-ray was unremarkable. IMPRESSION:  1. Severe coronary artery disease. 2.  Functional class I at this point. 3.  Anterior myocardial infarction on 10/18/2015, with a catheterization showing 99% OM branch of the circumflex with thrombus, 70% to 75% proximal LAD, 70% RCA, EF of 50%. 4.  Bypass surgery by Dr. Edwin Bacon on 10/23/2015, with a LIMA to the LAD, vein graft to the OM and a vein graft to the right coronary artery. 5.  Normal LV function, EF of 54%. 6.  Abnormal stress test on 02/25/2019 at [facility], with no reversible ischemia. 8.  Posttraumatic stress syndrome, followed at [place], [place]. 9.  Sleep apnea, on a CPAP mask. 10.  Status post cervical disk surgery by Dr. Mira Vela on 10/12/2020. 11.  Hypertension, well controlled. 12.  Hyperlipidemia, untreated at this point, with his LDL now up to 136. PLAN:  1. Restart Lipitor at 20 mg daily. 2.  See in 1 year. DISCUSSION:  Mr. Vicki Odell is doing well clinically. He has had no chest pain or chest discomfort. No unusual shortness of breath. He was told to stop his Lipitor because his \"cholesterol was too low. \"  However, his LDL is currently at 136. Previously, his LDL had been perfect at 54. I showed him the graph of his lipids and especially his LDL and he is willing to go back on Lipitor. We believe now that \"lower is better\" and as low as we can get the LDL, better it is long-term. I will plan on seeing him in 1 year. Thank you very much for allowing me the privilege of seeing Mr. Vicki Odell. If you have any questions on my thoughts, please do not hesitate to contact me.      Sincerely,        Denny Ramirez    D: 06/01/2021 14:30:03     T: 06/02/2021 5:48:39     [email or web address]  Job#: [id number]   Doc#: [id number]

## 2021-06-01 NOTE — PLAN OF CARE
[facility] NICK RODGERS       Phone: [phone]   Date: 2021                      Outpatient Physical Therapy  Fax: [phone]    ACCT #: [de-identified]                     Plan of Care  [facility]#: [id number]  Patient: Piedad Zamudio  : 1955    Referring Practitioner: Dr. Nicola Mosquera    Referral Date : 05/10/21    Diagnosis: Cervical stensosis  Onset Date: 05/10/21  Treatment Diagnosis: Cervical tightness      Assessment: Patient presents with cervical stiffness/tightness following surgery Oct 2020. He is not signficantly limited due to pain however notes stiffness with movement and daily tasks. Patient would benefit from short term PT to address postural stretching and strengthening  Prognosis: Good    Treatment Plan :  Days: 2 times per week Weeks: 8 weeks      Patient Education/HEP, Therapeutic Exercise and Manual Therapy     Goals:  Time Frame for Short term goals: 5 visits  Short term goal 1: Educate on home program of postural and cervical stretches    Time Frame for Long term goals : 10 visits  Long term goal 1: Upgrade home program for postural strengthening  Long term goal 2: Decrease subjective complaints of cervical tightness to allow patient to complete daily tasks without restriction  Long term goal 3: Increase right  strength to 70#     MELANIE FRANCO, PT   Date: 2021    ______________________________________ Date: 2021  Physician Signature  By signing above or cosigning electronically, I have reviewed this Plan of Care and certify a need for medically necessary rehabilitation services.

## 2021-06-01 NOTE — PROGRESS NOTES
Phone: [phone] [facility]         Fax: [phone]                      Outpatient Physical Therapy                                                                      Evaluation    Date: 2021  Patient: Tim Francois  : 1955  ACCT #: [de-identified]    Referring Practitioner: Dr. Wing Veras    Referral Date : 05/10/21    Diagnosis: Cervical stensosis    Treatment Diagnosis: Cervical tightness  Onset Date: 05/10/21  PT Insurance Information: Saeed John    Per Physician Order  Total # of Visits to Date: 1  No Show: 0  Canceled Appointment: 0     Subjective     Additional Pertinent Hx: Oct 2020 C2-3 fusion and C2-7 decompression. Cervical issuues are a result of a broken neck from service years. Prior to surgery noting bilateral UE numbness/pain with weakness; since surgery has noted gradual improvement. Notes only cervical stiffness and no real distal numbness. Continues to take Flexeril. No formal follow up with surgeon. Notes mild headaches. Oswestry = /50. PMHx includes CAD, CHF, CABG, HTN, DM, arthritis, CTS  Pain Screening  Patient Currently in Pain: Yes  Pain Assessment  Pain Assessment: 0-10  Pain Level: 2     IADL History  Active : Yes  Occupation: On disability  Leisure & Hobbies: Attempts daily household tasks    Objective  Vision  Vision: Impaired (glasses)  Hearing  Hearing: Exceptions to [facility]  Hearing Exceptions: Hard of hearing/hearing concerns, Right hearing aid  Observation/Palpation  Posture: Fair (kyphotic )  Palpation: Mild tenderness of bilateral UT, SCM and cervical paraspinals  Spine  Cervical: Cervical rotation in supine to 60-65 deg ;   flex and extension WFL.   Notes no dizziness or off balance feelings  Thoracic: Moderate thoracic kyphosis  Strength RUE  Strength RUE: Exception  R Shoulder Flexion: 4+/5  R Shoulder ABduction: 4/5  R Elbow Flexion: 4/5  Strength LUE  Strength LUE: Exception  L Shoulder Flexion: 4+/5  L Shoulder ABduction: 4+/5  L Elbow Flexion: 4+/5  L Wrist Extension: 4+/5  Strength Other  Other: Right  =56#; left  = 80#    AROM RUE (degrees)  RUE AROM : WFL  AROM LUE (degrees)  LUE AROM : WFL                 Additional Measures  Special Tests: DTR right C51+; otherwise WNL right C7 and left C5 and C7           WB Status: Ambulated to department with rollator walker; forward flexed posture             Assessment  Assessment: Patient presents with cervical stiffness/tightness following surgery Oct 2020. He is not signficantly limited due to pain however notes stiffness with movement and daily tasks. Patient would benefit from short term PT to address postural stretching and strengthening  Prognosis: Good  Decision Making: Medium Complexity  Exam: Oswestry = 9/50    Clinical Presentation:  Stable/Uncomplicated  The Following Comorbities will impact the patients progression and Plan of Care:   Cardiac Disease/Pacemaker, Diabetes and Previous Orthopedic Injury/Surgery       Activity Tolerance: Patient Tolerated treatment well    Education: PT POC/goals          Goals  Short term goals  Time Frame for Short term goals: 5 visits  Short term goal 1: Educate on home program of postural and cervical stretches    Long term goals  Time Frame for Long term goals : 10 visits  Long term goal 1: Upgrade home program for postural strengthening  Long term goal 2: Decrease subjective complaints of cervical tightness to allow patient to complete daily tasks without restriction  Long term goal 3:  Increase right  strength to 70#    Patient's Goal:    Reduce neck tightness    Timed Code Treatment Minutes: 0 Minutes  Total Treatment Time: 50     Time In: 3097  Time Out: 2289    FRANCISCA HARMON PT Date: 6/1/2021

## 2021-06-02 NOTE — PROGRESS NOTES
CARDIAC RISK FACTORS:  Known CAD:  Positive. Bypass Surgery:  Positive. Peripheral Vascular Disease:  Positive. Diabetes:  Negative. Hypertension:  Positive. Hyperlipidemia:  Positive. Smoking:  Negative. MEDICATIONS AT THIS TIME:  He is on aspirin 81 mg daily, Tenormin 25 mg daily, Remeron 15 mg nightly with 30 mg daily, Protonix 40 mg b.i.d., Ranexa 500 mg 2 tablets b.i.d., Aldactone 25 mg daily, Hytrin 2 mg daily, Desyrel 150 mg nightly. PAST MEDICAL AND SURGICAL HISTORY:  1.  Wrist surgery many years ago. 2.  Tonsillectomy. 3.  Right knee replaced many years ago. 4.  Bypass surgery, as stated previously. 5.  Sleep apnea, on a CPAP mask. 6.  Posttraumatic stress syndrome from being in [facility].  7.  Cervical stenosis from neck injury, with cervical disk laminectomy and fusion on 10/12/2020, with a good end result. 8.  Hyperlipidemia, not on Lipitor now because his cholesterol was \"too low. \"  9. Arthritis of left hip, with eventually having right and left hip replacement. 10.  Carpal tunnel disease. 11.  Pilonidal cyst.  12.  Left middle finger surgery. FAMILY HISTORY:  No significant heart disease in the family. SOCIAL HISTORY:  He is 77years old, , one son in [facility]. Quit smoking 29 years ago. Does not drink alcohol. He rides a bike. REVIEW OF SYSTEMS:  Cardiac as above. Other systems reviewed including constitutional, eyes, ears, nose and throat, cardiovascular, respiratory, GI, , musculoskeletal, integumentary, neurologic, endocrine, hematologic and allergic/immunologic are negative except for what is described above. No weight loss or weight gain. No change in bowel habits. No blood in stool. No fevers, sweats or chills. PHYSICAL EXAMINATION:  VITAL SIGNS:  His blood pressure was 140/80 with a heart rate of 68 and regular. Respiratory rate 18. O2 saturation 95%. Weight 216 pounds. GENERAL:  He is a pleasant 61-year-old gentleman. Denied pain.   He was oriented to person, place and time. Answered questions appropriately. SKIN:  No unusual skin changes. HEENT:  The pupils are equally round and intact. Mucous membranes were dry. NECK:  No JVD. Good carotid pulses. No carotid bruits. No lymphadenopathy or thyromegaly. CARDIOVASCULAR EXAM:  S1 and S2 were normal.  No S3 or S4. Soft systolic blowing type murmur. No diastolic murmur. PMI was normal.  No lift, thrust, or pericardial friction rub. LUNGS:  Quite clear to auscultation and percussion. ABDOMEN:  Soft and nontender. Good bowel sounds. EXTREMITIES:  Good femoral pulses. Good pedal pulses. No pedal edema. Skin was warm and dry. No calf tenderness. Nail beds pink. Good cap refill. PULSES:  Bilateral symmetrical radial, brachial and carotid pulses. LABORATORY DATA:  His sodium was 130, potassium 4.7, BUN 10, creatinine 0.86, GFR greater than 60, magnesium 2.0. His cholesterol was 188, HDL 35, , triglycerides 85. ALT was 8, AST was 10. TSH was 0.89. Vitamin D was 40.4. White count 6.5, hemoglobin 14.1 with a platelet count 993,678. EKG showed sinus rhythm with a possible old anterior myocardial infarction, which is unchanged from previous EKGs. Chest x-ray was unremarkable. IMPRESSION:  1. Severe coronary artery disease. 2.  Functional class I at this point. 3.  Anterior myocardial infarction on 10/18/2015, with a catheterization showing 99% OM branch of the circumflex with thrombus, 70% to 75% proximal LAD, 70% RCA, EF of 50%. 4.  Bypass surgery by Dr. Jessica Ballesteros on 10/23/2015, with a LIMA to the LAD, vein graft to the OM and a vein graft to the right coronary artery. 5.  Normal LV function, EF of 54%. 6.  Abnormal stress test on 02/25/2019 at [facility], with no reversible ischemia. 8.  Posttraumatic stress syndrome, followed at [place], [place]. 9.  Sleep apnea, on a CPAP mask. 10.  Status post cervical disk surgery by Dr. Pat Blackwood on 10/12/2020. 11.  Hypertension, well controlled. 12.  Hyperlipidemia, untreated at this point, with his LDL now up to 136. PLAN:  1. Restart Lipitor at 20 mg daily. 2.  See in 1 year. DISCUSSION:  Mr. Justice Juarez is doing well clinically. He has had no chest pain or chest discomfort. No unusual shortness of breath. He was told to stop his Lipitor because his \"cholesterol was too low. \"  However, his LDL is currently at 136. Previously, his LDL had been perfect at 54. I showed him the graph of his lipids and especially his LDL and he is willing to go back on Lipitor. We believe now that \"lower is better\" and as low as we can get the LDL, better it is long-term. I will plan on seeing him in 1 year. Thank you very much for allowing me the privilege of seeing Mr. Justice Juarez. If you have any questions on my thoughts, please do not hesitate to contact me.      Sincerely,        Priscila Neely    D: 06/01/2021 14:30:03     T: 06/02/2021 5:48:39     [email or web address]  Job#: [id number]   Doc#: [id number]

## 2021-06-10 RX ORDER — ALBUTEROL SULFATE 2.5 MG/3ML
SOLUTION RESPIRATORY (INHALATION)
Qty: 300 ML | Refills: 1 | Status: SHIPPED | OUTPATIENT
Start: 2021-06-10 | End: 2021-08-18

## 2021-06-10 NOTE — TELEPHONE ENCOUNTER
Last OV: 7/15/2020 HTN, HLD, COPD  Last RX:  Next scheduled apt: 6/16/2021    Sure scripts request        RX pending

## 2021-06-18 ENCOUNTER — APPOINTMENT (OUTPATIENT)
Dept: PHYSICAL THERAPY | Age: 66
End: 2021-06-18
Payer: MEDICAID

## 2021-06-21 ENCOUNTER — OFFICE VISIT (OUTPATIENT)
Dept: FAMILY MEDICINE CLINIC | Age: 66
End: 2021-06-21
Payer: MEDICAID

## 2021-06-21 ENCOUNTER — HOSPITAL ENCOUNTER (OUTPATIENT)
Dept: GENERAL RADIOLOGY | Age: 66
Discharge: HOME | End: 2021-06-23
Payer: MEDICAID

## 2021-06-21 ENCOUNTER — HOSPITAL ENCOUNTER (OUTPATIENT)
Age: 66
Discharge: HOME | End: 2021-06-23
Payer: MEDICAID

## 2021-06-21 VITALS
DIASTOLIC BLOOD PRESSURE: 78 MMHG | HEIGHT: 69 IN | BODY MASS INDEX: 31.09 KG/M2 | HEART RATE: 56 BPM | WEIGHT: 209.9 LBS | SYSTOLIC BLOOD PRESSURE: 120 MMHG

## 2021-06-21 DIAGNOSIS — I50.32 CHF (CONGESTIVE HEART FAILURE), NYHA CLASS II, CHRONIC, DIASTOLIC (HCC): ICD-10-CM

## 2021-06-21 DIAGNOSIS — R19.5 CHANGE IN STOOL: ICD-10-CM

## 2021-06-21 DIAGNOSIS — D12.6 TUBULAR ADENOMA OF COLON: ICD-10-CM

## 2021-06-21 DIAGNOSIS — K59.01 SLOW TRANSIT CONSTIPATION: Primary | ICD-10-CM

## 2021-06-21 DIAGNOSIS — G95.9 CERVICAL MYELOPATHY (HCC): ICD-10-CM

## 2021-06-21 DIAGNOSIS — J43.1 PANLOBULAR EMPHYSEMA (HCC): ICD-10-CM

## 2021-06-21 DIAGNOSIS — Z12.11 COLON CANCER SCREENING: ICD-10-CM

## 2021-06-21 DIAGNOSIS — K59.01 SLOW TRANSIT CONSTIPATION: ICD-10-CM

## 2021-06-21 PROCEDURE — 99214 OFFICE O/P EST MOD 30 MIN: CPT | Performed by: FAMILY MEDICINE

## 2021-06-21 PROCEDURE — 74018 RADEX ABDOMEN 1 VIEW: CPT

## 2021-06-21 RX ORDER — ONDANSETRON HYDROCHLORIDE 8 MG/1
8 TABLET, FILM COATED ORAL EVERY 8 HOURS PRN
COMMUNITY

## 2021-06-21 RX ORDER — SENNA PLUS 8.6 MG/1
1 TABLET ORAL 2 TIMES DAILY PRN
Qty: 60 TABLET | Refills: 0 | Status: SHIPPED | OUTPATIENT
Start: 2021-06-21 | End: 2022-05-23

## 2021-06-21 SDOH — ECONOMIC STABILITY: FOOD INSECURITY: WITHIN THE PAST 12 MONTHS, THE FOOD YOU BOUGHT JUST DIDN'T LAST AND YOU DIDN'T HAVE MONEY TO GET MORE.: NEVER TRUE

## 2021-06-21 SDOH — ECONOMIC STABILITY: FOOD INSECURITY: WITHIN THE PAST 12 MONTHS, YOU WORRIED THAT YOUR FOOD WOULD RUN OUT BEFORE YOU GOT MONEY TO BUY MORE.: NEVER TRUE

## 2021-06-21 ASSESSMENT — PATIENT HEALTH QUESTIONNAIRE - PHQ9
1. LITTLE INTEREST OR PLEASURE IN DOING THINGS: 0
2. FEELING DOWN, DEPRESSED OR HOPELESS: 0
SUM OF ALL RESPONSES TO PHQ QUESTIONS 1-9: 0
SUM OF ALL RESPONSES TO PHQ QUESTIONS 1-9: 0
SUM OF ALL RESPONSES TO PHQ9 QUESTIONS 1 & 2: 0
SUM OF ALL RESPONSES TO PHQ QUESTIONS 1-9: 0

## 2021-06-21 ASSESSMENT — SOCIAL DETERMINANTS OF HEALTH (SDOH): HOW HARD IS IT FOR YOU TO PAY FOR THE VERY BASICS LIKE FOOD, HOUSING, MEDICAL CARE, AND HEATING?: NOT HARD AT ALL

## 2021-06-21 NOTE — PROGRESS NOTES
Name: Jeferson Galindo  : 1955         Chief Complaint:     Chief Complaint   Patient presents with    Nausea    Constipation    Hypertension       History of Present Illness:      Jeferson Galindo is a 77 y.o.  male who presents with Nausea, Constipation, and Hypertension      HPI    C/o constipation which has been progressively worsening since neck surgery in October. Past 6 wks has become nauseated from it, unable to eat, taking zofran 8 mg 1/2 tab in the morning and then repeat it later. Finds if he takes a whole tab he gets too tired. Has been using docusate for a number of months, was given by [place], doesn't help. Has only had 1-2 bm's in past 6 wks. Past 4-5 days things got a little better r/t having eaten chili but still moving very slowly. Passing gas now but says for 6 wks he didn't pass any gas. Diffuse abd pain. The BM's the past few days he has passed have been rock hard and large, hurt to pass. Relates weight loss to not really eating for about 6 wks. Was eating a lot of eggs and worrell which he says increased his cholesterol, so now he's working on getting that down and is back on lipitor. More active now than he had been and sometimes goes for walks, hasn't been riding bike. CAD on spironolactone, concerned about K levels. Believes he's not allowed to eat fruit while on spironolactone - still does eat fruit but related to medical asst that he wanted to get off the spironolactone. Mood ok, taking remeron 15 mg at night and 30 mg in the daytime which seems to work well for him.      Medical History:     Patient Active Problem List   Diagnosis    ASHD (arteriosclerotic heart disease)    History of coronary artery bypass surgery    Obstructive apnea    Hypertension    Hyperlipidemia    Chronic obstructive pulmonary disease (HCC)    Vitamin D deficiency disease    Post traumatic stress disorder    CHF (congestive heart failure), NYHA class II, chronic, diastolic (HCC)    Cervical myelopathy ([facility] [address]       Medications:       Prior to Admission medications    Medication Sig Start Date End Date Taking? Authorizing Provider   ondansetron (ZOFRAN) 8 MG tablet Take 8 mg by mouth every 8 hours as needed for Nausea or Vomiting   Yes Historical Provider, MD   senna (SENOKOT) 8.6 MG tablet Take 1 tablet by mouth 2 times daily as needed for Constipation 6/21/21  Yes Soraida Fraga DO   albuterol (PROVENTIL) (2.5 MG/3ML) 0.083% nebulizer solution inhale contents of 1 vial ( 3 milliliters ) in nebulizer by mouth and INTO THE LUNGS every 4 hours if needed for wheezing or shortness of breath 6/10/21  Yes Soraida Fraga DO   RANEXA 500 MG extended release tablet Take 2 tablets by mouth 2 times daily 11/16/20  Yes Debbi Hughes MD   terazosin (HYTRIN) 2 MG capsule Take 1 capsule by mouth nightly 10/22/20  Yes Fredrick Ventura PA-C   nitroGLYCERIN (NITROSTAT) 0.4 MG SL tablet up to max of 3 total doses.  If no relief after 1 dose, call 911. 7/15/20  Yes Soraida Fraga DO   atenolol (TENORMIN) 25 MG tablet Take 1 tablet by mouth daily 9/19/19  Yes Debbi Hughes MD   mirtazapine (REMERON) 15 MG tablet Take 1 tablet by mouth nightly 5/31/19  Yes Leoashleigh Caruso APRN - CNP   mirtazapine (REMERON) 30 MG tablet Take 1 tablet by mouth daily 5/31/19  Yes Leo Zuly APRN - CNP   traZODone (DESYREL) 150 MG tablet Take 1 tablet by mouth nightly as needed for Sleep 5/31/19  Yes Leo Zuly APRN - CNP   spironolactone (ALDACTONE) 25 MG tablet Take 25 mg by mouth daily   Yes Historical Provider, MD   pantoprazole (PROTONIX) 40 MG tablet Take 1 tablet by mouth every morning (before breakfast)  Patient taking differently: Take 40 mg by mouth 2 times daily  3/6/19  Yes Soraida Fraga DO   Cholecalciferol (VITAMIN D) 2000 UNITS CAPS capsule Take 1 capsule by mouth daily   Yes Historical Provider, MD   aspirin 81 MG tablet Take 81 mg by mouth daily   Yes Historical Provider, MD        Allergies:       Codeine, Flomax [tamsulosin], Hydrocodone, Pcn [penicillins], and Sulfa antibiotics    Physical Exam:     Vitals:  /78 (Site: Left Upper Arm, Position: Sitting, Cuff Size: Medium Adult)   Pulse 56   Ht 5' 9\" (1.753 m)   Wt 209 lb 14.4 oz (95.2 kg)   BMI 31.00 kg/m²   Physical Exam  Constitutional:       Appearance: Normal appearance. He is well-developed. He is not ill-appearing. Cardiovascular:      Rate and Rhythm: Normal rate and regular rhythm. Heart sounds: Normal heart sounds. Pulmonary:      Effort: Pulmonary effort is normal.      Breath sounds: Normal breath sounds. Abdominal:      General: Bowel sounds are normal.      Palpations: Abdomen is soft. Tenderness: There is abdominal tenderness (mild, diffuse). Psychiatric:         Mood and Affect: Mood normal.         Behavior: Behavior normal.         Data:     Lab Results   Component Value Date     05/26/2021    K 4.7 05/26/2021    CL 96 05/26/2021    CO2 26 05/26/2021    BUN 10 05/26/2021    CREATININE 0.86 05/26/2021    GLUCOSE 100 05/26/2021    GLUCOSE 258 01/31/2012    PROT 6.7 05/26/2021    LABALBU 3.9 05/26/2021    LABALBU 4.3 09/08/2011    BILITOT 0.37 05/26/2021    ALKPHOS 84 05/26/2021    AST 10 05/26/2021    ALT 8 05/26/2021     Lab Results   Component Value Date    WBC 6.5 05/26/2021    RBC 4.74 05/26/2021    RBC 5.49 01/31/2012    HGB 14.1 05/26/2021    HCT 41.0 05/26/2021    MCV 86.5 05/26/2021    MCH 29.7 05/26/2021    MCHC 34.4 05/26/2021    RDW 16.3 05/26/2021     05/26/2021     01/31/2012    MPV NOT REPORTED 05/26/2021     Lab Results   Component Value Date    TSH 0.89 05/26/2021     Lab Results   Component Value Date    CHOL 188 05/26/2021    HDL 35 05/26/2021    PSA 0.52 07/05/2017    LABA1C 5.1 05/14/2019         Assessment & Plan:        Diagnosis Orders   1. Slow transit constipation  XR ABDOMEN (KUB) (SINGLE AP VIEW)   2. Change in stool  XR ABDOMEN (KUB) (SINGLE AP VIEW)   3.  Tubular adenoma of colon Ambulatory referral to General Surgery   4. Colon cancer screening  Ambulatory referral to General Surgery   5. Cervical myelopathy ([facility] [address]     6. CHF (congestive heart failure), NYHA class II, chronic, diastolic (HCC)     7. Panlobular emphysema (HCC)     constipation for past few months, worsened in past few wks. May have started r/t narcotics given during & after cervical spine surgery but has persisted. Pt not always a reliable historian so checking KUB to assess stool burden. Likely continue docusate and add stimulant laxative. Also due for CRC screening as he had tubular adenoma and poor prep on colonoscopy Nov 2017.   5. H/o cervical myelopathy, now s/p surgery and doing much better. 6. HF stable, cont same rx  7. Emphysema stable, cont same rx        Requested Prescriptions     Signed Prescriptions Disp Refills    senna (SENOKOT) 8.6 MG tablet 60 tablet 0     Sig: Take 1 tablet by mouth 2 times daily as needed for Constipation         There are no Patient Instructions on file for this visit. Víctor Conde received counseling on the following healthy behaviors: medication adherence  Reviewed prior labs and health maintenance. Continue current medications, diet and exercise. Discussed use, benefit, and side effects of prescribed medications. Barriers to medication compliance addressed. Patient given educational materials - see patient instructions. All patient questions answered.   Patient voiced understanding.     signed by Sammie Bangura DO on 6/21/2021 at 11:21 PM  [address]  Dept: [phone]

## 2021-06-22 ENCOUNTER — HOSPITAL ENCOUNTER (OUTPATIENT)
Dept: PHYSICAL THERAPY | Age: 66
Setting detail: THERAPIES SERIES
Discharge: HOME | End: 2021-06-22
Payer: MEDICAID

## 2021-06-22 ENCOUNTER — TELEPHONE (OUTPATIENT)
Dept: FAMILY MEDICINE CLINIC | Age: 66
End: 2021-06-22

## 2021-06-22 PROCEDURE — 97110 THERAPEUTIC EXERCISES: CPT

## 2021-06-22 PROCEDURE — 97140 MANUAL THERAPY 1/> REGIONS: CPT

## 2021-06-22 NOTE — TELEPHONE ENCOUNTER
----- Message from Robert Rojas, DO sent at 6/21/2021  5:05 PM EDT -----  Has some stool up to the top of the abdomen in the colon but no blockage and does not have a large amount of build up towards the end, so I recommend adding senna which I will prescribe. No enema should be needed (we  had discussed possibility).

## 2021-06-24 ENCOUNTER — APPOINTMENT (OUTPATIENT)
Dept: PHYSICAL THERAPY | Age: 66
End: 2021-06-24
Payer: MEDICAID

## 2021-06-25 ENCOUNTER — APPOINTMENT (OUTPATIENT)
Dept: PHYSICAL THERAPY | Age: 66
End: 2021-06-25
Payer: MEDICAID

## 2021-07-02 ENCOUNTER — HOSPITAL ENCOUNTER (OUTPATIENT)
Dept: PHYSICAL THERAPY | Age: 66
Setting detail: THERAPIES SERIES
Discharge: HOME | End: 2021-07-02
Payer: COMMERCIAL

## 2021-07-02 PROCEDURE — 97140 MANUAL THERAPY 1/> REGIONS: CPT

## 2021-07-02 PROCEDURE — 97110 THERAPEUTIC EXERCISES: CPT

## 2021-07-02 ASSESSMENT — PAIN SCALES - GENERAL: PAINLEVEL_OUTOF10: 1

## 2021-07-02 NOTE — PROGRESS NOTES
Phone: Jayant Jamescody      Fax: [phone]                            Outpatient Physical Therapy                                                                            Daily Note    Date: 2021  Patient Name: Meagan Bliss        MRN: [id number]   ACCT#:  [de-identified]  : 1955  (77 y.o.)    Referring Practitioner: Dr. Kyra Angulo    Referral Date : 05/10/21    Diagnosis: Cervical stensosis  Treatment Diagnosis: Cervical tightness    Onset Date: 05/10/21  PT Insurance Information: Saeed Lopez    Per Physician Order  Total # of Visits to Date: 3  No Show: 0  Canceled Appointment: 0  Plan of Care/Certification Expiration Date: 21    Pre-Treatment Pain:  1/10     Assessment  Assessment: Shelia reports generalized soreness and rates cervical pain at 1/10. Kyphotic posture noted and emphasis of exercise on postural strengthening and stretching. Tightness along left thoracic paraspinals. Progress  Chart Reviewed: Yes    Plan  Plan: Continue with current plan    Exercises/Modalities/Manual:  See DocFlow Sheet    Education:           Goals  (Total # of Visits to Date: 3)   Short Term Goals - Time Frame for Short term goals: 5 visits  Short term goal 1: Educate on home program of postural and cervical stretches                Long Term Goals - Time Frame for Long term goals : 10 visits  Long term goal 1: Upgrade home program for postural strengthening  Long term goal 2: Decrease subjective complaints of cervical tightness to allow patient to complete daily tasks without restriction  Long term goal 3:  Increase right  strength to 70#          Post Treatment Pain:  1/10    Time In: 1255    Time Out : 1335        Timed Code Treatment Minutes: 15 Minutes  Total Treatment Time: [address], PT     Date: 2021

## 2021-07-06 ENCOUNTER — HOSPITAL ENCOUNTER (OUTPATIENT)
Dept: PHYSICAL THERAPY | Age: 66
Setting detail: THERAPIES SERIES
Discharge: HOME | End: 2021-07-06
Payer: COMMERCIAL

## 2021-07-06 PROCEDURE — 97110 THERAPEUTIC EXERCISES: CPT

## 2021-07-06 PROCEDURE — 97140 MANUAL THERAPY 1/> REGIONS: CPT

## 2021-07-06 NOTE — PROGRESS NOTES
Phone: [phone] [facility]      Fax: [phone]                            Outpatient Physical Therapy                                                                            Daily Note    Date: 2021  Patient Name: Juan Jose Hammond        MRN: [id number]   ACCT#:  [de-identified]  : 1955  (77 y.o.)    Referring Practitioner: Dr. Lona Matta    Referral Date : 05/10/21    Diagnosis: Cervical stensosis  Treatment Diagnosis: Cervical tightness    Onset Date: 05/10/21  PT Insurance Information: Saeed Lopez    Per Physician Order  Total # of Visits to Date: 4  No Show: 0  Canceled Appointment: 0  Plan of Care/Certification Expiration Date: 21    Pre-Treatment Pain:  4/10     Assessment  Assessment: Patient noting increased tightness/soreness due to being in air conditioning all weekend. Completed ex per log with addition of black clothespins for pinch/ strengthening. Concluded with moist heat to upper thoracic/cervical region with manual therappy/stretching. Good reduction of tightness post treatment  Chart Reviewed: Yes    Plan  Plan: Continue with current plan    Exercises/Modalities/Manual:  See DocFlow Sheet    Education:           Goals  (Total # of Visits to Date: 4)   Short Term Goals - Time Frame for Short term goals: 5 visits  Short term goal 1: Educate on home program of postural and cervical stretches                Long Term Goals - Time Frame for Long term goals : 10 visits  Long term goal 1: Upgrade home program for postural strengthening  Long term goal 2: Decrease subjective complaints of cervical tightness to allow patient to complete daily tasks without restriction  Long term goal 3:  Increase right  strength to 70#          Post Treatment Pain:  2/10    Time In: 1450    Time Out : 1530        Timed Code Treatment Minutes: 40 Minutes  Total Treatment Time: [address], PT     Date: 2021

## 2021-07-08 ENCOUNTER — APPOINTMENT (OUTPATIENT)
Dept: GENERAL RADIOLOGY | Age: 66
End: 2021-07-08
Payer: OTHER GOVERNMENT

## 2021-07-08 ENCOUNTER — HOSPITAL ENCOUNTER (EMERGENCY)
Age: 66
Discharge: HOME | End: 2021-07-08
Attending: FAMILY MEDICINE
Payer: OTHER GOVERNMENT

## 2021-07-08 VITALS
TEMPERATURE: 98.4 F | RESPIRATION RATE: 20 BRPM | WEIGHT: 210 LBS | HEIGHT: 69 IN | OXYGEN SATURATION: 97 % | SYSTOLIC BLOOD PRESSURE: 145 MMHG | HEART RATE: 84 BPM | BODY MASS INDEX: 31.1 KG/M2 | DIASTOLIC BLOOD PRESSURE: 78 MMHG

## 2021-07-08 DIAGNOSIS — M19.031 OSTEOARTHRITIS OF RIGHT WRIST, UNSPECIFIED OSTEOARTHRITIS TYPE: ICD-10-CM

## 2021-07-08 DIAGNOSIS — M25.431 RIGHT WRIST EFFUSION: Primary | ICD-10-CM

## 2021-07-08 PROCEDURE — 99283 EMERGENCY DEPT VISIT LOW MDM: CPT

## 2021-07-08 PROCEDURE — 73110 X-RAY EXAM OF WRIST: CPT

## 2021-07-08 RX ORDER — DOCUSATE SODIUM 100 MG/1
100 CAPSULE, LIQUID FILLED ORAL 2 TIMES DAILY
Qty: 20 CAPSULE | Refills: 0 | Status: SHIPPED | OUTPATIENT
Start: 2021-07-08

## 2021-07-08 RX ORDER — OXYCODONE HYDROCHLORIDE AND ACETAMINOPHEN 5; 325 MG/1; MG/1
1 TABLET ORAL EVERY 6 HOURS PRN
Qty: 12 TABLET | Refills: 0 | Status: SHIPPED | OUTPATIENT
Start: 2021-07-08 | End: 2021-07-11

## 2021-07-08 RX ORDER — POLYETHYLENE GLYCOL 3350 17 G/17G
17 POWDER, FOR SOLUTION ORAL DAILY
Qty: 340 G | Refills: 0 | Status: SHIPPED | OUTPATIENT
Start: 2021-07-08 | End: 2021-07-28

## 2021-07-08 ASSESSMENT — PAIN DESCRIPTION - PAIN TYPE: TYPE: ACUTE PAIN

## 2021-07-08 ASSESSMENT — PAIN DESCRIPTION - LOCATION: LOCATION: WRIST

## 2021-07-08 ASSESSMENT — PAIN DESCRIPTION - ORIENTATION: ORIENTATION: RIGHT

## 2021-07-08 ASSESSMENT — PAIN DESCRIPTION - DESCRIPTORS: DESCRIPTORS: ACHING;STABBING

## 2021-07-08 ASSESSMENT — PAIN SCALES - GENERAL: PAINLEVEL_OUTOF10: 10

## 2021-07-08 NOTE — ED PROVIDER NOTES
[facility]  eMERGENCY dEPARTMENT eNCOUnter          [facility]       Chief Complaint   Patient presents with    Wrist Pain     Right wrist pain. Denies injury. Swelling to wrist.       Nurses Notes reviewed and I agree except as noted in the HPI. HISTORY OF PRESENT ILLNESS    Vel Manrique is a 77 y.o. male who presents to the emergency room via private vehicle, patient planing of right wrist pain for the past day, describing a 10 out of 10 pain that is aching and stabbing indicating the dorsum of his right wrist, patient cannot recall any trauma or falls, does not know what caused the pain. PCP: Sam Cruz    REVIEW OF SYSTEMS     Review of Systems   All other systems reviewed and are negative. PAST MEDICAL HISTORY    has a past medical history of Arthritis, Bronchitis with chronic airway obstruction (HCC), CAD (coronary artery disease), CHF (congestive heart failure) ([address] CTS (carpal tunnel syndrome), Diabetes mellitus ([address] Diabetic neuropathy associated with diabetes mellitus due to underlying condition ([address] Exposure to toxic chemical, GERD (gastroesophageal reflux disease), H/O cardiovascular stress test, H/O echocardiogram, Hip pain, left, Hx of cardiac cath, Hyperlipidemia, Hypertension, MI (myocardial infarction) ([address] Pneumonia, PTSD (post-traumatic stress disorder), S/P CABG (coronary artery bypass graft), Sleep apnea, and Ventricular fibrillation ([address] SURGICAL HISTORY      has a past surgical history that includes joint replacement; Tonsillectomy; Wrist surgery; Finger surgery; Carpal tunnel release; Pilonidal cyst excision; Coronary artery bypass graft (10/23/15); Cardiac surgery; Pilonidal cyst excision; Carpal tunnel release (Right); fracture surgery; Finger amputation (Left); Colonoscopy (2012);  Colonoscopy (11/27/2017); pr colonoscopy w/biopsy single/multiple (N/A, 11/27/2017); and pr egd transoral biopsy single/multiple (N/A, antibiotics. FAMILY HISTORY     is adopted. He was adopted. Family history is unknown by patient. SOCIAL HISTORY      reports that he quit smoking about 14 years ago. His smoking use included cigarettes. He has a 19.00 pack-year smoking history. He quit smokeless tobacco use about 20 years ago. He reports current drug use. Frequency: 1.00 time per week. Drug: Marijuana. He reports that he does not drink alcohol. PHYSICAL EXAM     INITIAL VITALS:  height is 5' 9\" (1.753 m) and weight is 210 lb (95.3 kg). His temperature is 98.4 °F (36.9 °C). His blood pressure is 145/78 (abnormal) and his pulse is 84. His respiration is 20 and oxygen saturation is 97%. Physical Exam   Constitutional: Patient is oriented to person, place, and time. Patient appears well-developed and well-nourished. Patient is active and cooperative. Neck: Full passive range of motion without pain and phonation normal.   Cardiovascular:  Normal rate, regular rhythm and intact distal pulses. Pulses: Right radial pulse  2+   Pulmonary/Chest: Effort normal. No tachypnea and no bradypnea. Abdominal: BMI 31.0,. Patient without distension  Musculoskeletal:   Focused examination of patient's right wrist shows the wrist to be in a slightly flexed position, subtle edema over the dorsum of the wrist extending into the proximal portion of the metacarpals, there is no overlying tegmen aberration, there is tenderness palpation throughout the proximal metacarpals dorsum and ventral aspect of the wrist, limiting physical exam, distal CSM intact. There is no marked thenar or hypothenar wasting. No pain to palpation of the mid forearm, proximal forearm, elbow upper arm or shoulder. Except as otherwise noted, negative acute trauma or deformity,  apparent full range of motion and normal strength all extremities appropriate to age. Neurological: Patient is alert and oriented to person, place, and time. patient displays no tremor.  Patient displays no seizure activity. Skin: Skin is warm and dry. Patient is not diaphoretic. Psychiatric: Patient has a normal mood and affect. Patient speech is normal and behavior is normal. Cognition and memory are normal.    DIFFERENTIAL DIAGNOSIS:   Fracture dislocation sprain strain contusion tendinitis    DIAGNOSTIC RESULTS           RADIOLOGY: non-plain film images(s) such as CT, Ultrasound and MRI are read by the radiologist.  XR WRIST RIGHT (MIN 3 VIEWS)   Preliminary Result   Preliminary report only      IMPRESSION:   1. No evidence of acute osseous remodeling right wrist.   2. 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 Marked osteoporosis of the lunocapitate joint with bone-on-bone abutment and    geode formation about both sides of this joint. The lunate bone is somewhat    small but is not fully collapsed. Mild osteoarthritis of the radiocarpal joint. Moderate soft tissue swelling of the wrist with probable effusion/synovial    thickening involving the radiocarpal joint and midcarpal space.              LABS:   Labs Reviewed - No data to display    EMERGENCY DEPARTMENT COURSE:   Vitals:    Vitals:    07/08/21 1130   BP: (!) 145/78   Pulse: 84   Resp: 20   Temp: 98.4 °F (36.9 °C)   SpO2: 97%   Weight: 210 lb (95.3 kg)   Height: 5' 9\" (1.753 m)     Patient history and limited physical exam taken at bedside, would like to get x-rays of the wrist and reevaluate, patient sitting bed semi-Fowlers, acknowledged    OARRS = 280, cyclobenzaprine #90 11/2020, Percocet #40 10/2020    Imaging reviewed, radiology report reviewed    Discussed with patient imaging findings, discussed likely chronic scapholunate ligament tear, discussed extensive arthritis throughout the wrist, likely effusion, at this time like to place patient in a soft cock up splint to give him some support, we discussed pain control and will prescribe oxycodone/Tylenol as patient is use this in the past, as well as Colace MiraLAX as patient has constipation with opiate medications. Will refer patient to orthopedic surgery for follow-up, patient acknowledges    FINAL IMPRESSION      1. Right wrist effusion    2. Osteoarthritis of right wrist, unspecified osteoarthritis type        (Noted during dictation that the incorrect laterality first was placed, this was corrected during dictation post discharge to the right side as well as adding osteoarthritis of the right wrist unspecified OA type)      DISPOSITION/PLAN   D/c    PATIENT REFERRED TO:  MD Elena Limon 1  [phone]    Schedule an appointment as soon as possible for a visit       DO Elena Tomas [address]  [address] [id number] [phone]    Call         DISCHARGE MEDICATIONS:  Discharge Medication List as of 7/8/2021  1:00 PM      START taking these medications    Details   oxyCODONE-acetaminophen (PERCOCET) 5-325 MG per tablet Take 1 tablet by mouth every 6 hours as needed for Pain for up to 3 days. Intended supply: 3 days. Take lowest dose possible to manage pain, Disp-12 tablet, R-0Print      docusate sodium (COLACE) 100 MG capsule Take 1 capsule by mouth 2 times daily, Disp-20 capsule, R-0Normal      polyethylene glycol (GLYCOLAX) 17 GM/SCOOP powder Take 17 g by mouth daily for 20 days, Disp-340 g, R-0Normal                 Summation      Patient Course:  D/c    ED Medications administered this visit:  Medications - No data to display    New Prescriptions from this visit:    Discharge Medication List as of 7/8/2021  1:00 PM      START taking these medications    Details   oxyCODONE-acetaminophen (PERCOCET) 5-325 MG per tablet Take 1 tablet by mouth every 6 hours as needed for Pain for up to 3 days. Intended supply: 3 days.  Take lowest dose possible to manage pain, Disp-12 tablet, R-0Print      docusate sodium (COLACE) 100 MG capsule Take 1 capsule by mouth 2 times daily, Disp-20 capsule, R-0Normal      polyethylene glycol (GLYCOLAX) 17 GM/SCOOP powder Take 17 g by mouth daily for 20 days, Disp-340 g, R-0Normal             Follow-up:  John Herrera MD  [address] [address] [phone]    Schedule an appointment as soon as possible for a visit       Asad Ward DO  [facility] [address] [phone]    Call           Final Impression:   1. Right wrist effusion    2.  Osteoarthritis of right wrist, unspecified osteoarthritis type               (Please note that portions of this note were completed with a voice recognition program.  Efforts were made to edit the dictations but occasionally words are mis-transcribed.)    MD Fernanda Jean Baptiste MD  07/08/21 2783

## 2021-07-12 ENCOUNTER — INITIAL CONSULT (OUTPATIENT)
Dept: SURGERY | Age: 66
End: 2021-07-12
Payer: COMMERCIAL

## 2021-07-12 VITALS — RESPIRATION RATE: 20 BRPM | HEIGHT: 69 IN | WEIGHT: 211 LBS | TEMPERATURE: 97.8 F | BODY MASS INDEX: 31.25 KG/M2

## 2021-07-12 DIAGNOSIS — I25.10 ASHD (ARTERIOSCLEROTIC HEART DISEASE): ICD-10-CM

## 2021-07-12 DIAGNOSIS — G47.33 OBSTRUCTIVE APNEA: ICD-10-CM

## 2021-07-12 DIAGNOSIS — I50.32 CHF (CONGESTIVE HEART FAILURE), NYHA CLASS II, CHRONIC, DIASTOLIC (HCC): ICD-10-CM

## 2021-07-12 DIAGNOSIS — K59.09 CHRONIC CONSTIPATION: Primary | ICD-10-CM

## 2021-07-12 DIAGNOSIS — F12.90 MARIJUANA USE: ICD-10-CM

## 2021-07-12 DIAGNOSIS — K21.9 GASTROESOPHAGEAL REFLUX DISEASE, UNSPECIFIED WHETHER ESOPHAGITIS PRESENT: ICD-10-CM

## 2021-07-12 DIAGNOSIS — J44.9 CHRONIC OBSTRUCTIVE PULMONARY DISEASE, UNSPECIFIED COPD TYPE (HCC): ICD-10-CM

## 2021-07-12 DIAGNOSIS — Z86.010 HISTORY OF COLON POLYPS: ICD-10-CM

## 2021-07-12 DIAGNOSIS — F43.10 POST TRAUMATIC STRESS DISORDER: ICD-10-CM

## 2021-07-12 PROCEDURE — G8926 SPIRO NO PERF OR DOC: HCPCS | Performed by: SURGERY

## 2021-07-12 PROCEDURE — 3023F SPIROM DOC REV: CPT | Performed by: SURGERY

## 2021-07-12 PROCEDURE — 1123F ACP DISCUSS/DSCN MKR DOCD: CPT | Performed by: SURGERY

## 2021-07-12 PROCEDURE — 3017F COLORECTAL CA SCREEN DOC REV: CPT | Performed by: SURGERY

## 2021-07-12 PROCEDURE — 1036F TOBACCO NON-USER: CPT | Performed by: SURGERY

## 2021-07-12 PROCEDURE — 4040F PNEUMOC VAC/ADMIN/RCVD: CPT | Performed by: SURGERY

## 2021-07-12 PROCEDURE — G8427 DOCREV CUR MEDS BY ELIG CLIN: HCPCS | Performed by: SURGERY

## 2021-07-12 PROCEDURE — 99204 OFFICE O/P NEW MOD 45 MIN: CPT | Performed by: SURGERY

## 2021-07-12 PROCEDURE — G8417 CALC BMI ABV UP PARAM F/U: HCPCS | Performed by: SURGERY

## 2021-07-12 RX ORDER — OXYCODONE AND ACETAMINOPHEN 10; 325 MG/1; MG/1
1 TABLET ORAL EVERY 4 HOURS PRN
COMMUNITY
End: 2021-11-18 | Stop reason: ALTCHOICE

## 2021-07-13 ENCOUNTER — HOSPITAL ENCOUNTER (OUTPATIENT)
Dept: PHYSICAL THERAPY | Age: 66
Setting detail: THERAPIES SERIES
Discharge: HOME | End: 2021-07-13
Payer: COMMERCIAL

## 2021-07-13 PROCEDURE — 97110 THERAPEUTIC EXERCISES: CPT

## 2021-07-13 PROCEDURE — 97140 MANUAL THERAPY 1/> REGIONS: CPT

## 2021-07-13 ASSESSMENT — PAIN SCALES - GENERAL: PAINLEVEL_OUTOF10: 2

## 2021-07-13 NOTE — PROGRESS NOTES
Phone: Jayant Sen      Fax: [phone]                            Outpatient Physical Therapy                                                                            Daily Note    Date: 2021  Patient Name: Lazarus General        MRN: [id number]   ACCT#:  [de-identified]  : 1955  (77 y.o.)    Referring Practitioner: Dr. Saqib Mcfarland    Referral Date : 05/10/21    Diagnosis: Cervical stensosis  Treatment Diagnosis: Cervical tightness    Onset Date: 05/10/21  PT Insurance Information: Saeed Lopez    Per Physician Order  Total # of Visits to Date: 5  No Show: 0  Canceled Appointment: 0  Plan of Care/Certification Expiration Date: 21    Pre-Treatment Pain:  2/10     Assessment  Assessment: Patient into department with wrist brace on R wrist.  This limits the exercises he is will to or able to complete today. Rates pain in cervical region as 2/10. Completes exercises as outlined above with fair tolerance. Hot pack x 5 minutes prior to manual therapy to cervical area. Chart Reviewed: Yes    Plan  Plan: Continue with current plan    Exercises/Modalities/Manual:  See DocFlow Sheet    Education:           Goals  (Total # of Visits to Date: 5)   Short Term Goals - Time Frame for Short term goals: 5 visits  Short term goal 1: Educate on home program of postural and cervical stretches                Long Term Goals - Time Frame for Long term goals : 10 visits  Long term goal 1: Upgrade home program for postural strengthening  Long term goal 2: Decrease subjective complaints of cervical tightness to allow patient to complete daily tasks without restriction  Long term goal 3:  Increase right  strength to 70#          Post Treatment Pain:  1/10    Time In: 1459  Time Out : 1535        Timed Code Treatment Minutes: 36 Minutes  Total Treatment Time: 36 Minutes    Laura Nguyen     Date: 2021

## 2021-07-16 ENCOUNTER — HOSPITAL ENCOUNTER (OUTPATIENT)
Dept: PHYSICAL THERAPY | Age: 66
Setting detail: THERAPIES SERIES
Discharge: HOME | End: 2021-07-16
Payer: COMMERCIAL

## 2021-07-16 PROCEDURE — 97140 MANUAL THERAPY 1/> REGIONS: CPT

## 2021-07-16 ASSESSMENT — PAIN SCALES - GENERAL: PAINLEVEL_OUTOF10: 1

## 2021-07-16 NOTE — PROGRESS NOTES
Phone: [address]      Fax: [phone]                            Outpatient Physical Therapy                                                                            Daily Note    Date: 2021  Patient Name: Pati Salgado        MRN: [id number]   ACCT#:  [de-identified]  : 1955  (77 y.o.)    Referring Practitioner: Dr. Artie Gabriel    Referral Date : 05/10/21    Diagnosis: Cervical stenosis  Treatment Diagnosis: Cervical tightness    Onset Date: 05/10/21  PT Insurance Information: Saeed Lopez    Per Physician Order  Total # of Visits to Date: 6  No Show: 0  Canceled Appointment: 0  Plan of Care/Certification Expiration Date: 21    Pre-Treatment Pain:  0-1/10     Assessment  Assessment: Focus on manual to cervical region due to limited tolerance to exercise due to right wrist injury/splinting (moist heat prior to manual to assist with relaxation of tissues). Patient to see ortho this afternoon in regards to wrist.   Patient overall notes reduction of frequency and severity of tightness in cervical region. Plan to continue x 2 weeks/4 visits   Chart Reviewed: Yes    Plan  Plan: Continue with current plan    Exercises/Modalities/Manual:  See DocFlow Sheet    Education:           Goals  (Total # of Visits to Date: 6)   Short Term Goals - Time Frame for Short term goals: 5 visits  Short term goal 1: Educate on home program of postural and cervical stretches                Long Term Goals - Time Frame for Long term goals : 10 visits  Long term goal 1: Upgrade home program for postural strengthening  Long term goal 2: Decrease subjective complaints of cervical tightness to allow patient to complete daily tasks without restriction  Long term goal 3:  Increase right  strength to 70#          Post Treatment Pain:  0-1/10    Time In: 1120    Time Out : 1155        Timed Code Treatment Minutes: 35 Minutes  Total Treatment Time: 28 Minutes    MELANIE FRANCO, PT     Date: 7/16/2021

## 2021-07-28 ENCOUNTER — HOSPITAL ENCOUNTER (OUTPATIENT)
Dept: PHYSICAL THERAPY | Age: 66
Setting detail: THERAPIES SERIES
Discharge: HOME | End: 2021-07-28
Payer: COMMERCIAL

## 2021-07-28 PROCEDURE — 97140 MANUAL THERAPY 1/> REGIONS: CPT

## 2021-07-30 ENCOUNTER — HOSPITAL ENCOUNTER (OUTPATIENT)
Dept: PHYSICAL THERAPY | Age: 66
Setting detail: THERAPIES SERIES
Discharge: HOME | End: 2021-07-30
Payer: COMMERCIAL

## 2021-07-30 PROCEDURE — 97110 THERAPEUTIC EXERCISES: CPT

## 2021-07-30 PROCEDURE — 97140 MANUAL THERAPY 1/> REGIONS: CPT

## 2021-07-30 ASSESSMENT — PAIN SCALES - GENERAL: PAINLEVEL_OUTOF10: 1

## 2021-07-30 NOTE — PROGRESS NOTES
Phone: [address]      Fax: [phone]                            Outpatient Physical Therapy                                                                            Daily Note    Date: 2021  Patient Name: Jesus Manuel Olivares        MRN: [id number]   ACCT#:  [de-identified]  : 1955  (77 y.o.)    Referring Practitioner: Dr. Carmelina Leon    Referral Date : 05/10/21    Diagnosis: Cervical stenosis  Treatment Diagnosis: Cervical tightness    Onset Date: 05/10/21  PT Insurance Information: Saeed Lopez    Per Physician Order  Total # of Visits to Date: 8  No Show: 0  Canceled Appointment: 0  Plan of Care/Certification Expiration Date: 21    Pre-Treatment Pain:  1/10     Assessment  Assessment: Patient notes neck pain is a 1/10, but states it is mostly tightness. Continued to hold UBE due to R wrist injury, but continued with other postural stretches and manual. Applied HP to cervical and upper thoracic region prior to manual to help loosen muscle. Post manual patient states pain is still a 1/10, but notes less stiffness. Plan to continue x2 visits and then D/C. Chart Reviewed: Yes    Plan  Plan: Continue with current plan    Exercises/Modalities/Manual:  See DocFlow Sheet    Education:     Goals  (Total # of Visits to Date: 8)   Short Term Goals - Time Frame for Short term goals: 5 visits  Short term goal 1: Educate on home program of postural and cervical stretches    Long Term Goals - Time Frame for Long term goals : 10 visits  Long term goal 1: Upgrade home program for postural strengthening  Long term goal 2: Decrease subjective complaints of cervical tightness to allow patient to complete daily tasks without restriction  Long term goal 3:  Increase right  strength to 70#    Post Treatment Pain:  1/10    Time In: 1345    Time Out : 1425   Timed Code Treatment Minutes: 35 Minutes  Total Treatment Time: 36 Minutes    Sylvia Carvalho     Date: 2021

## 2021-08-03 ENCOUNTER — HOSPITAL ENCOUNTER (OUTPATIENT)
Dept: PHYSICAL THERAPY | Age: 66
Setting detail: THERAPIES SERIES
Discharge: HOME | End: 2021-08-03
Payer: OTHER GOVERNMENT

## 2021-08-03 PROCEDURE — 97140 MANUAL THERAPY 1/> REGIONS: CPT

## 2021-08-03 PROCEDURE — 97110 THERAPEUTIC EXERCISES: CPT

## 2021-08-03 ASSESSMENT — PAIN SCALES - GENERAL: PAINLEVEL_OUTOF10: 1

## 2021-08-03 NOTE — PROGRESS NOTES
Phone: [address]      Fax: [phone]                            Outpatient Physical Therapy                                                                            Daily Note    Date: 8/3/2021  Patient Name: Grecia Butler        MRN: [id number]   ACCT#:  [de-identified]  : 1955  (77 y.o.)    Referring Practitioner: Dr. Michael Black    Referral Date : 05/10/21    Diagnosis: Cervical stenosis  Treatment Diagnosis: Cervical tightness    Onset Date: 05/10/21  PT Insurance Information: Saeed Lopez    Per Physician Order  Total # of Visits to Date: 9  No Show: 0  Canceled Appointment: 0  Plan of Care/Certification Expiration Date: 21    Pre-Treatment Pain:  1/10     Assessment  Assessment: Patient reports neck pain is a 1/10 this afternoon, but continues to note some tightness. Continued with cervical and postural stretches as outlined followed by manual. Post manual patient reports neck pain remains a 1/10, but notes less tightness. Plan to continue x1 visit and then D/C to HEP. Chart Reviewed: Yes    Plan  Plan: Continue with current plan    Exercises/Modalities/Manual:  See DocFlow Sheet    Education:     Goals  (Total # of Visits to Date: 5)   Short Term Goals - Time Frame for Short term goals: 5 visits  Short term goal 1: Educate on home program of postural and cervical stretches    Long Term Goals - Time Frame for Long term goals : 10 visits  Long term goal 1: Upgrade home program for postural strengthening  Long term goal 2: Decrease subjective complaints of cervical tightness to allow patient to complete daily tasks without restriction - MET  Long term goal 3:  Increase right  strength to 70#    Post Treatment Pain:  1/10    Time In: 1445    Time Out : 1520   Timed Code Treatment Minutes: 35 Minutes  Total Treatment Time: 28 Minutes    Lenore Davis [place]     Date: 8/3/2021

## 2021-08-06 ENCOUNTER — APPOINTMENT (OUTPATIENT)
Dept: PHYSICAL THERAPY | Age: 66
End: 2021-08-06
Payer: OTHER GOVERNMENT

## 2021-08-09 ENCOUNTER — HOSPITAL ENCOUNTER (OUTPATIENT)
Dept: PHYSICAL THERAPY | Age: 66
Setting detail: THERAPIES SERIES
Discharge: HOME | End: 2021-08-09
Payer: OTHER GOVERNMENT

## 2021-08-09 PROCEDURE — 97140 MANUAL THERAPY 1/> REGIONS: CPT

## 2021-08-09 PROCEDURE — 97110 THERAPEUTIC EXERCISES: CPT

## 2021-08-09 ASSESSMENT — PAIN SCALES - GENERAL: PAINLEVEL_OUTOF10: 0

## 2021-08-09 NOTE — PROGRESS NOTES
Phone: [phone]      Fax: [phone]                            Outpatient Physical Therapy                                                                            Daily Note    Date: 2021  Patient Name: Cher Hinds        MRN: [id number]   ACCT#:  [de-identified]  : 1955  (77 y.o.)    Referring Practitioner: Dr. Christal Rivera    Referral Date : 05/10/21    Diagnosis: Cervical stenosis  Treatment Diagnosis: Cervical tightness    Onset Date: 05/10/21  PT Insurance Information: Jasiel Canela    Per Physician Order  Total # of Visits to Date: 10  No Show: 0  Canceled Appointment: 0  Plan of Care/Certification Expiration Date: 21    Pre-Treatment Pain:  0/10     Assessment  Assessment: Patient has completed 10 treatment sessions consisting of exercise for stretching and postural strengthening along with manual therapy to address his cervical tightness following his cervical surgery/fusion in 2020. He currently report minimal to no pain but primarily stiffness. He ambulates using a four wheeled walker with forward flexed/ forward head. Cervical ROM is WFL to complete daily tasks. Based on his status, plan to discharge from further physical therapy and allow patient to continue on an independent basis. Chart Reviewed: Yes    Plan  Plan: Discharge    Exercises/Modalities/Manual:  See DocFlow Sheet    Education: Reviewed home exercises; issued orange tband for postural strengthening.           Goals  (Total # of Visits to Date: 8)   Short Term Goals - Time Frame for Short term goals: 5 visits  Short term goal 1: Educate on home program of postural and cervical stretches                Long Term Goals - Time Frame for Long term goals : 10 visits  Long term goal 1: Upgrade home program for postural strengthening- MET  Long term goal 2: Decrease subjective complaints of cervical tightness to allow patient to complete daily tasks without restriction - MET  Long term goal 3:  Increase right  strength to 70#- NOT MET          Post Treatment Pain:  0/10    Time In: 1350    Time Out : 1430        Timed Code Treatment Minutes: 40 Minutes  Total Treatment Time: [address], PT     Date: 8/9/2021

## 2021-08-18 RX ORDER — ALBUTEROL SULFATE 2.5 MG/3ML
SOLUTION RESPIRATORY (INHALATION)
Qty: 300 ML | Refills: 1 | Status: SHIPPED | OUTPATIENT
Start: 2021-08-18 | End: 2022-02-21

## 2021-08-19 ENCOUNTER — OFFICE VISIT (OUTPATIENT)
Dept: UROLOGY | Age: 66
End: 2021-08-19
Payer: COMMERCIAL

## 2021-08-19 VITALS — DIASTOLIC BLOOD PRESSURE: 80 MMHG | WEIGHT: 214 LBS | BODY MASS INDEX: 31.6 KG/M2 | SYSTOLIC BLOOD PRESSURE: 140 MMHG

## 2021-08-19 DIAGNOSIS — R33.9 URINARY RETENTION: ICD-10-CM

## 2021-08-19 DIAGNOSIS — N40.1 BPH WITH OBSTRUCTION/LOWER URINARY TRACT SYMPTOMS: Primary | ICD-10-CM

## 2021-08-19 DIAGNOSIS — N13.8 BPH WITH OBSTRUCTION/LOWER URINARY TRACT SYMPTOMS: Primary | ICD-10-CM

## 2021-08-19 PROCEDURE — G8427 DOCREV CUR MEDS BY ELIG CLIN: HCPCS | Performed by: PHYSICIAN ASSISTANT

## 2021-08-19 PROCEDURE — 1036F TOBACCO NON-USER: CPT | Performed by: PHYSICIAN ASSISTANT

## 2021-08-19 PROCEDURE — 3017F COLORECTAL CA SCREEN DOC REV: CPT | Performed by: PHYSICIAN ASSISTANT

## 2021-08-19 PROCEDURE — 4040F PNEUMOC VAC/ADMIN/RCVD: CPT | Performed by: PHYSICIAN ASSISTANT

## 2021-08-19 PROCEDURE — 99213 OFFICE O/P EST LOW 20 MIN: CPT | Performed by: PHYSICIAN ASSISTANT

## 2021-08-19 PROCEDURE — G8417 CALC BMI ABV UP PARAM F/U: HCPCS | Performed by: PHYSICIAN ASSISTANT

## 2021-08-19 PROCEDURE — 1123F ACP DISCUSS/DSCN MKR DOCD: CPT | Performed by: PHYSICIAN ASSISTANT

## 2021-08-19 PROCEDURE — 51798 US URINE CAPACITY MEASURE: CPT | Performed by: PHYSICIAN ASSISTANT

## 2021-08-19 RX ORDER — TERAZOSIN 2 MG/1
2 CAPSULE ORAL NIGHTLY
Qty: 30 CAPSULE | Refills: 11 | Status: SHIPPED | OUTPATIENT
Start: 2021-08-19

## 2021-08-19 ASSESSMENT — ENCOUNTER SYMPTOMS
VOMITING: 0
EYE REDNESS: 0
WHEEZING: 0
COLOR CHANGE: 0
BACK PAIN: 0
ABDOMINAL PAIN: 0
NAUSEA: 0
SHORTNESS OF BREATH: 0
CONSTIPATION: 0
COUGH: 0

## 2021-08-19 NOTE — PATIENT INSTRUCTIONS
SURVEY:    You may be receiving a survey from [employer] regarding your visit today. Please complete the survey to enable us to provide the highest quality of care to you and your family. If you cannot score us a very good on any question, please call the office to discuss how we could have made your experience a very good one. Thank you.   Jen

## 2021-08-19 NOTE — PROGRESS NOTES
HPI:      Patient is a 77 y.o. male in no acute distress. He is alert and oriented to person, place, and time. History:  He is status post C3-C7 laminectomy on 10/12/2020. Patient was on terazosin 2mg for BPH in the past but was not given it at time of surgery. Patient states that he has had reaction to Flomax in the past but is able to tolerate Terazosin. Patient did have acute urinary retention with a bladder scan of over 800 mL. Therefore Russell catheter was placed. Patient also was noted to have significant constipation. He was discharged from the hospital on 10/19/2020 on Cardura 1 mg. He does have itching and irritation on the tip of his penis with some white discharge around it as well. He has never been seen by urology before. He has never had issues with urinary retention in the past.     Today:  Patient is here today for follow-up BPH, urinary retention and yeast dermatitis of the penis. Patient states that he no longer has any instances of yeast dermatitis. He feels as though he empties well. He has occasional nocturia. He continues to take Hytrin. he is having no issues with this medication  Patient last voided 90 mins ago, scan = 170 mL. He overall has no new complaints. Past Medical History:   Diagnosis Date    Arthritis     WENDY KNEE    Bronchitis with chronic airway obstruction (HCC)     CAD (coronary artery disease)     CHF (congestive heart failure) (HCC)     CTS (carpal tunnel syndrome)     RIGHT    Diabetes mellitus ([facility]     Diabetic neuropathy associated with diabetes mellitus due to underlying condition ([facility]     Exposure to toxic chemical     [email or web address]-toxic water exposure    GERD (gastroesophageal reflux disease)     H/O cardiovascular stress test 07/08/2016    Abnormal.  Moderate perfusion defect of mild to moderate intensity in the inferolateral,inferior and inferoapical regions during stress imaging. Ef 45%.  with regional contents of 1 vial ( 3 milliliters ) in nebulizer by mouth and INTO THE LUNGS every 4 hours if needed for wheezing or shortness of breath 300 mL 1    oxyCODONE-acetaminophen (PERCOCET)  MG per tablet Take 1 tablet by mouth every 4 hours as needed for Pain.  docusate sodium (COLACE) 100 MG capsule Take 1 capsule by mouth 2 times daily 20 capsule 0    ondansetron (ZOFRAN) 8 MG tablet Take 8 mg by mouth every 8 hours as needed for Nausea or Vomiting      senna (SENOKOT) 8.6 MG tablet Take 1 tablet by mouth 2 times daily as needed for Constipation 60 tablet 0    RANEXA 500 MG extended release tablet Take 2 tablets by mouth 2 times daily 60 tablet 5    atenolol (TENORMIN) 25 MG tablet Take 1 tablet by mouth daily 90 tablet 1    mirtazapine (REMERON) 15 MG tablet Take 1 tablet by mouth nightly 30 tablet 0    mirtazapine (REMERON) 30 MG tablet Take 1 tablet by mouth daily 30 tablet 0    traZODone (DESYREL) 150 MG tablet Take 1 tablet by mouth nightly as needed for Sleep 30 tablet 0    spironolactone (ALDACTONE) 25 MG tablet Take 25 mg by mouth daily      pantoprazole (PROTONIX) 40 MG tablet Take 1 tablet by mouth every morning (before breakfast) (Patient taking differently: Take 40 mg by mouth 2 times daily ) 30 tablet 5    Cholecalciferol (VITAMIN D) 2000 UNITS CAPS capsule Take 1 capsule by mouth daily      aspirin 81 MG tablet Take 81 mg by mouth daily      [DISCONTINUED] terazosin (HYTRIN) 2 MG capsule Take 1 capsule by mouth nightly 30 capsule 11    nitroGLYCERIN (NITROSTAT) 0.4 MG SL tablet up to max of 3 total doses. If no relief after 1 dose, call 911. (Patient not taking: Reported on 8/19/2021) 25 tablet 3     No facility-administered encounter medications on file as of 8/19/2021.       Current Outpatient Medications on File Prior to Visit   Medication Sig Dispense Refill    Sildenafil Citrate (VIAGRA PO) Take by mouth as needed (from the [place])      albuterol (PROVENTIL) (2.5 MG/3ML) 0.083% nebulizer solution inhale contents of 1 vial ( 3 milliliters ) in nebulizer by mouth and INTO THE LUNGS every 4 hours if needed for wheezing or shortness of breath 300 mL 1    oxyCODONE-acetaminophen (PERCOCET)  MG per tablet Take 1 tablet by mouth every 4 hours as needed for Pain.  docusate sodium (COLACE) 100 MG capsule Take 1 capsule by mouth 2 times daily 20 capsule 0    ondansetron (ZOFRAN) 8 MG tablet Take 8 mg by mouth every 8 hours as needed for Nausea or Vomiting      senna (SENOKOT) 8.6 MG tablet Take 1 tablet by mouth 2 times daily as needed for Constipation 60 tablet 0    RANEXA 500 MG extended release tablet Take 2 tablets by mouth 2 times daily 60 tablet 5    atenolol (TENORMIN) 25 MG tablet Take 1 tablet by mouth daily 90 tablet 1    mirtazapine (REMERON) 15 MG tablet Take 1 tablet by mouth nightly 30 tablet 0    mirtazapine (REMERON) 30 MG tablet Take 1 tablet by mouth daily 30 tablet 0    traZODone (DESYREL) 150 MG tablet Take 1 tablet by mouth nightly as needed for Sleep 30 tablet 0    spironolactone (ALDACTONE) 25 MG tablet Take 25 mg by mouth daily      pantoprazole (PROTONIX) 40 MG tablet Take 1 tablet by mouth every morning (before breakfast) (Patient taking differently: Take 40 mg by mouth 2 times daily ) 30 tablet 5    Cholecalciferol (VITAMIN D) 2000 UNITS CAPS capsule Take 1 capsule by mouth daily      aspirin 81 MG tablet Take 81 mg by mouth daily      nitroGLYCERIN (NITROSTAT) 0.4 MG SL tablet up to max of 3 total doses. If no relief after 1 dose, call 911. (Patient not taking: Reported on 8/19/2021) 25 tablet 3     No current facility-administered medications on file prior to visit.      Codeine, Flomax [tamsulosin], Hydrocodone, Pcn [penicillins], and Sulfa antibiotics  Family History   Adopted: Yes   Family history unknown: Yes     Social History     Tobacco Use   Smoking Status Former Smoker    Packs/day: 1.00    Years: 19.00    Pack years: 19.00    Types: Cigarettes    Quit date: 8/23/2006    Years since quitting: 15.0   Smokeless Tobacco Former User    Quit date: 8/1/2000       Social History     Substance and Sexual Activity   Alcohol Use No       Review of Systems   Constitutional: Negative for appetite change, chills and fever. Eyes: Negative for redness and visual disturbance. Respiratory: Negative for cough, shortness of breath and wheezing. Cardiovascular: Negative for chest pain and leg swelling. Gastrointestinal: Negative for abdominal pain, constipation, nausea and vomiting. Genitourinary: Negative for decreased urine volume, difficulty urinating, discharge, dysuria, enuresis, flank pain, frequency, hematuria, penile pain, scrotal swelling, testicular pain and urgency. Positive for occasional nocturia   Musculoskeletal: Negative for back pain, joint swelling and myalgias. Skin: Negative for color change, rash and wound. Neurological: Negative for dizziness, tremors and numbness. Hematological: Negative for adenopathy. Does not bruise/bleed easily. BP (!) 140/80 (Site: Left Upper Arm, Position: Sitting, Cuff Size: Medium Adult)   Wt 214 lb (97.1 kg)   BMI 31.60 kg/m²       PHYSICAL EXAM:  Constitutional: Patient in no acute distress; Neuro: alert and oriented to person place and time. Psych: Mood and affect normal.  Lungs: Respiratory effort normal  Abdomen: Soft, non-tender, non-distended  Rectal: Deferred    Lab Results   Component Value Date    BUN 10 05/26/2021     Lab Results   Component Value Date    CREATININE 0.86 05/26/2021     Lab Results   Component Value Date    PSA 0.52 07/05/2017       ASSESSMENT:   Diagnosis Orders   1. BPH with obstruction/lower urinary tract symptoms     2. Urinary retention       PLAN:  Continue Hytrin    Follow-up in 1 year with [facility]    Patient will contact us sooner if he develops any new or worsening symptoms.

## 2021-08-22 ASSESSMENT — ENCOUNTER SYMPTOMS
SHORTNESS OF BREATH: 0
BLOOD IN STOOL: 0
NAUSEA: 0
ABDOMINAL DISTENTION: 1
COUGH: 0
SORE THROAT: 0
ABDOMINAL PAIN: 0
TROUBLE SWALLOWING: 0
BACK PAIN: 1
CHOKING: 0
APNEA: 1
VOMITING: 0

## 2021-08-22 NOTE — PATIENT INSTRUCTIONS
Patient Education        Upper GI Endoscopy: Before Your Procedure  What is an upper GI endoscopy? An upper gastrointestinal (or GI) endoscopy is a test that allows your doctor to look at the inside of your esophagus, stomach, and the first part of your small intestine, called the duodenum. The esophagus is the tube that carries food to your stomach. The doctor uses a thin, lighted tube that bends. It is called an endoscope, or scope. The doctor puts the tip of the scope in your mouth and gently moves it down your throat. The scope is a flexible video camera. The doctor looks at a monitor (like a TV set or a computer screen) as he or she moves the scope. A doctor may do this procedure to look for ulcers, tumors, infection, or bleeding. It also can be used to look for signs of acid backing up into your esophagus. This is called gastroesophageal reflux disease, or GERD. The doctor can use the scope to take a sample of tissue for study (a biopsy). The doctor also can use the scope to take out growths or stop bleeding. Follow-up care is a key part of your treatment and safety. Be sure to make and go to all appointments, and call your doctor if you are having problems. It's also a good idea to know your test results and keep a list of the medicines you take. How do you prepare for the procedure? Procedures can be stressful. This information will help you understand what you can expect. And it will help you safely prepare for your procedure. Preparing for the procedure    · Do not eat or drink anything for 6 to 8 hours before the test. An empty stomach helps your doctor see your stomach clearly during the test. It also reduces your chances of vomiting. If you vomit, there is a small risk that the vomit could enter your lungs.  (This is called aspiration.) If the test is done in an emergency, a tube may be inserted through your nose or mouth to empty your stomach.     · Do not take sucralfate (Carafate) or antacids on the day of the test. These medicines can make it hard for your doctor to see your upper GI tract.     · If your doctor tells you to, stop taking iron supplements 7 to 14 days before the test.     · Be sure you have someone to take you home. Anesthesia and pain medicine will make it unsafe for you to drive or get home on your own.     · Understand exactly what procedure is planned, along with the risks, benefits, and other options. · Tell your doctor ALL the medicines, vitamins, supplements, and herbal remedies you take. Some may increase the risk of problems during your procedure. Your doctor will tell you if you should stop taking any of them before the procedure and how soon to do it.     · If you take aspirin or some other blood thinner, ask your doctor if you should stop taking it before your procedure. Make sure that you understand exactly what your doctor wants you to do. These medicines increase the risk of bleeding.     · Make sure your doctor and the hospital have a copy of your advance directive. If you don't have one, you may want to prepare one. It lets others know your health care wishes. It's a good thing to have before any type of surgery or procedure. What happens on the day of the procedure? · Follow the instructions exactly about when to stop eating and drinking. If you don't, your procedure may be canceled. If your doctor told you to take your medicines on the day of the procedure, take them with only a sip of water.     · Take a bath or shower before you come in for your procedure. Do not apply lotions, perfumes, deodorants, or nail polish.     · Take off all jewelry and piercings. And take out contact lenses, if you wear them. At the hospital or surgery center   · Bring a picture ID.     · The test may take 15 to 30 minutes.     · The doctor may spray medicine on the back of your throat to numb it.  You also will get medicine to prevent pain and to relax you.     · You will lie on your Version: 12.9  © 3173-0436 [employer]. Care instructions adapted under license by [facility] ([facility]). If you have questions about a medical condition or this instruction, always ask your healthcare professional. [address] any warranty or liability for your use of this information. Patient Education        Learning About Colonoscopy  What is a colonoscopy? A colonoscopy is a test (also called a procedure) that lets a doctor look inside your large intestine. The doctor uses a thin, lighted tube called a colonoscope. The doctor uses it to look for small growths called polyps, colon or rectal cancer (colorectal cancer), or other problems like bleeding. During the procedure, the doctor can take samples of tissue. The samples can then be checked for cancer or other conditions. The doctor can also take out polyps. How is a colonoscopy done? This procedure is done in a doctor's office or a clinic or hospital. You will get medicine to help you relax and not feel pain. Some people find that they don't remember having the test because of the medicine. The doctor gently moves the colonoscope, or scope, through the colon. The scope is also a small video camera. It lets the doctor see the colon and take pictures. How do you prepare for the procedure? You need to clean out your colon before the procedure so the doctor can see your colon. This depends on which \"colon prep\" your doctor recommends. To clean out your colon, you'll do a \"colon prep\" before the test. This means you stop eating solid foods and drink only clear liquids. You can have water, tea, coffee, clear juices, clear broths, flavored ice pops, and gelatin (such as Jell-O). Do not drink anything red or purple. The day or night before the procedure, you drink a large amount of a special liquid. This causes loose, frequent stools. You will go to the bathroom a lot.  Your doctor may have you drink part of the liquid the evening before and the rest on the day of the test. It's very important to drink all of the liquid. If you have problems drinking it, call your doctor. Arrange to have someone take you home after the test.  What can you expect after a colonoscopy? Your doctor will tell you when you can eat and do your usual activities. Drink a lot of fluid after the test to replace the fluids you may have lost during the colon prep. But don't drink alcohol. Your doctor will talk to you about when you'll need your next colonoscopy. The results of your test and your risk for colorectal cancer will help your doctor decide how often you need to be checked. After the test, you may be bloated or have gas pains. You may need to pass gas. If a biopsy was done or a polyp was removed, you may have streaks of blood in your stool (feces) for a few days. Check with your doctor to see when it is safe to take aspirin and nonsteroidal anti-inflammatory drugs (NSAIDs) again. Problems such as heavy rectal bleeding may not occur until several weeks after the test. This isn't common. But it can happen after polyps are removed. Follow-up care is a key part of your treatment and safety. Be sure to make and go to all appointments, and call your doctor if you are having problems. It's also a good idea to know your test results and keep a list of the medicines you take. Where can you learn more? Go to https://[employer]davei.[employer]. org and sign in to your One[employer] account. Enter S737 in the [facility] box to learn more about \"Learning About Colonoscopy. \"     If you do not have an account, please click on the \"Sign Up Now\" link. Current as of: December 17, 2020               Content Version: 12.9  © 8120-3880 Healthwise[employer]. Care instructions adapted under license by [facility] ([facility]).  If you have questions about a medical condition or this instruction, always ask your healthcare professional. Irwin Young

## 2021-08-23 LAB
AVERAGE GLUCOSE: NORMAL
CREATININE, URINE: 119
HBA1C MFR BLD: 5.2 %
MICROALBUMIN/CREAT 24H UR: 1 MG/G{CREAT}
MICROALBUMIN/CREAT UR-RTO: 8

## 2021-09-14 NOTE — ED PROVIDER NOTES
Called and spoke to patient  Patient confirmed upcoming apt with Dr Isabel Lackey on 9/15/21 @ 8:30 am   Patient has no issues or concerns at this time  nebulizer every 4 hours as needed for SOB      lansoprazole (PREVACID SOLUTAB) 30 MG disintegrating tablet Take 1 tablet by mouth 4 times daily for 14 days, Disp-56 tablet, R-0Normal      acetaminophen (TYLENOL) 325 MG tablet Take 325 mg by mouth every 6 hours as neededHistorical Med             ALLERGIES     is allergic to codeine; pcn [penicillins]; sulfa antibiotics; and vicodin [hydrocodone-acetaminophen]. FAMILY HISTORY     is adopted. He was adopted. Family history is unknown by patient. SOCIAL HISTORY      reports that he quit smoking about 12 years ago. His smoking use included Cigarettes. He has a 19.00 pack-year smoking history. He quit smokeless tobacco use about 18 years ago. He reports that he uses drugs, including Marijuana, about 1 time per week. He reports that he does not drink alcohol. PHYSICAL EXAM     INITIAL VITALS:  height is 5' 9\" (1.753 m) and weight is 200 lb (90.7 kg). His oral temperature is 98.5 °F (36.9 °C). His blood pressure is 123/64 and his pulse is 65. His respiration is 17 and oxygen saturation is 93%. Physical Exam   Constitutional: Patient is oriented to person, place, and time. Patient appears well-developed and well-nourished. Patient is active and cooperative. HENT:   Head: Normocephalic and atraumatic. Head is without contusion. Right Ear: Hearing and external ear normal. No drainage. Left Ear: Hearing and external ear normal. No drainage. Nose: Nose normal. No nasal deformity. No epistaxis. Mouth/Throat: Mucous membranes are not dry. Eyes: EOMI. Conjunctivae, sclera, and lids are normal. Right eye exhibits no discharge. Left eye exhibits no discharge. Neck: Full passive range of motion without pain and phonation normal. Negative JVD  Cardiovascular:   Normal rate, regular rhythm and intact distal pulses. No murmurs, rubs, or gallops. Noted BLE edema with 1+ pitting.    Negative Homans sign  Pulses: Radial pulse is 2+   Pulmonary/Chest: Effort

## 2021-09-27 ENCOUNTER — OFFICE VISIT (OUTPATIENT)
Dept: PRIMARY CARE CLINIC | Age: 66
End: 2021-09-27
Payer: COMMERCIAL

## 2021-09-27 ENCOUNTER — HOSPITAL ENCOUNTER (OUTPATIENT)
Age: 66
Setting detail: SPECIMEN
Discharge: HOME | End: 2021-09-27
Payer: COMMERCIAL

## 2021-09-27 VITALS
OXYGEN SATURATION: 98 % | TEMPERATURE: 98.4 F | HEART RATE: 58 BPM | DIASTOLIC BLOOD PRESSURE: 88 MMHG | BODY MASS INDEX: 31.44 KG/M2 | WEIGHT: 212.3 LBS | HEIGHT: 69 IN | RESPIRATION RATE: 18 BRPM | SYSTOLIC BLOOD PRESSURE: 139 MMHG

## 2021-09-27 DIAGNOSIS — J06.9 VIRAL UPPER RESPIRATORY TRACT INFECTION: Primary | ICD-10-CM

## 2021-09-27 DIAGNOSIS — J06.9 VIRAL UPPER RESPIRATORY TRACT INFECTION: ICD-10-CM

## 2021-09-27 DIAGNOSIS — Z20.822 EXPOSURE TO COVID-19 VIRUS: ICD-10-CM

## 2021-09-27 PROBLEM — E11.69 DIABETES MELLITUS TYPE 2 IN OBESE (HCC): Status: ACTIVE | Noted: 2021-09-27

## 2021-09-27 PROBLEM — E66.9 DIABETES MELLITUS TYPE 2 IN OBESE (HCC): Status: ACTIVE | Noted: 2021-09-27

## 2021-09-27 PROBLEM — Z98.1 S/P CERVICAL SPINAL FUSION: Status: ACTIVE | Noted: 2020-10-12

## 2021-09-27 PROBLEM — I49.9 ARRHYTHMIA: Status: ACTIVE | Noted: 2021-09-27

## 2021-09-27 PROBLEM — M48.02 SPINAL STENOSIS IN CERVICAL REGION: Status: ACTIVE | Noted: 2020-09-01

## 2021-09-27 PROCEDURE — 1036F TOBACCO NON-USER: CPT | Performed by: NURSE PRACTITIONER

## 2021-09-27 PROCEDURE — C9803 HOPD COVID-19 SPEC COLLECT: HCPCS

## 2021-09-27 PROCEDURE — 4040F PNEUMOC VAC/ADMIN/RCVD: CPT | Performed by: NURSE PRACTITIONER

## 2021-09-27 PROCEDURE — 1123F ACP DISCUSS/DSCN MKR DOCD: CPT | Performed by: NURSE PRACTITIONER

## 2021-09-27 PROCEDURE — 3017F COLORECTAL CA SCREEN DOC REV: CPT | Performed by: NURSE PRACTITIONER

## 2021-09-27 PROCEDURE — G8417 CALC BMI ABV UP PARAM F/U: HCPCS | Performed by: NURSE PRACTITIONER

## 2021-09-27 PROCEDURE — G8427 DOCREV CUR MEDS BY ELIG CLIN: HCPCS | Performed by: NURSE PRACTITIONER

## 2021-09-27 PROCEDURE — 99213 OFFICE O/P EST LOW 20 MIN: CPT | Performed by: NURSE PRACTITIONER

## 2021-09-27 ASSESSMENT — ENCOUNTER SYMPTOMS
COUGH: 1
SORE THROAT: 0
DIARRHEA: 0
NAUSEA: 1
SHORTNESS OF BREATH: 1
RHINORRHEA: 1
WHEEZING: 1
VOMITING: 0

## 2021-09-27 NOTE — PATIENT INSTRUCTIONS
Patient Education        Learning About COVID-19 and Flu Symptoms  How can you tell COVID-19 from the flu? COVID-19 and the flu have similar symptoms. The two can be hard to tell apart. The only way to know for sure which illness you have is to be tested. Since the symptoms are so alike, it makes sense to act as if you have COVID-19 until your test results come back. This means staying home and limiting contact with people in your home. You'll need to wash your hands often and disinfect surfaces that you touch. And be sure to wear a mask when you're around other people. This is also good advice if you think you have the flu. COVID-19 and the flu have these symptoms in common:  · Fever or chills  · Cough  · Shortness of breath  · Fatigue (tiredness)  · Sore throat  · Runny or stuffy nose  · Muscle and body aches  · Headache  · Vomiting and diarrhea (more common in children than adults)  COVID-19 has another symptom that also may occur:  · New loss of taste or smell  COVID-19 symptoms may appear from 2 to 14 days after infection. Flu symptoms usually appear 1 to 4 days after infection. Why should you get a flu shot during the COVID-19 pandemic? It's important to get your yearly flu vaccine. Both the flu and COVID-19 can be active at the same time. You can get sick with both infections at once. And having both may make you more sick than getting just one. The flu vaccine won't protect you from COVID-19. But it can help prevent the flu or reduce its symptoms. If fewer people get very ill with the flu, this will help free up medical resources that are needed for COVID-19 patients. Where can you learn more? Go to https://[employer]. org and sign in to your [employer]Open account. Enter C123 in the [employer] box to learn more about \"Learning About COVID-19 and Flu Symptoms. \"     If you do not have an account, please click on the \"Sign Up Now\" link.   Current as of: March 26, 2021               Content Version: 13.0  © 2304-1377 Healthwise, [employer]. Care instructions adapted under license by [facility] ([facility]). If you have questions about a medical condition or this instruction, always ask your healthcare professional. [address] any warranty or liability for your use of this information. · Practice meticulous handwashing and cover cough to prevent spread of infection. · Advised to quarantine self at home until receives results from COVID-19 - will call with results. · Do not return to work until symptoms have improved and no fever for 24 hrs without fever reducing medication. · Encouraged to increase fluids and rest.  · Encouraged to take Vitamin C 500 to 1000 mg daily, Zinc 50 mg daily, Vitamin D3 2,000 IU daily and Vitamin B complex daily or a multivitamin daily. · Tylenol OTC PRN for pain, discomfort or fever as directed on package  · Cool mist humidifier  · Hot tea with honey and lemon for cough PRN  · Patient instructions given for Flu symptoms and Covid 19 infection. .  · To ER or call 911 if any increasing difficulty breathing, shortness of breath, inability to swallow, hives, rash, facial/tongue swelling or temp greater than 103 degrees. · Follow up with PCP or [facility] as needed if symptoms worsen or do not improve.

## 2021-09-27 NOTE — PROGRESS NOTES
[address] [facility] WALK-IN CARE  [address] [facility] [address]  Dept: [phone]  Dept Fax: [phone]     Parish Muñoz is a 77 y.o. male who presents to the [facility] in Care today for hismedical conditions/complaints as noted below. Parish Muñoz is c/o of Fever (x 4 days headache, bodyaches, )      HPI:     Fever   This is a new problem. The current episode started in the past 7 days (Started 4 days ago with fever, headache, body aches and nausea. Yanira Orozco at [facility] recently tested positive for Covid-19. Living at [facility]. Denies having Covid-19 vaccine and has positive antibody for Covid-19.). The problem occurs intermittently. The problem has been waxing and waning. Maximum temperature: 100.7 orally at highest. The temperature was taken using an oral thermometer. Associated symptoms include congestion, coughing (Dry cough.), headaches, muscle aches, nausea and wheezing. Pertinent negatives include no diarrhea, rash, sore throat or vomiting. Associated symptoms comments: Rhinorrhea. . He has tried NSAIDs (Ibuprofen 600 mg from [address] [facility]) for the symptoms. The treatment provided moderate relief. Risk factors: sick contacts           Past Medical History:   Diagnosis Date    Arthritis     WENDY KNEE    Bronchitis with chronic airway obstruction (HCC)     CAD (coronary artery disease)     [facility]     CTS (carpal tunnel syndrome)     RIGHT    Diabetes mellitus [facility]     Diabetic neuropathy associated with diabetes mellitus due to underlying condition ([facility]     Exposure to toxic chemical     [employer], [employer] base-toxic water exposure    GERD (gastroesophageal reflux disease)     H/O cardiovascular stress test 07/08/2016    Abnormal.  Moderate perfusion defect of mild to moderate intensity in the inferolateral,inferior and inferoapical regions during stress imaging. Ef 45%.  with regional wall motion abnormalities.  H/O echocardiogram 2/17/16    EF >60%. LV wall thickness is mildly increased. Inferoseptal wall is abnormal in its motion not unusual status post open heart surgery. Normal aortic valve structure with ild aortic regurgitation.  Hip pain, left     Hx of cardiac cath 07/08/2016    LMCA: Normal 0% stenosis. LAD: Chronic occlusion 100%. Lesion on Mid LAD: 100% stenosis. LCx: single stenosis. Lesion on Mid CX: 80% stenosis. RCA: Lesion on Mid RCA: 70% stenosis.     Hyperlipidemia     Hypertension     MI (myocardial infarction) ([facility] [address] 2015    Pneumonia     PTSD (post-traumatic stress disorder)     S/P CABG (coronary artery bypass graft) 10/23/15    Sleep apnea     Ventricular fibrillation ([facility] [address] 10/18/2015    x 2 DURING HEART ATTACK        Current Outpatient Medications   Medication Sig Dispense Refill    terazosin (HYTRIN) 2 MG capsule Take 1 capsule by mouth nightly 30 capsule 11    albuterol (PROVENTIL) (2.5 MG/3ML) 0.083% nebulizer solution inhale contents of 1 vial ( 3 milliliters ) in nebulizer by mouth and INTO THE LUNGS every 4 hours if needed for wheezing or shortness of breath 300 mL 1    RANEXA 500 MG extended release tablet Take 2 tablets by mouth 2 times daily 60 tablet 5    atenolol (TENORMIN) 25 MG tablet Take 1 tablet by mouth daily 90 tablet 1    mirtazapine (REMERON) 15 MG tablet Take 1 tablet by mouth nightly 30 tablet 0    mirtazapine (REMERON) 30 MG tablet Take 1 tablet by mouth daily 30 tablet 0    traZODone (DESYREL) 150 MG tablet Take 1 tablet by mouth nightly as needed for Sleep 30 tablet 0    spironolactone (ALDACTONE) 25 MG tablet Take 25 mg by mouth daily      Cholecalciferol (VITAMIN D) 2000 UNITS CAPS capsule Take 1 capsule by mouth daily      aspirin 81 MG tablet Take 81 mg by mouth daily      Sildenafil Citrate (VIAGRA PO) Take by mouth as needed (from the [facility]) (Patient not taking: Reported on 9/27/2021)      oxyCODONE-acetaminophen (PERCOCET)  MG per tablet Take 1 tablet by mouth every 4 hours as needed for Pain. (Patient not taking: Reported on 9/27/2021)      docusate sodium (COLACE) 100 MG capsule Take 1 capsule by mouth 2 times daily (Patient not taking: Reported on 9/27/2021) 20 capsule 0    ondansetron (ZOFRAN) 8 MG tablet Take 8 mg by mouth every 8 hours as needed for Nausea or Vomiting (Patient not taking: Reported on 9/27/2021)      senna (SENOKOT) 8.6 MG tablet Take 1 tablet by mouth 2 times daily as needed for Constipation (Patient not taking: Reported on 9/27/2021) 60 tablet 0    nitroGLYCERIN (NITROSTAT) 0.4 MG SL tablet up to max of 3 total doses. If no relief after 1 dose, call 911. (Patient not taking: Reported on 8/19/2021) 25 tablet 3    pantoprazole (PROTONIX) 40 MG tablet Take 1 tablet by mouth every morning (before breakfast) (Patient not taking: Reported on 9/27/2021) 30 tablet 5     No current facility-administered medications for this visit. Allergies   Allergen Reactions    Codeine Other (See Comments)    Flomax [Tamsulosin] Other (See Comments)     Patient \"stops urinating with this\"    Hydrocodone     Pcn [Penicillins] Other (See Comments)    Sulfa Antibiotics Other (See Comments)       Subjective:     Review of Systems   Constitutional: Positive for appetite change (poor appetite.), chills, diaphoresis, fatigue and fever. HENT: Positive for congestion and rhinorrhea. Negative for sore throat. Respiratory: Positive for cough (Dry cough.), shortness of breath and wheezing. Gastrointestinal: Positive for nausea. Negative for diarrhea and vomiting. Skin: Negative for rash and wound. Neurological: Positive for light-headedness and headaches. Negative for dizziness. Objective:      Physical Exam  Vitals and nursing note reviewed. Constitutional:       General: He is not in acute distress. Appearance: Normal appearance. He is well-developed. He is not ill-appearing or diaphoretic. Comments: Well hydrated, nontoxic appearance. HENT:      Head: Normocephalic and atraumatic. Right Ear: Hearing, ear canal and external ear normal. A middle ear effusion (Pale white fluid.) is present. No mastoid tenderness. Tympanic membrane is not injected, erythematous or bulging. Left Ear: Hearing, ear canal and external ear normal. A middle ear effusion (Pale white fluid.) is present. No mastoid tenderness. Tympanic membrane is not injected, erythematous or bulging. Nose: Mucosal edema, congestion and rhinorrhea present. Rhinorrhea is clear. Right Sinus: Maxillary sinus tenderness present. No frontal sinus tenderness. Left Sinus: Maxillary sinus tenderness present. No frontal sinus tenderness. Mouth/Throat:      Lips: Pink. Mouth: Mucous membranes are moist.      Pharynx: Oropharynx is clear. Uvula midline. No pharyngeal swelling, oropharyngeal exudate or posterior oropharyngeal erythema. Eyes:      General:         Right eye: No discharge. Left eye: No discharge. Conjunctiva/sclera: Conjunctivae normal.      Pupils: Pupils are equal, round, and reactive to light. Cardiovascular:      Rate and Rhythm: Regular rhythm. Bradycardia present. Heart sounds: Normal heart sounds, S1 normal and S2 normal. No murmur heard. No friction rub. No gallop. Pulmonary:      Effort: Pulmonary effort is normal. No accessory muscle usage or respiratory distress. Breath sounds: Normal breath sounds and air entry. No decreased breath sounds, wheezing, rhonchi or rales. Comments: Occasional dry cough. Breath sounds clear B/L anterior and posterior lobes. Chest expansion symmetrical.  No audible wheezing or respiratory distress. No rales or rhonchi. Abdominal:      General: Bowel sounds are normal.      Palpations: Abdomen is soft. Tenderness: There is no abdominal tenderness. Musculoskeletal:         General: Normal range of motion.    Lymphadenopathy: Cervical: No cervical adenopathy. Right cervical: No superficial or posterior cervical adenopathy. Left cervical: No superficial or posterior cervical adenopathy. Skin:     General: Skin is warm and dry. Coloration: Skin is not pale. Findings: No erythema or rash. Neurological:      Mental Status: He is alert and oriented to person, place, and time. Psychiatric:         Behavior: Behavior normal. Behavior is cooperative. /88   Pulse 58   Temp 98.4 °F (36.9 °C) (Oral)   Resp 18   Ht 5' 9\" (1.753 m)   Wt 212 lb 4.8 oz (96.3 kg)   SpO2 98%   BMI 31.35 kg/m²     Assessment:      Diagnosis Orders   1. Viral upper respiratory tract infection  COVID-19   2. Exposure to COVID-19 virus  COVID-19       Plan:      Return if symptoms worsen or fail to improve, for Resume all previous medications as directed. No orders of the defined types were placed in this encounter. · Practice meticulous handwashing and cover cough to prevent spread of infection. · Advised to quarantine self at home until receives results from COVID-19 - will call with results. · Do not return to work until symptoms have improved and no fever for 24 hrs without fever reducing medication. · Encouraged to increase fluids and rest.  · Encouraged to take Vitamin C 500 to 1000 mg daily, Zinc 50 mg daily, Vitamin D3 2,000 IU daily and Vitamin B complex daily or a multivitamin daily. · Tylenol OTC PRN for pain, discomfort or fever as directed on package  · Cool mist humidifier  · Hot tea with honey and lemon for cough PRN  · Patient instructions given for Flu symptoms and Covid 19 infection. .  · To ER or call 911 if any increasing difficulty breathing, shortness of breath, inability to swallow, hives, rash, facial/tongue swelling or temp greater than 103 degrees. · Follow up with PCP or Walk in Care as needed if symptoms worsen or do not improve.         Magnolia Servin received counseling on the following healthy behaviors: increased fluids and rest. Patient given educational materials - see patient instructions. Discussed use,benefit, and side effects of prescribed medications. Treatment plan discussed at visit. Continue routine health care follow up. All patient questions answered. Pt voiced understanding.       Electronically signed by DIANNA Galindo CNP on 9/27/2021 at 7:53 PM

## 2021-09-28 ENCOUNTER — TELEPHONE (OUTPATIENT)
Dept: FAMILY MEDICINE CLINIC | Age: 66
End: 2021-09-28

## 2021-09-28 DIAGNOSIS — Z20.822 SUSPECTED COVID-19 VIRUS INFECTION: Primary | ICD-10-CM

## 2021-09-28 LAB
-: NORMAL
REASON FOR REJECTION: NORMAL
ZZ NTE CLEAN UP: ORDERED TEST: NORMAL
ZZ NTE WITH NAME CLEAN UP: SPECIMEN SOURCE: NORMAL

## 2021-09-28 NOTE — TELEPHONE ENCOUNTER
Pt reported to walk in on 09/27/21 to be evaluated for Covid. Lab called stating sample leaked while in route to [place], requiring patient to be re-swabbed. Patient has transportation issues. Do you advise the patient to be retested?         [phone] Ph #

## 2021-09-29 ENCOUNTER — HOSPITAL ENCOUNTER (OUTPATIENT)
Dept: PREADMISSION TESTING | Age: 66
Setting detail: SPECIMEN
Discharge: HOME | End: 2021-09-29
Payer: OTHER GOVERNMENT

## 2021-11-18 ENCOUNTER — OFFICE VISIT (OUTPATIENT)
Dept: FAMILY MEDICINE CLINIC | Age: 66
End: 2021-11-18
Payer: COMMERCIAL

## 2021-11-18 VITALS
HEART RATE: 66 BPM | OXYGEN SATURATION: 98 % | DIASTOLIC BLOOD PRESSURE: 70 MMHG | WEIGHT: 212 LBS | HEIGHT: 69 IN | SYSTOLIC BLOOD PRESSURE: 120 MMHG | BODY MASS INDEX: 31.4 KG/M2

## 2021-11-18 DIAGNOSIS — M25.562 BILATERAL CHRONIC KNEE PAIN: ICD-10-CM

## 2021-11-18 DIAGNOSIS — Z86.59 HISTORY OF SUICIDAL IDEATION: ICD-10-CM

## 2021-11-18 DIAGNOSIS — G89.29 BILATERAL CHRONIC KNEE PAIN: ICD-10-CM

## 2021-11-18 DIAGNOSIS — M25.552 CHRONIC HIP PAIN, BILATERAL: ICD-10-CM

## 2021-11-18 DIAGNOSIS — M25.561 BILATERAL CHRONIC KNEE PAIN: ICD-10-CM

## 2021-11-18 DIAGNOSIS — G89.29 CHRONIC HIP PAIN, BILATERAL: ICD-10-CM

## 2021-11-18 DIAGNOSIS — M25.551 CHRONIC HIP PAIN, BILATERAL: ICD-10-CM

## 2021-11-18 DIAGNOSIS — F43.10 POST TRAUMATIC STRESS DISORDER: ICD-10-CM

## 2021-11-18 DIAGNOSIS — E11.9 DIET-CONTROLLED DIABETES MELLITUS (HCC): ICD-10-CM

## 2021-11-18 DIAGNOSIS — J43.1 PANLOBULAR EMPHYSEMA (HCC): Primary | ICD-10-CM

## 2021-11-18 DIAGNOSIS — K59.01 SLOW TRANSIT CONSTIPATION: ICD-10-CM

## 2021-11-18 PROCEDURE — G8427 DOCREV CUR MEDS BY ELIG CLIN: HCPCS | Performed by: FAMILY MEDICINE

## 2021-11-18 PROCEDURE — 3023F SPIROM DOC REV: CPT | Performed by: FAMILY MEDICINE

## 2021-11-18 PROCEDURE — 99214 OFFICE O/P EST MOD 30 MIN: CPT | Performed by: FAMILY MEDICINE

## 2021-11-18 PROCEDURE — G8926 SPIRO NO PERF OR DOC: HCPCS | Performed by: FAMILY MEDICINE

## 2021-11-18 PROCEDURE — 3046F HEMOGLOBIN A1C LEVEL >9.0%: CPT | Performed by: FAMILY MEDICINE

## 2021-11-18 PROCEDURE — G8417 CALC BMI ABV UP PARAM F/U: HCPCS | Performed by: FAMILY MEDICINE

## 2021-11-18 PROCEDURE — 1036F TOBACCO NON-USER: CPT | Performed by: FAMILY MEDICINE

## 2021-11-18 PROCEDURE — 4040F PNEUMOC VAC/ADMIN/RCVD: CPT | Performed by: FAMILY MEDICINE

## 2021-11-18 PROCEDURE — 1123F ACP DISCUSS/DSCN MKR DOCD: CPT | Performed by: FAMILY MEDICINE

## 2021-11-18 PROCEDURE — G8484 FLU IMMUNIZE NO ADMIN: HCPCS | Performed by: FAMILY MEDICINE

## 2021-11-18 PROCEDURE — 2022F DILAT RTA XM EVC RTNOPTHY: CPT | Performed by: FAMILY MEDICINE

## 2021-11-18 PROCEDURE — 3017F COLORECTAL CA SCREEN DOC REV: CPT | Performed by: FAMILY MEDICINE

## 2021-11-18 RX ORDER — SENNA PLUS 8.6 MG/1
1 TABLET ORAL DAILY PRN
Qty: 30 TABLET | Refills: 1 | Status: SHIPPED | OUTPATIENT
Start: 2021-11-18

## 2021-11-18 ASSESSMENT — PATIENT HEALTH QUESTIONNAIRE - PHQ9
SUM OF ALL RESPONSES TO PHQ QUESTIONS 1-9: 0
SUM OF ALL RESPONSES TO PHQ QUESTIONS 1-9: 0
1. LITTLE INTEREST OR PLEASURE IN DOING THINGS: 0
2. FEELING DOWN, DEPRESSED OR HOPELESS: 0
SUM OF ALL RESPONSES TO PHQ QUESTIONS 1-9: 0
SUM OF ALL RESPONSES TO PHQ9 QUESTIONS 1 & 2: 0

## 2021-11-18 ASSESSMENT — ENCOUNTER SYMPTOMS: RESPIRATORY NEGATIVE: 1

## 2021-11-18 NOTE — PROGRESS NOTES
Name: Joon Donnelly  : 1955         Chief Complaint:     Chief Complaint   Patient presents with    Hypertension    Hyperlipidemia    Diabetes       History of Present Illness:      Joon Donnelly is a 77 y.o.  male who presents with Hypertension, Hyperlipidemia, and Diabetes      HPI     Pt c/o abd pain, poor appetite, feels worse after eating. Taking miralax 1 capful daily and stool softeners 1 BID. If he has prunes those help also. Getting ready to move into homeless or medically disabled  housing in [place] and needs paperwork. Hoping to have wendy knee and hip replacements. On 70%  disability. H/o DM which resolved after he lost 160# a number of yrs ago. Has more trouble these days with hypoglycemia. Mental illness managed by psych, h/o psych hospitalization in mid 2019. Stable now and compliant with meds. Never had a positive TB screening. Believes he had COVID several mos ago. Also believes he was vaccinated against COVID but can't remember when or where. COPD stable, using albuterol prn. States he has mesothelioma from working around asbestos in the remote past.    Past Medical History:     Past Medical History:   Diagnosis Date    Arthritis     WENDY KNEE    Bronchitis with chronic airway obstruction (HCC)     CAD (coronary artery disease)     CHF (congestive heart failure) (HCC)     CTS (carpal tunnel syndrome)     RIGHT    [facility] [address]     Diabetic neuropathy associated with diabetes mellitus due to underlying condition ([facility] [address]     Exposure to toxic chemical     [employer], [employer] base-toxic water exposure    GERD (gastroesophageal reflux disease)     H/O cardiovascular stress test 2016    Abnormal.  Moderate perfusion defect of mild to moderate intensity in the inferolateral,inferior and inferoapical regions during stress imaging. Ef 45%. with regional wall motion abnormalities.     H/O echocardiogram 16    EF >60%. LV wall thickness is mildly increased. Inferoseptal wall is abnormal in its motion not unusual status post open heart surgery. Normal aortic valve structure with ild aortic regurgitation.  Hip pain, left     Hx of cardiac cath 07/08/2016    LMCA: Normal 0% stenosis. LAD: Chronic occlusion 100%. Lesion on Mid LAD: 100% stenosis. LCx: single stenosis. Lesion on Mid CX: 80% stenosis. RCA: Lesion on Mid RCA: 70% stenosis.  Hyperlipidemia     Hypertension     MI (my[facility] infarction) ([facility] [address] 2015    Pneumonia     PTSD (post-traumatic stress disorder)     S/P CABG (coronary artery bypass graft) 10/23/15    Sleep apnea     Ventricular fibrillation ([facility] [address] 10/18/2015    x 2 DURING HEART ATTACK        Past Surgical History:     Past Surgical History:   Procedure Laterality Date    CARDIAC SURGERY      10/21/2015 3 vessel CABG    CARPAL TUNNEL RELEASE      CARPAL TUNNEL RELEASE Right     [facility]    COLONOSCOPY  11/27/2017    CORONARY ARTERY BYPASS GRAFT  10/23/15    Dr. Thrasher Fore      left middle finger    FRACTURE SURGERY      left wrist    JOINT REPLACEMENT      right knee    PILONIDAL CYST EXCISION      1974    PILONIDAL CYST EXCISION      [place] COLONOSCOPY W/BIOPSY SINGLE/MULTIPLE N/A 11/27/2017    COLONOSCOPY WITH BIOPSY/ polypectomy performed by Luis Manuel Faith MD at [address] [facility] EGD TRANSORAL BIOPSY SINGLE/MULTIPLE N/A 11/27/2017    EGD BIOPSY performed by Luis Manuel Faith MD at [address] [facility]      left wrist        Medications:       Prior to Admission medications    Medication Sig Start Date End Date Taking?  Authorizing Provider   senna (SENOKOT) 8.6 MG tablet Take 1 tablet by mouth daily as needed for Constipation Up to twice a week 11/18/21  Yes Verlon Sprinkles, DO   Sildenafil Citrate (VIAGRA PO) Take by mouth as needed (from the [place])   Yes Historical Provider, MD   terazosin (HYTRIN) 2 MG capsule Take 1 capsule by mouth nightly 8/19/21  Yes Fredrick Ventura PA-C   albuterol (PROVENTIL) (2.5 MG/3ML) 0.083% nebulizer solution inhale contents of 1 vial ( 3 milliliters ) in nebulizer by mouth and INTO THE LUNGS every 4 hours if needed for wheezing or shortness of breath 8/18/21  Yes Emma Martinez DO   docusate sodium (COLACE) 100 MG capsule Take 1 capsule by mouth 2 times daily 7/8/21  Yes Merari Zhao MD   ondansetron (ZOFRAN) 8 MG tablet Take 8 mg by mouth every 8 hours as needed for Nausea or Vomiting   Yes Historical Provider, MD   senna (SENOKOT) 8.6 MG tablet Take 1 tablet by mouth 2 times daily as needed for Constipation 6/21/21  Yes Emma Martinez DO   RANEXA 500 MG extended release tablet Take 2 tablets by mouth 2 times daily 11/16/20  Yes Rosa Clement MD   nitroGLYCERIN (NITROSTAT) 0.4 MG SL tablet up to max of 3 total doses.  If no relief after 1 dose, call 911. 7/15/20  Yes Emma Martinez DO   atenolol (TENORMIN) 25 MG tablet Take 1 tablet by mouth daily 9/19/19  Yes Rosa Clement MD   mirtazapine (REMERON) 15 MG tablet Take 1 tablet by mouth nightly 5/31/19  Yes DIANNA Roland CNP   mirtazapine (REMERON) 30 MG tablet Take 1 tablet by mouth daily 5/31/19  Yes DIANNA Roland CNP   traZODone (DESYREL) 150 MG tablet Take 1 tablet by mouth nightly as needed for Sleep 5/31/19  Yes DIANNA Roland CNP   spironolactone (ALDACTONE) 25 MG tablet Take 25 mg by mouth daily   Yes Historical Provider, MD   pantoprazole (PROTONIX) 40 MG tablet Take 1 tablet by mouth every morning (before breakfast) 3/6/19  Yes Emma Martinez DO   Cholecalciferol (VITAMIN D) 2000 UNITS CAPS capsule Take 1 capsule by mouth daily   Yes Historical Provider, MD   aspirin 81 MG tablet Take 81 mg by mouth daily   Yes Historical Provider, MD        Allergies:       Codeine, Flomax [tamsulosin], Hydrocodone, Pcn [penicillins], and Sulfa antibiotics    Social History:     Tobacco:    reports that he quit smoking about 15 years ago. His smoking use included cigarettes. He has a 19.00 pack-year smoking history. He quit smokeless tobacco use about 21 years ago. Alcohol:      reports no history of alcohol use. Drug Use:  reports current drug use. Frequency: 1.00 time per week. Drug: Marijuana [place]). Family History:     Family History   Adopted: Yes   Family history unknown: Yes       Review of Systems:     Positive and Negative as described in HPI    Review of Systems   Constitutional: Negative. HENT: Negative. Respiratory: Negative. Physical Exam:     Vitals:  /70   Pulse 66   Ht 5' 9\" (1.753 m)   Wt 212 lb (96.2 kg)   SpO2 98%   BMI 31.31 kg/m²   Physical Exam  Vitals and nursing note reviewed. Constitutional:       Appearance: He is well-developed. HENT:      Right Ear: Hearing and tympanic membrane normal.      Left Ear: Hearing and tympanic membrane normal.      Nose: Nose normal.      Mouth/Throat:      Dentition: Normal dentition. Eyes:      Conjunctiva/sclera: Conjunctivae normal.      Pupils: Pupils are equal, round, and reactive to light. Neck:      Thyroid: No thyroid mass or thyromegaly. Cardiovascular:      Rate and Rhythm: Normal rate and regular rhythm. Heart sounds: S1 normal and S2 normal. No murmur heard. Comments: No peripheral edema. Pulmonary:      Effort: Pulmonary effort is normal.      Breath sounds: Normal breath sounds. Abdominal:      General: Bowel sounds are normal.      Palpations: Abdomen is soft. Tenderness: There is no abdominal tenderness. Musculoskeletal:      Comments: L middle finger s/p amputation of distal phalanx. 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 Ambulates with rollator. Lymphadenopathy:      Cervical: No cervical adenopathy. Skin:     General: Skin is warm and dry. Findings: No rash.    Neurological:      Mental Status: He is alert and oriented to person, place, and time. Psychiatric:         Mood and Affect: Mood normal.         Behavior: Behavior normal. Behavior is cooperative. Data:     Lab Results   Component Value Date     05/26/2021    K 4.7 05/26/2021    CL 96 05/26/2021    CO2 26 05/26/2021    BUN 10 05/26/2021    CREATININE 0.86 05/26/2021    GLUCOSE 100 05/26/2021    GLUCOSE 258 01/31/2012    PROT 6.7 05/26/2021    LABALBU 3.9 05/26/2021    LABALBU 4.3 09/08/2011    BILITOT 0.37 05/26/2021    ALKPHOS 84 05/26/2021    AST 10 05/26/2021    ALT 8 05/26/2021     Lab Results   Component Value Date    WBC 6.5 05/26/2021    RBC 4.74 05/26/2021    RBC 5.49 01/31/2012    HGB 14.1 05/26/2021    HCT 41.0 05/26/2021    MCV 86.5 05/26/2021    MCH 29.7 05/26/2021    MCHC 34.4 05/26/2021    RDW 16.3 05/26/2021     05/26/2021     01/31/2012    MPV NOT REPORTED 05/26/2021     Lab Results   Component Value Date    TSH 0.89 05/26/2021     Lab Results   Component Value Date    CHOL 188 05/26/2021    HDL 35 05/26/2021    PSA 0.52 07/05/2017    LABA1C 5.1 05/14/2019         Assessment & Plan:        Diagnosis Orders   1. Panlobular emphysema ([facility] [address]     2. Post traumatic stress disorder     3. History of suicidal ideation     4. Diet-controlled diabetes mellitus ([facility] [address]     5. Chronic hip pain, bilateral     6. Bilateral chronic knee pain     7. Slow transit constipation     1. COPD stable, cont albuterol prn  2-3. PTSD and depression, h/o SI - currently stable, cont current rx  4. H/o DM, no abnl a1c available to me so this is per pt report. Recent blood glucose levels have been WNL. 5-6. Chronic ann hip and knee pain, hoping to have joint replacements through [place]. Cont use of walker for now. 7. Constipation  - continue colace and miralax, add senna prn.  Also advised eating prunes, which he says he likes and have helped in the past.         Requested Prescriptions     Signed Prescriptions Disp Refills    senna (SENOKOT) 8.6 MG tablet 30 tablet 1     Sig: Take 1 tablet by mouth daily as needed for Constipation Up to twice a week       There are no Patient Instructions on file for this visit. Rob Opitz received counseling on the following healthy behaviors: medication adherence  Reviewed prior labs and health maintenance. Continue current medications, diet and exercise. Discussed use, benefit, and side effects of prescribed medications. Barriers to medication compliance addressed. Patient given educational materials - see patient instructions. All patient questions answered. Patient voiced understanding.      Electronically signed by Kristen Lester DO on 11/18/2021 at 9:57 PM   [address]  Dept: [phone]

## 2021-11-18 NOTE — Clinical Note
Can we get immunization records from [place]? Otherwise we'll need to leave that page of his forms blank. Thank you! !!

## 2022-01-18 RX ORDER — ALBUTEROL SULFATE 90 UG/1
AEROSOL, METERED RESPIRATORY (INHALATION)
Qty: 18 G | Refills: 2 | Status: SHIPPED | OUTPATIENT
Start: 2022-01-18 | End: 2022-09-12

## 2022-01-18 NOTE — TELEPHONE ENCOUNTER
Patient is asking for a refill on Spiriva inhaler and Albuterol inhaler.       Drug [address] Maintenance   Topic Date Due    Hepatitis C screen  Never done    COVID-19 Vaccine (1) Never done    Diabetic foot exam  Never done    Diabetic microalbuminuria test  Never done    Diabetic retinal exam  Never done    A1C test (Diabetic or Prediabetic)  05/14/2020    Shingles Vaccine (2 of 2) 10/18/2021    Flu vaccine (1) 11/18/2022 (Originally 9/1/2021)    Lipid screen  05/26/2022    Potassium monitoring  05/26/2022    Creatinine monitoring  05/26/2022    Depression Screen  11/18/2022    DTaP/Tdap/Td vaccine (2 - Td or Tdap) 08/08/2026    Colon cancer screen colonoscopy  11/27/2027    Pneumococcal 65+ years Vaccine  Completed    AAA screen  Completed    Hepatitis A vaccine  Aged Out    Hib vaccine  Aged Out    Meningococcal (ACWY) vaccine  Aged Out             (applicable per patient's age: Cancer Screenings, Depression Screening, Fall Risk Screening, Immunizations)    Hemoglobin A1C (%)   Date Value   05/14/2019 5.1     LDL Cholesterol (mg/dL)   Date Value   05/26/2021 136 (H)     AST (U/L)   Date Value   05/26/2021 10     ALT (U/L)   Date Value   05/26/2021 8     BUN (mg/dL)   Date Value   05/26/2021 10      (goal A1C is < 7)   (goal LDL is <100) need 30-50% reduction from baseline     BP Readings from Last 3 Encounters:   11/18/21 120/70   09/27/21 139/88   08/19/21 (!) 140/80    (goal /80)      All Future Testing planned in [facility]:  Lab Frequency Next Occurrence   TSH with Reflex Once 06/01/2022   CBC Auto Differential Once 06/01/2022   Comprehensive Metabolic Panel Once 19/19/5590   Vitamin D 25 Hydroxy Once 06/01/2022   Lipid Panel Once 06/01/2022   Magnesium Once 06/01/2022   EKG 12 Lead Once 06/01/2022   XR CHEST (2 VW) Once 06/01/2022   COVID-19 Once 09/28/2021       Next Visit Date:  Future Appointments   Date Time Provider April Cutler   5/23/2022  2:00 PM Kiara Shepherd MD Zan Mendez [facility]   8/18/2022  2:30 PM MD Kelly Connors [facility]            Patient Active Problem List:     ASHD (arteriosclerotic heart disease)     History of coronary artery bypass surgery     Obstructive apnea     Hypertension     Hyperlipidemia     Chronic obstructive pulmonary disease (HCC)     Vitamin D deficiency disease     Post traumatic stress disorder     CHF (congestive heart failure), NYHA class II, chronic, diastolic (HCC)     Cervical myelopathy (HCC)     Arrhythmia     Diabetes mellitus type 2 in obese ([address]     S/P cervical spinal fusion     Spinal stenosis in cervical region

## 2022-01-18 NOTE — TELEPHONE ENCOUNTER
Last visit:  11/18/2021  Next Visit Date:    Future Appointments   Date Time Provider April Menendezi   5/23/2022  2:00 PM MD Amanda Feldman [facility]   8/18/2022  2:30 PM MD Bayron Basilio [facility]         Medication List:  Prior to Admission medications    Medication Sig Start Date End Date Taking? Authorizing Provider   senna (SENOKOT) 8.6 MG tablet Take 1 tablet by mouth daily as needed for Constipation Up to twice a week 11/18/21   Ira Hanson DO   Sildenafil Citrate (VIAGRA PO) Take by mouth as needed (from the [place])    Historical Provider, MD   terazosin (HYTRIN) 2 MG capsule Take 1 capsule by mouth nightly 8/19/21   Carmelo Ventura PA-C   albuterol (PROVENTIL) (2.5 MG/3ML) 0.083% nebulizer solution inhale contents of 1 vial ( 3 milliliters ) in nebulizer by mouth and INTO THE LUNGS every 4 hours if needed for wheezing or shortness of breath 8/18/21   Ira Hanson DO   docusate sodium (COLACE) 100 MG capsule Take 1 capsule by mouth 2 times daily 7/8/21   Henny Felix MD   ondansetron (ZOFRAN) 8 MG tablet Take 8 mg by mouth every 8 hours as needed for Nausea or Vomiting    Historical Provider, MD   senna (SENOKOT) 8.6 MG tablet Take 1 tablet by mouth 2 times daily as needed for Constipation 6/21/21   Ira Hanson DO   RANEXA 500 MG extended release tablet Take 2 tablets by mouth 2 times daily 11/16/20   Francia Cruz MD   nitroGLYCERIN (NITROSTAT) 0.4 MG SL tablet up to max of 3 total doses.  If no relief after 1 dose, call 911. 7/15/20   Ira Hanson DO   atenolol (TENORMIN) 25 MG tablet Take 1 tablet by mouth daily 9/19/19   Francia Cruz MD   mirtazapine (REMERON) 15 MG tablet Take 1 tablet by mouth nightly 5/31/19   DIANNA Rodriguez - CNP   mirtazapine (REMERON) 30 MG tablet Take 1 tablet by mouth daily 5/31/19   Andria Bachelor, APRN - CNP   traZODone (DESYREL) 150 MG tablet Take 1 tablet by mouth nightly as needed for Sleep 5/31/19   Andria Blancas, APRN - CNP   spironolactone (ALDACTONE) 25 MG tablet Take 25 mg by mouth daily    Historical Provider, MD   pantoprazole (PROTONIX) 40 MG tablet Take 1 tablet by mouth every morning (before breakfast) 3/6/19   Anderson Waters DO   Cholecalciferol (VITAMIN D) 2000 UNITS CAPS capsule Take 1 capsule by mouth daily    Historical Provider, MD   aspirin 81 MG tablet Take 81 mg by mouth daily    Historical Provider, MD

## 2022-01-19 ENCOUNTER — APPOINTMENT (OUTPATIENT)
Dept: GENERAL RADIOLOGY | Age: 67
End: 2022-01-19
Payer: OTHER GOVERNMENT

## 2022-01-19 ENCOUNTER — HOSPITAL ENCOUNTER (EMERGENCY)
Age: 67
Discharge: HOME | End: 2022-01-19
Attending: FAMILY MEDICINE
Payer: OTHER GOVERNMENT

## 2022-01-19 VITALS
RESPIRATION RATE: 20 BRPM | BODY MASS INDEX: 29.53 KG/M2 | SYSTOLIC BLOOD PRESSURE: 129 MMHG | WEIGHT: 200 LBS | OXYGEN SATURATION: 94 % | DIASTOLIC BLOOD PRESSURE: 77 MMHG | HEART RATE: 91 BPM | TEMPERATURE: 99.1 F

## 2022-01-19 DIAGNOSIS — J40 BRONCHITIS: Primary | ICD-10-CM

## 2022-01-19 DIAGNOSIS — Z87.09 HISTORY OF COPD: ICD-10-CM

## 2022-01-19 DIAGNOSIS — Z20.822 LAB TEST NEGATIVE FOR COVID-19 VIRUS: ICD-10-CM

## 2022-01-19 LAB
ABSOLUTE EOS #: 0.1 K/UL (ref 0–0.4)
ABSOLUTE IMMATURE GRANULOCYTE: ABNORMAL K/UL (ref 0–0.3)
ABSOLUTE LYMPH #: 1.5 K/UL (ref 1–4.8)
ABSOLUTE MONO #: 0.9 K/UL (ref 0–1)
ANION GAP SERPL CALCULATED.3IONS-SCNC: 16 MMOL/L (ref 9–17)
BASOPHILS # BLD: 1 % (ref 0–2)
BASOPHILS ABSOLUTE: 0.1 K/UL (ref 0–0.2)
BUN BLDV-MCNC: 16 MG/DL (ref 8–23)
BUN/CREAT BLD: 18 (ref 9–20)
CALCIUM SERPL-MCNC: 9.2 MG/DL (ref 8.6–10.4)
CHLORIDE BLD-SCNC: 89 MMOL/L (ref 98–107)
CO2: 22 MMOL/L (ref 20–31)
CREAT SERPL-MCNC: 0.88 MG/DL (ref 0.7–1.2)
DIFFERENTIAL TYPE: YES
EOSINOPHILS RELATIVE PERCENT: 1 % (ref 0–5)
GFR AFRICAN AMERICAN: >60 ML/MIN
GFR NON-AFRICAN AMERICAN: >60 ML/MIN
GFR SERPL CREATININE-BSD FRML MDRD: ABNORMAL ML/MIN/{1.73_M2}
GFR SERPL CREATININE-BSD FRML MDRD: ABNORMAL ML/MIN/{1.73_M2}
GLUCOSE BLD-MCNC: 105 MG/DL (ref 70–99)
HCT VFR BLD CALC: 45.4 % (ref 41–53)
HEMOGLOBIN: 15.1 G/DL (ref 13.5–17.5)
IMMATURE GRANULOCYTES: ABNORMAL %
LYMPHOCYTES # BLD: 14 % (ref 13–44)
MAGNESIUM: 1.9 MG/DL (ref 1.6–2.6)
MCH RBC QN AUTO: 30.4 PG (ref 26–34)
MCHC RBC AUTO-ENTMCNC: 33.3 G/DL (ref 31–37)
MCV RBC AUTO: 91.1 FL (ref 80–100)
MONOCYTES # BLD: 9 % (ref 5–9)
NRBC AUTOMATED: ABNORMAL PER 100 WBC
PDW BLD-RTO: 15.1 % (ref 12.1–15.2)
PLATELET # BLD: 274 K/UL (ref 140–450)
PLATELET ESTIMATE: ABNORMAL
PMV BLD AUTO: ABNORMAL FL (ref 6–12)
POTASSIUM SERPL-SCNC: 3.5 MMOL/L (ref 3.7–5.3)
RBC # BLD: 4.98 M/UL (ref 4.5–5.9)
RBC # BLD: ABNORMAL 10*6/UL
SARS-COV-2, RAPID: NOT DETECTED
SEG NEUTROPHILS: 75 % (ref 39–75)
SEGMENTED NEUTROPHILS ABSOLUTE COUNT: 8.1 K/UL (ref 2.1–6.5)
SODIUM BLD-SCNC: 127 MMOL/L (ref 135–144)
SPECIMEN DESCRIPTION: NORMAL
WBC # BLD: 10.7 K/UL (ref 3.5–11)
WBC # BLD: ABNORMAL 10*3/UL

## 2022-01-19 PROCEDURE — 80048 BASIC METABOLIC PNL TOTAL CA: CPT

## 2022-01-19 PROCEDURE — 87635 SARS-COV-2 COVID-19 AMP PRB: CPT

## 2022-01-19 PROCEDURE — C9803 HOPD COVID-19 SPEC COLLECT: HCPCS

## 2022-01-19 PROCEDURE — 99284 EMERGENCY DEPT VISIT MOD MDM: CPT

## 2022-01-19 PROCEDURE — 83735 ASSAY OF MAGNESIUM: CPT

## 2022-01-19 PROCEDURE — 6370000000 HC RX 637 (ALT 250 FOR IP)

## 2022-01-19 PROCEDURE — 6370000000 HC RX 637 (ALT 250 FOR IP): Performed by: FAMILY MEDICINE

## 2022-01-19 PROCEDURE — 85025 COMPLETE CBC W/AUTO DIFF WBC: CPT

## 2022-01-19 PROCEDURE — 71045 X-RAY EXAM CHEST 1 VIEW: CPT

## 2022-01-19 RX ORDER — IPRATROPIUM BROMIDE AND ALBUTEROL SULFATE 2.5; .5 MG/3ML; MG/3ML
1 SOLUTION RESPIRATORY (INHALATION) EVERY 4 HOURS PRN
Qty: 120 ML | Refills: 0 | Status: SHIPPED | OUTPATIENT
Start: 2022-01-19 | End: 2022-09-12

## 2022-01-19 RX ORDER — ALBUTEROL SULFATE 90 UG/1
AEROSOL, METERED RESPIRATORY (INHALATION)
Status: COMPLETED
Start: 2022-01-19 | End: 2022-01-19

## 2022-01-19 RX ORDER — DOXYCYCLINE HYCLATE 100 MG
100 TABLET ORAL ONCE
Status: COMPLETED | OUTPATIENT
Start: 2022-01-19 | End: 2022-01-19

## 2022-01-19 RX ORDER — ALBUTEROL SULFATE 90 UG/1
2 AEROSOL, METERED RESPIRATORY (INHALATION) EVERY 6 HOURS PRN
Qty: 18 G | Refills: 3 | Status: SHIPPED | OUTPATIENT
Start: 2022-01-19

## 2022-01-19 RX ORDER — DOXYCYCLINE HYCLATE 100 MG
100 TABLET ORAL 2 TIMES DAILY
Qty: 20 TABLET | Refills: 0 | Status: SHIPPED | OUTPATIENT
Start: 2022-01-19 | End: 2022-01-29

## 2022-01-19 RX ADMIN — ALBUTEROL SULFATE: 90 AEROSOL, METERED RESPIRATORY (INHALATION) at 22:45

## 2022-01-19 RX ADMIN — DOXYCYCLINE HYCLATE 100 MG: 100 TABLET, COATED ORAL at 22:46

## 2022-01-20 NOTE — ED PROVIDER NOTES
[facility]  eMERGENCY dEPARTMENT eNCOUnter          [address]       Chief Complaint   Patient presents with    Concern For COVID-19     Pt has been feeling sick for about a week. Cough fevers, chills. Nurses Notes reviewed and I agree except as noted in the HPI. HISTORY OF PRESENT ILLNESS    Nimisha Henry is a 77 y.o. male who presents to the emergency room via EMS from local hotel where patient is residing, patient states he been having cough and fever for 1 to 2 weeks, occasional chills. Patient states has a history of COPD, does have a nebulizer but does not have any solution. Patient unsure if he has not had any sick contacts. Concerned he states he would not be living in a hotel but may not be able for much longer, will likely be homeless soon. REVIEW OF SYSTEMS     Review of Systems   All other systems reviewed and are negative. PAST MEDICAL HISTORY    has a past medical history of Arthritis, Bronchitis with chronic airway obstruction (HCC), CAD (coronary artery disease), CHF (congestive heart failure) ([address] CTS (carpal tunnel syndrome), Diabetes mellitus ([address] Diabetic neuropathy associated with diabetes mellitus due to underlying condition ([address] Exposure to toxic chemical, GERD (gastroesophageal reflux disease), H/O cardiovascular stress test, H/O echocardiogram, Hip pain, left, Hx of cardiac cath, Hyperlipidemia, Hypertension, MI (myocardial infarction) ([address] Pneumonia, PTSD (post-traumatic stress disorder), S/P CABG (coronary artery bypass graft), Sleep apnea, and Ventricular fibrillation ([address] SURGICAL HISTORY      has a past surgical history that includes joint replacement; Tonsillectomy; Wrist surgery; Finger surgery; Carpal tunnel release; Pilonidal cyst excision; Coronary artery bypass graft (10/23/15); Cardiac surgery; Pilonidal cyst excision; Carpal tunnel release (Right); fracture surgery; Finger amputation (Left);  Colonoscopy (2012); Colonoscopy (11/27/2017); pr colonoscopy w/biopsy single/multiple (N/A, 11/27/2017); and pr egd transoral biopsy single/multiple (N/A, 11/27/2017). CURRENT MEDICATIONS       Discharge Medication List as of 1/19/2022 10:26 PM      CONTINUE these medications which have NOT CHANGED    Details   !! albuterol sulfate  (90 Base) MCG/ACT inhaler inhale 2 puffs by mouth every 4 hours if needed for wheezing or shortness of breath, Disp-18 g, R-2Normal      tiotropium (SPIRIVA RESPIMAT) 2.5 MCG/ACT AERS inhaler Inhale 2 puffs into the lungs daily, Disp-3 each, R-3Normal      !! senna (SENOKOT) 8.6 MG tablet Take 1 tablet by mouth daily as needed for Constipation Up to twice a week, Disp-30 tablet, R-1Normal      Silden[facility]il Citrate (VIAGRA PO) [facility]Historical Med      terazosin (HYTRIN) 2 MG capsule Take 1 capsule by mouth nightly, Disp-30 capsule, R-11Normal      albuterol (PROVENTIL) (2.5 MG/3ML) 0.083% nebulizer solution inhale contents of 1 vial ( 3 milliliters ) in nebulizer by mouth and INTO THE LUNGS every 4 hours if needed for wheezing or shortness of breath, Disp-300 mL, R-1Normal      docusate sodium (COLACE) 100 MG capsule Take 1 capsule by mouth 2 times daily, Disp-20 capsule, R-0Normal      ondansetron (ZOFRAN) 8 MG tablet Take 8 mg by mouth every 8 hours as needed for Nausea or VomitingHistorical Med      !! senna (SENOKOT) 8.6 MG tablet Take 1 tablet by mouth 2 times daily as needed for Constipation, Disp-60 tablet, R-0Normal      RANEXA 500 MG extended release tablet Take 2 tablets by mouth 2 times daily, Disp-60 tablet, R-5, DAWNormal      nitroGLYCERIN (NITROSTAT) 0.4 MG SL tablet up to max of 3 total doses.  If no relief after 1 dose, call 911., Disp-25 tablet,R-3Normal      atenolol (TENORMIN) 25 MG tablet Take 1 tablet by mouth daily, Disp-90 tablet, R-1Normal      !! mirtazapine (REMERON) 15 MG tablet Take 1 tablet by mouth nightly, Disp-30 tablet, R-0Normal      !! mirtazapine (REMERON) 30 MG tablet Take 1 tablet by mouth daily, Disp-30 tablet, R-0Normal      traZODone (DESYREL) 150 MG tablet Take 1 tablet by mouth nightly as needed for Sleep, Disp-30 tablet, R-0Normal      spironolactone (ALDACTONE) 25 MG tablet Take 25 mg by mouth dailyHistorical Med      pantoprazole (PROTONIX) 40 MG tablet Take 1 tablet by mouth every morning (before breakfast), Disp-30 tablet, R-5Normal      Cholecalciferol (VITAMIN D) 2000 UNITS CAPS capsule Take 1 capsule by mouth daily      aspirin 81 MG tablet Take 81 mg by mouth daily       ! ! - Potential duplicate medications found. Please discuss with provider. ALLERGIES     is allergic to codeine, flomax [tamsulosin], hydrocodone, pcn [penicillins], and sulfa antibiotics. FAMILY HISTORY     is adopted. He was adopted. Family history is unknown by patient. SOCIAL HISTORY      reports that he quit smoking about 15 years ago. His smoking use included cigarettes. He has a 19.00 pack-year smoking history. He quit smokeless tobacco use about 21 years ago. He reports current drug use. Frequency: 1.00 time per week. Drug: Marijuana Deboraha Sanes). He reports that he does not drink alcohol. PHYSICAL EXAM     INITIAL VITALS:  weight is 200 lb (90.7 kg). His oral temperature is 99.1 °F (37.3 °C). His blood pressure is 129/77 and his pulse is 91. His respiration is 20 and oxygen saturation is 94%. Physical Exam   Constitutional: Patient is oriented to person, place, and time. Patient appears well-developed and well-nourished. Patient is active and cooperative. HENT:   Head: Normocephalic and atraumatic. Head is without contusion. Right Ear: Hearing and external ear normal. No drainage. Left Ear: Hearing and external ear normal. No drainage. Nose: Nose normal. No nasal deformity. No epistaxis. Mouth/Throat: Mucous membranes are not dry. Eyes: EOMI.  Conjunctivae, sclera, and lids are normal. Right eye exhibits no discharge. Left eye exhibits no discharge. Neck: Full passive range of motion without pain and phonation normal.   Cardiovascular:  Normal rate, regular rhythm and intact distal pulses. Pulses: Right radial pulse  2+   Pulmonary/Chest: Effort normal. No tachypnea and no bradypnea. No wheezes, rhonchi, or rales. Abdominal: Soft. Patient without distension or tenderness  Musculoskeletal:   Negative acute trauma or deformity,  apparent full range of motion and normal strength all extremities appropriate to age. Neurological: Patient is alert and oriented to person, place, and time. patient displays no tremor. Patient displays no seizure activity. .  Lymphatic: No gross cervical lymphadenopathy  Skin: Skin is warm and dry. Patient is not diaphoretic. Psychiatric: Patient has a normal mood and affect. Patient speech is normal and behavior is normal. Cognition and memory are normal.    DIFFERENTIAL DIAGNOSIS:   Pneumonia bronchitis URI, viral illness NOS    DIAGNOSTIC RESULTS           RADIOLOGY: non-plain film images(s) such as CT, Ultrasound and MRI are read by the radiologist.  XR CHEST PORTABLE   Final Result   Findings and impression:      1. The patient is slightly rotated to the left accentuating the    cardiomediastinal silhouette to the left of midline. 2. Some questionable atelectasis or scarring in the lingula. Some subtle faint    pneumonitis would be difficult to exclude in this region. The lungs are    otherwise clear. 3. Normal heart size with evidence of prior CABG. 4. No acute osseous abnormality.              LABS:   Labs Reviewed   CBC WITH AUTO DIFFERENTIAL - Abnormal; Notable for the following components:       Result Value    Segs Absolute 8.10 (*)     All other components within normal limits   BASIC METABOLIC PANEL W/ REFLEX TO MG FOR LOW K - Abnormal; Notable for the following components:    Glucose 105 (*)     Sodium 127 (*)     Potassium 3.5 (*)     Chloride 89 (*)     All other components within normal limits   COVID-19, RAPID   MAGNESIUM       EMERGENCY DEPARTMENT COURSE:   Vitals:    Vitals:    01/19/22 2054 01/19/22 2055   BP:  129/77   Pulse: 90 91   Resp: 20    Temp:  99.1 °F (37.3 °C)   TempSrc:  Oral   SpO2: 94%    Weight: 200 lb (90.7 kg)      Patient history and physical exam taken at bedside, discussed patient symptoms and exam findings, will get rapid COVID test on patient and reevaluate. Patient sitting low semi-Fowlers in bed. Rapid COVID test negative    Discussed with patient his rapid COVID test negative, patient states he does have a history of COPD, states he feels he may be getting pneumonia again, we will go ahead and get blood work and chest x-ray at this time, patient acknowledges    Chest x-ray reviewed, lab work-up reviewed    Discussed with patient his overall work-up, discussed likely bronchitis, noting history of COPD, patient states he does have a nebulizer machine though does not have any solution, does not have a rescue inhaler. Patient states he is waiting for the [place] to come through on his medications as well as place him in an apartment up in [facility]. Patient states currently living at a local hotel. At this time I feel we can safely discharge patient home as he is hemodynamically stable with good pulse oximetry, no increased work of breathing or wheezing, will give patient a rescue inhaler from the emergency room, prescriptions for his albuterol solution, will start patient on doxycycline for presumptive bronchitis of unknown duration though felt to be likely more than 1 week, noting patient's history of COPD, allergies to penicillins. Advised patient follow-up with primary care, return to ER if any symptoms change or worsen other concerns, acknowledged        FINAL IMPRESSION      1. Bronchitis    2. History of COPD    3.  Lab test negative for COVID-19 virus          DISPOSITION/PLAN   D/c    PATIENT REFERRED TO:  Willian Peña DO  [address] [facility]  [phone]    Call       [facility]  [address] [facility]  [phone]    As needed, If symptoms worsen      DISCHARGE MEDICATIONS:  Discharge Medication List as of 1/19/2022 10:26 PM      START taking these medications    Details   doxycycline hyclate (VIBRA-TABS) 100 MG tablet Take 1 tablet by mouth 2 times daily for 10 days, Disp-20 tablet, R-0Normal      ipratropium-albuterol (DUONEB) 0.5-2.5 (3) MG/3ML SOLN nebulizer solution Inhale 3 mLs into the lungs every 4 hours as needed for Shortness of Breath, Disp-120 mL, R-0Normal      !! albuterol sulfate HFA (PROVENTIL HFA) 108 (90 Base) MCG/ACT inhaler Inhale 2 puffs into the lungs every 6 hours as needed for Wheezing May substitute as needed for insurance reasons, Disp-18 g, R-3Print       !! - Potential duplicate medications found. Please discuss with provider. Summation      Patient Course: d/c    ED Medications administered this visit:    Medications   doxycycline hyclate (VIBRA-TABS) tablet 100 mg (100 mg Oral Given 1/19/22 2246)   albuterol sulfate  (90 Base) MCG/ACT inhaler (  Given 1/19/22 2245)       New Prescriptions from this visit:    Discharge Medication List as of 1/19/2022 10:26 PM      START taking these medications    Details   doxycycline hyclate (VIBRA-TABS) 100 MG tablet Take 1 tablet by mouth 2 times daily for 10 days, Disp-20 tablet, R-0Normal      ipratropium-albuterol (DUONEB) 0.5-2.5 (3) MG/3ML SOLN nebulizer solution Inhale 3 mLs into the lungs every 4 hours as needed for Shortness of Breath, Disp-120 mL, R-0Normal      !! albuterol sulfate HFA (PROVENTIL HFA) 108 (90 Base) MCG/ACT inhaler Inhale 2 puffs into the lungs every 6 hours as needed for Wheezing May substitute as needed for insurance reasons, Disp-18 g, R-3Print       !! - Potential duplicate medications found. Please discuss with provider.           Follow-up:  Soraida Fraga DO  [address] [facility]  [phone]    Call       [facility]  [address] [address]  [phone]    As needed, If symptoms worsen        Final Impression:   1. Bronchitis    2. History of COPD    3.  Lab test negative for COVID-19 virus               (Please note that portions of this note were completed with a voice recognition program.  Efforts were made to edit the dictations but occasionally words are mis-transcribed.)    MD Zully Mckinney MD  01/20/22 [address] [place] [address] Antonio Aiken MD  01/20/22 0191

## 2022-01-20 NOTE — ED NOTES
Pt states\" I have been living in a hotel and I am unable to afford it for much longer and is homeless     [facility]  01/19/22 2108

## 2022-02-21 RX ORDER — ALBUTEROL SULFATE 2.5 MG/3ML
SOLUTION RESPIRATORY (INHALATION)
Qty: 300 ML | Refills: 1 | Status: SHIPPED | OUTPATIENT
Start: 2022-02-21 | End: 2022-04-08

## 2022-04-08 RX ORDER — ALBUTEROL SULFATE 2.5 MG/3ML
SOLUTION RESPIRATORY (INHALATION)
Qty: 300 ML | Refills: 1 | Status: SHIPPED | OUTPATIENT
Start: 2022-04-08

## 2022-04-08 NOTE — TELEPHONE ENCOUNTER
Last OV: 11/18/2021 physical chronic  Last RX:    Next scheduled apt: Visit date not found      surescript requesting a refill

## 2022-05-23 ENCOUNTER — OFFICE VISIT (OUTPATIENT)
Dept: FAMILY MEDICINE CLINIC | Age: 67
End: 2022-05-23
Payer: COMMERCIAL

## 2022-05-23 ENCOUNTER — HOSPITAL ENCOUNTER (OUTPATIENT)
Age: 67
Discharge: HOME | End: 2022-05-23
Payer: COMMERCIAL

## 2022-05-23 ENCOUNTER — OFFICE VISIT (OUTPATIENT)
Dept: CARDIOLOGY CLINIC | Age: 67
End: 2022-05-23
Payer: COMMERCIAL

## 2022-05-23 VITALS
BODY MASS INDEX: 30.86 KG/M2 | OXYGEN SATURATION: 96 % | HEART RATE: 58 BPM | DIASTOLIC BLOOD PRESSURE: 60 MMHG | WEIGHT: 209 LBS | SYSTOLIC BLOOD PRESSURE: 140 MMHG

## 2022-05-23 VITALS
HEIGHT: 69 IN | SYSTOLIC BLOOD PRESSURE: 136 MMHG | BODY MASS INDEX: 30.96 KG/M2 | WEIGHT: 209 LBS | DIASTOLIC BLOOD PRESSURE: 80 MMHG

## 2022-05-23 DIAGNOSIS — Z95.1 HISTORY OF CORONARY ARTERY BYPASS SURGERY: ICD-10-CM

## 2022-05-23 DIAGNOSIS — I10 ESSENTIAL HYPERTENSION: ICD-10-CM

## 2022-05-23 DIAGNOSIS — E55.9 VITAMIN D DEFICIENCY DISEASE: ICD-10-CM

## 2022-05-23 DIAGNOSIS — E78.5 HYPERLIPIDEMIA, UNSPECIFIED HYPERLIPIDEMIA TYPE: ICD-10-CM

## 2022-05-23 DIAGNOSIS — R22.2 LUMP OF SKIN OF BACK: Primary | ICD-10-CM

## 2022-05-23 DIAGNOSIS — E55.9 VITAMIN D DEFICIENCY DISEASE: Primary | ICD-10-CM

## 2022-05-23 LAB
ABSOLUTE EOS #: 0.3 K/UL (ref 0–0.4)
ABSOLUTE LYMPH #: 1.6 K/UL (ref 1–4.8)
ABSOLUTE MONO #: 0.4 K/UL (ref 0–1)
ALBUMIN SERPL-MCNC: 4.2 G/DL (ref 3.5–5.2)
ALP BLD-CCNC: 83 U/L (ref 40–129)
ALT SERPL-CCNC: 7 U/L (ref 5–41)
ANION GAP SERPL CALCULATED.3IONS-SCNC: 12 MMOL/L (ref 9–17)
AST SERPL-CCNC: 11 U/L
BASOPHILS # BLD: 0 % (ref 0–2)
BASOPHILS ABSOLUTE: 0 K/UL (ref 0–0.2)
BILIRUB SERPL-MCNC: 0.3 MG/DL (ref 0.3–1.2)
BUN BLDV-MCNC: 15 MG/DL (ref 8–23)
BUN/CREAT BLD: 16 (ref 9–20)
CALCIUM SERPL-MCNC: 9.3 MG/DL (ref 8.6–10.4)
CHLORIDE BLD-SCNC: 100 MMOL/L (ref 98–107)
CHOLESTEROL/HDL RATIO: 3.9
CHOLESTEROL: 168 MG/DL
CO2: 24 MMOL/L (ref 20–31)
CREAT SERPL-MCNC: 0.91 MG/DL (ref 0.7–1.2)
DIFFERENTIAL TYPE: YES
EOSINOPHILS RELATIVE PERCENT: 4 % (ref 0–5)
GFR AFRICAN AMERICAN: >60 ML/MIN
GFR NON-AFRICAN AMERICAN: >60 ML/MIN
GFR SERPL CREATININE-BSD FRML MDRD: NORMAL ML/MIN/{1.73_M2}
GLUCOSE BLD-MCNC: 94 MG/DL (ref 70–99)
HCT VFR BLD CALC: 43.9 % (ref 41–53)
HDLC SERPL-MCNC: 43 MG/DL
HEMOGLOBIN: 14.6 G/DL (ref 13.5–17.5)
LDL CHOLESTEROL: 112 MG/DL (ref 0–130)
LYMPHOCYTES # BLD: 22 % (ref 13–44)
MAGNESIUM: 1.8 MG/DL (ref 1.6–2.6)
MCH RBC QN AUTO: 30.6 PG (ref 26–34)
MCHC RBC AUTO-ENTMCNC: 33.3 G/DL (ref 31–37)
MCV RBC AUTO: 92 FL (ref 80–100)
MONOCYTES # BLD: 6 % (ref 5–9)
PDW BLD-RTO: 14.3 % (ref 12.1–15.2)
PLATELET # BLD: 254 K/UL (ref 140–450)
POTASSIUM SERPL-SCNC: 4.5 MMOL/L (ref 3.7–5.3)
RBC # BLD: 4.77 M/UL (ref 4.5–5.9)
SEG NEUTROPHILS: 68 % (ref 39–75)
SEGMENTED NEUTROPHILS ABSOLUTE COUNT: 4.8 K/UL (ref 2.1–6.5)
SODIUM BLD-SCNC: 136 MMOL/L (ref 135–144)
TOTAL PROTEIN: 6.8 G/DL (ref 6.4–8.3)
TRIGL SERPL-MCNC: 63 MG/DL
TSH SERPL DL<=0.05 MIU/L-ACNC: 1.02 UIU/ML (ref 0.3–5)
VITAMIN D 25-HYDROXY: 34.2 NG/ML
WBC # BLD: 7.2 K/UL (ref 3.5–11)

## 2022-05-23 PROCEDURE — 85025 COMPLETE CBC W/AUTO DIFF WBC: CPT

## 2022-05-23 PROCEDURE — 93005 ELECTROCARDIOGRAM TRACING: CPT

## 2022-05-23 PROCEDURE — 99213 OFFICE O/P EST LOW 20 MIN: CPT | Performed by: FAMILY MEDICINE

## 2022-05-23 PROCEDURE — 1036F TOBACCO NON-USER: CPT | Performed by: FAMILY MEDICINE

## 2022-05-23 PROCEDURE — G8427 DOCREV CUR MEDS BY ELIG CLIN: HCPCS | Performed by: FAMILY MEDICINE

## 2022-05-23 PROCEDURE — 99214 OFFICE O/P EST MOD 30 MIN: CPT | Performed by: INTERNAL MEDICINE

## 2022-05-23 PROCEDURE — 84443 ASSAY THYROID STIM HORMONE: CPT

## 2022-05-23 PROCEDURE — 36415 COLL VENOUS BLD VENIPUNCTURE: CPT

## 2022-05-23 PROCEDURE — G8427 DOCREV CUR MEDS BY ELIG CLIN: HCPCS | Performed by: INTERNAL MEDICINE

## 2022-05-23 PROCEDURE — G8417 CALC BMI ABV UP PARAM F/U: HCPCS | Performed by: FAMILY MEDICINE

## 2022-05-23 PROCEDURE — G8417 CALC BMI ABV UP PARAM F/U: HCPCS | Performed by: INTERNAL MEDICINE

## 2022-05-23 PROCEDURE — 1036F TOBACCO NON-USER: CPT | Performed by: INTERNAL MEDICINE

## 2022-05-23 PROCEDURE — 82306 VITAMIN D 25 HYDROXY: CPT

## 2022-05-23 PROCEDURE — 3017F COLORECTAL CA SCREEN DOC REV: CPT | Performed by: INTERNAL MEDICINE

## 2022-05-23 PROCEDURE — 83735 ASSAY OF MAGNESIUM: CPT

## 2022-05-23 PROCEDURE — 3017F COLORECTAL CA SCREEN DOC REV: CPT | Performed by: FAMILY MEDICINE

## 2022-05-23 PROCEDURE — 80053 COMPREHEN METABOLIC PANEL: CPT

## 2022-05-23 PROCEDURE — 1123F ACP DISCUSS/DSCN MKR DOCD: CPT | Performed by: FAMILY MEDICINE

## 2022-05-23 PROCEDURE — 1123F ACP DISCUSS/DSCN MKR DOCD: CPT | Performed by: INTERNAL MEDICINE

## 2022-05-23 PROCEDURE — 80061 LIPID PANEL: CPT

## 2022-05-23 RX ORDER — ATORVASTATIN CALCIUM 80 MG/1
80 TABLET, FILM COATED ORAL
COMMUNITY
Start: 2021-08-23

## 2022-05-23 RX ORDER — FAMOTIDINE 40 MG/1
40 TABLET, FILM COATED ORAL
COMMUNITY
Start: 2022-02-16

## 2022-05-23 ASSESSMENT — PATIENT HEALTH QUESTIONNAIRE - PHQ9
2. FEELING DOWN, DEPRESSED OR HOPELESS: 0
SUM OF ALL RESPONSES TO PHQ QUESTIONS 1-9: 0
SUM OF ALL RESPONSES TO PHQ QUESTIONS 1-9: 0
SUM OF ALL RESPONSES TO PHQ9 QUESTIONS 1 & 2: 0
SUM OF ALL RESPONSES TO PHQ QUESTIONS 1-9: 0
SUM OF ALL RESPONSES TO PHQ QUESTIONS 1-9: 0
1. LITTLE INTEREST OR PLEASURE IN DOING THINGS: 0

## 2022-05-23 NOTE — PROGRESS NOTES
Name: Kimo Rios  : 1955         Chief Complaint:     Chief Complaint   Patient presents with    Cyst     on back been there for years. History of Present Illness:      Kimo Rios is a 79 y.o.  male who presents with Cyst (on back been there for years. )      HPI    Patient complains of raised lesion on the back. His grandson noticed it a few days ago but patient says it has been present off and on for years. Itchy sometimes. No treatment tried. Feeling okay otherwise. Medical History:     Patient Active Problem List   Diagnosis    ASHD (arteriosclerotic heart disease)    History of coronary artery bypass surgery    Obstructive apnea    Hypertension    Hyperlipidemia    Chronic obstructive pulmonary disease (HCC)    Vitamin D deficiency disease    Post traumatic stress disorder    CHF (congestive heart failure), NYHA class II, chronic, diastolic (HCC)    Cervical myelopathy (HCC)    Arrhythmia    Diabetes mellitus type 2 in obese ([address]    S/P cervical spinal fusion    Spinal stenosis in cervical region       Medications:       Prior to Admission medications    Medication Sig Start Date End Date Taking?  Authorizing Provider   famotidine (PEPCID) 40 MG tablet Take 40 mg by mouth 22  Yes Historical Provider, MD   atorvastatin (LIPITOR) 80 MG tablet Take 80 mg by mouth 21  Yes Historical Provider, MD   medical marijuana USE 19  Yes Historical Provider, MD   albuterol (PROVENTIL) (2.5 MG/3ML) 0.083% nebulizer solution inhale contents of 1 vial ( 3 milliliters ) in nebulizer by mouth and INTO THE LUNGS every 4 hours if needed for wheezing or shortness of breath 22   Pilo Persaud DO   ipratropium-albuterol (DUONEB) 0.5-2.5 (3) MG/3ML SOLN nebulizer solution Inhale 3 mLs into the lungs every 4 hours as needed for Shortness of Breath 22   Olivier Vargas MD   albuterol sulfate HFA (PROVENTIL HFA) 108 (90 Base) MCG/ACT inhaler Inhale 2 puffs into the lungs every 6 hours as needed for Wheezing May substitute as needed for insurance reasons 1/19/22   Robinson Valdez MD   albuterol sulfate  (90 Base) MCG/ACT inhaler inhale 2 puffs by mouth every 4 hours if needed for wheezing or shortness of breath 1/18/22   Roselyn Machado DO   tiotropium (SPIRIVA RESPIMAT) 2.5 MCG/ACT AERS inhaler Inhale 2 puffs into the lungs daily 1/18/22   Roselyn Machado DO   senna (SENOKOT) 8.6 MG tablet Take 1 tablet by mouth daily as needed for Constipation Up to twice a week 11/18/21   Roselyn Machado DO   Sildenafil Citrate (VIAGRA PO) Take by mouth as needed (from the [address] [facility])    Historical Provider, MD   terazosin (HYTRIN) 2 MG capsule Take 1 capsule by mouth nightly 8/19/21   Ahsan Ventura PA-C   docusate sodium (COLACE) 100 MG capsule Take 1 capsule by mouth 2 times daily 7/8/21   Robinson Valdez MD   ondansetron (ZOFRAN) 8 MG tablet Take 8 mg by mouth every 8 hours as needed for Nausea or Vomiting    Historical Provider, MD   RANEXA 500 MG extended release tablet Take 2 tablets by mouth 2 times daily 11/16/20   Malissa Pickett MD   nitroGLYCERIN (NITROSTAT) 0.4 MG SL tablet up to max of 3 total doses.  If no relief after 1 dose, call 911. 7/15/20   Roselyn Machado DO   atenolol (TENORMIN) 25 MG tablet Take 1 tablet by mouth daily 9/19/19   Malissa Pickett MD   mirtazapine (REMERON) 15 MG tablet Take 1 tablet by mouth nightly 5/31/19   DIANNA Da Silva CNP   mirtazapine (REMERON) 30 MG tablet Take 1 tablet by mouth daily 5/31/19   DIANNA Da Silva CNP   traZODone (DESYREL) 150 MG tablet Take 1 tablet by mouth nightly as needed for Sleep 5/31/19   DIANNA Da Silva CNP   spironolactone (ALDACTONE) 25 MG tablet Take 25 mg by mouth daily    Historical Provider, MD   Cholecalciferol (VITAMIN D) 2000 UNITS CAPS capsule Take 1 capsule by mouth daily    Historical Provider, MD   aspirin 81 MG tablet Take 81 mg by mouth daily    Historical Provider, MD

## 2022-05-23 NOTE — LETTER
Cyril Zamarripa MD  [facility]  [address]   [phone]      May 23, 2022      [facility] [address]      RE:   Lazarus   :  1955      Dear Dr. Saqib Mcfarland:    CHIEF COMPLAINT:  1. Coronary artery disease. 2.  Status post open heart surgery by Dr. Laurita Lawton on 10/23/2015, with a LIMA to the LAD, a vein graft to the OM, and a vein graft to the right coronary artery. HISTORY OF PRESENT ILLNESS:  I had the pleasure of seeing Mr. Michael Guajardo in the office on 2022. He is a pleasant, somewhat high 49-year-old gentleman who had a myocardial infarction on 10/18/2015. He had ventricular fibrillation while loaded into a helicopter and he was defibrillated. He went to [place] where he had emergency cardiac catheterization that showed 99% disease in the OM branch of the circumflex with thrombus. He had 70% to 75% disease in the LAD with 70% RCA, EF of 50%. He had an aortic balloon pump placed and he had bypass surgery by Dr. Laurita Lawton on 10/23/2015, with a LIMA to the LAD, a vein graft to the OM, and a vein graft to the distal right coronary artery. He had a catheterization on 2016, because of chest pain that showed occluded LAD, circumflex had 80% disease, right coronary artery had 70% disease. His bypass grafts were patent. EF was 55% to 60%    He overall is doing well. He has had no chest pain, chest discomfort, or any unusual shortness of breath. He was in the emergency room in January with bronchopneumonia. He has had no other catheterizations. He is waiting to have knee replacement and hip replacement done at the [facility]. He does use marijuana on a regular basis (\"everything in moderation\").   He talked to the [employer] in [place] and they would like him to be a \"spokesman for marijuana if they make marijuana legal.\"    CARDIAC RISK FACTORS:  Known CAD:  Positive. Bypass Surgery:  Positive. Peripheral Vascular Disease:  Positive. Diabetes:  Negative. Hypertension:  Positive. Hyperlipidemia:  Positive. Smoking:  Negative. MEDICATIONS AT THIS TIME:  Albuterol inhaler p.r.n., aspirin 81 mg daily, Tenormin 25 mg daily, vitamin D 2000 units daily, Remeron 30 mg nightly daily, Zofran p.r.n., Protonix 40 mg daily, Ranexa 500 mg 2 tablets b.i.d., Viagra p.r.n., Aldactone 25 mg daily, Hytrin 2 mg daily, trazodone 150 mg p.r.n. PAST MEDICAL AND SURGICAL HISTORY:  1.  Wrist surgery many years ago. 2.  Tonsillectomy. 3.  Right knee replaced years ago. 4.  Bypass surgery as stated previously. 5.  Sleep apnea, on a CPAP mask. 6.  Posttraumatic stress syndrome from being in [facility].  7.  Cervical stenosis from neck injury with cervical disk laminectomy and fusion on 10/12/2020, with a good end result. 8.  Hyperlipidemia, on Lipitor. 9.  Arthritis of left hip, with pinning, having left and right hip replacement and right knee replacement. 10.  Pilonidal cyst.  11.  Cardiac as above. FAMILY HISTORY:  No significant heart disease in the family. SOCIAL HISTORY:  He is 79years old. He is , although  from his wife, Lionel Catherine. Quit smoking about 30 years ago. Smokes marijuana regularly and would like to be a spokesman for marijuana (\"White  is best for the heart. .. \"). REVIEW OF SYSTEMS:  Cardiac as above. Other systems reviewed including constitutional, eyes, ears, nose and throat, cardiovascular, respiratory, GI, , musculoskeletal, integumentary, neurologic, endocrine, hematologic and allergic/immunologic are negative except for what is described above. No weight loss or weight gain. No change in bowel habits. No blood in stools. No fevers, sweats or chills. PHYSICAL EXAMINATION:  VITAL SIGNS:  His blood pressure was 140/60 with a heart rate of 50 and regular. Respirations were 18. O2 saturation 96%.   Weight 209 pounds. GENERAL:  He is a pleasant 77-year-old gentleman. Denied pain. He was oriented to person, place and time. Answered questions appropriately. SKIN:  No unusual skin changes. HEENT:  The pupils are equally round and intact. Mucous membranes were dry. NECK:  No JVD. Good carotid pulses. No carotid bruits. No lymphadenopathy or thyromegaly. CARDIOVASCULAR EXAM:  S1 and S2 were normal.  No S3 or S4. Soft systolic blowing type murmur. No diastolic murmur. PMI was normal.  No lift, thrust, or pericardial friction rub. LUNGS:  Clear to auscultation and percussion. ABDOMEN:  Soft and nontender. Good bowel sounds. EXTREMITIES:  Good femoral pulses. Good pedal pulses. No pedal edema. Skin was warm and dry. No calf tenderness. Nail beds pink. Good cap refill. PULSES:  Bilateral symmetrical radial, brachial and carotid pulses. No carotid bruits. Good femoral and pedal pulses. NEUROLOGIC EXAM:  Within normal limits. PSYCHIATRIC EXAM:  Within normal limits. LABORATORY DATA:  His sodium was 136, potassium 4.5, BUN 15, creatinine 0.91, GFR was greater than 60. Magnesium was 1.8. Calcium 9.3. ALT was 7, AST was 11. TSH 1.02. White count 7.2, hemoglobin 14.6 with a platelet count 386,910. His EKG showed sinus rhythm with nonspecific ST changes with no change from previous EKGs. Chest x-ray was unremarkable on 01/20/2022. IMPRESSION:  1. Severe CAD. 2.  Functional class I.  3.  Anterior myocardial infarction on 10/18/2015, with a catheterization showing 99% OM branch of the circumflex with thrombus, 70% to 75% proximal LAD, 70% RCA, EF 50%. 4.  Bypass surgery by Dr. Malcolm Aj on 10/23/2015, with a LIMA to the LAD, a vein graft to the OM, and a vein graft to the right coronary artery. 5.  Normal LV function. 6.  Posttraumatic stress syndrome, followed at [place], [place]. 7.  Cervical disk surgery by Dr. Stephie Easley on 10/12/2020.  8. Hypertension. 9.  Hyperlipidemia.   10. On marijuana \"in moderation. \"    PLAN:  1. See in 1 year. 2.  No change in medications. DISCUSSION:  Mr. Sharona Soliman overall is doing well. He has had no chest pain or chest discomfort or any unusual shortness of breath. He would like to become a spokesman for marijuana (my whole staff wants to be spokespeople for marijuana. ..). I will plan on seeing him in 1 year. At this time, his cardiac status seems to be stable. Thank you very much.     Sincerely,        Daniel Leugn    D: 05/23/2022 13:48:28     T: 05/23/2022 20:32:29     [id number]  Job#: [id number]   Doc#: [id number]

## 2022-05-23 NOTE — PROGRESS NOTES
Ov DR Suzy Regan 1 year follow up   No chest pain or sob  Was in [place] in January with   Bronchial pneum. No other hospitalizations  Or procedures since seen   [facility]  [facility]   Replacement and hip replacements  Done. Has a son sees [facility]. They would like pt to be a spokesman  If they make marijuana legal       see in 1 year.

## 2022-05-24 LAB
EKG ATRIAL RATE: 58 BPM
EKG P AXIS: 51 DEGREES
EKG P-R INTERVAL: 206 MS
EKG Q-T INTERVAL: 432 MS
EKG QRS DURATION: 84 MS
EKG QTC CALCULATION (BAZETT): 424 MS
EKG R AXIS: 30 DEGREES
EKG T AXIS: 30 DEGREES
EKG VENTRICULAR RATE: 58 BPM

## 2022-05-24 PROCEDURE — 93010 ELECTROCARDIOGRAM REPORT: CPT | Performed by: INTERNAL MEDICINE

## 2022-05-25 RX ORDER — ALBUTEROL SULFATE 2.5 MG/3ML
SOLUTION RESPIRATORY (INHALATION)
Qty: 300 ML | Refills: 1 | OUTPATIENT
Start: 2022-05-25

## 2022-05-25 NOTE — TELEPHONE ENCOUNTER
Last OV: 5/23/2022 lump on skin 11/18/21 physical   Last RX:    Next scheduled apt: Visit date not found        surescript requesting a refill

## 2022-05-25 NOTE — PROGRESS NOTES
Marge Casanova MD  [facility]  [address]  [phone]      May 23, 2022      Gabbie BarriosDO [address]      RE:   Grecia Butler  :  1955      Dear Dr. Michael Black:    CHIEF COMPLAINT:  1. Coronary artery disease. 2.  Status post open heart surgery by Dr. Danica Bolton on 10/23/2015, with a LIMA to the LAD, a vein graft to the OM, and a vein graft to the right coronary artery. HISTORY OF PRESENT ILLNESS:  I had the pleasure of seeing Mr. Amadeo Pacheco in the office on 2022. He is a pleasant, somewhat high 80-year-old gentleman who had a myocardial infarction on 10/18/2015. He had ventricular fibrillation while loaded into a helicopter and he was defibrillated. He went to [facility] where he had emergency cardiac catheterization that showed 99% disease in the OM branch of the circumflex with thrombus. He had 70% to 75% disease in the LAD with 70% RCA, EF of 50%. He had an aortic balloon pump placed and he had bypass surgery by Dr. Danica Bolton on 10/23/2015, with a LIMA to the LAD, a vein graft to the OM, and a vein graft to the distal right coronary artery. He had a catheterization on 2016, because of chest pain that showed occluded LAD, circumflex had 80% disease, right coronary artery had 70% disease. His bypass grafts were patent. EF was 55% to 60%    He overall is doing well. He has had no chest pain, chest discomfort, or any unusual shortness of breath. He was in the emergency room in January with bronchopneumonia. He has had no other catheterizations. He is waiting to have knee replacement and hip replacement done at the [facility]. He does use marijuana on a regular basis (\"everything in moderation\").   He talked to the [facility] in [place] and they would like him to be a \"spokesman for marijuana if they make marijuana legal.\"    CARDIAC RISK FACTORS:  Known CAD:  Positive. Bypass Surgery:  Positive. Peripheral Vascular Disease:  Positive. Diabetes:  Negative. Hypertension:  Positive. Hyperlipidemia:  Positive. Smoking:  Negative. MEDICATIONS AT THIS TIME:  Albuterol inhaler p.r.n., aspirin 81 mg daily, Tenormin 25 mg daily, vitamin D 2000 units daily, Remeron 30 mg nightly daily, Zofran p.r.n., Protonix 40 mg daily, Ranexa 500 mg 2 tablets b.i.d., Viagra p.r.n., Aldactone 25 mg daily, Hytrin 2 mg daily, trazodone 150 mg p.r.n. PAST MEDICAL AND SURGICAL HISTORY:  1.  Wrist surgery many years ago. 2.  Tonsillectomy. 3.  Right knee replaced years ago. 4.  Bypass surgery as stated previously. 5.  Sleep apnea, on a CPAP mask. 6.  Posttraumatic stress syndrome from being in [facility].  7.  Cervical stenosis from neck injury with cervical disk laminectomy and fusion on 10/12/2020, with a good end result. 8.  Hyperlipidemia, on Lipitor. 9.  Arthritis of left hip, with pinning, having left and right hip replacement and right knee replacement. 10.  Pilonidal cyst.  11.  Cardiac as above. FAMILY HISTORY:  No significant heart disease in the family. SOCIAL HISTORY:  He is 79years old. He is , although  from his wife, Edilberto Solis. Quit smoking about 30 years ago. Smokes marijuana regularly and would like to be a spokesman for marijuana (\"White  is best for the heart. .. \"). REVIEW OF SYSTEMS:  Cardiac as above. Other systems reviewed including constitutional, eyes, ears, nose and throat, cardiovascular, respiratory, GI, , musculoskeletal, integumentary, neurologic, endocrine, hematologic and allergic/immunologic are negative except for what is described above. No weight loss or weight gain. No change in bowel habits. No blood in stools. No fevers, sweats or chills. PHYSICAL EXAMINATION:  VITAL SIGNS:  His blood pressure was 140/60 with a heart rate of 50 and regular. Respirations were 18. O2 saturation 96%.   Weight 209 On marijuana \"in moderation. \"    PLAN:  1. See in 1 year. 2.  No change in medications. DISCUSSION:  Mr. Nithya Ortiz overall is doing well. He has had no chest pain or chest discomfort or any unusual shortness of breath. He would like to become a spokesman for marijuana (my whole staff wants to be spokespeople for marijuana. ..). I will plan on seeing him in 1 year. At this time, his cardiac status seems to be stable. Thank you very much.     Sincerely,        Nena Hernandez    D: 05/23/2022 13:48:28     T: 05/23/2022 20:32:29     [id number]  Job#: [id number]   Doc#: [id number]

## 2022-05-31 ENCOUNTER — HOSPITAL ENCOUNTER (OUTPATIENT)
Dept: ULTRASOUND IMAGING | Age: 67
Discharge: HOME | End: 2022-06-02
Payer: COMMERCIAL

## 2022-05-31 DIAGNOSIS — R22.2 LUMP OF SKIN OF BACK: ICD-10-CM

## 2022-05-31 PROCEDURE — 76705 ECHO EXAM OF ABDOMEN: CPT

## 2022-06-28 ENCOUNTER — TELEPHONE (OUTPATIENT)
Dept: FAMILY MEDICINE CLINIC | Age: 67
End: 2022-06-28

## 2022-06-28 NOTE — LETTER
[facility]  [address] [address]  Phone: [phone]  Fax: [address], DO         June 29, 2022     Patient: Vel Manrique   YOB: 1955   Date of Visit: 6/28/2022       To Whom It May Concern: It is my medical opinion that Mehul Ruiz requires a disability parking placard for the following reasons:  He has limited walking ability due to an orthopedic condition. Duration of need: 5 years    If you have any questions or concerns, please don't hesitate to call.     Sincerely,        Jatinder Hernandez, DO

## 2022-06-28 NOTE — TELEPHONE ENCOUNTER
Patient is asking for a handicap placard. Patient last seen 5/23/22. No future appt found.     Health Maintenance   Topic Date Due    Diabetic foot exam  Never done    Diabetic retinal exam  Never done    Hepatitis C screen  Never done    Prostate Specific Antigen (PSA) Screening or Monitoring  07/05/2018    Flu vaccine (Season Ended) 11/18/2022 (Originally 9/1/2022)    COVID-19 Vaccine (2 - Booster for Shira series) 07/06/2022    A1C test (Diabetic or Prediabetic)  08/23/2022    Diabetic microalbuminuria test  08/23/2022    Lipids  05/23/2023    Depression Screen  05/23/2023    DTaP/Tdap/Td vaccine (2 - Td or Tdap) 08/08/2026    Colorectal Cancer Screen  11/27/2027    Shingles vaccine  Completed    Pneumococcal 65+ years Vaccine  Completed    AAA screen  Completed    Hepatitis A vaccine  Aged Out    Hib vaccine  Aged Out    Meningococcal (ACWY) vaccine  Aged Out             (applicable per patient's age: Cancer Screenings, Depression Screening, Fall Risk Screening, Immunizations)    Hemoglobin A1C (%)   Date Value   08/23/2021 5.2   05/14/2019 5.1     LDL Cholesterol (mg/dL)   Date Value   05/23/2022 112     AST (U/L)   Date Value   05/23/2022 11     ALT (U/L)   Date Value   05/23/2022 7     BUN (mg/dL)   Date Value   05/23/2022 15      (goal A1C is < 7)   (goal LDL is <100) need 30-50% reduction from baseline     BP Readings from Last 3 Encounters:   05/23/22 (!) 140/60   05/23/22 136/80   01/19/22 129/77    (goal /80)      All Future Testing planned in [facility]:  Lab Frequency Next Occurrence   COVID-19 Once 09/28/2021   TSH with Reflex Once 05/23/2023   CBC with Auto Differential Once 05/23/2023   Comprehensive Metabolic Panel Once 16/15/1511   Vitamin D 25 Hydroxy Once 05/23/2023   Lipid Panel Once 05/23/2023   Magnesium Once 05/23/2023   EKG 12 Lead Once 05/23/2023   XR CHEST (2 VW) Once 05/23/2023       Next Visit Date:  Future Appointments   Date Time Provider April Cutler 8/18/2022  2:30 PM JAYDEN Pearce urol [facility]   5/22/2023  1:30 PM MD Alexandre Goldsmith [facility]            Patient Active Problem List:     ASHD (arteriosclerotic heart disease)     History of coronary artery bypass surgery     Obstructive apnea     Hypertension     Hyperlipidemia     Chronic obstructive pulmonary disease (HCC)     Vitamin D deficiency disease     Post traumatic stress disorder     CHF (congestive heart failure), NYHA class II, chronic, diastolic (HCC)     Cervical myelopathy (HCC)     Arrhythmia     Diabetes mellitus type 2 in obese ([address]     S/P cervical spinal fusion     Spinal stenosis in cervical region

## 2022-08-25 ENCOUNTER — HOSPITAL ENCOUNTER (EMERGENCY)
Age: 67
Discharge: HOME | End: 2022-08-25
Attending: FAMILY MEDICINE
Payer: OTHER GOVERNMENT

## 2022-08-25 ENCOUNTER — APPOINTMENT (OUTPATIENT)
Dept: GENERAL RADIOLOGY | Age: 67
End: 2022-08-25
Payer: OTHER GOVERNMENT

## 2022-08-25 VITALS
HEIGHT: 69 IN | TEMPERATURE: 97.9 F | WEIGHT: 185 LBS | OXYGEN SATURATION: 96 % | BODY MASS INDEX: 27.4 KG/M2 | SYSTOLIC BLOOD PRESSURE: 143 MMHG | RESPIRATION RATE: 18 BRPM | HEART RATE: 70 BPM | DIASTOLIC BLOOD PRESSURE: 93 MMHG

## 2022-08-25 DIAGNOSIS — Z98.890 HISTORY OF RIGHT KNEE SURGERY: ICD-10-CM

## 2022-08-25 DIAGNOSIS — M25.561 PAIN IN BOTH KNEES, UNSPECIFIED CHRONICITY: Primary | ICD-10-CM

## 2022-08-25 DIAGNOSIS — M25.562 PAIN IN BOTH KNEES, UNSPECIFIED CHRONICITY: Primary | ICD-10-CM

## 2022-08-25 PROCEDURE — 73564 X-RAY EXAM KNEE 4 OR MORE: CPT

## 2022-08-25 PROCEDURE — 99283 EMERGENCY DEPT VISIT LOW MDM: CPT

## 2022-08-25 ASSESSMENT — PAIN DESCRIPTION - ORIENTATION: ORIENTATION: LEFT

## 2022-08-25 ASSESSMENT — PAIN DESCRIPTION - FREQUENCY: FREQUENCY: CONTINUOUS

## 2022-08-25 ASSESSMENT — PAIN SCALES - GENERAL: PAINLEVEL_OUTOF10: 10

## 2022-08-25 ASSESSMENT — PAIN DESCRIPTION - LOCATION: LOCATION: KNEE

## 2022-08-25 NOTE — ED TRIAGE NOTES
Home meds reviewed with patient. Patient states we are able to speak with Kathya Srivastava who is a good friend.

## 2022-08-26 NOTE — ED PROVIDER NOTES
[facility]  eMERGENCY dEPARTMENT eNCOUnter          [facility]       Chief Complaint   Patient presents with    Knee Pain     Left knee pain x2 days - states fell on it yesterday and the day before        Nurses Notes reviewed and I agree except as noted in the HPI. HISTORY OF PRESENT ILLNESS    Niya Silva is a 79 y.o. male who presents the emergency room via private vehicle, patient complaining of knee pain, states pain worsening over the past 2 days, states he had a fall yesterday as well as the day prior. Patient notes he has had right knee surgery with replacement in the past.  Patient rates his pain 10 out of 10, burning spasm squeezing stabbing type pain, continuous. REVIEW OF SYSTEMS     Review of Systems   All other systems reviewed and are negative. PAST MEDICAL HISTORY    has a past medical history of Arthritis, Bronchitis with chronic airway obstruction (HCC), CAD (coronary artery disease), CHF (congestive heart failure) ([address] CTS (carpal tunnel syndrome), Diabetes mellitus ([address] Diabetic neuropathy associated with diabetes mellitus due to underlying condition ([address] Exposure to toxic chemical, GERD (gastroesophageal reflux disease), H/O cardiovascular stress test, H/O echocardiogram, Hip pain, left, Hx of cardiac cath, Hyperlipidemia, Hypertension, MI (myocardial infarction) ([address] Pneumonia, PTSD (post-traumatic stress disorder), S/P CABG (coronary artery bypass graft), Sleep apnea, and Ventricular fibrillation ([address] SURGICAL HISTORY      has a past surgical history that includes joint replacement; Tonsillectomy; Wrist surgery; Finger surgery; Carpal tunnel release; Pilonidal cyst excision; Coronary artery bypass graft (10/23/15); Cardiac surgery; Pilonidal cyst excision; Carpal tunnel release (Right); fracture surgery; Finger amputation (Left); Colonoscopy (2012);  Colonoscopy (11/27/2017); pr colonoscopy w/biopsy single/multiple (N/A, 11/27/2017); and pr egd transoral biopsy single/multiple (N/A, 11/27/2017).     CURRENT MEDICATIONS       Discharge Medication List as of 8/25/2022  2:18 PM        CONTINUE these medications which have NOT CHANGED    Details   famotidine (PEPCID) 40 MG tablet Take 40 mg by mouthHistorical Med      atorvastatin (LIPITOR) 80 MG tablet Take 80 mg by mouthHistorical Med      medical marijuana USEHistorical Med      albuterol (PROVENTIL) (2.5 MG/3ML) 0.083% nebulizer solution inhale contents of 1 vial ( 3 milliliters ) in nebulizer by mouth and INTO THE LUNGS every 4 hours if needed for wheezing or shortness of breath, Disp-300 mL, R-1Normal      ipratropium-albuterol (DUONEB) 0.5-2.5 (3) MG/3ML SOLN nebulizer solution Inhale 3 mLs into the lungs every 4 hours as needed for Shortness of Breath, Disp-120 mL, R-0Normal      !! albuterol sulfate HFA (PROVENTIL HFA) 108 (90 Base) MCG/ACT inhaler Inhale 2 puffs into the lungs every 6 hours as needed for Wheezing May substitute as needed for insurance reasons, Disp-18 g, R-3Print      !! albuterol sulfate  (90 Base) MCG/ACT inhaler inhale 2 puffs by mouth every 4 hours if needed for wheezing or shortness of breath, Disp-18 g, R-2Normal      tiotropium (SPIRIVA RESPIMAT) 2.5 MCG/ACT AERS inhaler Inhale 2 puffs into the lungs daily, Disp-3 each, R-3Normal      senna (SENOKOT) 8.6 MG tablet Take 1 tablet by mouth daily as needed for Constipation Up to twice a week, Disp-30 tablet, R-1Normal      Sildenafil Citrate (VIAGRA PO) [facility]Historical Med      terazosin (HYTRIN) 2 MG capsule Take 1 capsule by mouth nightly, Disp-30 capsule, R-11Normal      docusate sodium (COLACE) 100 MG capsule Take 1 capsule by mouth 2 times daily, Disp-20 capsule, R-0Normal      ondansetron (ZOFRAN) 8 MG tablet Take 8 mg by mouth every 8 hours as needed for Nausea or VomitingHistorical Med      RANEXA 500 MG extended release tablet Take 2 tablets by mouth 2 times daily, Disp-60 tablet, R-5, DAWNormal      nitroGLYCERIN (NITROSTAT) 0.4 MG SL tablet up to max of 3 total doses. If no relief after 1 dose, call 911., Disp-25 tablet,R-3Normal      atenolol (TENORMIN) 25 MG tablet Take 1 tablet by mouth daily, Disp-90 tablet, R-1Normal      !! mirtazapine (REMERON) 15 MG tablet Take 1 tablet by mouth nightly, Disp-30 tablet, R-0Normal      !! mirtazapine (REMERON) 30 MG tablet Take 1 tablet by mouth daily, Disp-30 tablet, R-0Normal      traZODone (DESYREL) 150 MG tablet Take 1 tablet by mouth nightly as needed for Sleep, Disp-30 tablet, R-0Normal      spironolactone (ALDACTONE) 25 MG tablet Take 25 mg by mouth dailyHistorical Med      Cholecalciferol (VITAMIN D) 2000 UNITS CAPS capsule Take 1 capsule by mouth daily      aspirin 81 MG tablet Take 81 mg by mouth daily       ! ! - Potential duplicate medications found. Please discuss with provider. ALLERGIES     is allergic to codeine, flomax [tamsulosin], gabapentin, hydrocodone, lisinopril, pcn [penicillins], and sulfa antibiotics. FAMILY HISTORY     is adopted. He was adopted. Family history is unknown by patient. SOCIAL HISTORY      reports that he quit smoking about 16 years ago. His smoking use included cigarettes. He has a 19.00 pack-year smoking history. He quit smokeless tobacco use about 22 years ago. He reports current drug use. Frequency: 1.00 time per week. Drug: Marijuana Berneta Sunil). He reports that he does not drink alcohol. PHYSICAL EXAM     INITIAL VITALS:  height is 5' 9\" (1.753 m) and weight is 185 lb (83.9 kg). His oral temperature is 97.9 °F (36.6 °C). His blood pressure is 143/93 (abnormal) and his pulse is 70. His respiration is 18 and oxygen saturation is 96%. Physical Exam   Constitutional: Patient is oriented to person, place, and time. Patient appears well-developed and well-nourished. Patient is active and cooperative.       Neck: Full passive range of motion without pain and phonation normal.   Cardiovascular:  Normal rate, regular rhythm and intact distal pulses. Pulses: Right radial pulse  2+   Pulmonary/Chest: Effort normal. No tachypnea and no bradypnea. Abdominal: BMI 27.3, patient without distension   Musculoskeletal:   Focused examination patient bilateral knees, shows a well-healed surgical scar along the anterior aspect of the right knee consistent with known TKR, there appears to be normal anatomic alignment the tibia going towards patella, there is no marked swelling or effusion grossly appreciated. Focused examination patient's left knee shows some mild swelling throughout the knee capsule with otherwise normal overlying tegmen aberration, subjective pain to palpation greater on the lateral aspect, patellar appears midline and intact, no gross trauma. Bilateral distal CSM is intact    Except as otherwise noted, negative acute trauma or deformity,  apparent full range of motion and normal strength all extremities appropriate to age. Neurological: Patient is alert and oriented to person, place, and time. patient displays no tremor. Patient displays no seizure activity. Skin: Skin is warm and dry. Patient is not diaphoretic. Psychiatric: Patient has a normal mood and affect. Patient speech is normal and behavior is normal. Cognition and memory are normal.     DIFFERENTIAL DIAGNOSIS:   Arthritis fracture dislocation patella effusion bursitis tendinitis    DIAGNOSTIC RESULTS           RADIOLOGY: non-plain film images(s) such as CT, Ultrasound and MRI are read by the radiologist.  XR KNEE LEFT (MIN 4 VIEWS)   Final Result         1. Anatomically aligned right total knee prosthesis. 2. Moderately severe degenerative change left knee, mainly lateral joint    compartment. XR KNEE RIGHT (MIN 4 VIEWS)   Final Result         1. Anatomically aligned right total knee prosthesis.       2. Moderately severe degenerative change left knee, mainly lateral joint compartment. LABS:   Labs Reviewed - No data to display    EMERGENCY DEPARTMENT COURSE:   Vitals:    Vitals:    08/25/22 1322   BP: (!) 143/93   Pulse: 70   Resp: 18   Temp: 97.9 °F (36.6 °C)   TempSrc: Oral   SpO2: 96%   Weight: 185 lb (83.9 kg)   Height: 5' 9\" (1.753 m)     Patient history and physical exam taken at bedside, discussed patient symptoms and exam findings, discussed getting x-rays of the knee, patient initially complaining of primary left knee pain, however he told the radiology tech that he was having right knee pain, therefore x-rays of both knees were taken. Imaging reviewed     Discussed patient imaging findings, showing an intact prosthesis in the right knee, showing arthritis in the left knee, greater on the lateral aspect, no fracture or dislocation otherwise appreciated. Patient will be discharged home at this time, follow-up with his primary care, follow-up with his [facility] as well, acknowledged    FINAL IMPRESSION      1. Pain in both knees, unspecified chronicity    2. History of right knee surgery          DISPOSITION/PLAN   D/c    PATIENT REFERRED TO:  Eva Orozco DO  [address] [place] [address] 21 [phone]    Call       [facility]    Call         DISCHARGE MEDICATIONS:  Discharge Medication List as of 8/25/2022  2:18 PM              Summation      Patient Course:  d/c    ED Medications administered this visit:  Medications - No data to display    New Prescriptions from this visit:    Discharge Medication List as of 8/25/2022  2:18 PM          Follow-up:  Eva Orozco DO  [address] [place] [address]  [phone]    Call       Follow up with your [facility]    Call           Final Impression:   1. Pain in both knees, unspecified chronicity    2.  History of right knee surgery               (Please note that portions of this note were completed with a voice recognition program.  Efforts were made to edit the dictations but occasionally words are mis-transcribed.)    MD Jemima Ghosh MD  08/26/22 9789

## 2022-09-12 ENCOUNTER — HOSPITAL ENCOUNTER (EMERGENCY)
Age: 67
Discharge: HOME | End: 2022-09-12
Attending: EMERGENCY MEDICINE
Payer: OTHER GOVERNMENT

## 2022-09-12 ENCOUNTER — APPOINTMENT (OUTPATIENT)
Dept: CT IMAGING | Age: 67
End: 2022-09-12
Payer: OTHER GOVERNMENT

## 2022-09-12 VITALS
HEART RATE: 75 BPM | RESPIRATION RATE: 20 BRPM | BODY MASS INDEX: 26.96 KG/M2 | OXYGEN SATURATION: 91 % | HEIGHT: 69 IN | WEIGHT: 182 LBS | DIASTOLIC BLOOD PRESSURE: 74 MMHG | SYSTOLIC BLOOD PRESSURE: 159 MMHG | TEMPERATURE: 100 F

## 2022-09-12 VITALS
DIASTOLIC BLOOD PRESSURE: 72 MMHG | RESPIRATION RATE: 18 BRPM | BODY MASS INDEX: 26.96 KG/M2 | HEART RATE: 72 BPM | WEIGHT: 182 LBS | OXYGEN SATURATION: 95 % | SYSTOLIC BLOOD PRESSURE: 149 MMHG | HEIGHT: 69 IN | TEMPERATURE: 98.1 F

## 2022-09-12 DIAGNOSIS — R53.1 GENERALIZED WEAKNESS: ICD-10-CM

## 2022-09-12 DIAGNOSIS — R31.9 HEMATURIA, UNSPECIFIED TYPE: Primary | ICD-10-CM

## 2022-09-12 DIAGNOSIS — E86.0 DEHYDRATION: Primary | ICD-10-CM

## 2022-09-12 DIAGNOSIS — U07.1 COVID-19 VIRUS INFECTION: ICD-10-CM

## 2022-09-12 LAB
-: NORMAL
-: NORMAL
ABSOLUTE EOS #: 0 K/UL (ref 0–0.4)
ABSOLUTE LYMPH #: 1 K/UL (ref 1–4.8)
ABSOLUTE MONO #: 0.4 K/UL (ref 0–1)
ALBUMIN SERPL-MCNC: 3.6 G/DL (ref 3.5–5.2)
ALP BLD-CCNC: 82 U/L (ref 40–129)
ALT SERPL-CCNC: 18 U/L (ref 5–41)
ANION GAP SERPL CALCULATED.3IONS-SCNC: 13 MMOL/L (ref 9–17)
AST SERPL-CCNC: 26 U/L
BASOPHILS # BLD: 0 % (ref 0–2)
BASOPHILS ABSOLUTE: 0 K/UL (ref 0–0.2)
BILIRUB SERPL-MCNC: 0.3 MG/DL (ref 0.3–1.2)
BILIRUBIN DIRECT: <0.1 MG/DL
BILIRUBIN URINE: NEGATIVE
BILIRUBIN URINE: NEGATIVE
BILIRUBIN, INDIRECT: ABNORMAL MG/DL (ref 0–1)
BUN BLDV-MCNC: 10 MG/DL (ref 8–23)
BUN/CREAT BLD: 11 (ref 9–20)
CALCIUM SERPL-MCNC: 8.4 MG/DL (ref 8.6–10.4)
CHLORIDE BLD-SCNC: 96 MMOL/L (ref 98–107)
CO2: 23 MMOL/L (ref 20–31)
COLOR: YELLOW
COLOR: YELLOW
COMMENT UA: ABNORMAL
COMMENT UA: ABNORMAL
CREAT SERPL-MCNC: 0.92 MG/DL (ref 0.7–1.2)
DIFFERENTIAL TYPE: YES
EOSINOPHILS RELATIVE PERCENT: 0 % (ref 0–5)
EPITHELIAL CELLS UA: NORMAL /HPF
GFR AFRICAN AMERICAN: >60 ML/MIN
GFR NON-AFRICAN AMERICAN: >60 ML/MIN
GFR SERPL CREATININE-BSD FRML MDRD: ABNORMAL ML/MIN/{1.73_M2}
GLUCOSE BLD-MCNC: 90 MG/DL (ref 70–99)
GLUCOSE URINE: NEGATIVE
GLUCOSE URINE: NEGATIVE
HCT VFR BLD CALC: 37.8 % (ref 41–53)
HEMOGLOBIN: 12.7 G/DL (ref 13.5–17.5)
KETONES, URINE: ABNORMAL
KETONES, URINE: ABNORMAL
LEUKOCYTE ESTERASE, URINE: NEGATIVE
LEUKOCYTE ESTERASE, URINE: NEGATIVE
LYMPHOCYTES # BLD: 23 % (ref 13–44)
MCH RBC QN AUTO: 30.5 PG (ref 26–34)
MCHC RBC AUTO-ENTMCNC: 33.7 G/DL (ref 31–37)
MCV RBC AUTO: 90.7 FL (ref 80–100)
MONOCYTES # BLD: 8 % (ref 5–9)
NITRITE, URINE: NEGATIVE
NITRITE, URINE: NEGATIVE
PDW BLD-RTO: 15.4 % (ref 12.1–15.2)
PH UA: 5 (ref 5–8)
PH UA: 5 (ref 5–8)
PLATELET # BLD: 143 K/UL (ref 140–450)
POTASSIUM SERPL-SCNC: 3.8 MMOL/L (ref 3.7–5.3)
PROTEIN UA: ABNORMAL
PROTEIN UA: ABNORMAL
RBC # BLD: 4.17 M/UL (ref 4.5–5.9)
RBC UA: NORMAL /HPF (ref 0–2)
RBC UA: NORMAL /HPF (ref 0–2)
SARS-COV-2, RAPID: DETECTED
SEG NEUTROPHILS: 69 % (ref 39–75)
SEGMENTED NEUTROPHILS ABSOLUTE COUNT: 3.1 K/UL (ref 2.1–6.5)
SODIUM BLD-SCNC: 132 MMOL/L (ref 135–144)
SPECIFIC GRAVITY UA: 1.01 (ref 1–1.03)
SPECIFIC GRAVITY UA: 1.01 (ref 1–1.03)
SPECIMEN DESCRIPTION: ABNORMAL
TOTAL PROTEIN: 6 G/DL (ref 6.4–8.3)
TURBIDITY: CLEAR
TURBIDITY: CLEAR
URINE HGB: ABNORMAL
URINE HGB: ABNORMAL
UROBILINOGEN, URINE: NORMAL
UROBILINOGEN, URINE: NORMAL
WBC # BLD: 4.4 K/UL (ref 3.5–11)
WBC UA: NORMAL /HPF
WBC UA: NORMAL /HPF

## 2022-09-12 PROCEDURE — C9803 HOPD COVID-19 SPEC COLLECT: HCPCS

## 2022-09-12 PROCEDURE — 2580000003 HC RX 258: Performed by: EMERGENCY MEDICINE

## 2022-09-12 PROCEDURE — 99284 EMERGENCY DEPT VISIT MOD MDM: CPT

## 2022-09-12 PROCEDURE — 80076 HEPATIC FUNCTION PANEL: CPT

## 2022-09-12 PROCEDURE — 6360000002 HC RX W HCPCS: Performed by: EMERGENCY MEDICINE

## 2022-09-12 PROCEDURE — 81001 URINALYSIS AUTO W/SCOPE: CPT

## 2022-09-12 PROCEDURE — 96374 THER/PROPH/DIAG INJ IV PUSH: CPT

## 2022-09-12 PROCEDURE — 87635 SARS-COV-2 COVID-19 AMP PRB: CPT

## 2022-09-12 PROCEDURE — 85025 COMPLETE CBC W/AUTO DIFF WBC: CPT

## 2022-09-12 PROCEDURE — 80048 BASIC METABOLIC PNL TOTAL CA: CPT

## 2022-09-12 PROCEDURE — 96361 HYDRATE IV INFUSION ADD-ON: CPT

## 2022-09-12 PROCEDURE — 94664 DEMO&/EVAL PT USE INHALER: CPT

## 2022-09-12 PROCEDURE — 6370000000 HC RX 637 (ALT 250 FOR IP): Performed by: EMERGENCY MEDICINE

## 2022-09-12 PROCEDURE — 74176 CT ABD & PELVIS W/O CONTRAST: CPT

## 2022-09-12 RX ORDER — IPRATROPIUM BROMIDE AND ALBUTEROL SULFATE 2.5; .5 MG/3ML; MG/3ML
1 SOLUTION RESPIRATORY (INHALATION) ONCE
Status: COMPLETED | OUTPATIENT
Start: 2022-09-12 | End: 2022-09-12

## 2022-09-12 RX ORDER — 0.9 % SODIUM CHLORIDE 0.9 %
1000 INTRAVENOUS SOLUTION INTRAVENOUS ONCE
Status: COMPLETED | OUTPATIENT
Start: 2022-09-12 | End: 2022-09-12

## 2022-09-12 RX ORDER — ONDANSETRON 2 MG/ML
4 INJECTION INTRAMUSCULAR; INTRAVENOUS ONCE
Status: COMPLETED | OUTPATIENT
Start: 2022-09-12 | End: 2022-09-12

## 2022-09-12 RX ORDER — SODIUM CHLORIDE 9 MG/ML
INJECTION, SOLUTION INTRAVENOUS ONCE
Status: COMPLETED | OUTPATIENT
Start: 2022-09-12 | End: 2022-09-12

## 2022-09-12 RX ORDER — CEPHALEXIN 500 MG/1
500 CAPSULE ORAL 3 TIMES DAILY
Qty: 15 CAPSULE | Refills: 0 | Status: SHIPPED | OUTPATIENT
Start: 2022-09-12

## 2022-09-12 RX ADMIN — SODIUM CHLORIDE: 9 INJECTION, SOLUTION INTRAVENOUS at 11:43

## 2022-09-12 RX ADMIN — IPRATROPIUM BROMIDE AND ALBUTEROL SULFATE 1 AMPULE: 2.5; .5 SOLUTION RESPIRATORY (INHALATION) at 19:14

## 2022-09-12 RX ADMIN — ONDANSETRON 4 MG: 2 INJECTION INTRAMUSCULAR; INTRAVENOUS at 12:14

## 2022-09-12 RX ADMIN — SODIUM CHLORIDE 1000 ML: 9 INJECTION, SOLUTION INTRAVENOUS at 10:10

## 2022-09-12 ASSESSMENT — LIFESTYLE VARIABLES
HOW MANY STANDARD DRINKS CONTAINING ALCOHOL DO YOU HAVE ON A TYPICAL DAY: PATIENT DOES NOT DRINK
HOW OFTEN DO YOU HAVE A DRINK CONTAINING ALCOHOL: NEVER
HOW MANY STANDARD DRINKS CONTAINING ALCOHOL DO YOU HAVE ON A TYPICAL DAY: PATIENT DOES NOT DRINK
HOW OFTEN DO YOU HAVE A DRINK CONTAINING ALCOHOL: NEVER

## 2022-09-12 ASSESSMENT — PAIN DESCRIPTION - LOCATION
LOCATION: KNEE
LOCATION: PERINEUM
LOCATION: PERINEUM

## 2022-09-12 ASSESSMENT — PAIN DESCRIPTION - ORIENTATION: ORIENTATION: RIGHT

## 2022-09-12 ASSESSMENT — PAIN DESCRIPTION - FREQUENCY
FREQUENCY: CONTINUOUS
FREQUENCY: CONTINUOUS

## 2022-09-12 ASSESSMENT — PAIN SCALES - GENERAL
PAINLEVEL_OUTOF10: 0
PAINLEVEL_OUTOF10: 7
PAINLEVEL_OUTOF10: 9

## 2022-09-12 ASSESSMENT — PAIN DESCRIPTION - PAIN TYPE: TYPE: CHRONIC PAIN

## 2022-09-12 ASSESSMENT — PAIN DESCRIPTION - DESCRIPTORS: DESCRIPTORS: ACHING

## 2022-09-12 ASSESSMENT — PAIN DESCRIPTION - ONSET: ONSET: ON-GOING

## 2022-09-12 ASSESSMENT — PAIN - FUNCTIONAL ASSESSMENT
PAIN_FUNCTIONAL_ASSESSMENT: 0-10
PAIN_FUNCTIONAL_ASSESSMENT: 0-10

## 2022-09-12 ASSESSMENT — PATIENT HEALTH QUESTIONNAIRE - PHQ9: SUM OF ALL RESPONSES TO PHQ QUESTIONS 1-9: 2

## 2022-09-12 NOTE — PROGRESS NOTES
Ticket to ride completed and SBAR given to ancillary team member. Patient is ready for transport to CT.

## 2022-09-12 NOTE — ED NOTES
Patient called post dc today, states he received 2L fluids while here, now that he is home he is voiding \"lots of bright red blood\", pt is concerned and states he will call squad to pick him up and bring him in, he does not have any other way to get back to the ED for tx, he said treatment is necessary     Elise Rangel RN  09/12/22 8077

## 2022-09-12 NOTE — DISCHARGE INSTRUCTIONS
Blood in the Urine: Care Instructions  Your Care Instructions     Blood in the urine, or hematuria, may make the urine look red, brown, or pink. There may be blood every time you urinate or just from time to time. You cannotalways see blood in the urine, but it will show up in a urine test.  Blood in the urine may be serious. It should always be checked by a doctor. Your doctor may recommend more tests, including an X-ray, a CT scan, or acystoscopy (which lets a doctor look inside the urethra and bladder). Blood in the urine can be a sign of another problem. Common causes are bladder infections and kidney stones. An injury to your groin or your genital area can also cause bleeding in the urinary tract. Very hard exercise--such as running a marathon--can cause blood in the urine. Blood in the urine can also be a sign of kidney disease or cancer in the bladder or kidney. Many cases of blood in the urine are caused by a harmless condition that runs in families. This is calledbenign familial hematuria. It does not need any treatment. Sometimes your urine may look red or brown even though it does not contain blood. For example, not getting enough fluids (dehydration), taking certain medicines, or having a liver problem can change the color of your urine. Eating foods such as beets, rhubarb, or blackberries or foods with red food coloringcan make your urine look red or pink. Follow-up care is a key part of your treatment and safety. Be sure to make and go to all appointments, and call your doctor if you are having problems. It's also a good idea to know your test results and keep alist of the medicines you take. When should you call for help? Call your doctor now or seek immediate medical care if:    You have symptoms of a urinary infection. For example: You have pus in your urine. You have pain in your back just below your rib cage. This is called flank pain. You have a fever, chills, or body aches.   It hurts to urinate. You have groin or belly pain. You have more blood in your urine. Watch closely for changes in your health, and be sure to contact your doctor if:    You have new urination problems. You do not get better as expected. Where can you learn more? Go to [email or web address] and sign in to your [facility] account. Enter Y927 in the [facility] box to learn more about \"Blood in the Urine: Care Instructions. \"     If you do not have an account, please click on the \"Sign Up Now\" link. Current as of: October 18, 2021               Content Version: 13.3  © 2006-2022 Healthwise, Incorporated. Care instructions adapted under license by [facility] ([facility]). If you have questions about a medical condition or this instruction, always ask your healthcare professional. [address] any warranty or liability for your use of this information.

## 2022-09-12 NOTE — ED PROVIDER NOTES
graft) 10/23/15    Sleep apnea     Ventricular fibrillation ([address] 10/18/2015    x 2 DURING HEART ATTACK       SURGICAL HISTORY    Past Surgical History:   Procedure Laterality Date    CARDIAC SURGERY      10/21/2015 3 vessel CABG    CARPAL TUNNEL RELEASE      CARPAL TUNNEL RELEASE Right     COLONOSCOPY  2012    [facility]    COLONOSCOPY  11/27/2017    CORONARY ARTERY BYPASS GRAFT  10/23/15    Dr. Jadine Frankel      left middle finger    FRACTURE SURGERY      left wrist    JOINT REPLACEMENT      right knee    PILONIDAL CYST EXCISION      1974    PILONIDAL CYST EXCISION      [place] COLONOSCOPY W/BIOPSY SINGLE/MULTIPLE N/A 11/27/2017    COLONOSCOPY WITH BIOPSY/ polypectomy performed by Radha Blake MD at [address] [place] [address] Dr GALVEZ TRANSORAL BIOPSY SINGLE/MULTIPLE N/A 11/27/2017    EGD BIOPSY performed by [facility]ha Blake [facility] at [facility]      left wrist       CURRENT MEDICATIONS    Current Outpatient Rx   Medication Sig Dispense Refill    cephALEXin (KEFLEX) 500 MG capsule Take 1 capsule by mouth 3 times daily 15 capsule 0    famotidine (PEPCID) 40 MG tablet Take 40 mg by mouth      atorvastatin (LIPITOR) 80 MG tablet Take 80 mg by mouth      medical marijuana USE      albuterol (PROVENTIL) (2.5 MG/3ML) 0.083% nebulizer solution inhale contents of 1 vial ( 3 milliliters ) in nebulizer by mouth and INTO THE LUNGS every 4 hours if needed for wheezing or shortness of breath 300 mL 1    albuterol sulfate HFA (PROVENTIL HFA) 108 (90 Base) MCG/ACT inhaler Inhale 2 puffs into the lungs every 6 hours as needed for Wheezing May substitute as needed for insurance reasons 18 g 3    tiotropium (SPIRIVA RESPIMAT) 2.5 MCG/ACT AERS inhaler Inhale 2 puffs into the lungs daily 3 each 3    senna (SENOKOT) 8.6 MG tablet Take 1 tablet by mouth daily as needed for Constipation Up to twice a week 30 tablet 1    Sildenafil Citrate (VIAGRA PO) Take by mouth as needed (from the [place])      terazosin (HYTRIN) 2 MG capsule Take 1 capsule by mouth nightly 30 capsule 11    docusate sodium (COLACE) 100 MG capsule Take 1 capsule by mouth 2 times daily 20 capsule 0    ondansetron (ZOFRAN) 8 MG tablet Take 8 mg by mouth every 8 hours as needed for Nausea or Vomiting      RANEXA 500 MG extended release tablet Take 2 tablets by mouth 2 times daily 60 tablet 5    nitroGLYCERIN (NITROSTAT) 0.4 MG SL tablet up to max of 3 total doses.  If no relief after 1 dose, call 911. 25 tablet 3    atenolol (TENORMIN) 25 MG tablet Take 1 tablet by mouth daily 90 tablet 1    mirtazapine (REMERON) 15 MG tablet Take 1 tablet by mouth nightly 30 tablet 0    mirtazapine (REMERON) 30 MG tablet Take 1 tablet by mouth daily 30 tablet 0    traZODone (DESYREL) 150 MG tablet Take 1 tablet by mouth nightly as needed for Sleep 30 tablet 0    spironolactone (ALDACTONE) 25 MG tablet Take 25 mg by mouth daily      Cholecalciferol (VITAMIN D) 2000 UNITS CAPS capsule Take 1 capsule by mouth daily      aspirin 81 MG tablet Take 81 mg by mouth daily         ALLERGIES    Allergies   Allergen Reactions    Codeine Other (See Comments)    Flomax [Tamsulosin] Other (See Comments)     Patient \"stops urinating with this\"    Gabapentin Other (See Comments)     Other reaction(s): Feeling irritable, Sweating    Hydrocodone     Lisinopril     Pcn [Penicillins] Other (See Comments)    Sulfa Antibiotics Other (See Comments)       FAMILY HISTORY    Family History   Adopted: Yes   Family history unknown: Yes       SOCIAL HISTORY    Social History     Socioeconomic History    Marital status:      Spouse name: None    Number of children: None    Years of education: None    Highest education level: None   Tobacco Use    Smoking status: Former     Packs/day: 1.00     Years: 19.00     Pack years: 19.00     Types: Cigarettes     Quit date: 2006     Years since quittin.0    Smokeless tobacco: Former     Quit date: 8/1/2000   Vaping Use    Vaping Use: Never used   Substance and Sexual Activity    Alcohol use: No    Drug use: Yes     Frequency: 1.0 times per week     Types: Marijuana Champ Cue)     Comment: Pt smokes marijuana therapeutically. Sexual activity: Yes     Partners: Female       PHYSICAL EXAM    VITAL SIGNS: BP (!) 149/72   Pulse 72   Temp 98.1 °F (36.7 °C) (Oral)   Resp 18   Ht 5' 9\" (1.753 m)   Wt 182 lb (82.6 kg)   SpO2 95%   BMI 26.88 kg/m²   Constitutional:  Well developed, well nourished, no acute distress, non-toxic appearance   Eyes:  PERRL, conjunctiva normal   HENT:  Atraumatic, external ears normal, nose normal, oropharynx moist.  Neck- supple   Respiratory:  No respiratory distress, diminished breath sounds bilaterally nonlabored respirations  Cardiovascular:  Normal rate, normal rhythm, no murmurs, no gallops, no rubs   GI: Abdomen is soft  : Patient has hematuria. No flank pain. Musculoskeletal:  No edema, no tenderness   Integument:  Well hydrated     RADIOLOGY  CT ABDOMEN PELVIS WO CONTRAST Additional Contrast? None   Final Result      1. No genitourinary calcification identified. 2. Severe atherosclerotic vascular disease that includes the coronary arteries. Irregular calcified plaque within the aorta and iliac arteries concerning for    regions of severe narrowing or occlusion. A followup CTA of the abdominal aorta    and iliac arteries is suggested. 3. Innumerable tiny groundglass nodules within both lungs in addition to patchy    regions of groundglass airspace disease which may be due to a multifocal    inflammatory pneumonitis or atypical pneumonia. Clinical correlation    recommended. 4. Cholelithiasis. 5. Fluid within the ascending colon can be seen with an enteritis or cathartic    use. 6. Minimal ascites.                         PROCEDURES        Labs  Labs Reviewed   URINALYSIS - Abnormal; Notable for the following components:       Result Value Ketones, Urine TRACE (*)     Urine Hgb 3+ (*)     Protein, UA 1+ (*)     All other components within normal limits   MICROSCOPIC URINALYSIS           Summation      Patient Course: Patient has hematuria without urolithiasis. UTI cannot be ruled out. patient will be sent home on Keflex. Follow-up with primary care provider in couple days. Return to ED if worse  ED Medications administered this visit:    Medications   ipratropium-albuterol (DUONEB) nebulizer solution 1 ampule (1 ampule Inhalation Given 9/12/22 1914)       New Prescriptions from this visit:    New Prescriptions    CEPHALEXIN (KEFLEX) 500 MG CAPSULE    Take 1 capsule by mouth 3 times daily       Follow-up:  No follow-up provider specified. Final Impression:   1. Hematuria, unspecified type    2.  COVID-19 virus infection               (Please note that portions of this note were completed with a voice recognition program.  Efforts were made to edit the dictations but occasionally words are mis-transcribed.)      Tahmina Mayorga MD  09/12/22 1954

## 2022-09-13 ENCOUNTER — CARE COORDINATION (OUTPATIENT)
Dept: CARE COORDINATION | Age: 67
End: 2022-09-13

## 2022-09-13 NOTE — CARE COORDINATION
Patient contacted regarding COVID-19 risk and diagnosis. Discussed COVID-19 related testing which was available at this time. Test results were positive. Patient informed of results, if available? Yes. LPN Care Coordinator contacted the patient by telephone to perform post discharge assessment. Call within 2 business days of discharge: Yes. Verified name and  with patient as identifiers. Provided introduction to self, and explanation of the CTN/ACM role, and reason for call due to risk factors for infection and/or exposure to COVID-19. Symptoms reviewed with patient who verbalized the following symptoms: no new symptoms  no worsening symptoms. Due to no new or worsening symptoms encounter was not routed to provider for escalation. Discussed follow-up appointments. If no appointment was previously scheduled, appointment scheduling offered: patient to call and make ED f/u with PCP. [facility] follow up appointment(s):   Future Appointments   Date Time Provider April Cutler   10/13/2022  2:45 PM Warnell Eisenmenger Lollie Amabile, PA-C willard uroанна [facility]   2023  1:30 PM Olga Stein MD [facility]     Non-[facility] follow up appointment(s): no    Non-face-to-face services provided:  Obtained and reviewed discharge summary and/or continuity of care documents     Advance Care Planning:   Does patient have an Advance Directive:  decision maker updated. Educated patient about risk for severe COVID-19 due to risk factors according to CDC guidelines. LPN CC reviewed discharge instructions, medical action plan and red flag symptoms with the patient who verbalized understanding. Discussed COVID vaccination status: No. Education provided on COVID-19 vaccination as appropriate. Discussed exposure protocols and quarantine with CDC Guidelines.  Patient was given an opportunity to verbalize any questions and concerns and agrees to contact LPN CC or health care provider for questions related to their healthcare. Reviewed and educated patient on any new and changed medications related to discharge diagnosis     Was patient discharged with a pulse oximeter? no      LPN CC provided contact information. Plan for follow-up call in 5-7 days based on severity of symptoms and risk factors. Patient is having Keflex delivered to him today per pharmacy.

## 2022-09-13 NOTE — ACP (ADVANCE CARE PLANNING)
Advance Care Planning   Healthcare Decision Maker:    Primary Decision Maker: Becki Strange - [phone]    Click here to complete Healthcare Decision Makers including selection of the Healthcare Decision Maker Relationship (ie \"Primary\"). Today we documented Decision Maker(s) consistent with Legal Next of Kin hierarchy.

## 2022-10-03 DIAGNOSIS — R31.9 HEMATURIA, UNSPECIFIED TYPE: Primary | ICD-10-CM

## 2022-10-05 ENCOUNTER — HOSPITAL ENCOUNTER (EMERGENCY)
Age: 67
Discharge: HOME | End: 2022-10-05
Attending: EMERGENCY MEDICINE
Payer: OTHER GOVERNMENT

## 2022-10-05 VITALS
HEART RATE: 76 BPM | RESPIRATION RATE: 16 BRPM | TEMPERATURE: 98 F | OXYGEN SATURATION: 97 % | BODY MASS INDEX: 27.13 KG/M2 | DIASTOLIC BLOOD PRESSURE: 73 MMHG | HEIGHT: 69 IN | WEIGHT: 183.2 LBS | SYSTOLIC BLOOD PRESSURE: 126 MMHG

## 2022-10-05 DIAGNOSIS — R31.9 HEMATURIA, UNSPECIFIED TYPE: Primary | ICD-10-CM

## 2022-10-05 LAB
-: ABNORMAL
BACTERIA: ABNORMAL
BILIRUBIN URINE: NEGATIVE
CASTS UA: ABNORMAL /LPF
CASTS UA: ABNORMAL /LPF
COLOR: YELLOW
COMMENT UA: ABNORMAL
EPITHELIAL CELLS UA: ABNORMAL /HPF
GLUCOSE URINE: NEGATIVE
KETONES, URINE: ABNORMAL
LEUKOCYTE ESTERASE, URINE: NEGATIVE
MUCUS: ABNORMAL
NITRITE, URINE: NEGATIVE
PH UA: 6 (ref 5–8)
PROTEIN UA: ABNORMAL
RBC UA: ABNORMAL /HPF (ref 0–2)
SPECIFIC GRAVITY UA: 1.02 (ref 1–1.03)
TURBIDITY: ABNORMAL
URINE HGB: NEGATIVE
UROBILINOGEN, URINE: NORMAL
WBC UA: ABNORMAL /HPF

## 2022-10-05 PROCEDURE — 81001 URINALYSIS AUTO W/SCOPE: CPT

## 2022-10-05 PROCEDURE — 99283 EMERGENCY DEPT VISIT LOW MDM: CPT

## 2022-10-05 ASSESSMENT — PAIN - FUNCTIONAL ASSESSMENT: PAIN_FUNCTIONAL_ASSESSMENT: 0-10

## 2022-10-05 ASSESSMENT — PAIN DESCRIPTION - LOCATION: LOCATION: OTHER (COMMENT)

## 2022-10-05 NOTE — ED PROVIDER NOTES
eMERGENCY dEPARTMENT eNCOUnter        [facility]    Chief Complaint   Patient presents with    Hematuria     Hematuria for a couple months       [facility]    Laura Larkin is a 79 y.o. male who presents to ED from home. By car  With complaint of blood in the urine. Onset off-and-on for the past couple months. Patient was seen in September for similar symptoms. CT abdomen and pelvis was negative. Patient had an episode of hematuria today. Patient denies abdominal pain denies flank pain. He describes the episode as painless hematuria. She denies dizziness denies lightheadedness. REVIEW OF SYSTEMS    All systems reviewed and positives are in the HPI      PAST MEDICAL HISTORY    Past Medical History:   Diagnosis Date    Arthritis     WENDY KNEE    Bronchitis with chronic airway obstruction (HCC)     CAD (coronary artery disease)     CHF (congestive heart failure) (HCC)     CTS (carpal tunnel syndrome)     RIGHT    Diabetes mellitus ([address]     Diabetic neuropathy associated with diabetes mellitus due to underlying condition ([facility]     Exposure to toxic chemical     [employer] base-toxic water exposure    GERD (gastroesophageal reflux disease)     H/O cardiovascular stress test 07/08/2016    Abnormal.  Moderate perfusion defect of mild to moderate intensity in the inferolateral,inferior and inferoapical regions during stress imaging. Ef 45%. with regional wall motion abnormalities. H/O echocardiogram 2/17/16    EF >60%. LV wall thickness is mildly increased. Inferoseptal wall is abnormal in its motion not unusual status post open heart surgery. Normal aortic valve structure with ild aortic regurgitation. Hip pain, left     Hx of cardiac cath 07/08/2016    LMCA: Normal 0% stenosis. LAD: Chronic occlusion 100%. Lesion on Mid LAD: 100% stenosis. LCx: single stenosis. Lesion on Mid CX: 80% stenosis. RCA: Lesion on Mid RCA: 70% stenosis.     Hyperlipidemia     Hypertension     MI (myocardial infarction) ([facility] 2015    Pneumonia     PTSD (post-traumatic stress disorder)     S/P CABG (coronary artery bypass graft) 10/23/15    Sleep apnea     Ventricular fibrillation ([facility] 10/18/2015    x 2 DURING HEART ATTACK       SURGICAL HISTORY    Past Surgical History:   Procedure Laterality Date    CARDIAC SURGERY      10/21/2015 3 vessel CABG    CARPAL TUNNEL RELEASE      CARPAL TUNNEL RELEASE Right     COLONOSCOPY  2012    [facility]    COLONOSCOPY  11/27/2017    CORONARY ARTERY BYPASS GRAFT  10/23/15    Dr. Luna Dubois      left middle finger    FRACTURE SURGERY      left wrist    JOINT REPLACEMENT      right knee    PILONIDAL CYST EXCISION      1974    PILONIDAL CYST EXCISION      WA COLONOSCOPY W/BIOPSY SINGLE/MULTIPLE N/A 11/27/2017    COLONOSCOPY WITH BIOPSY/ polypectomy performed by Silvana Sagastume MD at [address] [place] [address] EGD TRANSORAL BIOPSY SINGLE/MULTIPLE N/A 11/27/2017    EGD BIOPSY performed by Silvana Sagastume MD at [facility]      left wrist       CURRENT MEDICATIONS    Current Outpatient Rx   Medication Sig Dispense Refill    cephALEXin (KEFLEX) 500 MG capsule Take 1 capsule by mouth 3 times daily 15 capsule 0    famotidine (PEPCID) 40 MG tablet Take 40 mg by mouth      atorvastatin (LIPITOR) 80 MG tablet Take 80 mg by mouth      medical marijuana USE      albuterol (PROVENTIL) (2.5 MG/3ML) 0.083% nebulizer solution inhale contents of 1 vial ( 3 milliliters ) in nebulizer by mouth and INTO THE LUNGS every 4 hours if needed for wheezing or shortness of breath 300 mL 1    albuterol sulfate HFA (PROVENTIL HFA) 108 (90 Base) MCG/ACT inhaler Inhale 2 puffs into the lungs every 6 hours as needed for Wheezing May substitute as needed for insurance reasons 18 g 3    tiotropium (SPIRIVA RESPIMAT) 2.5 MCG/ACT AERS inhaler Inhale 2 puffs into the lungs daily 3 each 3    senna (SENOKOT) 8.6 MG tablet Take 1 tablet by mouth daily as needed for Constipation Up to twice a week 30 tablet 1    Sildenafil Citrate (VIAGRA PO) Take by mouth as needed (from the [place])      terazosin (HYTRIN) 2 MG capsule Take 1 capsule by mouth nightly 30 capsule 11    docusate sodium (COLACE) 100 MG capsule Take 1 capsule by mouth 2 times daily 20 capsule 0    ondansetron (ZOFRAN) 8 MG tablet Take 8 mg by mouth every 8 hours as needed for Nausea or Vomiting      RANEXA 500 MG extended release tablet Take 2 tablets by mouth 2 times daily 60 tablet 5    nitroGLYCERIN (NITROSTAT) 0.4 MG SL tablet up to max of 3 total doses.  If no relief after 1 dose, call 911. 25 tablet 3    atenolol (TENORMIN) 25 MG tablet Take 1 tablet by mouth daily 90 tablet 1    mirtazapine (REMERON) 15 MG tablet Take 1 tablet by mouth nightly 30 tablet 0    mirtazapine (REMERON) 30 MG tablet Take 1 tablet by mouth daily 30 tablet 0    traZODone (DESYREL) 150 MG tablet Take 1 tablet by mouth nightly as needed for Sleep 30 tablet 0    spironolactone (ALDACTONE) 25 MG tablet Take 25 mg by mouth daily      Cholecalciferol (VITAMIN D) 2000 UNITS CAPS capsule Take 1 capsule by mouth daily      aspirin 81 MG tablet Take 81 mg by mouth daily         ALLERGIES    Allergies   Allergen Reactions    Codeine Other (See Comments)    Flomax [Tamsulosin] Other (See Comments)     Patient \"stops urinating with this\"    Gabapentin Other (See Comments)     Other reaction(s): Feeling irritable, Sweating    Hydrocodone     Lisinopril     Pcn [Penicillins] Other (See Comments)    Sulfa Antibiotics Other (See Comments)       FAMILY HISTORY    Family History   Adopted: Yes   Family history unknown: Yes       SOCIAL HISTORY    Social History     Socioeconomic History    Marital status:      Spouse name: None    Number of children: None    Years of education: None    Highest education level: None   Tobacco Use    Smoking status: Former     Packs/day: 1.00     Years: 19.00     Pack years: 19.00     Types: Cigarettes     Quit date: 2006     Years since quittin.1    Smokeless tobacco: Former     Quit date: 2000   Vaping Use    Vaping Use: Never used   Substance and Sexual Activity    Alcohol use: No    Drug use: Yes     Frequency: 1.0 times per week     Types: Marijuana Azucenaheren Rabia)     Comment: Pt smokes marijuana therapeutically. Sexual activity: Yes     Partners: Female       PHYSICAL EXAM    VITAL SIGNS: /73   Pulse 76   Temp 98 °F (36.7 °C) (Oral)   Resp 16   Ht 5' 9\" (1.753 m)   Wt 183 lb 3.2 oz (83.1 kg)   SpO2 97%   BMI 27.05 kg/m²   Constitutional:  Well developed, well nourished, no acute distress, non-toxic appearance   Eyes:  PERRL, conjunctiva normal   HENT:  Atraumatic, external ears normal, nose normal, oropharynx dryNeck- supple   Respiratory:  No respiratory distress, normal breath sounds   Cardiovascular:  Normal rate, normal rhythm, no murmurs, no gallops, no rubs   GI: Abdomen soft nontender positive bowel sounds no masses. : No CVA tenderness to percussion. Musculoskeletal:  No edema, no tenderness   Integument:  Well hydrated     RADIOLOGY  No orders to display         [address] - Abnormal; Notable for the following components:       Result Value    Turbidity UA Hazy (*)     Ketones, Urine TRACE (*)     Protein, UA TRACE (*)     All other components within normal limits   MICROSCOPIC URINALYSIS - Abnormal; Notable for the following components:    Bacteria, UA RARE (*)     Mucus, UA RARE (*)     All other components within normal limits           Summation      Patient Course: Patient had a negative CT abdomen and pelvis on . Patient's urinalysis negative for hematuria. Concentrated urine. No signs of UTI.     ED Medications administered this visit:  Medications - No data to display    New Prescriptions from this visit:    New Prescriptions    No medications on file       Follow-up:  Linda Reason Domingo Malik MD  [address] , [address] [place] [address]  [phone]    Schedule an appointment as soon as possible for a visit in 1 week  Return to ED if worse      Final Impression:   1.  Hematuria, unspecified type               (Please note that portions of this note were completed with a voice recognition program.  Efforts were made to edit the dictations but occasionally words are mis-transcribed.)        Justin Medina MD  10/05/22 0141

## 2022-10-13 ENCOUNTER — TELEPHONE (OUTPATIENT)
Dept: FAMILY MEDICINE CLINIC | Age: 67
End: 2022-10-13

## 2022-10-13 DIAGNOSIS — E11.9 DIET-CONTROLLED DIABETES MELLITUS (HCC): Primary | ICD-10-CM

## 2022-10-13 RX ORDER — LANCETS 30 GAUGE
EACH MISCELLANEOUS
Qty: 100 EACH | Refills: 3 | Status: SHIPPED | OUTPATIENT
Start: 2022-10-13

## 2022-10-13 RX ORDER — UBIQUINOL 100 MG
CAPSULE ORAL
Qty: 100 EACH | Refills: 3 | Status: SHIPPED | OUTPATIENT
Start: 2022-10-13

## 2022-10-13 RX ORDER — GLUCOSAMINE HCL/CHONDROITIN SU 500-400 MG
CAPSULE ORAL
Qty: 100 STRIP | Refills: 3 | Status: SHIPPED | OUTPATIENT
Start: 2022-10-13

## 2022-10-13 NOTE — TELEPHONE ENCOUNTER
Pt called and said he needs a new glucose monitor  that he has been waking up with a headache and no monitor to check his sugar .     Please send monitor and kit to [facility]     Thanks

## 2022-10-13 NOTE — TELEPHONE ENCOUNTER
Last visit:  5/23/2022  Next Visit Date:    Future Appointments   Date Time Provider April Cutler   10/13/2022  2:45 PM JAYDEN Frazier [facility]   5/22/2023  1:30 PM MD Rosario Bailey [facility]         Medication List:  Prior to Admission medications    Medication Sig Start Date End Date Taking?  Authorizing Provider   cephALEXin (KEFLEX) 500 MG capsule Take 1 capsule by mouth 3 times daily 9/12/22   Blanka Hawthorne MD   famotidine (PEPCID) 40 MG tablet Take 40 mg by mouth 2/16/22   Historical Provider, MD   atorvastatin (LIPITOR) 80 MG tablet Take 80 mg by mouth 8/23/21   Historical Provider, MD   medical marijuana USE 11/22/19   Historical Provider, MD   albuterol (PROVENTIL) (2.5 MG/3ML) 0.083% nebulizer solution inhale contents of 1 vial ( 3 milliliters ) in nebulizer by mouth and INTO THE LUNGS every 4 hours if needed for wheezing or shortness of breath 4/8/22   Massiel Shipman DO   albuterol sulfate HFA (PROVENTIL HFA) 108 (90 Base) MCG/ACT inhaler Inhale 2 puffs into the lungs every 6 hours as needed for Wheezing May substitute as needed for insurance reasons 1/19/22   James Johnston MD   tiotropium (SPIRIVA RESPIMAT) 2.5 MCG/ACT AERS inhaler Inhale 2 puffs into the lungs daily 1/18/22   Massiel Shipman DO   senna (SENOKOT) 8.6 MG tablet Take 1 tablet by mouth daily as needed for Constipation Up to twice a week 11/18/21   Massiel Shipman DO   Sildenafil Citrate (VIAGRA PO) Take by mouth as needed (from the [place])    Historical Provider, MD   terazosin (HYTRIN) 2 MG capsule Take 1 capsule by mouth nightly 8/19/21   Radha Judd PA-C   docusate sodium (COLACE) 100 MG capsule Take 1 capsule by mouth 2 times daily 7/8/21   James Johnston MD   ondansetron (ZOFRAN) 8 MG tablet Take 8 mg by mouth every 8 hours as needed for Nausea or Vomiting    Historical Provider, MD   RANEXA 500 MG extended release tablet Take 2 tablets by mouth 2 times daily 11/16/20 Travis Roberto MD   nitroGLYCERIN (NITROSTAT) 0.4 MG SL tablet up to max of 3 total doses.  If no relief after 1 dose, call 911. 7/15/20   Delaney Lewis DO   atenolol (TENORMIN) 25 MG tablet Take 1 tablet by mouth daily 9/19/19   Travis Roberto MD   mirtazapine (REMERON) 15 MG tablet Take 1 tablet by mouth nightly 5/31/19   DIANNA Moore CNP   mirtazapine (REMERON) 30 MG tablet Take 1 tablet by mouth daily 5/31/19   DIANNA Moore CNP   traZODone (DESYREL) 150 MG tablet Take 1 tablet by mouth nightly as needed for Sleep 5/31/19   DIANNA Moore CNP   spironolactone (ALDACTONE) 25 MG tablet Take 25 mg by mouth daily    Historical Provider, MD   Cholecalciferol (VITAMIN D) 2000 UNITS CAPS capsule Take 1 capsule by mouth daily    Historical Provider, MD   aspirin 81 MG tablet Take 81 mg by mouth daily    Historical Provider, MD

## 2022-11-15 ENCOUNTER — HOSPITAL ENCOUNTER (OUTPATIENT)
Age: 67
Discharge: HOME | End: 2022-11-15
Payer: COMMERCIAL

## 2022-11-15 ENCOUNTER — OFFICE VISIT (OUTPATIENT)
Dept: FAMILY MEDICINE CLINIC | Age: 67
End: 2022-11-15
Payer: COMMERCIAL

## 2022-11-15 VITALS
HEIGHT: 69 IN | BODY MASS INDEX: 25.18 KG/M2 | DIASTOLIC BLOOD PRESSURE: 68 MMHG | SYSTOLIC BLOOD PRESSURE: 112 MMHG | HEART RATE: 57 BPM | OXYGEN SATURATION: 98 % | WEIGHT: 170 LBS

## 2022-11-15 DIAGNOSIS — E11.9 DIET-CONTROLLED DIABETES MELLITUS (HCC): ICD-10-CM

## 2022-11-15 DIAGNOSIS — R31.0 GROSS HEMATURIA: ICD-10-CM

## 2022-11-15 DIAGNOSIS — N52.9 ORGANIC IMPOTENCE: ICD-10-CM

## 2022-11-15 DIAGNOSIS — Z11.59 ENCOUNTER FOR HEPATITIS C SCREENING TEST FOR LOW RISK PATIENT: ICD-10-CM

## 2022-11-15 DIAGNOSIS — R63.4 UNINTENDED WEIGHT LOSS: ICD-10-CM

## 2022-11-15 DIAGNOSIS — R63.4 UNINTENDED WEIGHT LOSS: Primary | ICD-10-CM

## 2022-11-15 DIAGNOSIS — K21.9 GASTROESOPHAGEAL REFLUX DISEASE WITHOUT ESOPHAGITIS: ICD-10-CM

## 2022-11-15 LAB
-: ABNORMAL
ABSOLUTE EOS #: 0.2 K/UL (ref 0–0.4)
ABSOLUTE EOS #: 0.2 K/UL (ref 0–0.4)
ABSOLUTE LYMPH #: 1.8 K/UL (ref 1–4.8)
ABSOLUTE LYMPH #: 1.8 K/UL (ref 1–4.8)
ABSOLUTE MONO #: 0.4 K/UL (ref 0–1)
ABSOLUTE MONO #: 0.4 K/UL (ref 0–1)
ALBUMIN SERPL-MCNC: 4.1 G/DL (ref 3.5–5.2)
ALP BLD-CCNC: 89 U/L (ref 40–129)
ALT SERPL-CCNC: 5 U/L (ref 5–41)
ANION GAP SERPL CALCULATED.3IONS-SCNC: 9 MMOL/L (ref 9–17)
AST SERPL-CCNC: 9 U/L
BACTERIA: ABNORMAL
BASOPHILS # BLD: 1 % (ref 0–2)
BASOPHILS # BLD: 1 % (ref 0–2)
BASOPHILS ABSOLUTE: 0 K/UL (ref 0–0.2)
BASOPHILS ABSOLUTE: 0 K/UL (ref 0–0.2)
BILIRUB SERPL-MCNC: 0.3 MG/DL (ref 0.3–1.2)
BILIRUBIN URINE: NEGATIVE
BUN BLDV-MCNC: 8 MG/DL (ref 8–23)
BUN/CREAT BLD: 10 (ref 9–20)
C-REACTIVE PROTEIN: 5.5 MG/L (ref 0–5)
CALCIUM SERPL-MCNC: 9.1 MG/DL (ref 8.6–10.4)
CHLORIDE BLD-SCNC: 98 MMOL/L (ref 98–107)
CO2: 25 MMOL/L (ref 20–31)
COLOR: YELLOW
COMMENT UA: ABNORMAL
CREAT SERPL-MCNC: 0.77 MG/DL (ref 0.7–1.2)
DIFFERENTIAL TYPE: YES
DIFFERENTIAL TYPE: YES
EOSINOPHILS RELATIVE PERCENT: 3 % (ref 0–5)
EOSINOPHILS RELATIVE PERCENT: 3 % (ref 0–5)
EPITHELIAL CELLS UA: ABNORMAL /HPF
GFR SERPL CREATININE-BSD FRML MDRD: >60 ML/MIN/1.73M2
GLUCOSE BLD-MCNC: 91 MG/DL (ref 70–99)
GLUCOSE URINE: NEGATIVE
HBA1C MFR BLD: 4.9 %
HCT VFR BLD CALC: 42 % (ref 41–53)
HCT VFR BLD CALC: 42 % (ref 41–53)
HEMOGLOBIN: 14.2 G/DL (ref 13.5–17.5)
HEMOGLOBIN: 14.2 G/DL (ref 13.5–17.5)
HEPATITIS C ANTIBODY: NONREACTIVE
KETONES, URINE: NEGATIVE
LEUKOCYTE ESTERASE, URINE: NEGATIVE
LYMPHOCYTES # BLD: 28 % (ref 13–44)
LYMPHOCYTES # BLD: 28 % (ref 13–44)
MCH RBC QN AUTO: 31.2 PG (ref 26–34)
MCH RBC QN AUTO: 31.2 PG (ref 26–34)
MCHC RBC AUTO-ENTMCNC: 33.7 G/DL (ref 31–37)
MCHC RBC AUTO-ENTMCNC: 33.7 G/DL (ref 31–37)
MCV RBC AUTO: 92.6 FL (ref 80–100)
MCV RBC AUTO: 92.6 FL (ref 80–100)
MONOCYTES # BLD: 6 % (ref 5–9)
MONOCYTES # BLD: 6 % (ref 5–9)
MUCUS: ABNORMAL
NITRITE, URINE: NEGATIVE
PDW BLD-RTO: 15.8 % (ref 12.1–15.2)
PDW BLD-RTO: 15.8 % (ref 12.1–15.2)
PH UA: 6 (ref 5–8)
PLATELET # BLD: 257 K/UL (ref 140–450)
PLATELET # BLD: 257 K/UL (ref 140–450)
POTASSIUM SERPL-SCNC: 4.4 MMOL/L (ref 3.7–5.3)
PROSTATE SPECIFIC ANTIGEN: 0.58 NG/ML
PROTEIN UA: ABNORMAL
RBC # BLD: 4.54 M/UL (ref 4.5–5.9)
RBC # BLD: 4.54 M/UL (ref 4.5–5.9)
RBC UA: ABNORMAL /HPF (ref 0–2)
SEDIMENTATION RATE, ERYTHROCYTE: 50 MM/HR (ref 0–20)
SEG NEUTROPHILS: 62 % (ref 39–75)
SEG NEUTROPHILS: 62 % (ref 39–75)
SEGMENTED NEUTROPHILS ABSOLUTE COUNT: 4.1 K/UL (ref 2.1–6.5)
SEGMENTED NEUTROPHILS ABSOLUTE COUNT: 4.1 K/UL (ref 2.1–6.5)
SODIUM BLD-SCNC: 132 MMOL/L (ref 135–144)
SPECIFIC GRAVITY UA: 1.02 (ref 1–1.03)
TOTAL PROTEIN: 7.3 G/DL (ref 6.4–8.3)
TSH SERPL DL<=0.05 MIU/L-ACNC: 1.37 UIU/ML (ref 0.3–5)
TURBIDITY: CLEAR
URINE HGB: NEGATIVE
UROBILINOGEN, URINE: NORMAL
WBC # BLD: 6.6 K/UL (ref 3.5–11)
WBC # BLD: 6.6 K/UL (ref 3.5–11)
WBC UA: ABNORMAL /HPF

## 2022-11-15 PROCEDURE — 3017F COLORECTAL CA SCREEN DOC REV: CPT | Performed by: FAMILY MEDICINE

## 2022-11-15 PROCEDURE — 84443 ASSAY THYROID STIM HORMONE: CPT

## 2022-11-15 PROCEDURE — G8484 FLU IMMUNIZE NO ADMIN: HCPCS | Performed by: FAMILY MEDICINE

## 2022-11-15 PROCEDURE — 84153 ASSAY OF PSA TOTAL: CPT

## 2022-11-15 PROCEDURE — G8417 CALC BMI ABV UP PARAM F/U: HCPCS | Performed by: FAMILY MEDICINE

## 2022-11-15 PROCEDURE — 36415 COLL VENOUS BLD VENIPUNCTURE: CPT

## 2022-11-15 PROCEDURE — 3044F HG A1C LEVEL LT 7.0%: CPT | Performed by: FAMILY MEDICINE

## 2022-11-15 PROCEDURE — 1123F ACP DISCUSS/DSCN MKR DOCD: CPT | Performed by: FAMILY MEDICINE

## 2022-11-15 PROCEDURE — 81001 URINALYSIS AUTO W/SCOPE: CPT

## 2022-11-15 PROCEDURE — 2022F DILAT RTA XM EVC RTNOPTHY: CPT | Performed by: FAMILY MEDICINE

## 2022-11-15 PROCEDURE — 3074F SYST BP LT 130 MM HG: CPT | Performed by: FAMILY MEDICINE

## 2022-11-15 PROCEDURE — 86803 HEPATITIS C AB TEST: CPT

## 2022-11-15 PROCEDURE — 3078F DIAST BP <80 MM HG: CPT | Performed by: FAMILY MEDICINE

## 2022-11-15 PROCEDURE — 99214 OFFICE O/P EST MOD 30 MIN: CPT | Performed by: FAMILY MEDICINE

## 2022-11-15 PROCEDURE — G8427 DOCREV CUR MEDS BY ELIG CLIN: HCPCS | Performed by: FAMILY MEDICINE

## 2022-11-15 PROCEDURE — 85652 RBC SED RATE AUTOMATED: CPT

## 2022-11-15 PROCEDURE — 85025 COMPLETE CBC W/AUTO DIFF WBC: CPT

## 2022-11-15 PROCEDURE — 86140 C-REACTIVE PROTEIN: CPT

## 2022-11-15 PROCEDURE — 83036 HEMOGLOBIN GLYCOSYLATED A1C: CPT | Performed by: FAMILY MEDICINE

## 2022-11-15 PROCEDURE — 1036F TOBACCO NON-USER: CPT | Performed by: FAMILY MEDICINE

## 2022-11-15 PROCEDURE — 80053 COMPREHEN METABOLIC PANEL: CPT

## 2022-11-15 RX ORDER — IBUPROFEN 600 MG/1
600 TABLET ORAL
COMMUNITY
Start: 2022-08-12

## 2022-11-15 RX ORDER — BLOOD-GLUCOSE METER
EACH MISCELLANEOUS
COMMUNITY
Start: 2022-10-13

## 2022-11-15 RX ORDER — PANTOPRAZOLE SODIUM 40 MG/1
40 TABLET, DELAYED RELEASE ORAL
Qty: 30 TABLET | Refills: 5 | Status: SHIPPED | OUTPATIENT
Start: 2022-11-15

## 2022-11-15 SDOH — ECONOMIC STABILITY: FOOD INSECURITY: WITHIN THE PAST 12 MONTHS, THE FOOD YOU BOUGHT JUST DIDN'T LAST AND YOU DIDN'T HAVE MONEY TO GET MORE.: NEVER TRUE

## 2022-11-15 SDOH — ECONOMIC STABILITY: FOOD INSECURITY: WITHIN THE PAST 12 MONTHS, YOU WORRIED THAT YOUR FOOD WOULD RUN OUT BEFORE YOU GOT MONEY TO BUY MORE.: NEVER TRUE

## 2022-11-15 ASSESSMENT — SOCIAL DETERMINANTS OF HEALTH (SDOH): HOW HARD IS IT FOR YOU TO PAY FOR THE VERY BASICS LIKE FOOD, HOUSING, MEDICAL CARE, AND HEATING?: NOT HARD AT ALL

## 2022-11-15 NOTE — Clinical Note
Forgot to ask pt at visit about the lump on his back. Looked possibly suspicious on US and was recommended to see surgeon. He was asking [facility]. What did he find out?

## 2022-11-15 NOTE — PROGRESS NOTES
Name: Yaritza John  : 1955         Chief Complaint:     Chief Complaint   Patient presents with    Weight Loss       History of Present Illness:      Yaritza John is a 79 y.o.  male who presents with Weight Loss      HPI    Patient complains of unintentional weight loss. Reports he has had some type of GI trouble for a long time but worse for past 3 mos, epigastric or central pain. Worse with eating, can feel like he needs to vomit. Can always drink fluids. For past 3 yrs (since stuck in motel) hasn't been eating breakfast d/t cost. Has also been eating less d/t cost of food otherwise lately. BM's vary in consistency and sometimes he does have black stool. H/o bleeding ulcers but this doesn't feel similar to that, different area of pain. Had been on PPI in the past but was taken off by [place]. Gets a lot of sour stomach. Recent hematuria, had COVID and believes he got so dehydrated that he got a UTI. Had been in ER and after he left he had blood that came straight out of penis while not urinating. Still has \"prostate issue\" and ED. Has a girlfriend now which he's very happy about. She's 29 and has 3 young kids, youngest 13 mos, who all call him Dad. Still finishing up divorce from [facility], to whom he was  for I believe somewhere around 36 yrs. Concerned about ED and asks about pump device.     Medical History:     Patient Active Problem List   Diagnosis    ASHD (arteriosclerotic heart disease)    History of coronary artery bypass surgery    Obstructive apnea    Hypertension    Hyperlipidemia    Chronic obstructive pulmonary disease (HCC)    Vitamin D deficiency disease    Post traumatic stress disorder    CHF (congestive heart failure), NYHA class II, chronic, diastolic (HCC)    Cervical myelopathy (HCC)    Arrhythmia    Diabetes mellitus type 2 in obese ([address]    S/P cervical spinal fusion    Spinal stenosis in cervical region       Medications:       Prior to Admission medications    Medication Sig Start Date End Date Taking?  Authorizing Provider   Blood Glucose Monitoring Suppl (ONE TOUCH ULTRA 2) w/Device KIT use as directed by prescriber 10/13/22  Yes Historical Provider, MD   ibuprofen (ADVIL;MOTRIN) 600 MG tablet 600 mg [facility] 8/12/22  Yes Historical Provider, MD   pantoprazole (PROTONIX) 40 MG tablet Take 1 tablet by mouth every morning (before breakfast) 11/15/22  Yes Katie Ottos, DO   blood glucose monitor strips Test blood sugar once daily 10/13/22  Yes Katie Merced, DO   Lancets [address] [facility] Test blood sugar once daily 10/13/22  Yes Katie Merced, DO   Alcohol Swabs (ALCOHOL PREP) 70 % PADS Test sugar once daily 10/13/22  Yes Katie Merced, DO   famotidine (PEPCID) 40 MG tablet Take 40 mg by mouth 2/16/22  Yes Historical Provider, MD   atorvastatin (LIPITOR) 80 MG tablet Take 80 mg by mouth 8/23/21  Yes Historical Provider, MD   medical marijuana USE 11/22/19  Yes Historical Provider, MD   albuterol (PROVENTIL) (2.5 MG/3ML) 0.083% nebulizer solution inhale contents of 1 vial ( 3 milliliters ) in nebulizer by mouth and INTO THE LUNGS every 4 hours if needed for wheezing or shortness of breath 4/8/22  Yes Katie Blackwood, DO   albuterol sulfate HFA (PROVENTIL HFA) 108 (90 Base) MCG/ACT inhaler Inhale 2 puffs into the lungs every 6 hours as needed for Wheezing May substitute as needed for insurance reasons 1/19/22  Yes Harris Albrecht MD   tiotropium (SPIRIVA RESPIMAT) 2.5 MCG/ACT AERS inhaler Inhale 2 puffs into the lungs daily 1/18/22  Yes Katie Blackwood, DO   senna (SENOKOT) 8.6 MG tablet Take 1 tablet by mouth daily as needed for Constipation Up to twice a week 11/18/21  Yes Katie Ottos, DO   Sildenafil Citrate (VIAGRA PO) Take by mouth as needed (from the [place])   Yes Historical Provider, MD   terazosin (HYTRIN) 2 MG capsule Take 1 capsule by mouth nightly 8/19/21  Yes Fredrick Ventura PA-C   docusate sodium (COLACE) 100 MG capsule Take 1 capsule by mouth 2 times daily 7/8/21  Yes Harris Albrecht MD   ondansetron [facility]) 8 MG tablet Take 8 mg by mouth every 8 hours as needed for Nausea or Vomiting   Yes Historical Provider, MD   RANEXA 500 MG extended release tablet Take 2 tablets by mouth 2 times daily 11/16/20  Yes Refugio Starr MD   nitroGLYCERIN (NITROSTAT) 0.4 MG SL tablet up to max of 3 total doses. If no relief after 1 dose, call 911. 7/15/20  Yes Katie Blackwood DO   atenolol (TENORMIN) 25 MG tablet Take 1 tablet by mouth daily 9/19/19  Yes Refugio Starr MD   mirtazapine (REMERON) 15 MG tablet Take 1 tablet by mouth nightly 5/31/19  Yes DIANNA Benedict CNP   spironolactone (ALDACTONE) 25 MG tablet Take 25 mg by mouth daily   Yes Historical Provider, MD   Cholecalciferol (VITAMIN D) 2000 UNITS CAPS capsule Take 1 capsule by mouth daily   Yes Historical Provider, MD   aspirin 81 MG tablet Take 81 mg by mouth daily   Yes Historical Provider, MD        Allergies:       Codeine, Flomax [tamsulosin], Gabapentin, Hydrocodone, Lisinopril, Pcn [penicillins], and Sulfa antibiotics    Physical Exam:     Vitals:  /68   Pulse 57   Ht 5' 9\" (1.753 m)   Wt 170 lb (77.1 kg)   SpO2 98%   BMI 25.10 kg/m²   Physical Exam  Vitals and nursing note reviewed. Constitutional:       Appearance: He is well-developed. HENT:      Right Ear: Hearing and tympanic membrane normal.      Left Ear: Hearing and tympanic membrane normal.      Nose: Nose normal.      Mouth/Throat:      Mouth: Mucous membranes are moist.      Dentition: Normal dentition. Pharynx: Oropharynx is clear. Eyes:      Extraocular Movements: Extraocular movements intact. Conjunctiva/sclera: Conjunctivae normal.      Pupils: Pupils are equal, round, and reactive to light. Neck:      Thyroid: No thyroid mass or thyromegaly. Cardiovascular:      Rate and Rhythm: Normal rate and regular rhythm. Heart sounds: S1 normal and S2 normal. No murmur heard. Comments: No peripheral edema.   Pulmonary: Effort: Pulmonary effort is normal.      Breath sounds: Normal breath sounds. Abdominal:      General: Bowel sounds are normal.      Palpations: Abdomen is soft. Tenderness: There is no abdominal tenderness. Musculoskeletal:      Comments: Muscles of normal tone and bulk. Normal gait. Lymphadenopathy:      Cervical: No cervical adenopathy. Skin:     General: Skin is warm and dry. Findings: No rash. Neurological:      Mental Status: He is alert and oriented to person, place, and time. Psychiatric:         Mood and Affect: Mood normal.         Behavior: Behavior normal. Behavior is cooperative. Data:     Lab Results   Component Value Date/Time     11/15/2022 11:11 AM    K 4.4 11/15/2022 11:11 AM    CL 98 11/15/2022 11:11 AM    CO2 25 11/15/2022 11:11 AM    BUN 8 11/15/2022 11:11 AM    CREATININE 0.77 11/15/2022 11:11 AM    GLUCOSE 91 11/15/2022 11:11 AM    GLUCOSE 258 01/31/2012 07:45 PM    PROT 7.3 11/15/2022 11:11 AM    LABALBU 4.1 11/15/2022 11:11 AM    LABALBU 4.3 09/08/2011 06:56 PM    BILITOT 0.3 11/15/2022 11:11 AM    ALKPHOS 89 11/15/2022 11:11 AM    AST 9 11/15/2022 11:11 AM    ALT 5 11/15/2022 11:11 AM     Lab Results   Component Value Date/Time    WBC 6.6 11/15/2022 11:12 AM    RBC 4.54 11/15/2022 11:12 AM    RBC 5.49 01/31/2012 07:45 PM    HGB 14.2 11/15/2022 11:12 AM    HCT 42.0 11/15/2022 11:12 AM    MCV 92.6 11/15/2022 11:12 AM    MCH 31.2 11/15/2022 11:12 AM    MCHC 33.7 11/15/2022 11:12 AM    RDW 15.8 11/15/2022 11:12 AM     11/15/2022 11:12 AM     01/31/2012 07:45 PM    MPV NOT REPORTED 01/19/2022 09:32 PM     Lab Results   Component Value Date/Time    TSH 1.37 11/15/2022 11:11 AM     Lab Results   Component Value Date/Time    CHOL 168 05/23/2022 11:44 AM    HDL 43 05/23/2022 11:44 AM    PSA 0.58 11/15/2022 11:11 AM    LABA1C 4.9 11/15/2022 10:15 AM         Assessment & Plan:        Diagnosis Orders   1.  Unintended weight loss  Comprehensive Metabolic Panel    TSH with Reflex    PSA, Diagnostic      2. Gross hematuria  CBC with Auto Differential    PSA, Diagnostic    Urinalysis with Microscopic      3. Diet-controlled diabetes mellitus (HCC)  POCT glycosylated hemoglobin (Hb A1C)    Comprehensive Metabolic Panel      4. Gastroesophageal reflux disease without esophagitis        5. Organic impotence        6. Encounter for hepatitis C screening test for low risk patient  Hepatitis C Antibody        Unintentional weight loss, some situational r/t divorce and living in [facility] but has been quite precipitous in past 6 mos, approx 40# lost incl 25# from [address] and another 13 from early Oct til now. Also with symptoms of poss UGIB. Restart protonix and checking labs. May need endoscopy. Normal CT abd/pelvis. Gross hematuria also - rechecking. Advised to see urology for discussion of ED tx.          Requested Prescriptions     Signed Prescriptions Disp Refills    pantoprazole (PROTONIX) 40 MG tablet 30 tablet 5     Sig: Take 1 tablet by mouth every morning (before breakfast)         There are no Patient Instructions on file for this visit.      signed by Tristin Hess DO on 11/15/2022 at 10:57 PM  [address]  Dept: [phone]

## 2022-11-22 ENCOUNTER — TELEPHONE (OUTPATIENT)
Dept: UROLOGY | Age: 67
End: 2022-11-22

## 2022-11-22 NOTE — TELEPHONE ENCOUNTER
Patient needs to schedule an appointment in [facility] because he takes the [employer]. He was in ER a couple months ago because he was dehydrated and got some bags of fluid. Then he had a pool of blood come out of penis. He thought he  had a UTI before but he didn't. His only complaint now is the blood in urine. It had decreased from what he had before. When should I schedule him in [facility]?

## 2022-11-29 ENCOUNTER — TELEPHONE (OUTPATIENT)
Dept: FAMILY MEDICINE CLINIC | Age: 67
End: 2022-11-29

## 2022-11-29 DIAGNOSIS — R63.4 UNINTENDED WEIGHT LOSS: Primary | ICD-10-CM

## 2022-11-29 DIAGNOSIS — K92.1 MELENA: ICD-10-CM

## 2022-11-29 NOTE — PROGRESS NOTES
Patient states that the spot on his back popped and it's healing up.  Thinks that it could have been from the water at [facility]

## 2022-11-30 RX ORDER — ONDANSETRON 4 MG/1
4 TABLET, FILM COATED ORAL EVERY 8 HOURS PRN
Qty: 30 TABLET | Refills: 0 | Status: SHIPPED | OUTPATIENT
Start: 2022-11-30

## 2022-11-30 NOTE — TELEPHONE ENCOUNTER
Ensure protein Drinks sent to DM?
He asked about it but I did not send Rx. Typically not covered by insurance and there is no reason that he can't eat more food so he does not need the shakes.
Patient would like referral for colonoscopy and egd,  due to the nausea, diarrhea, weight loss. Notified about protein shakes, states that he is too nauseated to eat food and he thinks that he has crohns disease, he looked up the symptoms.   Was referred to [facility] and had a consult 7/2021, but no scope
Zofran Rx sent and referral entered.
JAYDEN Mitchell urol [facility]   5/22/2023  1:30 PM MD Savanna Gonzalez [facility]            Patient Active Problem List:     ASHD (arteriosclerotic heart disease)     History of coronary artery bypass surgery     Obstructive apnea     Hypertension     Hyperlipidemia     Chronic obstructive pulmonary disease (HCC)     Vitamin D deficiency disease     Post traumatic stress disorder     CHF (congestive heart failure), NYHA class II, chronic, diastolic (HCC)     Cervical myelopathy (HCC)     Arrhythmia     Diabetes mellitus type 2 in obese ([address]     S/P cervical spinal fusion     Spinal stenosis in cervical region

## 2022-12-01 ENCOUNTER — OFFICE VISIT (OUTPATIENT)
Dept: UROLOGY | Age: 67
End: 2022-12-01

## 2022-12-01 ENCOUNTER — HOSPITAL ENCOUNTER (OUTPATIENT)
Age: 67
Setting detail: SPECIMEN
Discharge: HOME | End: 2022-12-01
Payer: OTHER GOVERNMENT

## 2022-12-01 VITALS
DIASTOLIC BLOOD PRESSURE: 78 MMHG | SYSTOLIC BLOOD PRESSURE: 138 MMHG | HEIGHT: 66 IN | WEIGHT: 166 LBS | BODY MASS INDEX: 26.68 KG/M2

## 2022-12-01 DIAGNOSIS — R31.0 GROSS HEMATURIA: ICD-10-CM

## 2022-12-01 DIAGNOSIS — N13.8 BPH WITH OBSTRUCTION/LOWER URINARY TRACT SYMPTOMS: ICD-10-CM

## 2022-12-01 DIAGNOSIS — R31.0 GROSS HEMATURIA: Primary | ICD-10-CM

## 2022-12-01 DIAGNOSIS — K59.09 OTHER CONSTIPATION: ICD-10-CM

## 2022-12-01 DIAGNOSIS — N40.1 BPH WITH OBSTRUCTION/LOWER URINARY TRACT SYMPTOMS: ICD-10-CM

## 2022-12-01 DIAGNOSIS — R63.4 UNINTENTIONAL WEIGHT LOSS: ICD-10-CM

## 2022-12-01 LAB
-: ABNORMAL
BACTERIA: ABNORMAL
BILIRUBIN URINE: ABNORMAL
COLOR: ABNORMAL
COMMENT UA: ABNORMAL
EPITHELIAL CELLS UA: ABNORMAL /HPF
GLUCOSE URINE: NEGATIVE
KETONES, URINE: ABNORMAL
LEUKOCYTE ESTERASE, URINE: ABNORMAL
MUCUS: ABNORMAL
NITRITE, URINE: NEGATIVE
PH UA: 5 (ref 5–8)
PROTEIN UA: ABNORMAL
RBC UA: ABNORMAL /HPF (ref 0–2)
SPECIFIC GRAVITY UA: 1.02 (ref 1–1.03)
TURBIDITY: ABNORMAL
URINE HGB: NEGATIVE
UROBILINOGEN, URINE: NORMAL
WBC UA: ABNORMAL /HPF

## 2022-12-01 PROCEDURE — 81001 URINALYSIS AUTO W/SCOPE: CPT

## 2022-12-01 PROCEDURE — 87086 URINE CULTURE/COLONY COUNT: CPT

## 2022-12-01 ASSESSMENT — ENCOUNTER SYMPTOMS
WHEEZING: 0
BACK PAIN: 0
COLOR CHANGE: 0
VOMITING: 0
EYE REDNESS: 0
SHORTNESS OF BREATH: 0
COUGH: 0
NAUSEA: 0
CONSTIPATION: 1
ABDOMINAL PAIN: 0

## 2022-12-01 NOTE — PATIENT INSTRUCTIONS
any question, please call the office to discuss how we could have made your experience a very good one. Thank you.

## 2022-12-01 NOTE — PROGRESS NOTES
HPI:      Patient is a 79 y.o. male in no acute distress. He is alert and oriented to person, place, and time. History:  He is status post C3-C7 laminectomy on 10/12/2020. Patient was on terazosin 2mg for BPH in the past but was not given it at time of surgery. Patient states that he has had reaction to Flomax in the past but is able to tolerate Terazosin. Patient did have acute urinary retention with a bladder scan of over 800 mL. Therefore Russell catheter was placed. Patient also was noted to have significant constipation. He was discharged from the hospital on 10/19/2020 on Cardura 1 mg. He does have itching and irritation on the tip of his penis with some white discharge around it as well. He has never been seen by urology before. He has never had issues with urinary retention in the past.     Today:  Patient is here today for gross hematuria. Patient has not been seen in over a year had did no-show and canceled prior appointments. He states that approximately 2-1/2 months ago he developed gross hematuria which has been intermittent. He was in the emergency room twice on 9/12/2022 and once 10/5/2022 with complaints of hematuria. He did have a noncontrast CT which did not show any obstructive uropathy or suspicious lesions. Patient does report that he has lost 40 to 50 pounds over the last few months. He does not have a daily bowel movement. He states that sometimes he goes a full week without having a bowel movement. He he does take terazosin 2 mg nightly. Patient is a former smoker. He quit in 2006. Patient did have a PSA ordered by PCP which was 0.58.     Past Medical History:   Diagnosis Date    Arthritis     WENDY KNEE    Bronchitis with chronic airway obstruction (HCC)     CAD (coronary artery disease)     CHF (congestive heart failure) (HCC)     CTS (carpal tunnel syndrome)     RIGHT    Diabetes mellitus ([facility] [address]     Diabetic neuropathy associated with diabetes mellitus due to underlying condition ([facility] [address]     Exposure to toxic chemical     [employer] [place], [address] [place] [address]-toxic water exposure    GERD (gastroesophageal reflux disease)     H/O cardiovascular stress test 07/08/2016    Abnormal.  Moderate perfusion defect of mild to moderate intensity in the inferolateral,inferior and inferoapical regions during stress imaging. Ef 45%. with regional wall motion abnormalities. H/O echocardiogram 2/17/16    EF >60%. LV wall thickness is mildly increased. Inferoseptal wall is abnormal in its motion not unusual status post open heart surgery. Normal aortic valve structure with ild aortic regurgitation. Hip pain, left     Hx of cardiac cath 07/08/2016    LMCA: Normal 0% stenosis. LAD: Chronic occlusion 100%. Lesion on Mid LAD: 100% stenosis. LCx: single stenosis. Lesion on Mid CX: 80% stenosis. RCA: Lesion on Mid RCA: 70% stenosis.     Hyperlipidemia     Hypertension     MI (myocardial infarction) ([facility] [address] 2015    Pneumonia     PTSD (post-traumatic stress disorder)     S/P CABG (coronary artery bypass graft) 10/23/15    Sleep apnea     Ventricular fibrillation ([facility] [address] 10/18/2015    x 2 DURING HEART ATTACK     Past Surgical History:   Procedure Laterality Date    CARDIAC SURGERY      10/21/2015 3 vessel CABG    CARPAL TUNNEL RELEASE      CARPAL TUNNEL RELEASE Right     COLONOSCOPY  2012    [facility]    COLONOSCOPY  11/27/2017    CORONARY ARTERY BYPASS GRAFT  10/23/15    Dr. Rasta Woods      left middle finger    FRACTURE SURGERY      left wrist    JOINT REPLACEMENT      right knee    PILONIDAL CYST EXCISION      1974    PILONIDAL CYST EXCISION      PA COLONOSCOPY W/BIOPSY SINGLE/MULTIPLE N/A 11/27/2017    COLONOSCOPY WITH BIOPSY/ polypectomy performed by Patricia Casper MD at [facility] EGD TRANSORAL BIOPSY SINGLE/MULTIPLE N/A 11/27/2017    EGD BIOPSY performed by Patricia Casper MD at [address] [place] [address] TONSILLECTOMY      WRIST SURGERY      left wrist     Outpatient Encounter Medications as of 12/1/2022   Medication Sig Dispense Refill    ondansetron (ZOFRAN) 4 MG tablet Take 1 tablet by mouth every 8 hours as needed for Nausea or Vomiting 30 tablet 0    Blood Glucose Monitoring Suppl (ONE TOUCH ULTRA 2) w/Device KIT use as directed by prescriber      ibuprofen (ADVIL;MOTRIN) 600 MG tablet 600 mg VA      pantoprazole (PROTONIX) 40 MG tablet Take 1 tablet by mouth every morning (before breakfast) 30 tablet 5    blood glucose monitor strips Test blood sugar once daily 100 strip 3    Lancets MISC Test blood sugar once daily 100 each 3    Alcohol Swabs (ALCOHOL PREP) 70 % PADS Test sugar once daily 100 each 3    famotidine (PEPCID) 40 MG tablet Take 40 mg by mouth      atorvastatin (LIPITOR) 80 MG tablet Take 80 mg by mouth      medical marijuana USE      albuterol (PROVENTIL) (2.5 MG/3ML) 0.083% nebulizer solution inhale contents of 1 vial ( 3 milliliters ) in nebulizer by mouth and INTO THE LUNGS every 4 hours if needed for wheezing or shortness of breath 300 mL 1    albuterol sulfate HFA (PROVENTIL HFA) 108 (90 Base) MCG/ACT inhaler Inhale 2 puffs into the lungs every 6 hours as needed for Wheezing May substitute as needed for insurance reasons 18 g 3    tiotropium (SPIRIVA RESPIMAT) 2.5 MCG/ACT AERS inhaler Inhale 2 puffs into the lungs daily 3 each 3    senna (SENOKOT) 8.6 MG tablet Take 1 tablet by mouth daily as needed for Constipation Up to twice a week 30 tablet 1    Sildenafil Citrate (VIAGRA PO) Take by mouth as needed (from the [facility])      terazosin (HYTRIN) 2 MG capsule Take 1 capsule by mouth nightly 30 capsule 11    docusate sodium (COLACE) 100 MG capsule Take 1 capsule by mouth 2 times daily 20 capsule 0    RANEXA 500 MG extended release tablet Take 2 tablets by mouth 2 times daily 60 tablet 5    nitroGLYCERIN (NITROSTAT) 0.4 MG SL tablet up to max of 3 total doses.  If no relief after 1 dose, call 152. 83 tablet 3    atenolol (TENORMIN) 25 MG tablet Take 1 tablet by mouth daily 90 tablet 1    mirtazapine (REMERON) 15 MG tablet Take 1 tablet by mouth nightly 30 tablet 0    spironolactone (ALDACTONE) 25 MG tablet Take 25 mg by mouth daily      aspirin 81 MG tablet Take 81 mg by mouth daily      Cholecalciferol (VITAMIN D) 2000 UNITS CAPS capsule Take 1 capsule by mouth daily (Patient not taking: Reported on 12/1/2022)       No facility-administered encounter medications on file as of 12/1/2022.       Current Outpatient Medications on File Prior to Visit   Medication Sig Dispense Refill    ondansetron (ZOFRAN) 4 MG tablet Take 1 tablet by mouth every 8 hours as needed for Nausea or Vomiting 30 tablet 0    Blood Glucose Monitoring Suppl (ONE TOUCH ULTRA 2) w/Device KIT use as directed by prescriber      ibuprofen (ADVIL;MOTRIN) 600 MG tablet 600 mg VA      pantoprazole (PROTONIX) 40 MG tablet Take 1 tablet by mouth every morning (before breakfast) 30 tablet 5    blood glucose monitor strips Test blood sugar once daily 100 strip 3    Lancets MISC Test blood sugar once daily 100 each 3    Alcohol Swabs (ALCOHOL PREP) 70 % PADS Test sugar once daily 100 each 3    famotidine (PEPCID) 40 MG tablet Take 40 mg by mouth      atorvastatin (LIPITOR) 80 MG tablet Take 80 mg by mouth      medical marijuana USE      albuterol (PROVENTIL) (2.5 MG/3ML) 0.083% nebulizer solution inhale contents of 1 vial ( 3 milliliters ) in nebulizer by mouth and INTO THE LUNGS every 4 hours if needed for wheezing or shortness of breath 300 mL 1    albuterol sulfate HFA (PROVENTIL HFA) 108 (90 Base) MCG/ACT inhaler Inhale 2 puffs into the lungs every 6 hours as needed for Wheezing May substitute as needed for insurance reasons 18 g 3    tiotropium (SPIRIVA RESPIMAT) 2.5 MCG/ACT AERS inhaler Inhale 2 puffs into the lungs daily 3 each 3    senna (SENOKOT) 8.6 MG tablet Take 1 tablet by mouth daily as needed for Constipation Up to twice a week 30 tablet 1 Sildenafil Citrate (VIAGRA PO) Take by mouth as needed (from the [place])      terazosin (HYTRIN) 2 MG capsule Take 1 capsule by mouth nightly 30 capsule 11    docusate sodium (COLACE) 100 MG capsule Take 1 capsule by mouth 2 times daily 20 capsule 0    RANEXA 500 MG extended release tablet Take 2 tablets by mouth 2 times daily 60 tablet 5    nitroGLYCERIN (NITROSTAT) 0.4 MG SL tablet up to max of 3 total doses. If no relief after 1 dose, call 911. 25 tablet 3    atenolol (TENORMIN) 25 MG tablet Take 1 tablet by mouth daily 90 tablet 1    mirtazapine (REMERON) 15 MG tablet Take 1 tablet by mouth nightly 30 tablet 0    spironolactone (ALDACTONE) 25 MG tablet Take 25 mg by mouth daily      aspirin 81 MG tablet Take 81 mg by mouth daily      Cholecalciferol (VITAMIN D) 2000 UNITS CAPS capsule Take 1 capsule by mouth daily (Patient not taking: Reported on 2022)       No current facility-administered medications on file prior to visit. Codeine, Flomax [tamsulosin], Gabapentin, Hydrocodone, Lisinopril, Pcn [penicillins], and Sulfa antibiotics  Family History   Adopted: Yes   Family history unknown: Yes     Social History     Tobacco Use   Smoking Status Former    Packs/day: 1.00    Years: 19.00    Pack years: 19.00    Types: Cigarettes    Quit date: 2006    Years since quittin.2   Smokeless Tobacco Former    Quit date: 2000       Social History     Substance and Sexual Activity   Alcohol Use No       Review of Systems   Constitutional:  Positive for appetite change and unexpected weight change. Negative for chills and fever. Eyes:  Negative for redness and visual disturbance. Respiratory:  Negative for cough, shortness of breath and wheezing. Cardiovascular:  Negative for chest pain and leg swelling. Gastrointestinal:  Positive for constipation. Negative for abdominal pain, nausea and vomiting. Genitourinary:  Positive for hematuria.  Negative for decreased urine volume, difficulty urinating, dysuria, enuresis, flank pain, frequency, penile discharge, penile pain, scrotal swelling, testicular pain and urgency. Musculoskeletal:  Negative for back pain, joint swelling and myalgias. Skin:  Negative for color change, rash and wound. Neurological:  Negative for dizziness, tremors and numbness. Hematological:  Negative for adenopathy. Does not bruise/bleed easily. /78 (Site: Right Upper Arm, Position: Sitting, Cuff Size: Medium Adult)   Ht 5' 6\" (1.676 m)   Wt 166 lb (75.3 kg)   BMI 26.79 kg/m²       PHYSICAL EXAM:  Constitutional: Patient in no acute distress; Neuro: alert and oriented to person place and time. Psych: Mood and affect normal.  Lungs: Respiratory effort normal  Abdomen: Soft, non-tender, non-distended  Rectal: deferred       Lab Results   Component Value Date    BUN 8 11/15/2022     Lab Results   Component Value Date    CREATININE 0.77 11/15/2022     Lab Results   Component Value Date    PSA 0.58 11/15/2022    PSA 0.52 07/05/2017       ASSESSMENT:   Diagnosis Orders   1. Gross hematuria  CT UROGRAM    Culture, Urine    Urinalysis with Microscopic    Basic Metabolic Panel      2. BPH with obstruction/lower urinary tract symptoms        3. Other constipation        4. Unintentional weight loss              PLAN:  We will check a urinalysis and culture. We will call him with results. We are checking this for preparation for his upcoming cystoscopy. As patient has had a 40 to 50 pound weight loss over the last few months we will need to repeat a CT scan. We will do a CT urogram.  We also need to directly visualize lower urinary tract with cystoscopy. We discussed the risk benefits of cystoscopy including bleeding and infection. Amenable schedule.     Continue terazosin    Follow-up for cystoscopy and to review CT urogram

## 2022-12-02 LAB
CULTURE: NO GROWTH
SPECIMEN DESCRIPTION: NORMAL

## 2022-12-05 ENCOUNTER — APPOINTMENT (OUTPATIENT)
Dept: CT IMAGING | Age: 67
End: 2022-12-05
Payer: OTHER GOVERNMENT

## 2022-12-05 ENCOUNTER — TELEPHONE (OUTPATIENT)
Dept: UROLOGY | Age: 67
End: 2022-12-05

## 2022-12-05 ENCOUNTER — HOSPITAL ENCOUNTER (EMERGENCY)
Age: 67
Discharge: HOME | End: 2022-12-05
Attending: EMERGENCY MEDICINE
Payer: OTHER GOVERNMENT

## 2022-12-05 ENCOUNTER — APPOINTMENT (OUTPATIENT)
Dept: GENERAL RADIOLOGY | Age: 67
End: 2022-12-05
Payer: OTHER GOVERNMENT

## 2022-12-05 VITALS
BODY MASS INDEX: 24.55 KG/M2 | HEIGHT: 68 IN | TEMPERATURE: 98.1 F | SYSTOLIC BLOOD PRESSURE: 125 MMHG | RESPIRATION RATE: 18 BRPM | DIASTOLIC BLOOD PRESSURE: 75 MMHG | OXYGEN SATURATION: 94 % | WEIGHT: 162 LBS | HEART RATE: 61 BPM

## 2022-12-05 DIAGNOSIS — S83.91XA SPRAIN OF RIGHT KNEE, UNSPECIFIED LIGAMENT, INITIAL ENCOUNTER: ICD-10-CM

## 2022-12-05 DIAGNOSIS — S09.90XA INJURY OF HEAD, INITIAL ENCOUNTER: ICD-10-CM

## 2022-12-05 DIAGNOSIS — S70.01XA CONTUSION OF RIGHT HIP, INITIAL ENCOUNTER: Primary | ICD-10-CM

## 2022-12-05 PROCEDURE — 6370000000 HC RX 637 (ALT 250 FOR IP): Performed by: EMERGENCY MEDICINE

## 2022-12-05 PROCEDURE — 73502 X-RAY EXAM HIP UNI 2-3 VIEWS: CPT

## 2022-12-05 PROCEDURE — 70450 CT HEAD/BRAIN W/O DYE: CPT

## 2022-12-05 PROCEDURE — 99284 EMERGENCY DEPT VISIT MOD MDM: CPT

## 2022-12-05 PROCEDURE — 73564 X-RAY EXAM KNEE 4 OR MORE: CPT

## 2022-12-05 RX ORDER — ACETAMINOPHEN 500 MG
1000 TABLET ORAL
Status: COMPLETED | OUTPATIENT
Start: 2022-12-05 | End: 2022-12-05

## 2022-12-05 RX ORDER — IBUPROFEN 200 MG
400 TABLET ORAL
Status: COMPLETED | OUTPATIENT
Start: 2022-12-05 | End: 2022-12-05

## 2022-12-05 RX ORDER — ONDANSETRON 4 MG/1
4 TABLET, ORALLY DISINTEGRATING ORAL ONCE
Status: COMPLETED | OUTPATIENT
Start: 2022-12-05 | End: 2022-12-05

## 2022-12-05 RX ADMIN — IBUPROFEN 400 MG: 200 TABLET, FILM COATED ORAL at 14:04

## 2022-12-05 RX ADMIN — ACETAMINOPHEN 1000 MG: 500 TABLET, FILM COATED ORAL at 09:01

## 2022-12-05 RX ADMIN — ONDANSETRON 4 MG: 4 TABLET, ORALLY DISINTEGRATING ORAL at 09:01

## 2022-12-05 ASSESSMENT — ENCOUNTER SYMPTOMS
EYE PAIN: 0
ABDOMINAL PAIN: 0
SORE THROAT: 0
TROUBLE SWALLOWING: 0
RHINORRHEA: 0
DIARRHEA: 0
WHEEZING: 0
BACK PAIN: 0
VOMITING: 0
CHEST TIGHTNESS: 0
SHORTNESS OF BREATH: 0

## 2022-12-05 ASSESSMENT — PAIN DESCRIPTION - LOCATION
LOCATION: KNEE
LOCATION: KNEE;HIP

## 2022-12-05 ASSESSMENT — PAIN DESCRIPTION - PAIN TYPE: TYPE: ACUTE PAIN

## 2022-12-05 ASSESSMENT — PAIN SCALES - GENERAL
PAINLEVEL_OUTOF10: 9
PAINLEVEL_OUTOF10: 8
PAINLEVEL_OUTOF10: 8

## 2022-12-05 ASSESSMENT — PAIN DESCRIPTION - DESCRIPTORS
DESCRIPTORS: ACHING
DESCRIPTORS: ACHING;STABBING

## 2022-12-05 ASSESSMENT — PAIN DESCRIPTION - FREQUENCY: FREQUENCY: CONTINUOUS

## 2022-12-05 ASSESSMENT — PAIN DESCRIPTION - ORIENTATION
ORIENTATION: RIGHT
ORIENTATION: RIGHT

## 2022-12-05 ASSESSMENT — PAIN - FUNCTIONAL ASSESSMENT: PAIN_FUNCTIONAL_ASSESSMENT: 0-10

## 2022-12-05 NOTE — ED NOTES
This nurse calls [facility] and sets up an appointment for patient to have bone scan test. This nurse calls [facility] to set up a ride to [facility] on 12/08/2022 for a bone scan appointment at 0800. Patients transport will be to his home at 0600 to pick him up. Conformation number [id number]. Patient made aware of appointment time and transfer times.       Reg Dash RN  12/05/22 [id number]

## 2022-12-05 NOTE — ED PROVIDER NOTES
@@  eMERGENCY dEPARTMENT eNCOUnter      Pt Name: Ray Montero  MRN: [id number]  [place] 1955  Date of evaluation: 12/5/2022  Provider: Lucille Mccormack MD    CHIEF COMPLAINT       Chief Complaint   Patient presents with    Knee Injury    Hip Injury     Pt has fallen twice in the last three days         HISTORYOF PRESENT ILLNESS   (Location/Symptom, Timing/Onset, Context/Setting, Quality, Duration, ModifyingFactors, Severity) Note limiting factors. HPI    Ray Montero is a 79 y.o. male who presents to the emergency department right hip and knee pain after a fall. Patient states that he was at [address] on Friday, 3 days ago, and his right knee gave out and he fell onto his right side. He injured his right knee and right hip at that time. Since then he has been ambulating, he usually uses his walker. This morning he was in his hotel room and his knee again gave out and he fell to the right side. He states that this fall did not seem as bad as the fall 3 days ago. He does have a history of frequent falls. He is in the process of scheduling to get surgery on his right knee, had a previous replacement but states there is some ligamentous and muscular damage if they want to repair, he does have chronic issues with it giving out and being painful. He hit his head this morning when he fell, denies loss of consciousness or confusion since the event. He says he did vomit once this morning but this is not uncommon for him. His pain is worse with movement, better with rest.    Nursing Notes were reviewed. REVIEW OF SYSTEMS    (2+ for level 4; 10+ for level 5)   Review of Systems   Constitutional:  Negative for diaphoresis and fever. HENT:  Negative for rhinorrhea, sore throat and trouble swallowing. Eyes:  Negative for pain. Respiratory:  Negative for chest tightness, shortness of breath and wheezing. Cardiovascular:  Negative for chest pain and leg swelling.    Gastrointestinal:  Negative for abdominal pain, diarrhea and vomiting.   Endocrine: Negative for polyuria.   Genitourinary:  Negative for dysuria and frequency.   Musculoskeletal:  Positive for arthralgias. Negative for back pain, myalgias, neck pain and neck stiffness.   Skin:  Negative for rash.   Allergic/Immunologic: Negative for immunocompromised state.   Neurological:  Negative for dizziness, seizures, syncope and headaches.   Hematological:  Does not bruise/bleed easily.   Psychiatric/Behavioral:  Negative for confusion.    All other systems reviewed and are negative.    PAST MEDICAL HISTORY     Past Medical History:   Diagnosis Date    Arthritis     WENDY KNEE    Bronchitis with chronic airway obstruction ([facility])     CAD (coronary artery disease)     CHF (congestive heart failure) ([facility])     CTS (carpal tunnel syndrome)     RIGHT    Diabetes mellitus ([facility])     Diabetic neuropathy associated with diabetes mellitus due to underlying condition ([facility])     Exposure to toxic chemical     [employer] [place] [employer]-toxic water exposure    GERD (gastroesophageal reflux disease)     H/O cardiovascular stress test 07/08/2016    Abnormal.  Moderate perfusion defect of mild to moderate intensity in the inferolateral,inferior and inferoapical regions during stress imaging.  Ef 45%. with regional wall motion abnormalities.    H/O echocardiogram 2/17/16    EF >60%. LV wall thickness is mildly increased. Inferoseptal wall is abnormal in its motion not unusual status post open heart surgery.  Normal aortic valve structure with ild aortic regurgitation.    Hip pain, left     Hx of cardiac cath 07/08/2016    LMCA: Normal 0% stenosis. LAD: Chronic occlusion 100%. Lesion on Mid LAD: 100% stenosis. LCx: single stenosis. Lesion on Mid CX: 80% stenosis. RCA: Lesion on Mid RCA: 70% stenosis.    Hyperlipidemia     Hypertension     MI (myocardial infarction) ([facility]) 2015    Pneumonia     PTSD (post-traumatic stress disorder)     S/P CABG (coronary artery  bypass graft) 10/23/15    Sleep apnea     Ventricular fibrillation ([address] 10/18/2015    x 2 DURING HEART ATTACK       SURGICAL HISTORY       Past Surgical History:   Procedure Laterality Date    CARDIAC SURGERY      10/21/2015 3 vessel CABG    CARPAL TUNNEL RELEASE      CARPAL TUNNEL RELEASE Right     COLONOSCOPY      [facility]    COLONOSCOPY  2017    CORONARY ARTERY BYPASS GRAFT  10/23/15    Dr. Coty Noel      left middle finger    FRACTURE SURGERY      left wrist    JOINT REPLACEMENT      right knee    PILONIDAL CYST EXCISION      1974    PILONIDAL CYST EXCISION      RI COLONOSCOPY W/BIOPSY SINGLE/MULTIPLE N/A 2017    COLONOSCOPY WITH BIOPSY/ polypectomy performed by Elijah Hui MD at [address] [place] [address] EGD TRANSORAL BIOPSY SINGLE/MULTIPLE N/A 2017    EGD BIOPSY performed by Elijah Hui MD at [facility]       CURRENT MEDICATIONS     [unfilled]    ALLERGIES     Codeine, Flomax [tamsulosin], Gabapentin, Hydrocodone, Lisinopril, Pcn [penicillins], and Sulfa antibiotics    FAMILY HISTORY       Family History   Adopted: Yes   Family history unknown: Yes        SOCIAL HISTORY       Social History     Socioeconomic History    Marital status:      Spouse name: None    Number of children: None    Years of education: None    Highest education level: None   Tobacco Use    Smoking status: Former     Packs/day: 1.00     Years: 19.00     Pack years: 19.00     Types: Cigarettes     Quit date: 2006     Years since quittin.2    Smokeless tobacco: Former     Quit date: 2000   Vaping Use    Vaping Use: Never used   Substance and Sexual Activity    Alcohol use: No    Drug use: Yes     Frequency: 1.0 times per week     Types: Marijuana (Weed)     Comment: Pt smokes marijuana therapeutically.     Sexual activity: Yes     Partners: Female     Social Determinants of Health     Financial Resource Strain: Low Risk     Difficulty of Paying Living Expenses: Not hard at all   Food Insecurity: No Food Insecurity    Worried About [facility] in the Last Year: Never true    [facility] in the Last Year: Never true       SCREENINGS    [place] Coma Scale  Eye Opening: Spontaneous  Best Verbal Response: Oriented  Best Motor Response: Obeys commands  Gt Coma Scale Score: 15      PHYSICAL EXAM    (up to 7 forlevel 4, 8 or more for level 5)     ED Triage Vitals   BP Temp Temp src Pulse Resp SpO2 Height Weight   -- -- -- -- -- -- -- --       Physical Exam  Vitals and nursing note reviewed. Constitutional:       General: He is not in acute distress. Appearance: He is well-developed. HENT:      Head: Normocephalic and atraumatic. Nose: Nose normal.      Mouth/Throat:      Mouth: Mucous membranes are moist.   Eyes:      General:         Right eye: No discharge. Left eye: No discharge. Extraocular Movements: Extraocular movements intact. Pupils: Pupils are equal, round, and reactive to light. Neck:      Comments: Cervical spine nontender  Cardiovascular:      Rate and Rhythm: Normal rate and regular rhythm. Heart sounds: Normal heart sounds. No murmur heard. No friction rub. No gallop. Pulmonary:      Effort: Pulmonary effort is normal. No respiratory distress. Breath sounds: Normal breath sounds. No stridor. No wheezing or rales. Chest:      Chest wall: No tenderness. Abdominal:      General: Bowel sounds are normal. There is no distension. Palpations: Abdomen is soft. There is no mass. Tenderness: There is no abdominal tenderness. There is no guarding or rebound. Musculoskeletal:         General: Tenderness present. Normal range of motion. Cervical back: Normal range of motion and neck supple. Comments: Cervical thoracic and lumbar spine are nontender.   He has some tenderness in the right hip, some tenderness in the right knee which is worse with range of motion but he is able to scoot from the EMS cart over to the bed. No obvious deformity. PT pulses intact and equal bilaterally   Lymphadenopathy:      Cervical: No cervical adenopathy. Skin:     General: Skin is warm and dry. Findings: No erythema or rash. Neurological:      General: No focal deficit present. Mental Status: He is alert and oriented to person, place, and time. Cranial Nerves: No cranial nerve deficit. Coordination: Coordination normal.   Psychiatric:         Behavior: Behavior normal.         Thought Content: Thought content normal.         Judgment: Judgment normal.       DIAGNOSTIC RESULTS     EKG (Per Emergency Physician):   N/a    RADIOLOGY (Per Emergency Physician):   Head unremarkable. X-ray of the right hip and right knee show chronic changes but nothing acute    Interpretation per the Radiologist below, ifavailable at the time of this note:  XR HIP RIGHT (2-3 VIEWS)    Result Date: 12/5/2022  EXAM TYPE: XR HIP RIGHT (2-3 VIEWS) EXAM DATE AND TIME: 12/5/2022 10:41 AM EST INDICATION: 79years old Male with right hip pain. COMPARISON: None. TECHNIQUE: Frontal and frog-leg lateral views of the right hip. FINDINGS: There is bone-on-bone apposition of the right hip joint with subchondral sclerosis and cystic changes. Extensive marginal spurring is noted. No acute fracture or traumatic malalignment. Advanced osteoarthritis of the right hip joint. XR KNEE RIGHT (MIN 4 VIEWS)    Result Date: 12/5/2022  EXAM TYPE: XR KNEE RIGHT (MIN 4 VIEWS) EXAM DATE AND TIME: 12/5/2022 10:41 AM EST INDICATION: 79years old Male with pain. History of recent fall. COMPARISON: 8/25/2022 TECHNIQUE: 3 views of the right knee. FINDINGS/IMPRESSION: 1. Stable appearance of the right total knee arthroplasty. 2. No evidence of an acute fracture or subluxation.      CT Head W/O Contrast    Result Date: 12/5/2022  EXAMINATION: CT HEAD WO CONTRAST. HISTORY: Reason for exam:->fall, head injury. COMPARISON: 4/21/2019. TECHNIQUE: CT examination of the head without IV contrast. Dose reduction techniques were achieved by using automated exposure control and/or adjustment of mA and/or kV according to patient size and/or use of iterative reconstruction technique. FINDINGS: There is ventriculomegaly and sulcal prominence suggesting age-appropriate volume loss. No intracranial hemorrhage or abnormal extra-axial fluid collections are identified. The fourth ventricle is in the midline and midline symmetry is preserved. Arterial calcifications are seen at the skull base. Orbits are symmetrical. Visualized paranasal sinuses are clear. Aging changes of the brain without intracranial hemorrhage or midline shift confirmed. ED BEDSIDE ULTRASOUND:   Performed by ED Physician - none    LABS:  Labs Reviewed - No data to display     All other labs were within normal range or not returned as of this dictation. EMERGENCY DEPARTMENT COURSE and DIFFERENTIALDIAGNOSIS/MDM:   Vitals:    Vitals:    12/05/22 0854   BP: 125/75   Pulse: 61   Resp: 18   Temp: 98.1 °F (36.7 °C)   TempSrc: Oral   SpO2: 94%   Weight: 162 lb (73.5 kg)   Height: 5' 8\" (1.727 m)       Medications   acetaminophen (TYLENOL) tablet 1,000 mg (1,000 mg Oral Given 12/5/22 0901)   ondansetron (ZOFRAN-ODT) disintegrating tablet 4 mg (4 mg SubLINGual Given 12/5/22 0901)       MDM  . Imaging shows no acute fracture. Patient was able to ambulate with his Rollator walker. He was supposed to have a bone density test this morning in [place] to prep for possible surgery on his right knee and he missed that appointment so he is working to rearrange follow-up with orthopedics. He does not want anything for pain. He will return if any worsening or changing symptoms.     14:00 We were able to reschedule his bone scan for this Thursday [facility] get that [facility] performed. REVAL:         CRITICAL CARE TIME   Total Critical Care time was 0 minutes, excluding separatelyreportable procedures. There was a high probability ofclinically significant/life threatening deterioration in the patient's condition which required my urgent intervention. CONSULTS:  [unfilled]    PROCEDURES:  Unless otherwise noted below, none     Procedures    FINAL IMPRESSION      1. Contusion of right hip, initial encounter    2. Sprain of right knee, unspecified ligament, initial encounter    3. Injury of head, initial encounter          DISPOSITION/PLAN   DISPOSITION Decision To Discharge 12/05/2022 12:03:50 PM      PATIENT REFERRED TO:  Call your orthopedic doctor for further follow up          [facility] [address]    Go in 3 day(s)  Bone Scan      DISCHARGE MEDICATIONS:  New Prescriptions    No medications on file              Summation      Patient Course: Mechanical fall x2, history of right knee giving out, imaging shows no acute fracture and CT head negative, patient able to ambulate here, will follow up with orthopedics    ED Medications administered this visit:    Medications   acetaminophen (TYLENOL) tablet 1,000 mg (1,000 mg Oral Given 12/5/22 0901)   ondansetron (ZOFRAN-ODT) disintegrating tablet 4 mg (4 mg SubLINGual Given 12/5/22 0901)       New Prescriptions from this visit:    New Prescriptions    No medications on file       Follow-up:  Call your orthopedic doctor for further follow up          [facility] [address]    Go in 3 day(s)  Bone Scan        Final Impression:  1. Contusion of right hip, initial encounter    2. Sprain of right knee, unspecified ligament, initial encounter    3.  Injury of head, initial encounter                   (Please note:  Portions of this note were completed with a voicerecognition program.  Efforts were made to edit the dictations but occasionally words and phrases are mis-transcribed.)  Form v2016. [id number].[id number]    Gissel Castelan MD (electronically signed)  Emergency Medicine Provider            Gissel Castelan MD  12/05/22 Padmini Rios MD  12/05/22 1278

## 2022-12-05 NOTE — TELEPHONE ENCOUNTER
----- Message from DIANNA Nicholson CNP sent at 12/5/2022  9:05 AM EST -----  Call pt - urine cx reviewed and negative for UTI & for significant microhematuria

## 2022-12-05 NOTE — DISCHARGE INSTRUCTIONS
[facility] will pick you up Thursday morning at 6 AM to take you to your bone scan at [place].   You must be ready and waiting for their arrival.

## 2022-12-05 NOTE — ED NOTES
Pt ambulates with walker in room approximately 10 foot.       Russell Hall RN  12/05/22 5736 Rehabilitation services/Teaching and training

## 2022-12-09 ENCOUNTER — HOSPITAL ENCOUNTER (OUTPATIENT)
Dept: CT IMAGING | Age: 67
End: 2022-12-09
Payer: OTHER GOVERNMENT

## 2022-12-09 DIAGNOSIS — R31.0 GROSS HEMATURIA: ICD-10-CM

## 2022-12-09 PROCEDURE — 6360000004 HC RX CONTRAST MEDICATION: Performed by: PHYSICIAN ASSISTANT

## 2022-12-09 PROCEDURE — 74178 CT ABD&PLV WO CNTR FLWD CNTR: CPT | Performed by: PHYSICIAN ASSISTANT

## 2022-12-09 RX ADMIN — IOPAMIDOL 125 ML: 755 INJECTION, SOLUTION INTRAVENOUS at 11:36

## 2022-12-15 ENCOUNTER — HOSPITAL ENCOUNTER (OUTPATIENT)
Age: 67
Discharge: HOME | End: 2022-12-15
Payer: OTHER GOVERNMENT

## 2022-12-15 ENCOUNTER — PROCEDURE VISIT (OUTPATIENT)
Dept: UROLOGY | Age: 67
End: 2022-12-15

## 2022-12-15 VITALS — BODY MASS INDEX: 24.63 KG/M2 | SYSTOLIC BLOOD PRESSURE: 126 MMHG | WEIGHT: 162 LBS | DIASTOLIC BLOOD PRESSURE: 80 MMHG

## 2022-12-15 DIAGNOSIS — K59.09 OTHER CONSTIPATION: ICD-10-CM

## 2022-12-15 DIAGNOSIS — N40.1 BPH WITH OBSTRUCTION/LOWER URINARY TRACT SYMPTOMS: ICD-10-CM

## 2022-12-15 DIAGNOSIS — R33.9 URINARY RETENTION: ICD-10-CM

## 2022-12-15 DIAGNOSIS — R31.0 GROSS HEMATURIA: Primary | ICD-10-CM

## 2022-12-15 DIAGNOSIS — N13.8 BPH WITH OBSTRUCTION/LOWER URINARY TRACT SYMPTOMS: ICD-10-CM

## 2022-12-15 DIAGNOSIS — R31.0 GROSS HEMATURIA: ICD-10-CM

## 2022-12-15 LAB
ANION GAP SERPL CALCULATED.3IONS-SCNC: 10 MMOL/L (ref 9–17)
BUN BLDV-MCNC: 14 MG/DL (ref 8–23)
BUN/CREAT BLD: 18 (ref 9–20)
CALCIUM SERPL-MCNC: 8.8 MG/DL (ref 8.6–10.4)
CHLORIDE BLD-SCNC: 99 MMOL/L (ref 98–107)
CO2: 27 MMOL/L (ref 20–31)
CREAT SERPL-MCNC: 0.8 MG/DL (ref 0.7–1.2)
GFR SERPL CREATININE-BSD FRML MDRD: >60 ML/MIN/1.73M2
GLUCOSE BLD-MCNC: 93 MG/DL (ref 70–99)
POTASSIUM SERPL-SCNC: 4.7 MMOL/L (ref 3.7–5.3)
SODIUM BLD-SCNC: 136 MMOL/L (ref 135–144)

## 2022-12-15 PROCEDURE — 80048 BASIC METABOLIC PNL TOTAL CA: CPT

## 2022-12-15 PROCEDURE — 36415 COLL VENOUS BLD VENIPUNCTURE: CPT

## 2022-12-15 RX ORDER — TERAZOSIN 5 MG/1
5 CAPSULE ORAL NIGHTLY
Qty: 30 CAPSULE | Refills: 5 | Status: SHIPPED | OUTPATIENT
Start: 2022-12-15

## 2022-12-15 ASSESSMENT — ENCOUNTER SYMPTOMS
COLOR CHANGE: 0
NAUSEA: 0
COUGH: 0
SHORTNESS OF BREATH: 0
WHEEZING: 0
EYE REDNESS: 0
BACK PAIN: 0
CONSTIPATION: 0
VOMITING: 0
ABDOMINAL PAIN: 0

## 2022-12-15 NOTE — PROGRESS NOTES
During cystoscopy the following was utilized on patient with no adverse affects:    45% SODIUM CHLORIDE 500 ML BAG  Lot number: [id number]  Expiration date: Discnatalia Lao 134 2%   Lot number: [id number]  Expiration date: 4-24

## 2022-12-15 NOTE — PROGRESS NOTES
HPI:          Patient is a 79 y.o. male in no acute distress. He is alert and oriented to person, place, and time. History:  He is status post C3-C7 laminectomy on 10/12/2020. Patient was on terazosin 2mg for BPH in the past but was not given it at time of surgery. Patient states that he has had reaction to Flomax in the past but is able to tolerate Terazosin. Patient did have acute urinary retention with a bladder scan of over 800 mL. Therefore Russell catheter was placed. Patient also was noted to have significant constipation. He was discharged from the hospital on 10/19/2020 on Cardura 1 mg. He does have itching and irritation on the tip of his penis with some white discharge around it as well. He has never been seen by urology before. He has never had issues with urinary retention in the past.      Currently  Pt here today for lower tract visualization secondary to gross hematuria. PT had a recent CT urogram performed. This film was independently reviewed. This does not show any significant  abnormalities. Patient is otherwise doing well. No recent gross hematuria or dysuria. Patient does state today that he is having some difficulty initiating his urine stream.  Patient has not tried any other medications. He does currently take terazosin but only 2 mg at night. He has not tried any increased dosing of this. No pain today. Cystoscopy Procedure Note    Pre-operative Diagnosis: gross hematuria    Post-operative Diagnosis: Same     Surgeon: Cirilo Walden    Assistants: None    Anesthesia : Local    Procedure Details   The risks, benefits, complications, treatment options, and expected outcomes were discussed with the patient. The patient concurred with the proposed plan, giving informed consent. Cystoscopy was performed today under local anesthesia, using sterile technique.  The patient was placed in the dorsal lithotomy position, prepped with CHG, and draped in the usual sterile fashion. A 14 [place] flexible cystoscope was used to systematically inspect both the urethra and bladder in their entirety. Findings:  Anterior urethra: normal without strictures  Hyperplasia: bilobar  Bladder: Normal mucosa, without lesions. Ureteral orifice(s) was/were seen in the normal position and effluxing clear urine  Trabeculations No  Diverticulum No  Description: normal anatomy         Specimens: Cytology/urine culture No                 Complications:  None; patient tolerated the procedure well. Disposition: home           Condition: stable       Past Medical History:   Diagnosis Date    Arthritis     WENDY KNEE    Bronchitis with chronic airway obstruction (HCC)     CAD (coronary artery disease)     CHF (congestive heart failure) (HCC)     CTS (carpal tunnel syndrome)     RIGHT    Diabetes mellitus ([address]     Diabetic neuropathy associated with diabetes mellitus due to underlying condition ([address]     Exposure to toxic chemical     [place] base-toxic water exposure    GERD (gastroesophageal reflux disease)     H/O cardiovascular stress test 07/08/2016    Abnormal.  Moderate perfusion defect of mild to moderate intensity in the inferolateral,inferior and inferoapical regions during stress imaging. Ef 45%. with regional wall motion abnormalities. H/O echocardiogram 2/17/16    EF >60%. LV wall thickness is mildly increased. Inferoseptal wall is abnormal in its motion not unusual status post open heart surgery. Normal aortic valve structure with ild aortic regurgitation. Hip pain, left     Hx of cardiac cath 07/08/2016    LMCA: Normal 0% stenosis. LAD: Chronic occlusion 100%. Lesion on Mid LAD: 100% stenosis. LCx: single stenosis. Lesion on Mid CX: 80% stenosis. RCA: Lesion on Mid RCA: 70% stenosis.     Hyperlipidemia     Hypertension     MI (myocardial infarction) ([address] 2015    Pneumonia     PTSD (post-traumatic stress disorder)     S/P CABG (coronary artery bypass graft) 10/23/15    Sleep apnea     Ventricular fibrillation ([address] 10/18/2015    x 2 DURING HEART ATTACK     Past Surgical History:   Procedure Laterality Date    CARDIAC SURGERY      10/21/2015 3 vessel CABG    CARPAL TUNNEL RELEASE      CARPAL TUNNEL RELEASE Right     COLONOSCOPY  2012    [facility]    COLONOSCOPY  11/27/2017    CORONARY ARTERY BYPASS GRAFT  10/23/15    Dr. Lamon Fabry      left middle finger    FRACTURE SURGERY      left wrist    JOINT REPLACEMENT      right knee    PILONIDAL CYST EXCISION      1974    PILONIDAL CYST EXCISION      OK COLONOSCOPY W/BIOPSY SINGLE/MULTIPLE N/A 11/27/2017    COLONOSCOPY WITH BIOPSY/ polypectomy performed by Nguyen Marion MD at [address] [place] [address] Dr GALVEZ TRANSORAL BIOPSY SINGLE/MULTIPLE N/A 11/27/2017    EGD BIOPSY performed by Nguyen Marion MD at [address] [place] [address]      left wrist     Outpatient Encounter Medications as of 12/15/2022   Medication Sig Dispense Refill    terazosin (HYTRIN) 5 MG capsule Take 1 capsule by mouth nightly 30 capsule 5    ondansetron (ZOFRAN) 4 MG tablet Take 1 tablet by mouth every 8 hours as needed for Nausea or Vomiting 30 tablet 0    Blood Glucose Monitoring Suppl (ONE TOUCH ULTRA 2) w/Device KIT use as directed by prescriber      ibuprofen (ADVIL;MOTRIN) 600 MG tablet 600 mg VA      pantoprazole (PROTONIX) 40 MG tablet Take 1 tablet by mouth every morning (before breakfast) 30 tablet 5    blood glucose monitor strips Test blood sugar once daily 100 strip 3    Lancets MISC Test blood sugar once daily 100 each 3    Alcohol Swabs (ALCOHOL PREP) 70 % PADS Test sugar once daily 100 each 3    famotidine (PEPCID) 40 MG tablet Take 40 mg by mouth      atorvastatin (LIPITOR) 80 MG tablet Take 80 mg by mouth      medical marijuana USE      albuterol (PROVENTIL) (2.5 MG/3ML) 0.083% nebulizer solution inhale contents of 1 vial ( 3 milliliters ) in nebulizer by mouth and INTO THE LUNGS every 4 hours if needed for wheezing or shortness of breath 300 mL 1    albuterol sulfate HFA (PROVENTIL HFA) 108 (90 Base) MCG/ACT inhaler Inhale 2 puffs into the lungs every 6 hours as needed for Wheezing May substitute as needed for insurance reasons 18 g 3    tiotropium (SPIRIVA RESPIMAT) 2.5 MCG/ACT AERS inhaler Inhale 2 puffs into the lungs daily 3 each 3    senna (SENOKOT) 8.6 MG tablet Take 1 tablet by mouth daily as needed for Constipation Up to twice a week 30 tablet 1    Sildenafil Citrate (VIAGRA PO) Take by mouth as needed (from the [place])      docusate sodium (COLACE) 100 MG capsule Take 1 capsule by mouth 2 times daily 20 capsule 0    RANEXA 500 MG extended release tablet Take 2 tablets by mouth 2 times daily 60 tablet 5    nitroGLYCERIN (NITROSTAT) 0.4 MG SL tablet up to max of 3 total doses. If no relief after 1 dose, call 911. 25 tablet 3    atenolol (TENORMIN) 25 MG tablet Take 1 tablet by mouth daily 90 tablet 1    mirtazapine (REMERON) 15 MG tablet Take 1 tablet by mouth nightly 30 tablet 0    spironolactone (ALDACTONE) 25 MG tablet Take 25 mg by mouth daily      aspirin 81 MG tablet Take 81 mg by mouth daily      [DISCONTINUED] terazosin (HYTRIN) 2 MG capsule Take 1 capsule by mouth nightly 30 capsule 11    Cholecalciferol (VITAMIN D) 2000 UNITS CAPS capsule Take 1 capsule by mouth daily (Patient not taking: No sig reported)       No facility-administered encounter medications on file as of 12/15/2022.       Current Outpatient Medications on File Prior to Visit   Medication Sig Dispense Refill    ondansetron (ZOFRAN) 4 MG tablet Take 1 tablet by mouth every 8 hours as needed for Nausea or Vomiting 30 tablet 0    Blood Glucose Monitoring Suppl (ONE TOUCH ULTRA 2) w/Device KIT use as directed by prescriber      ibuprofen (ADVIL;MOTRIN) 600 MG tablet 600 mg VA      pantoprazole (PROTONIX) 40 MG tablet Take 1 tablet by mouth every morning (before breakfast) 30 tablet 5    blood glucose monitor strips Test blood sugar once daily 100 strip 3    Lancets MISC Test blood sugar once daily 100 each 3    Alcohol Swabs (ALCOHOL PREP) 70 % PADS Test sugar once daily 100 each 3    famotidine (PEPCID) 40 MG tablet Take 40 mg by mouth      atorvastatin (LIPITOR) 80 MG tablet Take 80 mg by mouth      medical marijuana USE      albuterol (PROVENTIL) (2.5 MG/3ML) 0.083% nebulizer solution inhale contents of 1 vial ( 3 milliliters ) in nebulizer by mouth and INTO THE LUNGS every 4 hours if needed for wheezing or shortness of breath 300 mL 1    albuterol sulfate HFA (PROVENTIL HFA) 108 (90 Base) MCG/ACT inhaler Inhale 2 puffs into the lungs every 6 hours as needed for Wheezing May substitute as needed for insurance reasons 18 g 3    tiotropium (SPIRIVA RESPIMAT) 2.5 MCG/ACT AERS inhaler Inhale 2 puffs into the lungs daily 3 each 3    senna (SENOKOT) 8.6 MG tablet Take 1 tablet by mouth daily as needed for Constipation Up to twice a week 30 tablet 1    Sildenafil Citrate (VIAGRA PO) Take by mouth as needed (from the [place])      docusate sodium (COLACE) 100 MG capsule Take 1 capsule by mouth 2 times daily 20 capsule 0    RANEXA 500 MG extended release tablet Take 2 tablets by mouth 2 times daily 60 tablet 5    nitroGLYCERIN (NITROSTAT) 0.4 MG SL tablet up to max of 3 total doses. If no relief after 1 dose, call 911. 25 tablet 3    atenolol (TENORMIN) 25 MG tablet Take 1 tablet by mouth daily 90 tablet 1    mirtazapine (REMERON) 15 MG tablet Take 1 tablet by mouth nightly 30 tablet 0    spironolactone (ALDACTONE) 25 MG tablet Take 25 mg by mouth daily      aspirin 81 MG tablet Take 81 mg by mouth daily      Cholecalciferol (VITAMIN D) 2000 UNITS CAPS capsule Take 1 capsule by mouth daily (Patient not taking: No sig reported)       No current facility-administered medications on file prior to visit.      Codeine, Flomax [tamsulosin], Gabapentin, Hydrocodone, Lisinopril, Pcn [penicillins], and Sulfa antibiotics  Family History   Adopted: Yes   Family history unknown: Yes     Social History     Tobacco Use   Smoking Status Former    Packs/day: 1.00    Years: 19.00    Pack years: 19.00    Types: Cigarettes    Quit date: 2006    Years since quittin.3   Smokeless Tobacco Former    Quit date: 2000       Social History     Substance and Sexual Activity   Alcohol Use No       Review of Systems   Constitutional:  Negative for appetite change, chills and fever. Eyes:  Negative for redness and visual disturbance. Respiratory:  Negative for cough, shortness of breath and wheezing. Cardiovascular:  Negative for chest pain and leg swelling. Gastrointestinal:  Negative for abdominal pain, constipation, nausea and vomiting. Genitourinary:  Positive for difficulty urinating and hematuria. Negative for decreased urine volume, dysuria, enuresis, flank pain, frequency, penile discharge, penile pain, scrotal swelling, testicular pain and urgency. Musculoskeletal:  Negative for back pain, joint swelling and myalgias. Skin:  Negative for color change, rash and wound. Neurological:  Negative for dizziness, tremors and numbness. Hematological:  Negative for adenopathy. Does not bruise/bleed easily. /80 (Site: Left Upper Arm, Position: Sitting)   Wt 162 lb (73.5 kg)   BMI 24.63 kg/m²       PHYSICAL EXAM:  Constitutional: Patient in no acute distress; Neuro: alert and oriented to person place and time. Psych: Mood and affect normal.  Skin: Normal  Lungs: Respiratory effort normal  Cardiovascular:  Normal peripheral pulses  Abdomen: Soft, non-tender, non-distended with no CVA, flank pain  Bladder non-tender and not distended.   Lymphatics: no palpable lymphadenopathy  Penis normal  Urethral meatus normal  Scrotal exam normal  Testicles normal bilaterally  Epididymis normal bilaterally  No evidence of inguinal hernia      Lab Results   Component Value Date    BUN 14 12/15/2022     Lab Results   Component Value Date    CREATININE 0.80 12/15/2022     Lab Results   Component Value Date    PSA 0.58 11/15/2022    PSA 0.52 07/05/2017       ASSESSMENT:  This is a 79 y.o. male with the following diagnoses:   Diagnosis Orders   1. Gross hematuria  SC CYSTOURETHROSCOPY      2. BPH with obstruction/lower urinary tract symptoms  SC CYSTOURETHROSCOPY      3. Other constipation  SC CYSTOURETHROSCOPY      4. Urinary retention  SC CYSTOURETHROSCOPY           PLAN:  Patient is clear from a gross hematuria standpoint. We will increase his terazosin from 2 mg to 5 mg. He will follow-up with us in 6 to 8 weeks so we can access efficacy.

## 2023-01-06 ENCOUNTER — HOSPITAL ENCOUNTER (OUTPATIENT)
Dept: CT IMAGING | Age: 68
End: 2023-01-06
Payer: COMMERCIAL

## 2023-01-06 ENCOUNTER — OFFICE VISIT (OUTPATIENT)
Dept: FAMILY MEDICINE CLINIC | Age: 68
End: 2023-01-06
Payer: COMMERCIAL

## 2023-01-06 VITALS
OXYGEN SATURATION: 98 % | HEIGHT: 69 IN | TEMPERATURE: 99.2 F | DIASTOLIC BLOOD PRESSURE: 70 MMHG | BODY MASS INDEX: 24.73 KG/M2 | WEIGHT: 167 LBS | HEART RATE: 58 BPM | SYSTOLIC BLOOD PRESSURE: 130 MMHG

## 2023-01-06 DIAGNOSIS — L05.91 PILONIDAL CYST: ICD-10-CM

## 2023-01-06 DIAGNOSIS — L05.91 PILONIDAL CYST: Primary | ICD-10-CM

## 2023-01-06 DIAGNOSIS — K62.89 PERIANAL PAIN: ICD-10-CM

## 2023-01-06 PROCEDURE — 3078F DIAST BP <80 MM HG: CPT | Performed by: FAMILY MEDICINE

## 2023-01-06 PROCEDURE — 1036F TOBACCO NON-USER: CPT | Performed by: FAMILY MEDICINE

## 2023-01-06 PROCEDURE — 6360000004 HC RX CONTRAST MEDICATION: Performed by: FAMILY MEDICINE

## 2023-01-06 PROCEDURE — 99214 OFFICE O/P EST MOD 30 MIN: CPT | Performed by: FAMILY MEDICINE

## 2023-01-06 PROCEDURE — G8484 FLU IMMUNIZE NO ADMIN: HCPCS | Performed by: FAMILY MEDICINE

## 2023-01-06 PROCEDURE — G8420 CALC BMI NORM PARAMETERS: HCPCS | Performed by: FAMILY MEDICINE

## 2023-01-06 PROCEDURE — 3075F SYST BP GE 130 - 139MM HG: CPT | Performed by: FAMILY MEDICINE

## 2023-01-06 PROCEDURE — G8427 DOCREV CUR MEDS BY ELIG CLIN: HCPCS | Performed by: FAMILY MEDICINE

## 2023-01-06 PROCEDURE — 72193 CT PELVIS W/DYE: CPT

## 2023-01-06 PROCEDURE — 1123F ACP DISCUSS/DSCN MKR DOCD: CPT | Performed by: FAMILY MEDICINE

## 2023-01-06 PROCEDURE — 3017F COLORECTAL CA SCREEN DOC REV: CPT | Performed by: FAMILY MEDICINE

## 2023-01-06 RX ADMIN — IOPAMIDOL 75 ML: 755 INJECTION, SOLUTION INTRAVENOUS at 14:52

## 2023-01-06 ASSESSMENT — PATIENT HEALTH QUESTIONNAIRE - PHQ9
1. LITTLE INTEREST OR PLEASURE IN DOING THINGS: 0
2. FEELING DOWN, DEPRESSED OR HOPELESS: 0
SUM OF ALL RESPONSES TO PHQ QUESTIONS 1-9: 0
SUM OF ALL RESPONSES TO PHQ9 QUESTIONS 1 & 2: 0
SUM OF ALL RESPONSES TO PHQ QUESTIONS 1-9: 0

## 2023-01-06 NOTE — PROGRESS NOTES
Name: Bebe Godoy  : 1955         Chief Complaint:     Chief Complaint   Patient presents with    Cyst     On his tailbone since August, has been seeping for months, got really inflamed, red and painful for the past 2 weeks. History of Present Illness:      Bebe Godoy is a 79 y.o.  male who presents with Cyst (On his tailbone since August, has been seeping for months, got really inflamed, red and painful for the past 2 weeks. )      HPI    Pt c/o pain in tailbone. Had fallen in Nov while play chasing a child, R knee gave out and he fell with feet straight out in front of him and landed on tailbone. Pain since then and had been seeping even prior to this, since about August.  Relates history of pilonidal cyst which had been drained or operated on, patient unsure of details, in [place] in the early . Feeling a little flushed for the past week or so. BM's continue to vary greatly between constipation and diarrhea. Nausea and poor appetite. Medical History:     Patient Active Problem List   Diagnosis    ASHD (arteriosclerotic heart disease)    History of coronary artery bypass surgery    Obstructive apnea    Hypertension    Hyperlipidemia    Chronic obstructive pulmonary disease (HCC)    Vitamin D deficiency disease    Post traumatic stress disorder    CHF (congestive heart failure), NYHA class II, chronic, diastolic (HCC)    Cervical myelopathy (HCC)    Arrhythmia    Diabetes mellitus type 2 in obese ([address]    S/P cervical spinal fusion    Spinal stenosis in cervical region       Medications:       Prior to Admission medications    Medication Sig Start Date End Date Taking?  Authorizing Provider   terazosin (HYTRIN) 5 MG capsule Take 1 capsule by mouth nightly 12/15/22  Yes Yashira Myles MD   ondansetron [facility]) 4 MG tablet Take 1 tablet by mouth every 8 hours as needed for Nausea or Vomiting 22  Yes Yuly Bailon DO   Blood Glucose Monitoring Suppl (ONE TOUCH ULTRA 2) w/Device KIT use as directed by prescriber 10/13/22  Yes Historical Provider, MD   ibuprofen (ADVIL;MOTRIN) 600 MG tablet 600 mg VA 8/12/22  Yes Historical Provider, MD   blood glucose monitor strips Test blood sugar once daily 10/13/22  Yes Tanesha Medrano DO   Lancets MISC Test blood sugar once daily 10/13/22  Yes Tanesha Medrano DO   Alcohol Swabs (ALCOHOL PREP) 70 % PADS Test sugar once daily 10/13/22  Yes Tanesha Medrano DO   famotidine (PEPCID) 40 MG tablet Take 40 mg by mouth 2/16/22  Yes Historical Provider, MD   atorvastatin (LIPITOR) 80 MG tablet Take 80 mg by mouth 8/23/21  Yes Historical Provider, MD   medical marijuana USE 11/22/19  Yes Historical Provider, MD   albuterol (PROVENTIL) (2.5 MG/3ML) 0.083% nebulizer solution inhale contents of 1 vial ( 3 milliliters ) in nebulizer by mouth and INTO THE LUNGS every 4 hours if needed for wheezing or shortness of breath 4/8/22  Yes Tanesha Medrano DO   albuterol sulfate HFA (PROVENTIL HFA) 108 (90 Base) MCG/ACT inhaler Inhale 2 puffs into the lungs every 6 hours as needed for Wheezing May substitute as needed for insurance reasons 1/19/22  Yes Dung Finley MD   tiotropium (SPIRIVA RESPIMAT) 2.5 MCG/ACT AERS inhaler Inhale 2 puffs into the lungs daily 1/18/22  Yes Tanesha Medrano DO   senna (SENOKOT) 8.6 MG tablet Take 1 tablet by mouth daily as needed for Constipation Up to twice a week 11/18/21  Yes Tanesha Medrano DO   Sildenafil Citrate (VIAGRA PO) Take by mouth as needed ([facility])   Yes Historical Provider, MD   docusate sodium (COLACE) 100 MG capsule Take 1 capsule by mouth 2 times daily 7/8/21  Yes Dung Finley MD   RANEXA 500 MG extended release tablet Take 2 tablets by mouth 2 times daily 11/16/20  Yes Torrey Smith MD   nitroGLYCERIN (NITROSTAT) 0.4 MG SL tablet up to max of 3 total doses. If no relief after 1 dose, call 911. 7/15/20  Yes Tanesha Medrano DO   atenolol (TENORMIN) 25 MG tablet Take 1 tablet by mouth  daily 9/19/19  Yes Analisa Sellers MD   mirtazapine (REMERON) 15 MG tablet Take 1 tablet by mouth nightly 5/31/19  Yes DIANNA Heredia - CNP   spironolactone (ALDACTONE) 25 MG tablet Take 25 mg by mouth daily   Yes Historical Provider, MD   aspirin 81 MG tablet Take 81 mg by mouth daily   Yes Historical Provider, MD        Allergies:       Codeine, Flomax [tamsulosin], Gabapentin, Hydrocodone, Lisinopril, Pcn [penicillins], and Sulfa antibiotics    Physical Exam:     Vitals:  /70   Pulse 58   Temp 99.2 °F (37.3 °C)   Ht 5' 9\" (1.753 m)   Wt 167 lb (75.8 kg)   SpO2 98%   BMI 24.66 kg/m²   Physical Exam  Vitals and nursing note reviewed. Constitutional:       General: He is not in acute distress. Appearance: He is well-developed. Pulmonary:      Effort: Pulmonary effort is normal.   Skin:     General: Skin is warm and dry. Comments: Skin over coccyx: elliptical shaped full thickness ulcer approx 2.5 x 1.5cm, no active exudate. Superior to that, also midline, there's a pinpoint tract opening. Surrounding erythema. No exudate. Neurological:      Mental Status: He is alert and oriented to person, place, and time.    Psychiatric:         Mood and Affect: Mood normal.         Behavior: Behavior normal.       Data:     Lab Results   Component Value Date/Time     12/15/2022 12:36 PM    K 4.7 12/15/2022 12:36 PM    CL 99 12/15/2022 12:36 PM    CO2 27 12/15/2022 12:36 PM    BUN 14 12/15/2022 12:36 PM    CREATININE 0.80 12/15/2022 12:36 PM    GLUCOSE 93 12/15/2022 12:36 PM    GLUCOSE 258 01/31/2012 07:45 PM    PROT 7.3 11/15/2022 11:11 AM    LABALBU 4.1 11/15/2022 11:11 AM    LABALBU 4.3 09/08/2011 06:56 PM    BILITOT 0.3 11/15/2022 11:11 AM    ALKPHOS 89 11/15/2022 11:11 AM    AST 9 11/15/2022 11:11 AM    ALT 5 11/15/2022 11:11 AM     Lab Results   Component Value Date/Time    WBC 6.6 11/15/2022 11:12 AM    RBC 4.54 11/15/2022 11:12 AM    RBC 5.49 01/31/2012 07:45 PM    HGB 14.2 11/15/2022 11:12 AM    HCT 42.0 11/15/2022 11:12 AM    MCV 92.6 11/15/2022 11:12 AM    MCH 31.2 11/15/2022 11:12 AM    MCHC 33.7 11/15/2022 11:12 AM    RDW 15.8 11/15/2022 11:12 AM     11/15/2022 11:12 AM     01/31/2012 07:45 PM    MPV NOT REPORTED 01/19/2022 09:32 PM     Lab Results   Component Value Date/Time    TSH 1.37 11/15/2022 11:11 AM     Lab Results   Component Value Date/Time    CHOL 168 05/23/2022 11:44 AM    HDL 43 05/23/2022 11:44 AM    PSA 0.58 11/15/2022 11:11 AM    LABA1C 4.9 11/15/2022 10:15 AM         Assessment & Plan:        Diagnosis Orders   1. Pilonidal cyst  CT PELVIS W CONTRAST      2. Perianal pain  CT PELVIS W CONTRAST        Pilonidal cyst, increasing pain, some constitutional symptoms of illness over the past few days. Erythema and exudate. On exam he has an ulcer as well as opening to a tract or fistula. With this and the constitutional symptoms I do have concern that he has an abscess in the pelvis. CT ordered stat. Pt also asked about availability of meal delivery to his home d/t food insecurity and recent weight loss. He said \"the nurse who used to come\" told him I could write an order for it. I'm unaware of any program and asked that he contact the nurse and have her send us info or an order.       signed by Leeanna Casanova DO on 1/6/2023 at 2:16 PM  [place] [phone]  Dept: [phone]

## 2023-01-09 RX ORDER — ALBUTEROL SULFATE 2.5 MG/3ML
SOLUTION RESPIRATORY (INHALATION)
Qty: 300 ML | Refills: 1 | Status: SHIPPED | OUTPATIENT
Start: 2023-01-09

## 2023-01-09 NOTE — TELEPHONE ENCOUNTER
Last OV: 1/6/2023 cyst   12/01/22  Last RX:    Next scheduled apt: Visit date not found          Surescript requesting a refill

## 2023-01-11 ENCOUNTER — TELEPHONE (OUTPATIENT)
Dept: UROLOGY | Age: 68
End: 2023-01-11

## 2023-01-11 NOTE — TELEPHONE ENCOUNTER
Patient said when he was in Dr Gal Gallego said he could do a procedure to fix his skin flaps. He said he had extra tissue hanging in front of prostate. He wants to schedule that.

## 2023-01-16 RX ORDER — SPIRONOLACTONE 25 MG/1
25 TABLET ORAL DAILY
Qty: 30 TABLET | Refills: 5 | Status: SHIPPED | OUTPATIENT
Start: 2023-01-16

## 2023-01-16 NOTE — TELEPHONE ENCOUNTER
Terazosin 5 mg  Spironolactone 25 mg    Short supply to [facility]. Can we call in a short supply for him?     Health Maintenance   Topic Date Due    Diabetic foot exam  Never done    Diabetic retinal exam  Never done    COVID-19 Vaccine (2 - Booster for Shira series) 07/06/2022    Flu vaccine (1) 08/01/2022    Diabetic Alb to Cr ratio (uACR) test  08/23/2022    Lipids  05/23/2023    A1C test (Diabetic or Prediabetic)  11/15/2023    GFR test (Diabetes, CKD 3-4, OR last GFR 15-59)  12/15/2023    Depression Screen  01/06/2024    DTaP/Tdap/Td vaccine (2 - Td or Tdap) 08/08/2026    Colorectal Cancer Screen  11/27/2027    Shingles vaccine  Completed    Pneumococcal 65+ years Vaccine  Completed    AAA screen  Completed    Hepatitis C screen  Completed    Hepatitis A vaccine  Aged Out    Hib vaccine  Aged Out    Meningococcal (ACWY) vaccine  Aged Out             (applicable per patient's age: Cancer Screenings, Depression Screening, Fall Risk Screening, Immunizations)    Hemoglobin A1C (%)   Date Value   11/15/2022 4.9   08/23/2021 5.2   05/14/2019 5.1     LDL Cholesterol (mg/dL)   Date Value   05/23/2022 112     AST (U/L)   Date Value   11/15/2022 9     ALT (U/L)   Date Value   11/15/2022 5     BUN (mg/dL)   Date Value   12/15/2022 14      (goal A1C is < 7)   (goal LDL is <100) need 30-50% reduction from baseline     BP Readings from Last 3 Encounters:   01/06/23 130/70   12/15/22 126/80   12/05/22 125/75    (goal /80)      All Future Testing planned in [facility]:  Lab Frequency Next Occurrence   TSH with Reflex Once 05/23/2023   CBC with Auto Differential Once 05/23/2023   Comprehensive Metabolic Panel Once 54/83/9197   Vitamin D 25 Hydroxy Once 05/23/2023   Lipid Panel Once 05/23/2023   Magnesium Once 05/23/2023   EKG 12 Lead Once 05/23/2023   XR CHEST (2 VW) Once 05/23/2023       Next Visit Date:  Future Appointments   Date Time Provider Department Center   2/9/2023 12:30 PM [facility]  Stephanie Parrot   2/9/2023  1:00 PM JAYDEN Cedeno urol [facility]   2/15/2023  3:00 PM Melanie Jefferson, DO [facility]   3/16/2023  1:00 PM Clydie Null, MD [facility]ol[facility] Blunt [facility]   5/22/2023  1:30 PM MD Coy Forbes [facility]            Patient Active Problem List:     ASHD (arteriosclerotic heart disease)     History of coronary artery bypass surgery     Obstructive apnea     Hypertension     Hyperlipidemia     Chronic obstructive pulmonary disease (HCC)     Vitamin D deficiency disease     Post traumatic stress disorder     CHF (congestive heart failure), NYHA class II, chronic, diastolic (HCC)     Cervical myelopathy (HCC)     Arrhythmia     Diabetes mellitus type 2 in obese ([address]     S/P cervical spinal fusion     Spinal stenosis in cervical region

## 2023-02-02 ENCOUNTER — HOSPITAL ENCOUNTER (EMERGENCY)
Age: 68
Discharge: HOME | End: 2023-02-03
Attending: EMERGENCY MEDICINE
Payer: OTHER GOVERNMENT

## 2023-02-02 DIAGNOSIS — Z51.89 ENCOUNTER FOR WOUND CARE: Primary | ICD-10-CM

## 2023-02-02 PROCEDURE — 99283 EMERGENCY DEPT VISIT LOW MDM: CPT

## 2023-02-02 RX ORDER — GINSENG 100 MG
CAPSULE ORAL ONCE
Status: DISCONTINUED | OUTPATIENT
Start: 2023-02-02 | End: 2023-02-03 | Stop reason: HOSPADM

## 2023-02-02 RX ORDER — GINSENG 100 MG
CAPSULE ORAL
Qty: 1 G | Refills: 0 | Status: SHIPPED | OUTPATIENT
Start: 2023-02-02 | End: 2023-02-12

## 2023-02-02 ASSESSMENT — PAIN SCALES - GENERAL: PAINLEVEL_OUTOF10: 10

## 2023-02-02 ASSESSMENT — PAIN DESCRIPTION - LOCATION: LOCATION: SACRUM

## 2023-02-02 ASSESSMENT — PAIN DESCRIPTION - ORIENTATION: ORIENTATION: MID

## 2023-02-02 ASSESSMENT — PAIN DESCRIPTION - DESCRIPTORS: DESCRIPTORS: THROBBING;TENDER

## 2023-02-02 ASSESSMENT — PAIN DESCRIPTION - FREQUENCY: FREQUENCY: CONTINUOUS

## 2023-02-02 ASSESSMENT — PAIN DESCRIPTION - PAIN TYPE: TYPE: ACUTE PAIN

## 2023-02-02 ASSESSMENT — PAIN - FUNCTIONAL ASSESSMENT: PAIN_FUNCTIONAL_ASSESSMENT: 0-10

## 2023-02-03 VITALS
RESPIRATION RATE: 18 BRPM | DIASTOLIC BLOOD PRESSURE: 86 MMHG | OXYGEN SATURATION: 93 % | BODY MASS INDEX: 25.11 KG/M2 | SYSTOLIC BLOOD PRESSURE: 146 MMHG | TEMPERATURE: 98 F | HEIGHT: 67 IN | WEIGHT: 160 LBS | HEART RATE: 73 BPM

## 2023-02-03 NOTE — ED TRIAGE NOTES
[address]      Pt Name: Jazzy Liu  MRN: [id number]  [place] 1955  Date of evaluation: 2/2/2023  Provider: Daniel Plata MD    [address] [place] [address]       Chief Complaint   Patient presents with    Abscess     Patient arrives to ER tonight with reports of sacral pain that has been ongoing for 3 months. Patient reports that he is supposed to see a surgeon next month but has not been on any antibiotics and now is having chills, and nausea. Nausea         HISTORY OF PRESENT ILLNESS      Jazzy Liu is a 79 y.o. male who presents to the emergency department to have multiple presentations to the ER with similar evaluation for his buttock abscess and wound    He is complaining of 3-month history of wound in his sacral area that his main issue to come to the ER although you have some other issues regarding bedbugs in his hotel that also has been going on for 3 months but this is not an acute issue    pt denies any fever or chills or any drainage    Review of systems otherwise negative          REVIEW OF SYSTEMS       Review of Systems   All other systems reviewed and are negative. PAST MEDICAL HISTORY     Past Medical History:   Diagnosis Date    Arthritis     WENDY KNEE    Bronchitis with chronic airway obstruction (HCC)     CAD (coronary artery disease)     CHF (congestive heart failure) (HCC)     CTS (carpal tunnel syndrome)     RIGHT    Diabetes mellitus ([address]     Diabetic neuropathy associated with diabetes mellitus due to underlying condition ([facility] [address]     Exposure to toxic chemical     [employer], [employer]-toxic water exposure    GERD (gastroesophageal reflux disease)     H/O cardiovascular stress test 07/08/2016    Abnormal.  Moderate perfusion defect of mild to moderate intensity in the inferolateral,inferior and inferoapical regions during stress imaging. Ef 45%. with regional wall motion abnormalities.     H/O echocardiogram 2/17/16 EF >60%. LV wall thickness is mildly increased. Inferoseptal wall is abnormal in its motion not unusual status post open heart surgery. Normal aortic valve structure with ild aortic regurgitation. Hip pain, left     Hx of cardiac cath 07/08/2016    LMCA: Normal 0% stenosis. LAD: Chronic occlusion 100%. Lesion on Mid LAD: 100% stenosis. LCx: single stenosis. Lesion on Mid CX: 80% stenosis. RCA: Lesion on Mid RCA: 70% stenosis.     Hyperlipidemia     Hypertension     MI (myocardial infarction) ([facility] [address] 2015    Pneumonia     PTSD (post-traumatic stress disorder)     S/P CABG (coronary artery bypass graft) 10/23/15    Sleep apnea     Ventricular fibrillation ([facility] [address] 10/18/2015    x 2 DURING HEART ATTACK         SURGICAL HISTORY       Past Surgical History:   Procedure Laterality Date    CARDIAC SURGERY      10/21/2015 3 vessel CABG    CARPAL TUNNEL RELEASE      CARPAL TUNNEL RELEASE Right     COL[facility]OSCOPY  2012    [facility]    COLONOSCOPY  11/27/2017    CORONARY ARTERY BYPASS GRAFT  10/23/15    Dr. Nadine Ceballos      left middle finger    FRACTURE SURGERY      left wrist    JOINT REPLACEMENT      right knee    PILONIDAL CYST EXCISION      1974    PILONIDAL CYST EXCISION      [place] COLONOSCOPY W/BIOPSY SINGLE/MULTIPLE N/A 11/27/2017    COLONOSCOPY WITH BIOPSY/ polypectomy performed by Nery Romero MD at [address] [place] [address] Dr GALVEZ TRANSORAL BIOPSY SINGLE/MULTIPLE N/A 11/27/2017    EGD BIOPSY performed by Nery Romero MD at [facility]      left wrist         CURRENT MEDICATIONS       Previous Medications    ALBUTEROL (PROVENTIL) (2.5 MG/3ML) 0.083% NEBULIZER SOLUTION    inhale contents of 1 vial ( 3 milliliters ) in nebulizer by mouth and INTO THE LUNGS every 4 hours if needed for wheezing or shortness of breath    ALBUTEROL SULFATE HFA (PROVENTIL HFA) 108 (90 BASE) MCG/ACT INHALER    Inhale 2 puffs into the lungs every 6 hours as needed for Wheezing May substitute as needed for insurance reasons    ALCOHOL SWABS (ALCOHOL PREP) 70 % PADS    Test sugar once daily    ASPIRIN 81 MG TABLET    Take 81 mg by mouth daily    ATENOLOL (TENORMIN) 25 MG TABLET    Take 1 tablet by mouth daily    ATORVASTATIN (LIPITOR) 80 MG TABLET    Take 80 mg by mouth Patient reports \"every 3 days I was told to take a half a pill\". BLOOD GLUCOSE MONITOR STRIPS    Test blood sugar once daily    BLOOD GLUCOSE MONITORING SUPPL (ONE TOUCH ULTRA 2) W/DEVICE KIT    use as directed by prescriber    DOCUSATE SODIUM (COLACE) 100 MG CAPSULE    Take 1 capsule by mouth 2 times daily    LANCETS MISC    Test blood sugar once daily    MEDICAL MARIJUANA    USE    MIRTAZAPINE (REMERON) 15 MG TABLET    Take 1 tablet by mouth nightly    NITROGLYCERIN (NITROSTAT) 0.4 MG SL TABLET    up to max of 3 total doses. If no relief after 1 dose, call 911.     ONDANSETRON (ZOFRAN) 4 MG TABLET    Take 1 tablet by mouth every 8 hours as needed for Nausea or Vomiting    RANEXA 500 MG EXTENDED RELEASE TABLET    Take 2 tablets by mouth 2 times daily    SENNA (SENOKOT) 8.6 MG TABLET    Take 1 tablet by mouth daily as needed for Constipation Up to twice a week    SILDENAFIL CITRATE (VIAGRA PO)    Take by mouth as needed (from the [facility])    SPIRONOLACTONE (ALDACTONE) 25 MG TABLET    Take 1 tablet by mouth daily    TERAZOSIN (HYTRIN) 5 MG CAPSULE    Take 1 capsule by mouth nightly    TIOTROPIUM (SPIRIVA RESPIMAT) 2.5 MCG/ACT AERS INHALER    Inhale 2 puffs into the lungs daily       ALLERGIES       Codeine, Flomax [tamsulosin], Gabapentin, Hydrocodone, Hydrocodone-acetaminophen, Lisinopril, Pcn [penicillins], and Sulfa antibiotics    FAMILY HISTORY       Family History   Adopted: Yes   Family history unknown: Yes          SOCIAL HISTORY       Social History     Tobacco Use    Smoking status: Former     Packs/day: 1.00     Years: 19.00     Pack years: 19.00     Types: Cigarettes     Quit date: 2006     Years since quittin.4    Smokeless tobacco: Former     Quit date: 2000   Vaping Use    Vaping Use: Never used   Substance Use Topics    Alcohol use: No    Drug use: Yes     Frequency: 1.0 times per week     Types: Marijuana (Weed)     Comment: Pt smokes marijuana therapeutically. PHYSICAL EXAM       ED Triage Vitals [23 2236]   BP Temp Temp Source Heart Rate Resp SpO2 Height Weight   (!) 146/86 98 °F (36.7 °C) Oral 73 18 96 % 5' 7\" (1.702 m) 160 lb (72.6 kg)       Physical Exam    Physical Exam  Vitals and nursing note reviewed. Constitutional:       General: pt is not in acute distress. Appearance: Normal appearance. well-developed. HENT:      Head: Normocephalic and atraumatic. Right Ear: Tympanic membrane and external ear normal.      Left Ear: Tympanic membrane and external ear normal.      Nose: normal      Mouth/Throat:      Mouth: Mucous membranes are moist.      Pharynx: No oropharyngeal exudate or posterior oropharyngeal erythema. Eyes:      General: No scleral icterus. Pupils: Pupils are equal, round, and reactive to light. Neck:      Vascular: No carotid bruit (neg ann). Cardiovascular:      Rate and Rhythm: Normal rate and regular rhythm. Heart sounds: Normal heart sounds. No murmur heard. Pulmonary:      Effort: Pulmonary effort is normal. No respiratory distress. Breath sounds: Normal breath sounds. No wheezing. Abdominal:      General: Bowel sounds are normal.      Palpations: Abdomen is soft. Tenderness: There is no abdominal tenderness. Musculoskeletal:         General: No deformity  present. No tenderness. Normal range of motion. Cervical back: Normal range of motion and neck supple. Right lower leg: No edema. Left lower leg: No edema. Lymphadenopathy:      Cervical: No cervical adenopathy. Skin:     General: Skin is warm and dry. Findings: No rash.    Neurological:      Mental Status:  pt is alert and oriented to person, place, and time. Psychiatric:         Behavior: Behavior normal.         Thought Content: Thought content normal.         Judgment: Judgment normal.      Sacral area examination showed that the patient have 1.5 cm linear stage II ulcer that is healing with mild redness no drainage no signs of infection        DIAGNOSTIC RESULTS     EKG:     RADIOLOGY:         Interpretation per the Radiologist below, if available at the time of this note:    No orders to display         LABS:  Labs Reviewed - No data to display    All other labs were within normal range or not returned as of this dictation. MIPS    Not applicable      Total Critical Care time was:    EMERGENCY DEPARTMENT COURSE and DIFFERENTIAL DIAGNOSIS/MDM:    Patient Course: The patient went well he had multiple presentation for similar evaluation with a CAT scan done before he had no signs of infection right now    He had bacitracin applied to his wound and was discharged home to bacitracin and wound care as outpatient    The patient to follow-up with a primary care doctor within a week for further evaluation and management and to come back to the ER in case of any new symptoms      ED Medications administered this visit:  Medications - No data to display    New Prescriptions from this visit:    New Prescriptions    BACITRACIN 500 UNIT/GM OINTMENT    Apply topically 2 times daily. Follow-up:  [place] Dubois [place]  [address] [facility]  [phone]    Schedule an appointment as soon as possible for a visit in 3 days        Final Impression:   1.  Encounter for wound care Stable

## 2023-02-03 NOTE — ED PROVIDER NOTES
[address]       Pt Name: Elaine Armando  MRN: [id number]  [place] 1955  Date of evaluation: 2/2/2023  Provider: Cristy Lucia MD     [address] [place] [address]             Chief Complaint   Patient presents with    Abscess       Patient arrives to ER tonight with reports of sacral pain that has been ongoing for 3 months. Patient reports that he is supposed to see a surgeon next month but has not been on any antibiotics and now is having chills, and nausea. Nausea            HISTORY OF PRESENT ILLNESS       Elaine Armando is a 79 y.o. male who presents to the emergency department to have multiple presentations to the ER with similar evaluation for his buttock abscess and wound     He is complaining of 3-month history of wound in his sacral area that his main issue to come to the ER although you have some other issues regarding bedbugs in his hotel that also has been going on for 3 months but this is not an acute issue     pt denies any fever or chills or any drainage     Review of systems otherwise negative              REVIEW OF SYSTEMS        Review of Systems   All other systems reviewed and are negative. PAST MEDICAL HISTORY      Past Medical History        Past Medical History:   Diagnosis Date    Arthritis       WENDY KNEE    Bronchitis with chronic airway obstruction (HCC)      CAD (coronary artery disease)      CHF (congestive heart failure) (HCC)      CTS (carpal tunnel syndrome)       RIGHT    Diabetes mellitus ([address]      Diabetic neuropathy associated with diabetes mellitus due to underlying condition ([facility] [address]      Exposure to toxic chemical       [employer]-toxic water exposure    GERD (gastroesophageal reflux disease)      H/O cardiovascular stress test 07/08/2016     Abnormal.  Moderate perfusion defect of mild to moderate intensity in the inferolateral,inferior and inferoapical regions during stress imaging. Ef 45%.  with regional wall motion abnormalities.    H/O echocardiogram 2/17/16     EF >60%. LV wall thickness is mildly increased. Inferoseptal wall is abnormal in its motion not unusual status post open heart surgery.  Normal aortic valve structure with ild aortic regurgitation.    Hip pain, left      Hx of cardiac cath 07/08/2016     LMCA: Normal 0% stenosis. LAD: Chronic occlusion 100%. Lesion on Mid LAD: 100% stenosis. LCx: single stenosis. Lesion on Mid CX: 80% stenosis. RCA: Lesion on Mid RCA: 70% stenosis.    Hyperlipidemia      Hypertension      MI (myocardial infarction) ([facility] 2015    Pneumonia      PTSD (post-traumatic stress disorder)      S/P CABG (coronary artery bypass graft) 10/23/15    Sleep apnea      Ventricular fibrillation ([facility]) 10/18/2015     x 2 DURING HEART ATTACK               SURGICAL HISTORY        Past Surgical History         Past Surgical History:   Procedure Laterality Date    CARDIAC SURGERY         10/21/2015 3 vessel CABG    CARPAL TUNNEL RELEASE        CARPAL TUNNEL RELEASE Right      COLONOSCOPY   2012     [facility]    COLONOSCOPY   11/27/2017    CORONARY ARTERY BYPASS GRAFT   10/23/15     Dr. Melissa[facility]    FINGER AMPUTATION Left       TIP OF MIDDLE FINGER    FINGER SURGERY         left middle finger    FRACTURE SURGERY         left wrist    JOINT REPLACEMENT         right knee    PILONIDAL CYST EXCISION         1974    PILONIDAL CYST EXCISION        [place] COLONOSCOPY W/BIOPSY SINGLE/MULTIPLE N/A 11/27/2017     COLONOSCOPY WITH BIOPSY/ polypectomy performed by Wally Ellsworth MD at [facility] OR    [place] EGD TRANSORAL BIOPSY SINGLE/MULTIPLE N/A 11/27/2017     EGD BIOPSY performed by Wally Ellsworth MD at [facility] OR    TONSILLECTOMY        WRIST SURGERY         left wrist               CURRENT MEDICATIONS             Previous Medications     ALBUTEROL (PROVENTIL) (2.5 MG/3ML) 0.083% NEBULIZER SOLUTION    inhale contents of 1 vial ( 3 milliliters ) in nebulizer by mouth and INTO THE LUNGS every 4  hours if needed for wheezing or shortness of breath     ALBUTEROL SULFATE HFA (PROVENTIL HFA) 108 (90 BASE) MCG/ACT INHALER    Inhale 2 puffs into the lungs every 6 hours as needed for Wheezing May substitute as needed for insurance reasons     ALCOHOL SWABS (ALCOHOL PREP) 70 % PADS    Test sugar once daily     ASPIRIN 81 MG TABLET    Take 81 mg by mouth daily     ATENOLOL (TENORMIN) 25 MG TABLET    Take 1 tablet by mouth daily     ATORVASTATIN (LIPITOR) 80 MG TABLET    Take 80 mg by mouth Patient reports \"every 3 days I was told to take a half a pill\". BLOOD GLUCOSE MONITOR STRIPS    Test blood sugar once daily     BLOOD GLUCOSE MONITORING SUPPL (ONE TOUCH ULTRA 2) W/DEVICE KIT    use as directed by prescriber     DOCUSATE SODIUM (COLACE) 100 MG CAPSULE    Take 1 capsule by mouth 2 times daily     LANCETS MISC    Test blood sugar once daily     MEDICAL MARIJUANA    USE     MIRTAZAPINE (REMERON) 15 MG TABLET    Take 1 tablet by mouth nightly     NITROGLYCERIN (NITROSTAT) 0.4 MG SL TABLET    up to max of 3 total doses. If no relief after 1 dose, call 911.      ONDANSETRON (ZOFRAN) 4 MG TABLET    Take 1 tablet by mouth every 8 hours as needed for Nausea or Vomiting     RANEXA 500 MG EXTENDED RELEASE TABLET    Take 2 tablets by mouth 2 times daily     SENNA (SENOKOT) 8.6 MG TABLET    Take 1 tablet by mouth daily as needed for Constipation Up to twice a week     SILDENAFIL CITRATE (VIAGRA PO)    Take by mouth as needed (from the [facility])     SPIRONOLACTONE (ALDACTONE) 25 MG TABLET    Take 1 tablet by mouth daily     TERAZOSIN (HYTRIN) 5 MG CAPSULE    Take 1 capsule by mouth nightly     TIOTROPIUM (SPIRIVA RESPIMAT) 2.5 MCG/ACT AERS INHALER    Inhale 2 puffs into the lungs daily         ALLERGIES        Codeine, Flomax [tamsulosin], Gabapentin, Hydrocodone, Hydrocodone-acetaminophen, Lisinopril, Pcn [penicillins], and Sulfa antibiotics     FAMILY HISTORY        Family History   Family History   Adopted: Yes   Family history unknown: Yes               SOCIAL HISTORY        Social History            Tobacco Use    Smoking status: Former       Packs/day: 1.00       Years: 19.00       Pack years: 19.00       Types: Cigarettes       Quit date: 2006       Years since quittin.4    Smokeless tobacco: Former       Quit date: 2000   Vaping Use    Vaping Use: Never used   Substance Use Topics    Alcohol use: No    Drug use: Yes       Frequency: 1.0 times per week       Types: Marijuana (Weed)       Comment: Pt smokes marijuana therapeutically. PHYSICAL EXAM                  ED Triage Vitals [23 2236]   BP Temp Temp Source Heart Rate Resp SpO2 Height Weight   (!) 146/86 98 °F (36.7 °C) Oral 73 18 96 % 5' 7\" (1.702 m) 160 lb (72.6 kg)         Physical Exam     Physical Exam  Vitals and nursing note reviewed. Constitutional:       General: pt is not in acute distress. Appearance: Normal appearance. well-developed. HENT:      Head: Normocephalic and atraumatic. Right Ear: Tympanic membrane and external ear normal.      Left Ear: Tympanic membrane and external ear normal.      Nose: normal      Mouth/Throat:      Mouth: Mucous membranes are moist.      Pharynx: No oropharyngeal exudate or posterior oropharyngeal erythema. Eyes:      General: No scleral icterus. Pupils: Pupils are equal, round, and reactive to light. Neck:      Vascular: No carotid bruit (neg ann). Cardiovascular:      Rate and Rhythm: Normal rate and regular rhythm. Heart sounds: Normal heart sounds. No murmur heard. Pulmonary:      Effort: Pulmonary effort is normal. No respiratory distress. Breath sounds: Normal breath sounds. No wheezing. Abdominal:      General: Bowel sounds are normal.      Palpations: Abdomen is soft. Tenderness: There is no abdominal tenderness. Musculoskeletal:         General: No deformity  present. No tenderness. Normal range of motion.       Cervical back: Normal range of motion and neck supple. Right lower leg: No edema. Left lower leg: No edema. Lymphadenopathy:      Cervical: No cervical adenopathy. Skin:     General: Skin is warm and dry. Findings: No rash. Neurological:      Mental Status:  pt is alert and oriented to person, place, and time. Psychiatric:         Behavior: Behavior normal.         Thought Content: Thought content normal.         Judgment: Judgment normal.        Sacral area examination showed that the patient have 1.5 cm linear stage II ulcer that is healing with mild redness no drainage no signs of infection           DIAGNOSTIC RESULTS      EKG:      RADIOLOGY:            Interpretation per the Radiologist below, if available at the time of this note:     No orders to display            LABS:  Labs Reviewed - No data to display     All other labs were within normal range or not returned as of this dictation. MIPS     Not applicable        Total Critical Care time was:     EMERGENCY DEPARTMENT COURSE and DIFFERENTIAL DIAGNOSIS/MDM:     Patient Course: The patient went well he had multiple presentation for similar evaluation with a CAT scan done before he had no signs of infection right now     He had bacitracin applied to his wound and was discharged home to bacitracin and wound care as outpatient     The patient to follow-up with a primary care doctor within a week for further evaluation and management and to come back to the ER in case of any new symptoms        ED Medications administered this visit:  Medications - No data to display     New Prescriptions from this visit:         New Prescriptions     BACITRACIN 500 UNIT/GM OINTMENT    Apply topically 2 times daily. Follow-up:  Niya Nava DO  [address] [facility]  [phone]     Schedule an appointment as soon as possible for a visit in 3 days        Final Impression:   1.  Encounter for wound care Stable                Susie MD Tara  02/03/23 [id number]

## 2023-02-04 ENCOUNTER — HOSPITAL ENCOUNTER (EMERGENCY)
Age: 68
Discharge: HOME | End: 2023-02-04
Attending: FAMILY MEDICINE
Payer: OTHER GOVERNMENT

## 2023-02-04 VITALS
OXYGEN SATURATION: 97 % | TEMPERATURE: 98.5 F | SYSTOLIC BLOOD PRESSURE: 154 MMHG | BODY MASS INDEX: 25 KG/M2 | WEIGHT: 159.3 LBS | DIASTOLIC BLOOD PRESSURE: 78 MMHG | HEART RATE: 63 BPM | RESPIRATION RATE: 18 BRPM | HEIGHT: 67 IN

## 2023-02-04 DIAGNOSIS — E11.9 CONTROLLED TYPE 2 DIABETES MELLITUS WITHOUT COMPLICATION, WITHOUT LONG-TERM CURRENT USE OF INSULIN (HCC): Primary | ICD-10-CM

## 2023-02-04 LAB
CHP ED QC CHECK: 81
GLUCOSE BLD-MCNC: 81 MG/DL (ref 65–99)

## 2023-02-04 PROCEDURE — 99283 EMERGENCY DEPT VISIT LOW MDM: CPT

## 2023-02-04 PROCEDURE — 82947 ASSAY GLUCOSE BLOOD QUANT: CPT

## 2023-02-04 ASSESSMENT — PAIN - FUNCTIONAL ASSESSMENT: PAIN_FUNCTIONAL_ASSESSMENT: 0-10

## 2023-02-04 ASSESSMENT — PAIN DESCRIPTION - LOCATION: LOCATION: SACRUM

## 2023-02-04 ASSESSMENT — PAIN SCALES - GENERAL: PAINLEVEL_OUTOF10: 9

## 2023-02-05 NOTE — ED PROVIDER NOTES
eMERGENCY dEPARTMENT eNCOUnter      [address] [facility]    Chief Complaint   Patient presents with    Tailbone Pain     Chronic ulcer. Was seen here a few days ago, living in [place]. HPI    Yaritza John is a 79 y.o. male who presents to ED from home due to concern of his blood glucose possibly being elevated. Although the chief complaint is listed as tailbone pain from chronic ulcer, patient states that this is not actually what brought him to the ER. He actually just wants his blood sugar checked. He states that his chronic tailbone pain with chronic ulcer is a chronic problem. He was seen for this 2 days ago and this was stable. This has not changed according to the patient. Patient does have a history of type 2 diabetes. REVIEW OF SYSTEMS    All systems reviewed and positives are in the [facility]    PAST MEDICAL HISTORY    Past Medical History:   Diagnosis Date    Arthritis     WENDY KNEE    Bronchitis with chronic airway obstruction (HCC)     CAD (coronary artery disease)     CHF (congestive heart failure) ([facility])     CTS (carpal tunnel syndrome)     RIGHT    Diabetes mellitus ([facility]     Diabetic neuropathy associated with diabetes mellitus due to underlying condition ([facility]     Exposure to toxic chemical     [employer], [employer]-toxic water exposure    GERD (gastroesophageal reflux disease)     H/O cardiovascular stress test 07/08/2016    Abnormal.  Moderate perfusion defect of mild to moderate intensity in the inferolateral,inferior and inferoapical regions during stress imaging. Ef 45%. with regional wall motion abnormalities. H/O echocardiogram 2/17/16    EF >60%. LV wall thickness is mildly increased. Inferoseptal wall is abnormal in its motion not unusual status post open heart surgery. Normal aortic valve structure with ild aortic regurgitation. Hip pain, left     Hx of cardiac cath 07/08/2016    LMCA: Normal 0% stenosis. LAD: Chronic occlusion 100%.  Lesion on Mid LAD: 100% stenosis. LCx: single stenosis. Lesion on Mid CX: 80% stenosis. RCA: Lesion on Mid RCA: 70% stenosis. Hyperlipidemia     Hypertension     MI (myocardial infarction) ([facility] 2015    Pneumonia     PTSD (post-traumatic stress disorder)     S/P CABG (coronary artery bypass graft) 10/23/15    Sleep apnea     Ventricular fibrillation ([facility] 10/18/2015    x 2 DURING HEART ATTACK       SURGICAL HISTORY    Past Surgical History:   Procedure Laterality Date    CARDIAC SURGERY      10/21/2015 3 vessel CABG    CARPAL TUNNEL RELEASE      CARPAL TUNNEL RELEASE Right     COLONOSCOPY  2012    [facility]    COLONOSCOPY  11/27/2017    CORONARY ARTERY BYPASS GRAFT  10/23/15    Dr. Lynn Romo      left middle finger    FRACTURE SURGERY      left wrist    JOINT REPLACEMENT      right knee    PILONIDAL CYST EXCISION      1974    PILONIDAL CYST EXCISION      MO COLONOSCOPY W/BIOPSY SINGLE/MULTIPLE N/A 11/27/2017    COLONOSCOPY WITH BIOPSY/ polypectomy performed by Katiuska Linda MD at [address] [place] [address] Dr GALVEZ TRANSORAL BIOPSY SINGLE/MULTIPLE N/A 11/27/2017    EGD BIOPSY performed by [facility]iuska [facility] at [facility]      left wrist       CURRENT MEDICATIONS    Current Outpatient Rx   Medication Sig Dispense Refill    albuterol (PROVENTIL) (2.5 MG/3ML) 0.083% nebulizer solution inhale contents of 1 vial ( 3 milliliters ) in nebulizer by mouth and INTO THE LUNGS every 4 hours if needed for wheezing or shortness of breath 300 mL 1    bacitracin 500 UNIT/GM ointment Apply topically 2 times daily.  1 g 0    spironolactone (ALDACTONE) 25 MG tablet Take 1 tablet by mouth daily 30 tablet 5    terazosin (HYTRIN) 5 MG capsule Take 1 capsule by mouth nightly 30 capsule 5    ondansetron (ZOFRAN) 4 MG tablet Take 1 tablet by mouth every 8 hours as needed for Nausea or Vomiting 30 tablet 0    Blood Glucose Monitoring Suppl (ONE TOUCH ULTRA 2) w/Device KIT use as directed by prescriber      blood glucose monitor strips Test blood sugar once daily 100 strip 3    Lancets MISC Test blood sugar once daily 100 each 3    Alcohol Swabs (ALCOHOL PREP) 70 % PADS Test sugar once daily 100 each 3    atorvastatin (LIPITOR) 80 MG tablet Take 80 mg by mouth Patient reports \"every 3 days I was told to take a half a pill\". medical marijuana USE      albuterol sulfate HFA (PROVENTIL HFA) 108 (90 Base) MCG/ACT inhaler Inhale 2 puffs into the lungs every 6 hours as needed for Wheezing May substitute as needed for insurance reasons 18 g 3    tiotropium (SPIRIVA RESPIMAT) 2.5 MCG/ACT AERS inhaler Inhale 2 puffs into the lungs daily 3 each 3    senna (SENOKOT) 8.6 MG tablet Take 1 tablet by mouth daily as needed for Constipation Up to twice a week 30 tablet 1    Sildenafil Citrate (VIAGRA PO) Take by mouth as needed (from the [place]) (Patient not taking: Reported on 2/2/2023)      docusate sodium (COLACE) 100 MG capsule Take 1 capsule by mouth 2 times daily 20 capsule 0    RANEXA 500 MG extended release tablet Take 2 tablets by mouth 2 times daily 60 tablet 5    nitroGLYCERIN (NITROSTAT) 0.4 MG SL tablet up to max of 3 total doses.  If no relief after 1 dose, call 911. 25 tablet 3    atenolol (TENORMIN) 25 MG tablet Take 1 tablet by mouth daily 90 tablet 1    mirtazapine (REMERON) 15 MG tablet Take 1 tablet by mouth nightly 30 tablet 0    aspirin 81 MG tablet Take 81 mg by mouth daily         ALLERGIES    Allergies   Allergen Reactions    Codeine Other (See Comments)    Flomax [Tamsulosin] Other (See Comments)     Patient \"stops urinating with this\"    Gabapentin Other (See Comments)     Other reaction(s): Feeling irritable, Sweating    Hydrocodone     Hydrocodone-Acetaminophen Other (See Comments)    Lisinopril     Pcn [Penicillins] Other (See Comments)    Sulfa Antibiotics Other (See Comments)       FAMILY HISTORY    Family History   Adopted: Yes   Family history unknown: Yes       SOCIAL HISTORY    Social History     Socioeconomic History    Marital status:      Spouse name: None    Number of children: None    Years of education: None    Highest education level: None   Tobacco Use    Smoking status: Former     Packs/day: 1.00     Years: 19.00     Pack years: 19.00     Types: Cigarettes     Quit date: 2006     Years since quittin.4    Smokeless tobacco: Former     Quit date: 2000   Vaping Use    Vaping Use: Never used   Substance and Sexual Activity    Alcohol use: No    Drug use: Yes     Frequency: 1.0 times per week     Types: Marijuana (Weed)     Comment: Pt smokes marijuana therapeutically. Sexual activity: Yes     Partners: Female     Social Determinants of Health     Financial Resource Strain: Low Risk     Difficulty of Paying Living Expenses: Not hard at all   Food Insecurity: No Food Insecurity    Worried About Cloak in the Last Year: Never true    Ran Out of Food in the Last Year: Never true       PHYSICAL EXAM    VITAL SIGNS: BP (!) 154/78   Pulse 63   Temp 98.5 °F (36.9 °C) (Oral)   Resp 18   Ht 5' 7\" (1.702 m)   Wt 159 lb 4.8 oz (72.3 kg)   SpO2 97%   BMI 24.95 kg/m²   Constitutional:  Well developed, well nourished, no acute distress, non-toxic appearance   Eyes:  PERRL, conjunctiva normal   HENT:  Atraumatic, external ears normal, nose normal, oropharynx moist. Neck supple   Respiratory:  No respiratory distress, normal breath sounds   Cardiovascular:  Normal rate, normal rhythm, no murmurs, no gallops, no rubs   GI: Abdomen soft, nontender, no masses, bowel sounds positive.   : No CVA tenderness  Musculoskeletal:  No edema   Integument:  Well hydrated   Neurologic:  Alert & oriented x 3, no focal deficits     EKG        RADIOLOGY/PROCEDURES    No orders to display     Labs  Labs Reviewed   POCT GLUCOSE - Normal   GLUCOSE, WHOLE BLOOD         EMERGENCY DEPARTMENT COURSE and DIFFERENTIAL DIAGNOSIS/MDM:    Patient Course: Number and complexity of problems:    Differential Diagnosis: Hyperglycemia versus hypoglycemia    Pertinent Comorbid Conditions: Diabetes mellitus    2)  Data Reviewed    Decision Rules/Scores utilized:  n/a    Labs/tests: Reviewed lab results. Glucose level was normal at 81. EKG/rhythm strips:     Radiology interpretation/review:           3)  Treatment and Disposition    Patient repeat assessment: Patient was stable on discharge. Disposition discussion with patient/family: Reviewed follow-up instructions with the patient. Shared Decision Making: The patient's medical condition with him. Case discussed with consulting clinician:  n/a    ED Medications administered this visit:  Medications - No data to display    New Prescriptions from this visit:    Discharge Medication List as of 2/4/2023 10:37 PM          Follow-up:  Jaylon Green DO  [id number] [facility]  [phone]      As needed        Final Impression:   1.  Controlled type 2 diabetes mellitus without complication, without long-term current use of insulin ([facility] [address]                 (Please note that portions of this note were completed with a voice recognition program.  Efforts were made to edit the dictations but occasionally words are mis-transcribed.)      (Please note that portions of this note were completed with a voice recognition program.  Efforts were made to edit the dictations but occasionally words are mis-transcribed.)        Fabrice Cano MD  02/05/23 1018

## 2023-02-05 NOTE — PROGRESS NOTES
Patient amb to bathroom with walker and steady gait, voids and back to chair, denies any c/o at this time

## 2023-02-09 ENCOUNTER — OFFICE VISIT (OUTPATIENT)
Dept: UROLOGY | Age: 68
End: 2023-02-09
Payer: OTHER GOVERNMENT

## 2023-02-09 ENCOUNTER — TELEPHONE (OUTPATIENT)
Dept: UROLOGY | Age: 68
End: 2023-02-09

## 2023-02-09 VITALS
HEIGHT: 69 IN | BODY MASS INDEX: 24.59 KG/M2 | SYSTOLIC BLOOD PRESSURE: 140 MMHG | DIASTOLIC BLOOD PRESSURE: 80 MMHG | WEIGHT: 166 LBS

## 2023-02-09 DIAGNOSIS — N13.8 BPH WITH OBSTRUCTION/LOWER URINARY TRACT SYMPTOMS: Primary | ICD-10-CM

## 2023-02-09 DIAGNOSIS — R31.0 GROSS HEMATURIA: ICD-10-CM

## 2023-02-09 DIAGNOSIS — N40.1 BPH WITH OBSTRUCTION/LOWER URINARY TRACT SYMPTOMS: Primary | ICD-10-CM

## 2023-02-09 PROCEDURE — G8484 FLU IMMUNIZE NO ADMIN: HCPCS | Performed by: PHYSICIAN ASSISTANT

## 2023-02-09 PROCEDURE — 3077F SYST BP >= 140 MM HG: CPT | Performed by: PHYSICIAN ASSISTANT

## 2023-02-09 PROCEDURE — 3017F COLORECTAL CA SCREEN DOC REV: CPT | Performed by: PHYSICIAN ASSISTANT

## 2023-02-09 PROCEDURE — 99213 OFFICE O/P EST LOW 20 MIN: CPT | Performed by: PHYSICIAN ASSISTANT

## 2023-02-09 PROCEDURE — G8427 DOCREV CUR MEDS BY ELIG CLIN: HCPCS | Performed by: PHYSICIAN ASSISTANT

## 2023-02-09 PROCEDURE — 1123F ACP DISCUSS/DSCN MKR DOCD: CPT | Performed by: PHYSICIAN ASSISTANT

## 2023-02-09 PROCEDURE — G8420 CALC BMI NORM PARAMETERS: HCPCS | Performed by: PHYSICIAN ASSISTANT

## 2023-02-09 PROCEDURE — 1036F TOBACCO NON-USER: CPT | Performed by: PHYSICIAN ASSISTANT

## 2023-02-09 PROCEDURE — 3079F DIAST BP 80-89 MM HG: CPT | Performed by: PHYSICIAN ASSISTANT

## 2023-02-09 RX ORDER — TERAZOSIN 5 MG/1
5 CAPSULE ORAL NIGHTLY
Qty: 30 CAPSULE | Refills: 5 | Status: SHIPPED | OUTPATIENT
Start: 2023-02-09

## 2023-02-09 ASSESSMENT — ENCOUNTER SYMPTOMS
CONSTIPATION: 0
BACK PAIN: 0
NAUSEA: 0
COUGH: 0
WHEEZING: 0
VOMITING: 0
SHORTNESS OF BREATH: 0
ABDOMINAL PAIN: 0
EYE REDNESS: 0
COLOR CHANGE: 0

## 2023-02-09 NOTE — TELEPHONE ENCOUNTER
Francisco Hanson is interested in an ED pump instead of taking pills for ED. What do you recommend? [FreeTextEntry2] : RT great toe pain

## 2023-02-09 NOTE — PROGRESS NOTES
HPI:      Patient is a 79 y.o. male in no acute distress. He is alert and oriented to person, place, and time. History:  He is status post C3-C7 laminectomy on 10/12/2020. Patient was on terazosin 2mg for BPH in the past but was not given it at time of surgery. Patient states that he has had reaction to Flomax in the past but is able to tolerate Terazosin. Patient did have acute urinary retention with a bladder scan of over 800 mL. Therefore Russell catheter was placed. Patient also was noted to have significant constipation. He was discharged from the hospital on 10/19/2020 on Cardura 1 mg. He does have itching and irritation on the tip of his penis with some white discharge around it as well. He has never been seen by urology before. He has never had issues with urinary retention in the past.     12/2022 - Gross hematuria - CT: his does not show any significant  abnormalities. Cysto: bilobar, no bladder lesion    increase his terazosin from 2 mg to 5 mg     Today:  Patient is here today for follow-up BPH and gross hematuria. Patient states that he continues to have issues initiating stream.  At last visit we did perform a cystoscopy for his gross hematuria. There was no bladder lesions seen. CT scan showed no significant  abnormalities. We did increase his terazosin from 2 mg to 5 mg. Patient reports that he is living out of a [employer] currently and does not have access to any of his medications. Apparently he had bedbugs in the hotel he was living at and was asked to leave. He continues to have intermittent gross hematuria.   He states that he has not been able to try the increased    Past Medical History:   Diagnosis Date    Arthritis     WENDY KNEE    Bronchitis with chronic airway obstruction (HCC)     CAD (coronary artery disease)     CHF (congestive heart failure) (HCC)     CTS (carpal tunnel syndrome)     RIGHT    Diabetes mellitus ([facility] [address]     Diabetic neuropathy associated with diabetes mellitus due to underlying condition ([facility] [address]     Exposure to toxic chemical     [employer] [address] [place] [place] [address]-toxic water exposure    GERD (gastroesophageal reflux disease)     H/O cardiovascular stress test 07/08/2016    Abnormal.  Moderate perfusion defect of mild to moderate intensity in the inferolateral,inferior and inferoapical regions during stress imaging. Ef 45%. with regional wall motion abnormalities. H/O echocardiogram 2/17/16    EF >60%. LV wall thickness is mildly increased. Inferoseptal wall is abnormal in its motion not unusual status post open heart surgery. Normal aortic valve structure with ild aortic regurgitation. Hip pain, left     Hx of cardiac cath 07/08/2016    LMCA: Normal 0% stenosis. LAD: Chronic occlusion 100%. Lesion on Mid LAD: 100% stenosis. LCx: single stenosis. Lesion on Mid CX: 80% stenosis. RCA: Lesion on Mid RCA: 70% stenosis.     Hyperlipidemia     Hypertension     MI (myocardial infarction) ([facility] [address] 2015    Pneumonia     PTSD (post-traumatic stress disorder)     S/P CABG (coronary artery bypass graft) 10/23/15    Sleep apnea     Ventricular fibrillation ([facility] [address] 10/18/2015    x 2 DURING HEART ATTACK     Past Surgical History:   Procedure Laterality Date    CARDIAC SURGERY      10/21/2015 3 vessel CABG    CARPAL TUNNEL RELEASE      CARPAL TUNNEL RELEASE Right     COLONOSCOPY  2012    [facility]    COLONOSCOPY  11/27/2017    CORONARY ARTERY BYPASS GRAFT  10/23/15    Dr. Missy Layton      left middle finger    FRACTURE SURGERY      left wrist    JOINT REPLACEMENT      right knee    PILONIDAL CYST EXCISION      1974    PILONIDAL CYST EXCISION      CO COLONOSCOPY W/BIOPSY SINGLE/MULTIPLE N/A 11/27/2017    COLONOSCOPY WITH BIOPSY/ polypectomy performed by Jd Quick MD at [address] [place] [address] Dr GALVEZ TRANSORAL BIOPSY SINGLE/MULTIPLE N/A 11/27/2017    EGD BIOPSY performed by Jd Quick MD at [facility]    TONSILLECTOMY      WRIST SURGERY      left wrist     Outpatient Encounter Medications as of 2/9/2023   Medication Sig Dispense Refill    terazosin (HYTRIN) 5 MG capsule Take 1 capsule by mouth nightly 30 capsule 5    bacitracin 500 UNIT/GM ointment Apply topically 2 times daily. (Patient not taking: Reported on 2/9/2023) 1 g 0    spironolactone (ALDACTONE) 25 MG tablet Take 1 tablet by mouth daily (Patient not taking: Reported on 2/9/2023) 30 tablet 5    albuterol (PROVENTIL) (2.5 MG/3ML) 0.083% nebulizer solution inhale contents of 1 vial ( 3 milliliters ) in nebulizer by mouth and INTO THE LUNGS every 4 hours if needed for wheezing or shortness of breath (Patient not taking: Reported on 2/9/2023) 300 mL 1    [DISCONTINUED] terazosin (HYTRIN) 5 MG capsule Take 1 capsule by mouth nightly (Patient not taking: Reported on 2/9/2023) 30 capsule 5    ondansetron (ZOFRAN) 4 MG tablet Take 1 tablet by mouth every 8 hours as needed for Nausea or Vomiting (Patient not taking: Reported on 2/9/2023) 30 tablet 0    Blood Glucose Monitoring Suppl (ONE TOUCH ULTRA 2) w/Device KIT use as directed by prescriber (Patient not taking: Reported on 2/9/2023)      blood glucose monitor strips Test blood sugar once daily (Patient not taking: Reported on 2/9/2023) 100 strip 3    Lancets MISC Test blood sugar once daily (Patient not taking: Reported on 2/9/2023) 100 each 3    Alcohol Swabs (ALCOHOL PREP) 70 % PADS Test sugar once daily (Patient not taking: Reported on 2/9/2023) 100 each 3    atorvastatin (LIPITOR) 80 MG tablet Take 80 mg by mouth Patient reports \"every 3 days I was told to take a half a pill\".  (Patient not taking: Reported on 2/9/2023)      medical marijuana USE (Patient not taking: Reported on 2/9/2023)      albuterol sulfate HFA (PROVENTIL HFA) 108 (90 Base) MCG/ACT inhaler Inhale 2 puffs into the lungs every 6 hours as needed for Wheezing May substitute as needed for insurance reasons (Patient not taking: Reported on 2/9/2023) 18 g 3    tiotropium (SPIRIVA RESPIMAT) 2.5 MCG/ACT AERS inhaler Inhale 2 puffs into the lungs daily (Patient not taking: Reported on 2/9/2023) 3 each 3    senna (SENOKOT) 8.6 MG tablet Take 1 tablet by mouth daily as needed for Constipation Up to twice a week (Patient not taking: Reported on 2/9/2023) 30 tablet 1    Sildenafil Citrate (VIAGRA PO) Take by mouth as needed (from the [place]) (Patient not taking: Reported on 2/9/2023)      docusate sodium (COLACE) 100 MG capsule Take 1 capsule by mouth 2 times daily (Patient not taking: Reported on 2/9/2023) 20 capsule 0    RANEXA 500 MG extended release tablet Take 2 tablets by mouth 2 times daily (Patient not taking: Reported on 2/9/2023) 60 tablet 5    nitroGLYCERIN (NITROSTAT) 0.4 MG SL tablet up to max of 3 total doses. If no relief after 1 dose, call 911. (Patient not taking: Reported on 2/9/2023) 25 tablet 3    atenolol (TENORMIN) 25 MG tablet Take 1 tablet by mouth daily (Patient not taking: Reported on 2/9/2023) 90 tablet 1    mirtazapine (REMERON) 15 MG tablet Take 1 tablet by mouth nightly (Patient not taking: Reported on 2/9/2023) 30 tablet 0    aspirin 81 MG tablet Take 81 mg by mouth daily (Patient not taking: Reported on 2/9/2023)       No facility-administered encounter medications on file as of 2/9/2023. Current Outpatient Medications on File Prior to Visit   Medication Sig Dispense Refill    bacitracin 500 UNIT/GM ointment Apply topically 2 times daily.  (Patient not taking: Reported on 2/9/2023) 1 g 0    spironolactone (ALDACTONE) 25 MG tablet Take 1 tablet by mouth daily (Patient not taking: Reported on 2/9/2023) 30 tablet 5    albuterol (PROVENTIL) (2.5 MG/3ML) 0.083% nebulizer solution inhale contents of 1 vial ( 3 milliliters ) in nebulizer by mouth and INTO THE LUNGS every 4 hours if needed for wheezing or shortness of breath (Patient not taking: Reported on 2/9/2023) 300 mL 1    ondansetron (ZOFRAN) 4 MG tablet Take 1 tablet by mouth every 8 hours as needed for Nausea or Vomiting (Patient not taking: Reported on 2/9/2023) 30 tablet 0    Blood Glucose Monitoring Suppl (ONE TOUCH ULTRA 2) w/Device KIT use as directed by prescriber (Patient not taking: Reported on 2/9/2023)      blood glucose monitor strips Test blood sugar once daily (Patient not taking: Reported on 2/9/2023) 100 strip 3    Lancets MISC Test blood sugar once daily (Patient not taking: Reported on 2/9/2023) 100 each 3    Alcohol Swabs (ALCOHOL PREP) 70 % PADS Test sugar once daily (Patient not taking: Reported on 2/9/2023) 100 each 3    atorvastatin (LIPITOR) 80 MG tablet Take 80 mg by mouth Patient reports \"every 3 days I was told to take a half a pill\". (Patient not taking: Reported on 2/9/2023)      medical marijuana USE (Patient not taking: Reported on 2/9/2023)      albuterol sulfate HFA (PROVENTIL HFA) 108 (90 Base) MCG/ACT inhaler Inhale 2 puffs into the lungs every 6 hours as needed for Wheezing May substitute as needed for insurance reasons (Patient not taking: Reported on 2/9/2023) 18 g 3    tiotropium (SPIRIVA RESPIMAT) 2.5 MCG/ACT AERS inhaler Inhale 2 puffs into the lungs daily (Patient not taking: Reported on 2/9/2023) 3 each 3    senna (SENOKOT) 8.6 MG tablet Take 1 tablet by mouth daily as needed for Constipation Up to twice a week (Patient not taking: Reported on 2/9/2023) 30 tablet 1    Sildenafil Citrate (VIAGRA PO) Take by mouth as needed (from the [place]) (Patient not taking: Reported on 2/9/2023)      docusate sodium (COLACE) 100 MG capsule Take 1 capsule by mouth 2 times daily (Patient not taking: Reported on 2/9/2023) 20 capsule 0    RANEXA 500 MG extended release tablet Take 2 tablets by mouth 2 times daily (Patient not taking: Reported on 2/9/2023) 60 tablet 5    nitroGLYCERIN (NITROSTAT) 0.4 MG SL tablet up to max of 3 total doses. If no relief after 1 dose, call 911.  (Patient not taking: Reported on 2/9/2023) 25 tablet 3    atenolol (TENORMIN) 25 MG tablet Take 1 tablet by mouth daily (Patient not taking: Reported on 2023) 90 tablet 1    mirtazapine (REMERON) 15 MG tablet Take 1 tablet by mouth nightly (Patient not taking: Reported on 2023) 30 tablet 0    aspirin 81 MG tablet Take 81 mg by mouth daily (Patient not taking: Reported on 2023)       No current facility-administered medications on file prior to visit. Codeine, Flomax [tamsulosin], Gabapentin, Hydrocodone, Hydrocodone-acetaminophen, Lisinopril, Pcn [penicillins], and Sulfa antibiotics  Family History   Adopted: Yes   Family history unknown: Yes     Social History     Tobacco Use   Smoking Status Former    Packs/day: 1.00    Years: 19.00    Pack years: 19.00    Types: Cigarettes    Quit date: 2006    Years since quittin.4   Smokeless Tobacco Former    Quit date: 2000       Social History     Substance and Sexual Activity   Alcohol Use No       Review of Systems   Constitutional:  Negative for appetite change, chills and fever. Eyes:  Negative for redness and visual disturbance. Respiratory:  Negative for cough, shortness of breath and wheezing. Cardiovascular:  Negative for chest pain and leg swelling. Gastrointestinal:  Negative for abdominal pain, constipation, nausea and vomiting. Genitourinary:  Positive for difficulty urinating and hematuria. Negative for decreased urine volume, dysuria, enuresis, flank pain, frequency, penile discharge, penile pain, scrotal swelling, testicular pain and urgency. Musculoskeletal:  Negative for back pain, joint swelling and myalgias. Skin:  Negative for color change, rash and wound. Neurological:  Negative for dizziness, tremors and numbness. Hematological:  Negative for adenopathy. Does not bruise/bleed easily.      BP (!) 140/80 (Site: Left Upper Arm, Position: Sitting, Cuff Size: Medium Adult)   Ht 5' 9\" (1.753 m)   Wt 166 lb (75.3 kg)   BMI 24.51 kg/m²       PHYSICAL EXAM:  Constitutional: Patient in no acute distress; Neuro: alert and oriented to person place and time. Psych: Mood and affect normal.  Lungs: Respiratory effort normal  Abdomen: Soft, non-tender, non-distended   Rectal: deferred       Lab Results   Component Value Date    BUN 14 12/15/2022     Lab Results   Component Value Date    CREATININE 0.80 12/15/2022     Lab Results   Component Value Date    PSA 0.58 11/15/2022    PSA 0.52 07/05/2017       ASSESSMENT:   Diagnosis Orders   1. BPH with obstruction/lower urinary tract symptoms        2. Gross hematuria          PLAN:  Patient just had a gross hematuria work-up within the past 2 months. We did send a new prescription for his terazosin 5 mg    We gave him the number to the  here at the hospital to see if they can assist him in getting new prescriptions    Follow-up in 8 weeks to discuss efficacy of terazosin at increased dose of 5 mg as he did not take this increased dosing prior to the visit    After patient left the office he did call and request a vacuum-assisted device for erectile dysfunction, we have never seen the patient for erectile dysfunction in the past.  He will need to have this discussed at his next office visit.

## 2023-02-09 NOTE — TELEPHONE ENCOUNTER
Please call patient and let him know that we will discuss erectile dysfunction treatment options at his next visit    Patient does have a history of erectile dysfunction treated by the [place]    We have never addressed erectile dysfunction with him in the office.

## 2023-02-09 NOTE — PATIENT INSTRUCTIONS
SURVEY:    You may be receiving a survey from [employer] regarding your visit today. Please complete the survey to enable us to provide the highest quality of care to you and your family. If you cannot score us a very good on any question, please call the office to discuss how we could have made your experience a very good one. Thank you.

## 2023-02-10 ENCOUNTER — HOSPITAL ENCOUNTER (EMERGENCY)
Age: 68
Discharge: HOME | End: 2023-02-10
Attending: FAMILY MEDICINE
Payer: OTHER GOVERNMENT

## 2023-02-10 ENCOUNTER — APPOINTMENT (OUTPATIENT)
Dept: GENERAL RADIOLOGY | Age: 68
End: 2023-02-10
Payer: OTHER GOVERNMENT

## 2023-02-10 ENCOUNTER — HOSPITAL ENCOUNTER (EMERGENCY)
Age: 68
Discharge: HOME | End: 2023-02-10
Attending: EMERGENCY MEDICINE
Payer: OTHER GOVERNMENT

## 2023-02-10 VITALS
HEIGHT: 69 IN | TEMPERATURE: 98.7 F | BODY MASS INDEX: 24.59 KG/M2 | WEIGHT: 166 LBS | DIASTOLIC BLOOD PRESSURE: 90 MMHG | SYSTOLIC BLOOD PRESSURE: 158 MMHG | OXYGEN SATURATION: 93 % | HEART RATE: 80 BPM | RESPIRATION RATE: 16 BRPM

## 2023-02-10 VITALS
HEIGHT: 70 IN | HEART RATE: 78 BPM | BODY MASS INDEX: 23.77 KG/M2 | DIASTOLIC BLOOD PRESSURE: 69 MMHG | RESPIRATION RATE: 18 BRPM | SYSTOLIC BLOOD PRESSURE: 132 MMHG | WEIGHT: 166 LBS | TEMPERATURE: 98.6 F | OXYGEN SATURATION: 96 %

## 2023-02-10 DIAGNOSIS — E87.6 HYPOKALEMIA: ICD-10-CM

## 2023-02-10 DIAGNOSIS — S83.91XA SPRAIN OF RIGHT KNEE, UNSPECIFIED LIGAMENT, INITIAL ENCOUNTER: Primary | ICD-10-CM

## 2023-02-10 DIAGNOSIS — R19.7 NAUSEA VOMITING AND DIARRHEA: Primary | ICD-10-CM

## 2023-02-10 DIAGNOSIS — M25.551 CHRONIC RIGHT HIP PAIN: ICD-10-CM

## 2023-02-10 DIAGNOSIS — R11.2 NAUSEA VOMITING AND DIARRHEA: Primary | ICD-10-CM

## 2023-02-10 DIAGNOSIS — G89.29 CHRONIC RIGHT HIP PAIN: ICD-10-CM

## 2023-02-10 LAB
ABSOLUTE EOS #: 0.1 K/UL (ref 0–0.4)
ABSOLUTE LYMPH #: 1.2 K/UL (ref 1–4.8)
ABSOLUTE MONO #: 0.3 K/UL (ref 0–1)
ALBUMIN SERPL-MCNC: 3.9 G/DL (ref 3.5–5.2)
ALP SERPL-CCNC: 78 U/L (ref 40–129)
ALT SERPL-CCNC: 7 U/L (ref 5–41)
ANION GAP SERPL CALCULATED.3IONS-SCNC: 11 MMOL/L (ref 9–17)
AST SERPL-CCNC: 13 U/L
BASOPHILS # BLD: 1 % (ref 0–2)
BASOPHILS ABSOLUTE: 0 K/UL (ref 0–0.2)
BILIRUB SERPL-MCNC: 0.4 MG/DL (ref 0.3–1.2)
BUN SERPL-MCNC: 15 MG/DL (ref 8–23)
BUN/CREAT BLD: 25 (ref 9–20)
CALCIUM SERPL-MCNC: 8.9 MG/DL (ref 8.6–10.4)
CHLORIDE SERPL-SCNC: 99 MMOL/L (ref 98–107)
CO2 SERPL-SCNC: 26 MMOL/L (ref 20–31)
CREAT SERPL-MCNC: 0.61 MG/DL (ref 0.7–1.2)
DIFFERENTIAL TYPE: YES
EOSINOPHILS RELATIVE PERCENT: 1 % (ref 0–5)
GFR SERPL CREATININE-BSD FRML MDRD: >60 ML/MIN/1.73M2
GLUCOSE SERPL-MCNC: 111 MG/DL (ref 70–99)
HCT VFR BLD AUTO: 39.8 % (ref 41–53)
HGB BLD-MCNC: 13.1 G/DL (ref 13.5–17.5)
LYMPHOCYTES # BLD: 21 % (ref 13–44)
MCH RBC QN AUTO: 30.1 PG (ref 26–34)
MCHC RBC AUTO-ENTMCNC: 33 G/DL (ref 31–37)
MCV RBC AUTO: 91.2 FL (ref 80–100)
MONOCYTES # BLD: 5 % (ref 5–9)
PDW BLD-RTO: 15.8 % (ref 12.1–15.2)
PLATELET # BLD AUTO: 213 K/UL (ref 140–450)
POTASSIUM SERPL-SCNC: 3.4 MMOL/L (ref 3.7–5.3)
PROT SERPL-MCNC: 6.5 G/DL (ref 6.4–8.3)
RBC # BLD: 4.37 M/UL (ref 4.5–5.9)
SEG NEUTROPHILS: 72 % (ref 39–75)
SEGMENTED NEUTROPHILS ABSOLUTE COUNT: 4.1 K/UL (ref 2.1–6.5)
SODIUM SERPL-SCNC: 136 MMOL/L (ref 135–144)
WBC # BLD AUTO: 5.7 K/UL (ref 3.5–11)

## 2023-02-10 PROCEDURE — 99283 EMERGENCY DEPT VISIT LOW MDM: CPT

## 2023-02-10 PROCEDURE — 73502 X-RAY EXAM HIP UNI 2-3 VIEWS: CPT

## 2023-02-10 PROCEDURE — 85025 COMPLETE CBC W/AUTO DIFF WBC: CPT

## 2023-02-10 PROCEDURE — 99284 EMERGENCY DEPT VISIT MOD MDM: CPT

## 2023-02-10 PROCEDURE — 73564 X-RAY EXAM KNEE 4 OR MORE: CPT

## 2023-02-10 PROCEDURE — 80053 COMPREHEN METABOLIC PANEL: CPT

## 2023-02-10 PROCEDURE — 96374 THER/PROPH/DIAG INJ IV PUSH: CPT

## 2023-02-10 PROCEDURE — 2580000003 HC RX 258: Performed by: FAMILY MEDICINE

## 2023-02-10 PROCEDURE — 6360000002 HC RX W HCPCS: Performed by: FAMILY MEDICINE

## 2023-02-10 RX ORDER — ONDANSETRON 4 MG/1
4 TABLET, ORALLY DISINTEGRATING ORAL
Qty: 15 TABLET | Refills: 0 | Status: SHIPPED | OUTPATIENT
Start: 2023-02-10

## 2023-02-10 RX ORDER — POTASSIUM CHLORIDE 20 MEQ/1
20 TABLET, EXTENDED RELEASE ORAL 2 TIMES DAILY
Qty: 10 TABLET | Refills: 0 | Status: SHIPPED | OUTPATIENT
Start: 2023-02-10 | End: 2023-02-15

## 2023-02-10 RX ORDER — 0.9 % SODIUM CHLORIDE 0.9 %
1000 INTRAVENOUS SOLUTION INTRAVENOUS ONCE
Status: COMPLETED | OUTPATIENT
Start: 2023-02-10 | End: 2023-02-10

## 2023-02-10 RX ORDER — ONDANSETRON 2 MG/ML
4 INJECTION INTRAMUSCULAR; INTRAVENOUS ONCE
Status: COMPLETED | OUTPATIENT
Start: 2023-02-10 | End: 2023-02-10

## 2023-02-10 RX ADMIN — ONDANSETRON 4 MG: 2 INJECTION INTRAMUSCULAR; INTRAVENOUS at 12:25

## 2023-02-10 RX ADMIN — SODIUM CHLORIDE 1000 ML: 9 INJECTION, SOLUTION INTRAVENOUS at 12:25

## 2023-02-10 ASSESSMENT — PAIN - FUNCTIONAL ASSESSMENT
PAIN_FUNCTIONAL_ASSESSMENT: 0-10
PAIN_FUNCTIONAL_ASSESSMENT: 0-10

## 2023-02-10 ASSESSMENT — ENCOUNTER SYMPTOMS
NAUSEA: 1
VOMITING: 1
CHOKING: 0
COLOR CHANGE: 0

## 2023-02-10 ASSESSMENT — LIFESTYLE VARIABLES
HOW MANY STANDARD DRINKS CONTAINING ALCOHOL DO YOU HAVE ON A TYPICAL DAY: 1 OR 2
HOW OFTEN DO YOU HAVE A DRINK CONTAINING ALCOHOL: MONTHLY OR LESS

## 2023-02-10 ASSESSMENT — PAIN DESCRIPTION - FREQUENCY: FREQUENCY: CONTINUOUS

## 2023-02-10 ASSESSMENT — PAIN SCALES - GENERAL
PAINLEVEL_OUTOF10: 9
PAINLEVEL_OUTOF10: 10
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
## 2023-02-10 NOTE — ED NOTES
Ticket to ride completed.  The following information was reported off:  Name  Allergies  Orientation Level  Destination  Safety Issues  Code Status  Oxygen Requirements  Special needs including mobility, language, communication         Joseph Hahn RN  02/10/23 0044

## 2023-02-11 ENCOUNTER — APPOINTMENT (OUTPATIENT)
Dept: GENERAL RADIOLOGY | Age: 68
End: 2023-02-11
Payer: OTHER GOVERNMENT

## 2023-02-11 ENCOUNTER — HOSPITAL ENCOUNTER (EMERGENCY)
Age: 68
Discharge: HOME | End: 2023-02-11
Attending: FAMILY MEDICINE
Payer: OTHER GOVERNMENT

## 2023-02-11 VITALS
DIASTOLIC BLOOD PRESSURE: 71 MMHG | HEIGHT: 69 IN | RESPIRATION RATE: 14 BRPM | TEMPERATURE: 98.5 F | BODY MASS INDEX: 25.27 KG/M2 | OXYGEN SATURATION: 93 % | WEIGHT: 170.6 LBS | HEART RATE: 74 BPM | SYSTOLIC BLOOD PRESSURE: 145 MMHG

## 2023-02-11 DIAGNOSIS — Z76.0 MEDICATION REFILL: ICD-10-CM

## 2023-02-11 DIAGNOSIS — R06.02 SHORTNESS OF BREATH: Primary | ICD-10-CM

## 2023-02-11 DIAGNOSIS — Z87.09 HISTORY OF COPD: ICD-10-CM

## 2023-02-11 DIAGNOSIS — Z59.00 HOMELESS SINGLE PERSON: ICD-10-CM

## 2023-02-11 LAB
ABSOLUTE EOS #: 0.2 K/UL (ref 0–0.4)
ABSOLUTE LYMPH #: 1.4 K/UL (ref 1–4.8)
ABSOLUTE MONO #: 0.3 K/UL (ref 0–1)
ANION GAP SERPL CALCULATED.3IONS-SCNC: 11 MMOL/L (ref 9–17)
BASOPHILS # BLD: 1 % (ref 0–2)
BASOPHILS ABSOLUTE: 0 K/UL (ref 0–0.2)
BNP SERPL-MCNC: 336 PG/ML
BUN SERPL-MCNC: 13 MG/DL (ref 8–23)
BUN/CREAT BLD: 22 (ref 9–20)
CALCIUM SERPL-MCNC: 8.5 MG/DL (ref 8.6–10.4)
CHLORIDE SERPL-SCNC: 105 MMOL/L (ref 98–107)
CO2 SERPL-SCNC: 25 MMOL/L (ref 20–31)
CREAT SERPL-MCNC: 0.6 MG/DL (ref 0.7–1.2)
DIFFERENTIAL TYPE: YES
EOSINOPHILS RELATIVE PERCENT: 4 % (ref 0–5)
GFR SERPL CREATININE-BSD FRML MDRD: >60 ML/MIN/1.73M2
GLUCOSE SERPL-MCNC: 100 MG/DL (ref 70–99)
HCT VFR BLD AUTO: 36 % (ref 41–53)
HGB BLD-MCNC: 11.9 G/DL (ref 13.5–17.5)
LYMPHOCYTES # BLD: 28 % (ref 13–44)
MAGNESIUM SERPL-MCNC: 1.8 MG/DL (ref 1.6–2.6)
MCH RBC QN AUTO: 30 PG (ref 26–34)
MCHC RBC AUTO-ENTMCNC: 33 G/DL (ref 31–37)
MCV RBC AUTO: 90.9 FL (ref 80–100)
MONOCYTES # BLD: 7 % (ref 5–9)
PDW BLD-RTO: 15.9 % (ref 12.1–15.2)
PLATELET # BLD AUTO: 195 K/UL (ref 140–450)
POTASSIUM SERPL-SCNC: 3.4 MMOL/L (ref 3.7–5.3)
RBC # BLD: 3.96 M/UL (ref 4.5–5.9)
SEG NEUTROPHILS: 60 % (ref 39–75)
SEGMENTED NEUTROPHILS ABSOLUTE COUNT: 3 K/UL (ref 2.1–6.5)
SODIUM SERPL-SCNC: 141 MMOL/L (ref 135–144)
TROPONIN I SERPL DL<=0.01 NG/ML-MCNC: 21 NG/L (ref 0–22)
WBC # BLD AUTO: 5 K/UL (ref 3.5–11)

## 2023-02-11 PROCEDURE — 93005 ELECTROCARDIOGRAM TRACING: CPT | Performed by: FAMILY MEDICINE

## 2023-02-11 PROCEDURE — 80048 BASIC METABOLIC PNL TOTAL CA: CPT

## 2023-02-11 PROCEDURE — 84484 ASSAY OF TROPONIN QUANT: CPT

## 2023-02-11 PROCEDURE — 94640 AIRWAY INHALATION TREATMENT: CPT

## 2023-02-11 PROCEDURE — 6370000000 HC RX 637 (ALT 250 FOR IP)

## 2023-02-11 PROCEDURE — 83735 ASSAY OF MAGNESIUM: CPT

## 2023-02-11 PROCEDURE — 94664 DEMO&/EVAL PT USE INHALER: CPT

## 2023-02-11 PROCEDURE — 6370000000 HC RX 637 (ALT 250 FOR IP): Performed by: FAMILY MEDICINE

## 2023-02-11 PROCEDURE — 96374 THER/PROPH/DIAG INJ IV PUSH: CPT

## 2023-02-11 PROCEDURE — 36415 COLL VENOUS BLD VENIPUNCTURE: CPT

## 2023-02-11 PROCEDURE — 85025 COMPLETE CBC W/AUTO DIFF WBC: CPT

## 2023-02-11 PROCEDURE — 71045 X-RAY EXAM CHEST 1 VIEW: CPT

## 2023-02-11 PROCEDURE — 6360000002 HC RX W HCPCS: Performed by: FAMILY MEDICINE

## 2023-02-11 PROCEDURE — 99285 EMERGENCY DEPT VISIT HI MDM: CPT

## 2023-02-11 PROCEDURE — 83880 ASSAY OF NATRIURETIC PEPTIDE: CPT

## 2023-02-11 RX ORDER — RANOLAZINE 500 MG/1
TABLET, FILM COATED, EXTENDED RELEASE ORAL
Qty: 60 TABLET | Refills: 0 | Status: SHIPPED | OUTPATIENT
Start: 2023-02-11

## 2023-02-11 RX ORDER — IPRATROPIUM BROMIDE AND ALBUTEROL SULFATE 2.5; .5 MG/3ML; MG/3ML
1 SOLUTION RESPIRATORY (INHALATION) ONCE
Status: COMPLETED | OUTPATIENT
Start: 2023-02-11 | End: 2023-02-11

## 2023-02-11 RX ORDER — PREDNISONE 20 MG/1
40 TABLET ORAL DAILY
Qty: 10 TABLET | Refills: 0 | Status: SHIPPED | OUTPATIENT
Start: 2023-02-11 | End: 2023-02-16

## 2023-02-11 RX ORDER — ATENOLOL 25 MG/1
25 TABLET ORAL DAILY
Qty: 30 TABLET | Refills: 0 | Status: SHIPPED | OUTPATIENT
Start: 2023-02-11

## 2023-02-11 RX ORDER — ALBUTEROL SULFATE 90 UG/1
2 AEROSOL, METERED RESPIRATORY (INHALATION) EVERY 6 HOURS PRN
Qty: 18 G | Refills: 1 | Status: SHIPPED | OUTPATIENT
Start: 2023-02-11

## 2023-02-11 RX ORDER — ASPIRIN 81 MG/1
324 TABLET, CHEWABLE ORAL ONCE
Status: COMPLETED | OUTPATIENT
Start: 2023-02-11 | End: 2023-02-11

## 2023-02-11 RX ORDER — ALBUTEROL SULFATE 90 UG/1
AEROSOL, METERED RESPIRATORY (INHALATION)
Status: COMPLETED
Start: 2023-02-11 | End: 2023-02-11

## 2023-02-11 RX ORDER — ALBUTEROL SULFATE 90 UG/1
2 AEROSOL, METERED RESPIRATORY (INHALATION) EVERY 4 HOURS PRN
Status: DISCONTINUED | OUTPATIENT
Start: 2023-02-11 | End: 2023-02-11 | Stop reason: HOSPADM

## 2023-02-11 RX ORDER — METHYLPREDNISOLONE SODIUM SUCCINATE 125 MG/2ML
125 INJECTION, POWDER, LYOPHILIZED, FOR SOLUTION INTRAMUSCULAR; INTRAVENOUS ONCE
Status: COMPLETED | OUTPATIENT
Start: 2023-02-11 | End: 2023-02-11

## 2023-02-11 RX ADMIN — ALBUTEROL SULFATE 2 PUFF: 90 AEROSOL, METERED RESPIRATORY (INHALATION) at 02:37

## 2023-02-11 RX ADMIN — ASPIRIN 324 MG: 81 TABLET, CHEWABLE ORAL at 02:01

## 2023-02-11 RX ADMIN — IPRATROPIUM BROMIDE AND ALBUTEROL SULFATE 1 AMPULE: .5; 3 SOLUTION RESPIRATORY (INHALATION) at 01:59

## 2023-02-11 RX ADMIN — METHYLPREDNISOLONE SODIUM SUCCINATE 125 MG: 125 INJECTION, POWDER, FOR SOLUTION INTRAMUSCULAR; INTRAVENOUS at 02:01

## 2023-02-11 SDOH — ECONOMIC STABILITY - HOUSING INSECURITY: HOMELESSNESS UNSPECIFIED: Z59.00

## 2023-02-11 ASSESSMENT — PAIN - FUNCTIONAL ASSESSMENT
PAIN_FUNCTIONAL_ASSESSMENT: 0-10
PAIN_FUNCTIONAL_ASSESSMENT: NONE - DENIES PAIN

## 2023-02-11 ASSESSMENT — PAIN SCALES - GENERAL: PAINLEVEL_OUTOF10: 7

## 2023-02-11 ASSESSMENT — ENCOUNTER SYMPTOMS
CHEST TIGHTNESS: 1
SHORTNESS OF BREATH: 1
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
## 2023-02-11 NOTE — DISCHARGE INSTRUCTIONS
Refills for your Ranexa, atenolol, albuterol MDI, and prescription for prednisone sent to your pharmacy of record Refills provided, patient needs an appointment for further refills

## 2023-02-12 LAB
EKG ATRIAL RATE: 72 BPM
EKG P AXIS: 46 DEGREES
EKG P-R INTERVAL: 156 MS
EKG Q-T INTERVAL: 412 MS
EKG QRS DURATION: 82 MS
EKG QTC CALCULATION (BAZETT): 451 MS
EKG R AXIS: 8 DEGREES
EKG T AXIS: 5 DEGREES
EKG VENTRICULAR RATE: 72 BPM

## 2023-02-12 PROCEDURE — 93010 ELECTROCARDIOGRAM REPORT: CPT | Performed by: INTERNAL MEDICINE

## 2023-02-15 ENCOUNTER — OFFICE VISIT (OUTPATIENT)
Dept: SURGERY | Age: 68
End: 2023-02-15
Payer: OTHER GOVERNMENT

## 2023-02-15 DIAGNOSIS — M79.18 BUTTOCK PAIN: Primary | ICD-10-CM

## 2023-02-15 PROBLEM — I21.9 MYOCARDIAL INFARCTION (HCC): Status: ACTIVE | Noted: 2023-02-15

## 2023-02-15 PROBLEM — M19.90 OSTEOARTHROSIS: Status: ACTIVE | Noted: 2023-02-15

## 2023-02-15 PROBLEM — I10 HYPERTENSIVE DISORDER: Status: ACTIVE | Noted: 2023-02-15

## 2023-02-15 PROBLEM — R31.9 HEMATURIA: Status: ACTIVE | Noted: 2023-02-03

## 2023-02-15 PROBLEM — I50.9 CONGESTIVE HEART FAILURE (HCC): Status: ACTIVE | Noted: 2023-02-15

## 2023-02-15 PROBLEM — K52.9 COLITIS: Status: ACTIVE | Noted: 2023-02-15

## 2023-02-15 PROBLEM — E66.01 MORBID OBESITY (HCC): Status: ACTIVE | Noted: 2023-02-15

## 2023-02-15 PROBLEM — E78.00 HYPERCHOLESTEROLEMIA: Status: ACTIVE | Noted: 2023-02-15

## 2023-02-15 PROBLEM — J30.2 SEASONAL ALLERGIC RHINITIS: Status: ACTIVE | Noted: 2023-02-15

## 2023-02-15 PROBLEM — F17.200 SMOKER: Status: ACTIVE | Noted: 2023-02-15

## 2023-02-15 PROBLEM — L98.429: Status: ACTIVE | Noted: 2023-02-03

## 2023-02-15 PROCEDURE — 3017F COLORECTAL CA SCREEN DOC REV: CPT | Performed by: SURGERY

## 2023-02-15 PROCEDURE — G8427 DOCREV CUR MEDS BY ELIG CLIN: HCPCS | Performed by: SURGERY

## 2023-02-15 PROCEDURE — 3074F SYST BP LT 130 MM HG: CPT | Performed by: SURGERY

## 2023-02-15 PROCEDURE — 99203 OFFICE O/P NEW LOW 30 MIN: CPT | Performed by: SURGERY

## 2023-02-15 PROCEDURE — 1036F TOBACCO NON-USER: CPT | Performed by: SURGERY

## 2023-02-15 PROCEDURE — 3078F DIAST BP <80 MM HG: CPT | Performed by: SURGERY

## 2023-02-15 PROCEDURE — G8484 FLU IMMUNIZE NO ADMIN: HCPCS | Performed by: SURGERY

## 2023-02-15 PROCEDURE — 1123F ACP DISCUSS/DSCN MKR DOCD: CPT | Performed by: SURGERY

## 2023-02-15 PROCEDURE — G8417 CALC BMI ABV UP PARAM F/U: HCPCS | Performed by: SURGERY

## 2023-02-15 RX ORDER — DOXYCYCLINE HYCLATE 100 MG
TABLET ORAL
COMMUNITY
Start: 2023-02-04 | End: 2023-04-04 | Stop reason: ALTCHOICE

## 2023-02-15 RX ORDER — ASPIRIN 81 MG/1
TABLET, CHEWABLE ORAL
COMMUNITY

## 2023-02-27 RX ORDER — ALBUTEROL SULFATE 2.5 MG/3ML
SOLUTION RESPIRATORY (INHALATION)
Qty: 300 ML | Refills: 1 | OUTPATIENT
Start: 2023-02-27

## 2023-03-13 ENCOUNTER — HOSPITAL ENCOUNTER (OUTPATIENT)
Dept: CT IMAGING | Age: 68
Discharge: HOME | End: 2023-03-15
Payer: COMMERCIAL

## 2023-03-13 DIAGNOSIS — M25.561 RIGHT KNEE PAIN, UNSPECIFIED CHRONICITY: ICD-10-CM

## 2023-03-13 DIAGNOSIS — Z96.651 PRESENCE OF RIGHT ARTIFICIAL KNEE JOINT: ICD-10-CM

## 2023-03-13 PROCEDURE — 73700 CT LOWER EXTREMITY W/O DYE: CPT

## 2023-03-16 ENCOUNTER — OFFICE VISIT (OUTPATIENT)
Dept: GASTROENTEROLOGY | Age: 68
End: 2023-03-16

## 2023-03-16 VITALS
BODY MASS INDEX: 26.36 KG/M2 | WEIGHT: 178 LBS | RESPIRATION RATE: 19 BRPM | HEART RATE: 67 BPM | HEIGHT: 69 IN | OXYGEN SATURATION: 97 %

## 2023-03-16 DIAGNOSIS — K62.5 RECTAL BLEEDING: ICD-10-CM

## 2023-03-16 DIAGNOSIS — R63.4 WEIGHT LOSS: Primary | ICD-10-CM

## 2023-03-16 RX ORDER — TRAZODONE HYDROCHLORIDE 50 MG/1
25 TABLET ORAL
COMMUNITY
Start: 2023-02-27

## 2023-03-16 RX ORDER — POLYETHYLENE GLYCOL 3350, SODIUM CHLORIDE, SODIUM BICARBONATE, POTASSIUM CHLORIDE 420; 11.2; 5.72; 1.48 G/4L; G/4L; G/4L; G/4L
4000 POWDER, FOR SOLUTION ORAL ONCE
Qty: 4000 ML | Refills: 0 | Status: SHIPPED | OUTPATIENT
Start: 2023-03-16 | End: 2023-03-16

## 2023-03-16 RX ORDER — RISPERIDONE 0.5 MG/1
0.5 TABLET ORAL
COMMUNITY
Start: 2023-03-02 | End: 2023-04-04 | Stop reason: ALTCHOICE

## 2023-03-16 ASSESSMENT — ENCOUNTER SYMPTOMS: DIARRHEA: 1

## 2023-03-20 ENCOUNTER — HOSPITAL ENCOUNTER (EMERGENCY)
Age: 68
Discharge: HOME | End: 2023-03-20
Attending: FAMILY MEDICINE
Payer: OTHER GOVERNMENT

## 2023-03-20 VITALS
OXYGEN SATURATION: 96 % | HEIGHT: 67 IN | WEIGHT: 165 LBS | RESPIRATION RATE: 18 BRPM | DIASTOLIC BLOOD PRESSURE: 85 MMHG | BODY MASS INDEX: 25.9 KG/M2 | HEART RATE: 81 BPM | SYSTOLIC BLOOD PRESSURE: 167 MMHG | TEMPERATURE: 98.8 F

## 2023-03-20 DIAGNOSIS — Z00.8 MEDICAL CLEARANCE FOR INCARCERATION: Primary | ICD-10-CM

## 2023-03-20 PROCEDURE — 99282 EMERGENCY DEPT VISIT SF MDM: CPT

## 2023-03-20 ASSESSMENT — PAIN - FUNCTIONAL ASSESSMENT: PAIN_FUNCTIONAL_ASSESSMENT: NONE - DENIES PAIN

## 2023-03-21 NOTE — ED PROVIDER NOTES
stable, medical screening exam showing no acute abnormalities, and questions patient do not elicit any concerns or require further work-up at this time. Patient will be cleared for transfer to [facility] with police    1)  Number and Complexity of Problems  Problem List This Visit: Medical clearance for incarceration    Differential Diagnosis: Infinity    Diagnoses Considered but Do Not Suspect: N/A    Pertinent Comorbid Conditions: Extensive medical history    2)  Data Reviewed  My EKG interpretation:  As above    Decision Rules/Scores utilized: N/A    Tests considered but not ordered and why: N/A    External Documents Reviewed: N/A    Imaging that is independently reviewed and interpreted by me as: N/A    See more data below for the lab and radiology tests and orders. 3)  Treatment and Disposition    Patient repeat assessment:  As above    Disposition discussion with patient/family: Discharged into police custody    Case discussed with consulting clinician:  As above    Social determinants of health impacting treatment or disposition: N/A    Shared Decision Making: N/A    Code Status Discussion: N/A      REASSESSMENT     As above      CRITICAL CARE TIME     Total Critical Care time was 0 minutes, excluding separately reportable procedures. There was a high probability of clinically significant/life threatening deterioration in the patient's condition which required my urgent intervention. PROCEDURES:  Unless otherwise noted below, none     Procedures    FINAL IMPRESSION      1.  Medical clearance for incarceration          DISPOSITION/PLAN     DISPOSITION Decision To Discharge 03/20/2023 08:44:05 PM      PATIENT REFERRED TO:  Follow-up with facility medical staff      As needed    DO [address]  [place] [address]  [phone]      As needed    [facility] ED  [address] [facility] [address] [phone]    As needed, If symptoms worsen      DISCHARGE

## 2023-03-27 RX ORDER — NITROGLYCERIN 0.4 MG/1
TABLET SUBLINGUAL
Qty: 25 TABLET | Refills: 3 | Status: SHIPPED | OUTPATIENT
Start: 2023-03-27

## 2023-03-27 NOTE — TELEPHONE ENCOUNTER
Last OV: 1/6/2023   11/15/22 chronic   Last RX:    Next scheduled apt: Visit date not found            Pt requesting a refill

## 2023-03-30 ENCOUNTER — TELEPHONE (OUTPATIENT)
Dept: UROLOGY | Age: 68
End: 2023-03-30

## 2023-03-30 DIAGNOSIS — R39.198 DIFFICULTY URINATING: Primary | ICD-10-CM

## 2023-03-30 NOTE — TELEPHONE ENCOUNTER
Patient wants to make an appointment. He can't urinate sometimes and when he does it burns. He can't come to [place] due to [facility] bringing him. Is May okay in [facility]?

## 2023-03-30 NOTE — TELEPHONE ENCOUNTER
Have him drop off a urinalysis and culture to the lab. Transportation is an issue on such short notice and distance. [facility] is his only option. He does have an appointment 4/13/2023.   Keep that appointment    If patient is unable to urinate whatsoever and develops abdominal pain he needs go the emergency room

## 2023-04-16 PROBLEM — I10 HYPERTENSIVE DISORDER: Status: RESOLVED | Noted: 2023-02-15 | Resolved: 2023-04-16

## 2023-04-16 PROBLEM — E78.5 HYPERLIPIDEMIA: Status: RESOLVED | Noted: 2017-08-23 | Resolved: 2023-04-16

## 2023-04-16 PROBLEM — E66.01 MORBID OBESITY (HCC): Status: RESOLVED | Noted: 2023-02-15 | Resolved: 2023-04-16

## 2023-04-16 PROBLEM — M19.90 OSTEOARTHROSIS: Status: RESOLVED | Noted: 2023-02-15 | Resolved: 2023-04-16

## 2023-04-16 PROBLEM — K52.9 COLITIS: Status: RESOLVED | Noted: 2023-02-15 | Resolved: 2023-04-16

## 2023-04-21 ENCOUNTER — TELEPHONE (OUTPATIENT)
Dept: UROLOGY | Age: 68
End: 2023-04-21

## 2023-05-01 NOTE — ED PROVIDER NOTES
May 23, 2023    To:   [Insurance Company]  [Address]    RE: Ismael Pepper  [address] [place] [address]  : 2004  MRN: [id number]  Policy #:   Case/Reference:     To Whom It May Concern,      I am writing on behalf of my patient, Ismael Pepper to document the medical necessity of GLP - 1 agonist Wegovy (semaglutide) for the treatment of chronic obesity. This letter provides information about the patient's medical history and diagnosis and a statement summarizing my treatment rationale.      Summary of Patient History and Diagnosis  [facility]el [facility] of obesity. Ismael has regular visits with the team including medical monitoring, dietary guidelines (with dietitian)and physical activity. She incorporates physical activity into her health plan. Despite following reduced calorie diet and significant physical activity, Ismael has BMI class I obesity.     Treatment Rationale  As of 2022, both liraglutide and semaglutide have been FDA-approved for the treatment of obesity in adolescents >/ 12 years of age. However, semaglutide has been shown to be more effective than liraglutide for treatment of obesity. In a placebo control trial, semaglutide resulted in a mean placebo-subtracted BMI change of -16.7 percentage points at 68 weeks as compared to liraglutide which achieved only a mean placebo-subtracted BMI change of -4.6 percentage points at 56 weeks (Pedro NEWELL, et al. Once-Weekly Semaglutide in Adolescents with Obesity. N Engl J Med 2022; 387:6069-9613). Given the severe nature of Ismael's obesity, they should be treated with the most effective medication available. Ismael meets the criteria for treatment based on the recent FDA-approval of semaglutide for treatment of adolescents with severe obesity. Usha does not have any history of pancreatitis or any known family history of medullary thyroid carcinoma, MEN syndrome, or  [facility] ED  eMERGENCY dEPARTMENT eNCOUnter      Pt Name: Akbar Garcia  MRN: [id number]  [place] 1955  Date of evaluation: 11/29/2017  Provider: Jami Packer MD    CHIEF COMPLAINT       Chief Complaint   Patient presents with    Nausea     and vomiting  since last night    Headache     x 4 days    Illness     feels it is withdrawal from Rx med and has been waiting on the refill Mirapine/Remeron out x 6 d     Patient is a 26-year-old male presents to the emergency department complaining of nausea with headache for 4 days. Patient states that he hasn't been taking his Remeron for the past 5 days as he ran out of his prescription and didn't call in time to get it refilled. He states this happened last time he had this happen and he has the same symptoms of nausea headache and occasional vomiting. He denies any chest pain or abdominal pain. He denies any other associated symptoms. He states otherwise he feels fine. He states he came here today to get a short-term prescription so that he could make it until his prescription from the [address] [facility] [address] showed up. Nursing Notes were reviewed. REVIEW OF SYSTEMS    (2-9 systems for level 4, 10 or more for level 5)     Review of Systems   Constitutional: Negative for chills and fever. HENT: Negative for ear pain, sore throat and trouble swallowing. Eyes: Negative for pain and redness. Respiratory: Negative for cough and shortness of breath. Cardiovascular: Negative for chest pain and palpitations. Gastrointestinal: Positive for nausea and vomiting. Negative for abdominal pain and diarrhea. Genitourinary: Negative for dysuria and frequency. Musculoskeletal: Negative for back pain and neck pain. Skin: Negative for color change and rash. Neurological: Positive for headaches. Negative for dizziness and syncope. Hematological: Positive for adenopathy. Psychiatric/Behavioral: Negative for hallucinations and suicidal ideas.        Except as noted above the remainder of the review of systems was reviewed and negative. PAST MEDICAL HISTORY     Past Medical History:   Diagnosis Date    Arthritis     WENDY KNEE    Bronchitis with chronic airway obstruction (HCC)     CAD (coronary artery disease)     CHF (congestive heart failure) (HCC)     CTS (carpal tunnel syndrome)     RIGHT    Diabetes mellitus ([address]     Diabetic neuropathy associated with diabetes mellitus due to underlying condition ([address]     Exposure to toxic chemical     [employer], [email or web address] base-toxic water exposure    GERD (gastroesophageal reflux disease)     H/O cardiovascular stress test 07/08/2016    Abnormal.  Moderate perfusion defect of mild to moderate intensity in the inferolateral,inferior and inferoapical regions during stress imaging. Ef 45%. with regional wall motion abnormalities.  H/O echocardiogram 2/17/16    EF >60%. LV wall thickness is mildly increased. Inferoseptal wall is abnormal in its motion not unusual status post open heart surgery. Normal aortic valve structure with ild aortic regurgitation.  Hip pain, left     Hx of cardiac cath 07/08/2016    LMCA: Normal 0% stenosis. LAD: Chronic occlusion 100%. Lesion on Mid LAD: 100% stenosis. LCx: single stenosis. Lesion on Mid CX: 80% stenosis. RCA: Lesion on Mid RCA: 70% stenosis.     Hyperlipidemia     Hypertension     MI (myocardial infarction) 2015    Pneumonia     PTSD (post-traumatic stress disorder)     S/P CABG (coronary artery bypass graft) 10/23/15    Sleep apnea     Ventricular fibrillation ([address] 10/18/2015    x 2 DURING HEART ATTACK         SURGICAL HISTORY       Past Surgical History:   Procedure Laterality Date    CARDIAC SURGERY      10/21/2015 3 vessel CABG    CARPAL TUNNEL RELEASE      CARPAL TUNNEL RELEASE Right     [facility]  2012    [facility]    COLONOSCOPY  11/27/2017    CORONARY ARTERY BYPASS GRAFT  10/23/15    Dr. Joseph Sarita pancreatitis.      Although increased physical activity is helpful for improving strength and fitness, it is impossible for Usha to exercise his way to normal BMI.  Despite physical activity, calorie reduction has a much larger impact on weight loss than physical activity. The benefits of exercise for weight control may be best observed when exercise continues as part of the treatment plan beyond the initial weight loss period, which is typically 6 mo in duration.     Duration  As you know, obesity is a chronic disease and requires long-term treatment and management.      Summary  In summary, semaglutide is medically necessary for this patient s medical condition. Please call my office at [phone] if I can provide you with any additional information to approve my request. I look forward to receiving your timely response and approval of this request.      Sincerely,     IRMA Real  The [facility] represents a collaboration between [facility] and [facility].         AMPUTATION Left     TIP OF MIDDLE FINGER    FINGER SURGERY      left middle finger    FRACTURE SURGERY      left wrist    JOINT REPLACEMENT      right knee    PILONIDAL CYST EXCISION      1974    PILONIDAL CYST EXCISION      OR COLONOSCOPY W/BIOPSY SINGLE/MULTIPLE N/A 11/27/2017    COLONOSCOPY WITH BIOPSY/ polypectomy performed by Agnes Oliver MD at [address] [facility] [address] EGD TRANSORAL BIOPSY SINGLE/MULTIPLE N/A 11/27/2017    EGD BIOPSY performed by Agnes Oliver MD at [address]      left wrist         ALLERGIES     Codeine; Pcn [penicillins]; Sulfa antibiotics; and Vicodin [hydrocodone-acetaminophen]    FAMILY HISTORY       Family History   Problem Relation Age of Onset    Adopted: Yes    Family history unknown: Yes          SOCIAL HISTORY       Social History     Social History    Marital status:      Spouse name: N/A    Number of children: N/A    Years of education: N/A     Social History Main Topics    Smoking status: Former Smoker     Quit date: 8/23/2006    Smokeless tobacco: Former User     Quit date: 8/1/2000    Alcohol use No    Drug use:      Frequency: 1.0 time per week     Types: Marijuana      Comment: Pt smokes marijuana therapeutically.  Sexual activity: Not Asked     Other Topics Concern    None     Social History Narrative    None           PHYSICAL EXAM    (up to 7 for level 4, 8 or more for level 5)     ED Triage Vitals [11/29/17 1045]   BP Temp Temp src Pulse Resp SpO2 Height Weight   (!) 170/81 97.7 °F (36.5 °C) -- 58 20 96 % -- 197 lb (89.4 kg)       Physical Exam    Physical    Vital signs and nursing notes were reviewed as well as the social, family, and past medical history. Gen.  appearance: Patient is alert and oriented and in no acute distress    Head: Atraumatic, normocephalic    Neck: Supple, trachea/thyroid normal    EENT: PERRLA, EOMI, conjunctiva normal.    Skin: Warm and dry with no rash    Cardiovascular: Heart RRR, no gallops or rubs, no aortic enlargement or bruits noted. Respiratory: Lungs clear, no wheezing, no rales, normal breath sounds. Gastrointestinal: Abdomen nontender, bowel sounds normal, no rebound/guarding/distention or mass    Musculoskeletal: No tenderness in the extremities, no back or hip pain. Neurological: Patient is alert and oriented ×3, no focal motor or sensory deficits noted, reflexes normal, NIH = 0    DIAGNOSTIC RESULTS         EMERGENCY DEPARTMENT COURSE and DIFFERENTIAL DIAGNOSIS/MDM:   Vitals:    Vitals:    11/29/17 1045   BP: (!) 170/81   Pulse: 58   Resp: 20   Temp: 97.7 °F (36.5 °C)   SpO2: 96%   Weight: 197 lb (89.4 kg)                 REASSESSMENT     ED Course      Here in the ED I discussed with the patient and we will restart his Remeron [facility] PROCEDURES:  Unless otherwise noted below, none     Procedures    FINAL IMPRESSION      1.  Withdrawal from sedative, hypnotic, or anxiolytic drug [facility])          DISPOSITION/PLAN   DISPOSITION Decision to Discharge    PATIENT REFERRED TO:  Diana Villanueva MD  [address]  [address]  [phone]    In 1 week        DISCHARGE MEDICATIONS:  New Prescriptions    MIRTAZAPINE (REMERON) 30 MG TABLET    Take 1 tablet by mouth nightly          (Please note that portions of this note were completed with a voice recognition program.  Efforts were made to edit the dictations but occasionally words are mis-transcribed.)    Ignacia Riojas MD (electronically signed)  Attending Emergency Physician            Ignacia Riojas MD  11/29/17 [id number]

## 2023-05-03 ENCOUNTER — HOSPITAL ENCOUNTER (OUTPATIENT)
Age: 68
Discharge: HOME | End: 2023-05-03
Payer: OTHER GOVERNMENT

## 2023-05-03 ENCOUNTER — HOSPITAL ENCOUNTER (OUTPATIENT)
Dept: NON INVASIVE DIAGNOSTICS | Age: 68
Discharge: HOME | End: 2023-05-03
Payer: OTHER GOVERNMENT

## 2023-05-03 DIAGNOSIS — R60.0 BILATERAL LOWER EXTREMITY EDEMA: ICD-10-CM

## 2023-05-03 LAB
ANION GAP SERPL CALCULATED.3IONS-SCNC: 10 MMOL/L (ref 9–17)
BUN SERPL-MCNC: 28 MG/DL (ref 8–23)
BUN/CREAT BLD: 27 (ref 9–20)
CALCIUM SERPL-MCNC: 9.6 MG/DL (ref 8.6–10.4)
CHLORIDE SERPL-SCNC: 97 MMOL/L (ref 98–107)
CO2 SERPL-SCNC: 30 MMOL/L (ref 20–31)
CREAT SERPL-MCNC: 1.04 MG/DL (ref 0.7–1.2)
GFR SERPL CREATININE-BSD FRML MDRD: >60 ML/MIN/1.73M2
GLUCOSE SERPL-MCNC: 99 MG/DL (ref 70–99)
POTASSIUM SERPL-SCNC: 4.1 MMOL/L (ref 3.7–5.3)
SODIUM SERPL-SCNC: 137 MMOL/L (ref 135–144)

## 2023-05-03 PROCEDURE — 80048 BASIC METABOLIC PNL TOTAL CA: CPT

## 2023-05-03 PROCEDURE — 36415 COLL VENOUS BLD VENIPUNCTURE: CPT

## 2023-05-03 PROCEDURE — 93306 TTE W/DOPPLER COMPLETE: CPT

## 2023-05-25 ENCOUNTER — HOSPITAL ENCOUNTER (OUTPATIENT)
Age: 68
Setting detail: SPECIMEN
Discharge: HOME | End: 2023-05-25
Payer: OTHER GOVERNMENT

## 2023-05-25 ENCOUNTER — OFFICE VISIT (OUTPATIENT)
Dept: UROLOGY | Age: 68
End: 2023-05-25
Payer: OTHER GOVERNMENT

## 2023-05-25 VITALS
HEIGHT: 68 IN | WEIGHT: 196 LBS | DIASTOLIC BLOOD PRESSURE: 78 MMHG | BODY MASS INDEX: 29.7 KG/M2 | SYSTOLIC BLOOD PRESSURE: 138 MMHG

## 2023-05-25 DIAGNOSIS — R39.198 DIFFICULTY URINATING: ICD-10-CM

## 2023-05-25 DIAGNOSIS — R30.0 DYSURIA: ICD-10-CM

## 2023-05-25 DIAGNOSIS — N41.1 CHRONIC PROSTATITIS: Primary | ICD-10-CM

## 2023-05-25 DIAGNOSIS — N40.1 BPH WITH OBSTRUCTION/LOWER URINARY TRACT SYMPTOMS: ICD-10-CM

## 2023-05-25 DIAGNOSIS — N13.8 BPH WITH OBSTRUCTION/LOWER URINARY TRACT SYMPTOMS: ICD-10-CM

## 2023-05-25 LAB
-: ABNORMAL
BILIRUB UR QL STRIP: NEGATIVE
CLARITY UR: CLEAR
COLOR UR: YELLOW
COMMENT UA: ABNORMAL
EPI CELLS #/AREA URNS HPF: ABNORMAL /HPF
GLUCOSE UR STRIP-MCNC: NEGATIVE MG/DL
HGB UR QL STRIP.AUTO: NEGATIVE
KETONES UR STRIP-MCNC: NEGATIVE MG/DL
LEUKOCYTE ESTERASE UR QL STRIP: NEGATIVE
NITRITE UR QL STRIP: NEGATIVE
PH UR STRIP: 6 [PH] (ref 5–8)
PROT UR STRIP-MCNC: ABNORMAL MG/DL
RBC #/AREA URNS HPF: ABNORMAL /HPF (ref 0–2)
SP GR UR STRIP: 1.01 (ref 1–1.03)
UROBILINOGEN UR STRIP-ACNC: NORMAL
WBC #/AREA URNS HPF: ABNORMAL /HPF

## 2023-05-25 PROCEDURE — 87086 URINE CULTURE/COLONY COUNT: CPT

## 2023-05-25 PROCEDURE — 3017F COLORECTAL CA SCREEN DOC REV: CPT | Performed by: PHYSICIAN ASSISTANT

## 2023-05-25 PROCEDURE — 99214 OFFICE O/P EST MOD 30 MIN: CPT | Performed by: PHYSICIAN ASSISTANT

## 2023-05-25 PROCEDURE — 1123F ACP DISCUSS/DSCN MKR DOCD: CPT | Performed by: PHYSICIAN ASSISTANT

## 2023-05-25 PROCEDURE — 1036F TOBACCO NON-USER: CPT | Performed by: PHYSICIAN ASSISTANT

## 2023-05-25 PROCEDURE — 3078F DIAST BP <80 MM HG: CPT | Performed by: PHYSICIAN ASSISTANT

## 2023-05-25 PROCEDURE — G8427 DOCREV CUR MEDS BY ELIG CLIN: HCPCS | Performed by: PHYSICIAN ASSISTANT

## 2023-05-25 PROCEDURE — G8417 CALC BMI ABV UP PARAM F/U: HCPCS | Performed by: PHYSICIAN ASSISTANT

## 2023-05-25 PROCEDURE — 51798 US URINE CAPACITY MEASURE: CPT | Performed by: PHYSICIAN ASSISTANT

## 2023-05-25 PROCEDURE — 3075F SYST BP GE 130 - 139MM HG: CPT | Performed by: PHYSICIAN ASSISTANT

## 2023-05-25 PROCEDURE — 81001 URINALYSIS AUTO W/SCOPE: CPT

## 2023-05-25 RX ORDER — TERAZOSIN 10 MG/1
10 CAPSULE ORAL NIGHTLY
Qty: 30 CAPSULE | Refills: 1 | Status: SHIPPED | OUTPATIENT
Start: 2023-05-25

## 2023-05-25 RX ORDER — LEVOFLOXACIN 500 MG/1
500 TABLET, FILM COATED ORAL DAILY
Qty: 10 TABLET | Refills: 0 | Status: SHIPPED | OUTPATIENT
Start: 2023-05-25 | End: 2023-06-04

## 2023-05-25 ASSESSMENT — ENCOUNTER SYMPTOMS
COUGH: 0
ABDOMINAL PAIN: 0
VOMITING: 0
WHEEZING: 0
CONSTIPATION: 0
NAUSEA: 0
BACK PAIN: 0
COLOR CHANGE: 0
EYE REDNESS: 0
SHORTNESS OF BREATH: 0

## 2023-05-25 NOTE — PROGRESS NOTES
HPI:      Patient is a 76 y.o. male in no acute distress. He is alert and oriented to person, place, and time. History:  He is status post C3-C7 laminectomy on 10/12/2020. Patient was on terazosin 2mg for BPH in the past but was not given it at time of surgery. Patient states that he has had reaction to Flomax in the past but is able to tolerate Terazosin. Patient did have acute urinary retention with a bladder scan of over 800 mL. Therefore Russell catheter was placed. Patient also was noted to have significant constipation. He was discharged from the hospital on 10/19/2020 on Cardura 1 mg. He does have itching and irritation on the tip of his penis with some white discharge around it as well. He has never been seen by urology before. He has never had issues with urinary retention in the past.     12/2022 - Gross hematuria - CT: his does not show any significant  abnormalities. Cysto: bilobar, no bladder lesion    increase his terazosin from 2 mg to 5 mg    5/2023 -increase terazosin to 10 mg     Today:  Patient is here today with complaints of difficulty urinating and straining on urination. Patient states that he currently has dysuria. This has been ongoing. Patient did have lab work earlier in the month which did show normal kidney function. Patient is on Hytrin 5 mg nightly. He states that he continues to have the same symptoms he had a year ago. He does have daily bowel movements. He drinks water but also drinks caffeinated beverages. Random bladderscan performed in office today: Pt last voided 30 mins ago, scan = 227 mL. We did prompt him to void. Patient was able to void 300ml    Patient states that he may be moving to [place] in the upcoming weeks to months. He states that he is going to try to establish care with urology down there. In the meantime he would like to be treated by us.     Past Medical History:   Diagnosis Date    Arthritis     WENDY KNEE    Bronchitis with chronic

## 2023-05-25 NOTE — PATIENT INSTRUCTIONS
SURVEY:    You may be receiving a survey from [employer] regarding your visit today. Please complete the survey to enable us to provide the highest quality of care to you and your family. If you cannot score us a very good on any question, please call the office to discuss how we could have made your experience a very good one. Thank you.

## 2023-05-26 ENCOUNTER — TELEPHONE (OUTPATIENT)
Dept: GASTROENTEROLOGY | Age: 68
End: 2023-05-26

## 2023-05-26 DIAGNOSIS — R63.4 WEIGHT LOSS: Primary | ICD-10-CM

## 2023-05-26 DIAGNOSIS — K62.5 RECTAL BLEEDING: ICD-10-CM

## 2023-05-26 LAB
MICROORGANISM SPEC CULT: NORMAL
SPECIMEN DESCRIPTION: NORMAL

## 2023-05-26 RX ORDER — POLYETHYLENE GLYCOL 3350, SODIUM CHLORIDE, SODIUM BICARBONATE, POTASSIUM CHLORIDE 420; 11.2; 5.72; 1.48 G/4L; G/4L; G/4L; G/4L
4000 POWDER, FOR SOLUTION ORAL ONCE
Qty: 4000 ML | Refills: 0 | Status: SHIPPED | OUTPATIENT
Start: 2023-05-26 | End: 2023-05-26

## 2023-05-26 RX ORDER — POLYETHYLENE GLYCOL 3350, SODIUM SULFATE ANHYDROUS, SODIUM BICARBONATE, SODIUM CHLORIDE, POTASSIUM CHLORIDE 236; 22.74; 6.74; 5.86; 2.97 G/4L; G/4L; G/4L; G/4L; G/4L
4 POWDER, FOR SOLUTION ORAL ONCE
Qty: 4000 ML | Refills: 0 | Status: CANCELLED | OUTPATIENT
Start: 2023-05-26 | End: 2023-05-26

## 2023-05-26 NOTE — TELEPHONE ENCOUNTER
Patient did not get his bowel prep after his appt, he is requesting that it be resent. RA in [facility].

## 2023-05-30 ENCOUNTER — TELEPHONE (OUTPATIENT)
Dept: GASTROENTEROLOGY | Age: 68
End: 2023-05-30

## 2023-05-30 ENCOUNTER — TELEPHONE (OUTPATIENT)
Dept: UROLOGY | Age: 68
End: 2023-05-30

## 2023-05-30 VITALS
WEIGHT: 173.5 LBS | OXYGEN SATURATION: 98 % | HEIGHT: 69 IN | DIASTOLIC BLOOD PRESSURE: 87 MMHG | SYSTOLIC BLOOD PRESSURE: 160 MMHG | RESPIRATION RATE: 18 BRPM | BODY MASS INDEX: 25.7 KG/M2 | HEART RATE: 68 BPM

## 2023-05-30 NOTE — TELEPHONE ENCOUNTER
Spoke to patient, he denies having any concerns or questions in regards to colonoscopy or bowel prep.

## 2023-05-30 NOTE — TELEPHONE ENCOUNTER
----- Message from Ruthann Ham PA-C sent at 5/30/2023  8:59 AM EDT -----  Please let him know that urine culture was negative, we do want him to complete his course of antibiotics as we are treating him for prostatitis. There was blood in his urine but he just had a full hematuria work-up within the past 6 months therefore we do not need to repeat.

## 2023-06-21 ENCOUNTER — HOSPITAL ENCOUNTER (EMERGENCY)
Age: 68
Discharge: HOME | End: 2023-06-21
Attending: EMERGENCY MEDICINE
Payer: COMMERCIAL

## 2023-06-21 ENCOUNTER — APPOINTMENT (OUTPATIENT)
Dept: CT IMAGING | Age: 68
End: 2023-06-21
Payer: COMMERCIAL

## 2023-06-21 VITALS
TEMPERATURE: 97.5 F | SYSTOLIC BLOOD PRESSURE: 160 MMHG | WEIGHT: 175 LBS | BODY MASS INDEX: 26.52 KG/M2 | OXYGEN SATURATION: 98 % | RESPIRATION RATE: 18 BRPM | HEART RATE: 68 BPM | DIASTOLIC BLOOD PRESSURE: 69 MMHG | HEIGHT: 68 IN

## 2023-06-21 DIAGNOSIS — G43.909 MIGRAINE WITHOUT STATUS MIGRAINOSUS, NOT INTRACTABLE, UNSPECIFIED MIGRAINE TYPE: Primary | ICD-10-CM

## 2023-06-21 PROCEDURE — 70450 CT HEAD/BRAIN W/O DYE: CPT

## 2023-06-21 PROCEDURE — 6360000002 HC RX W HCPCS: Performed by: EMERGENCY MEDICINE

## 2023-06-21 PROCEDURE — 96372 THER/PROPH/DIAG INJ SC/IM: CPT

## 2023-06-21 PROCEDURE — 99284 EMERGENCY DEPT VISIT MOD MDM: CPT

## 2023-06-21 RX ORDER — METOCLOPRAMIDE HYDROCHLORIDE 5 MG/ML
5 INJECTION INTRAMUSCULAR; INTRAVENOUS ONCE
Status: COMPLETED | OUTPATIENT
Start: 2023-06-21 | End: 2023-06-21

## 2023-06-21 RX ORDER — KETOROLAC TROMETHAMINE 15 MG/ML
15 INJECTION, SOLUTION INTRAMUSCULAR; INTRAVENOUS ONCE
Status: COMPLETED | OUTPATIENT
Start: 2023-06-21 | End: 2023-06-21

## 2023-06-21 RX ADMIN — METOCLOPRAMIDE 5 MG: 5 INJECTION, SOLUTION INTRAMUSCULAR; INTRAVENOUS at 13:24

## 2023-06-21 RX ADMIN — KETOROLAC TROMETHAMINE 15 MG: 15 INJECTION, SOLUTION INTRAMUSCULAR; INTRAVENOUS at 13:24

## 2023-06-21 ASSESSMENT — PAIN DESCRIPTION - ORIENTATION: ORIENTATION: ANTERIOR

## 2023-06-21 ASSESSMENT — PAIN - FUNCTIONAL ASSESSMENT
PAIN_FUNCTIONAL_ASSESSMENT: 0-10
PAIN_FUNCTIONAL_ASSESSMENT: ACTIVITIES ARE NOT PREVENTED

## 2023-06-21 ASSESSMENT — PAIN DESCRIPTION - LOCATION: LOCATION: HEAD

## 2023-06-21 ASSESSMENT — PAIN DESCRIPTION - DESCRIPTORS: DESCRIPTORS: ACHING;THROBBING

## 2023-06-21 ASSESSMENT — PAIN DESCRIPTION - PAIN TYPE: TYPE: ACUTE PAIN

## 2023-06-21 ASSESSMENT — PAIN SCALES - GENERAL: PAINLEVEL_OUTOF10: 9

## 2023-06-21 NOTE — ED PROVIDER NOTES
[address]      Pt Name: Sherryle Flavin  MRN: [id number]  [place] 1955  Date of evaluation: 6/21/2023  Provider: Mario Loredo MD    [address]       Chief Complaint   Patient presents with    Headache     C/O headache for 4 days, has been taking Ibuprofen at home with no relief          HISTORY OF PRESENT ILLNESS      Sherryle Flavin is a 76 y.o. male who presents to the emergency department patient presenting with a headache that is all over his head for the last 4 days continuous associated with nausea, and photosensitivity    The patient mentioned that he does not usually have headaches frequently      No hx of head trauma blurry vision or any other concerns    Patient tried Tylenol ibuprofen at home with no effect      REVIEW OF SYSTEMS       Review of Systems   All other systems reviewed and are negative. PAST MEDICAL HISTORY     Past Medical History:   Diagnosis Date    Arthritis     WENDY KNEE    Bronchitis with chronic airway obstruction (HCC)     CAD (coronary artery disease)     CHF (congestive heart failure) (HCC)     CTS (carpal tunnel syndrome)     RIGHT    Diabetes mellitus ([facility]     Diabetic neuropathy associated with diabetes mellitus due to underlying condition ([facility]     Exposure to toxic chemical     [employer], [email or web address] base-toxic water exposure    GERD (gastroesophageal reflux disease)     H/O cardiovascular stress test 07/08/2016    Abnormal.  Moderate perfusion defect of mild to moderate intensity in the inferolateral,inferior and inferoapical regions during stress imaging. Ef 45%. with regional wall motion abnormalities. H/O echocardiogram 2/17/16    EF >60%. LV wall thickness is mildly increased. Inferoseptal wall is abnormal in its motion not unusual status post open heart surgery. Normal aortic valve structure with ild aortic regurgitation.     Hip pain, left     Hx of cardiac cath 07/08/2016    LMCA: Normal 0%

## 2023-06-21 NOTE — ED NOTES
Ticket to ride completed.  The following information was reported off:  Name  Allergies  Orientation Level  Destination  Safety Issues  Code Status  Oxygen Requirements  Special needs including mobility, language, communication       Grupo Bro RN  06/21/23 [id number] negative

## 2023-08-02 RX ORDER — TERAZOSIN 10 MG/1
10 CAPSULE ORAL NIGHTLY
Qty: 30 CAPSULE | Refills: 0 | Status: SHIPPED | OUTPATIENT
Start: 2023-08-02

## 2023-08-02 NOTE — TELEPHONE ENCOUNTER
Patient could only come to [place]. I offered him 9/14 as I don't have anything before then. He said he is moving by then so he will turn his care over to the [facility]. He will go to [place] to sign a records release.

## 2023-10-06 ENCOUNTER — TELEPHONE (OUTPATIENT)
Dept: FAMILY MEDICINE CLINIC | Age: 68
End: 2023-10-06

## 2023-10-09 RX ORDER — BUMETANIDE 2 MG/1
2 TABLET ORAL
COMMUNITY
Start: 2023-05-09

## 2023-10-09 RX ORDER — SULINDAC 150 MG/1
150 TABLET ORAL
COMMUNITY
Start: 2023-06-22 | End: 2023-10-13 | Stop reason: ALTCHOICE

## 2023-10-09 NOTE — TELEPHONE ENCOUNTER
Care Transitions Initial Follow Up Call    Outreach made within 2 business days of discharge: Yes    Patient: Robert Velarde Patient : 1955   MRN: [id number]  Reason for Admission: sob, venous insuff. Discharge Date: 10/7/23       Spoke with: Mouna Jones    Discharge department/facility: [facility] Interactive Patient Contact:  Was patient able to fill all prescriptions: Yes  Was patient instructed to bring all medications to the follow-up visit: Yes  Is patient taking all medications as directed in the discharge summary?  Yes  Does patient understand their discharge instructions: Yes  Does patient have questions or concerns that need addressed prior to 7-14 day follow up office visit: no    Scheduled appointment with PCP within 7-14 days    Follow Up  Future Appointments   Date Time Provider [address] [place] [address]   10/13/2023  9:40 AM [address]

## 2023-10-13 ENCOUNTER — OFFICE VISIT (OUTPATIENT)
Dept: FAMILY MEDICINE CLINIC | Age: 68
End: 2023-10-13
Payer: COMMERCIAL

## 2023-10-13 VITALS
HEART RATE: 62 BPM | DIASTOLIC BLOOD PRESSURE: 70 MMHG | BODY MASS INDEX: 34.86 KG/M2 | WEIGHT: 230 LBS | OXYGEN SATURATION: 97 % | SYSTOLIC BLOOD PRESSURE: 136 MMHG | HEIGHT: 68 IN

## 2023-10-13 DIAGNOSIS — R10.9 ABDOMINAL CRAMPING: ICD-10-CM

## 2023-10-13 DIAGNOSIS — I50.32 CHRONIC DIASTOLIC HEART FAILURE (HCC): Primary | ICD-10-CM

## 2023-10-13 DIAGNOSIS — M25.551 CHRONIC RIGHT HIP PAIN: ICD-10-CM

## 2023-10-13 DIAGNOSIS — Z09 HOSPITAL DISCHARGE FOLLOW-UP: ICD-10-CM

## 2023-10-13 DIAGNOSIS — G89.29 CHRONIC RIGHT HIP PAIN: ICD-10-CM

## 2023-10-13 DIAGNOSIS — I87.2 VENOUS STASIS DERMATITIS OF BOTH LOWER EXTREMITIES: ICD-10-CM

## 2023-10-13 PROCEDURE — 3017F COLORECTAL CA SCREEN DOC REV: CPT | Performed by: FAMILY MEDICINE

## 2023-10-13 PROCEDURE — 3074F SYST BP LT 130 MM HG: CPT | Performed by: FAMILY MEDICINE

## 2023-10-13 PROCEDURE — 99214 OFFICE O/P EST MOD 30 MIN: CPT | Performed by: FAMILY MEDICINE

## 2023-10-13 PROCEDURE — 1123F ACP DISCUSS/DSCN MKR DOCD: CPT | Performed by: FAMILY MEDICINE

## 2023-10-13 PROCEDURE — G8484 FLU IMMUNIZE NO ADMIN: HCPCS | Performed by: FAMILY MEDICINE

## 2023-10-13 PROCEDURE — 1036F TOBACCO NON-USER: CPT | Performed by: FAMILY MEDICINE

## 2023-10-13 PROCEDURE — G8417 CALC BMI ABV UP PARAM F/U: HCPCS | Performed by: FAMILY MEDICINE

## 2023-10-13 PROCEDURE — 3078F DIAST BP <80 MM HG: CPT | Performed by: FAMILY MEDICINE

## 2023-10-13 PROCEDURE — G8427 DOCREV CUR MEDS BY ELIG CLIN: HCPCS | Performed by: FAMILY MEDICINE

## 2023-10-13 RX ORDER — BUDESONIDE AND FORMOTEROL FUMARATE DIHYDRATE 160; 4.5 UG/1; UG/1
2 AEROSOL RESPIRATORY (INHALATION) 2 TIMES DAILY
COMMUNITY
Start: 2023-10-09

## 2023-10-13 RX ORDER — TRIAMCINOLONE ACETONIDE 1 MG/G
CREAM TOPICAL
Qty: 80 G | Refills: 0 | Status: SHIPPED | OUTPATIENT
Start: 2023-10-13

## 2023-10-13 RX ORDER — DICYCLOMINE HYDROCHLORIDE 10 MG/1
10 CAPSULE ORAL 4 TIMES DAILY PRN
Qty: 60 CAPSULE | Refills: 2 | Status: SHIPPED | OUTPATIENT
Start: 2023-10-13

## 2023-10-13 RX ORDER — METOLAZONE 2.5 MG/1
2.5 TABLET ORAL
COMMUNITY
Start: 2023-10-10

## 2023-11-15 ENCOUNTER — TELEPHONE (OUTPATIENT)
Dept: FAMILY MEDICINE CLINIC | Age: 68
End: 2023-11-15

## 2023-11-15 NOTE — TELEPHONE ENCOUNTER
Pt's is having bilateral leg swelling.    Pt needs approval for aqua therapy, scheduled an appointment

## 2023-11-27 RX ORDER — DICYCLOMINE HYDROCHLORIDE 10 MG/1
CAPSULE ORAL
Qty: 60 CAPSULE | Refills: 2 | Status: SHIPPED | OUTPATIENT
Start: 2023-11-27

## 2023-11-27 NOTE — TELEPHONE ENCOUNTER
Last OV 10/13/23 for hospital follow up  Requesting refill on bentyl thru [facility]  Next OV 11/30/23

## 2023-11-29 LAB
CREATININE, URINE: 103
MICROALBUMIN/CREAT 24H UR: 4.8 MG/G{CREAT}
MICROALBUMIN/CREAT UR-RTO: 47

## 2023-12-05 ENCOUNTER — OFFICE VISIT (OUTPATIENT)
Dept: FAMILY MEDICINE CLINIC | Age: 68
End: 2023-12-05
Payer: COMMERCIAL

## 2023-12-05 ENCOUNTER — HOSPITAL ENCOUNTER (OUTPATIENT)
Age: 68
Discharge: HOME | End: 2023-12-05
Payer: COMMERCIAL

## 2023-12-05 VITALS
WEIGHT: 256 LBS | DIASTOLIC BLOOD PRESSURE: 80 MMHG | SYSTOLIC BLOOD PRESSURE: 156 MMHG | BODY MASS INDEX: 38.8 KG/M2 | HEART RATE: 62 BPM | HEIGHT: 68 IN | OXYGEN SATURATION: 96 %

## 2023-12-05 DIAGNOSIS — I50.32 CHRONIC DIASTOLIC HEART FAILURE (HCC): ICD-10-CM

## 2023-12-05 DIAGNOSIS — J43.1 PANLOBULAR EMPHYSEMA (HCC): ICD-10-CM

## 2023-12-05 DIAGNOSIS — R06.02 SOB (SHORTNESS OF BREATH): ICD-10-CM

## 2023-12-05 DIAGNOSIS — I50.32 CHRONIC DIASTOLIC HEART FAILURE (HCC): Primary | ICD-10-CM

## 2023-12-05 DIAGNOSIS — M79.89 SWELLING OF BOTH LOWER EXTREMITIES: ICD-10-CM

## 2023-12-05 DIAGNOSIS — E11.9 DIET-CONTROLLED DIABETES MELLITUS (HCC): ICD-10-CM

## 2023-12-05 LAB
ANION GAP SERPL CALCULATED.3IONS-SCNC: 10 MMOL/L (ref 9–17)
BASOPHILS # BLD: 0.01 K/UL (ref 0–0.2)
BASOPHILS NFR BLD: 0 % (ref 0–2)
BNP SERPL-MCNC: 296 PG/ML
BUN SERPL-MCNC: 17 MG/DL (ref 8–23)
BUN/CREAT SERPL: 21 (ref 9–20)
CALCIUM SERPL-MCNC: 9.1 MG/DL (ref 8.6–10.4)
CHLORIDE SERPL-SCNC: 102 MMOL/L (ref 98–107)
CO2 SERPL-SCNC: 27 MMOL/L (ref 20–31)
CREAT SERPL-MCNC: 0.8 MG/DL (ref 0.7–1.2)
EOSINOPHIL # BLD: 0.22 K/UL (ref 0–0.4)
EOSINOPHILS RELATIVE PERCENT: 4 % (ref 0–5)
ERYTHROCYTE [DISTWIDTH] IN BLOOD BY AUTOMATED COUNT: 15.1 % (ref 12.1–15.2)
GFR SERPL CREATININE-BSD FRML MDRD: >60 ML/MIN/1.73M2
GLUCOSE SERPL-MCNC: 96 MG/DL (ref 70–99)
HCT VFR BLD AUTO: 42.9 % (ref 41–53)
HGB BLD-MCNC: 14.3 G/DL (ref 13.5–17.5)
IMM GRANULOCYTES # BLD AUTO: 0.01 K/UL (ref 0–0.3)
IMM GRANULOCYTES NFR BLD: 0 % (ref 0–5)
LYMPHOCYTES NFR BLD: 1.12 K/UL (ref 1–4.8)
LYMPHOCYTES RELATIVE PERCENT: 21 % (ref 13–44)
MCH RBC QN AUTO: 30.1 PG (ref 26–34)
MCHC RBC AUTO-ENTMCNC: 33.3 G/DL (ref 31–37)
MCV RBC AUTO: 90.3 FL (ref 80–100)
MONOCYTES NFR BLD: 0.38 K/UL (ref 0–1)
MONOCYTES NFR BLD: 7 % (ref 5–9)
NEUTROPHILS NFR BLD: 67 % (ref 39–75)
NEUTS SEG NFR BLD: 3.55 K/UL (ref 2.1–6.5)
PLATELET # BLD AUTO: 159 K/UL (ref 140–450)
PMV BLD AUTO: 9.6 FL (ref 6–12)
POTASSIUM SERPL-SCNC: 4.5 MMOL/L (ref 3.7–5.3)
RBC # BLD AUTO: 4.75 M/UL (ref 4.5–5.9)
SODIUM SERPL-SCNC: 139 MMOL/L (ref 135–144)
TSH SERPL DL<=0.05 MIU/L-ACNC: 1.57 UIU/ML (ref 0.3–5)
WBC OTHER # BLD: 5.3 K/UL (ref 3.5–11)

## 2023-12-05 PROCEDURE — 80048 BASIC METABOLIC PNL TOTAL CA: CPT

## 2023-12-05 PROCEDURE — 1036F TOBACCO NON-USER: CPT | Performed by: FAMILY MEDICINE

## 2023-12-05 PROCEDURE — G8484 FLU IMMUNIZE NO ADMIN: HCPCS | Performed by: FAMILY MEDICINE

## 2023-12-05 PROCEDURE — 84443 ASSAY THYROID STIM HORMONE: CPT

## 2023-12-05 PROCEDURE — G8417 CALC BMI ABV UP PARAM F/U: HCPCS | Performed by: FAMILY MEDICINE

## 2023-12-05 PROCEDURE — 83880 ASSAY OF NATRIURETIC PEPTIDE: CPT

## 2023-12-05 PROCEDURE — 99214 OFFICE O/P EST MOD 30 MIN: CPT | Performed by: FAMILY MEDICINE

## 2023-12-05 PROCEDURE — 3017F COLORECTAL CA SCREEN DOC REV: CPT | Performed by: FAMILY MEDICINE

## 2023-12-05 PROCEDURE — 3074F SYST BP LT 130 MM HG: CPT | Performed by: FAMILY MEDICINE

## 2023-12-05 PROCEDURE — 3046F HEMOGLOBIN A1C LEVEL >9.0%: CPT | Performed by: FAMILY MEDICINE

## 2023-12-05 PROCEDURE — 3023F SPIROM DOC REV: CPT | Performed by: FAMILY MEDICINE

## 2023-12-05 PROCEDURE — 3078F DIAST BP <80 MM HG: CPT | Performed by: FAMILY MEDICINE

## 2023-12-05 PROCEDURE — G8427 DOCREV CUR MEDS BY ELIG CLIN: HCPCS | Performed by: FAMILY MEDICINE

## 2023-12-05 PROCEDURE — 36415 COLL VENOUS BLD VENIPUNCTURE: CPT

## 2023-12-05 PROCEDURE — 2022F DILAT RTA XM EVC RTNOPTHY: CPT | Performed by: FAMILY MEDICINE

## 2023-12-05 PROCEDURE — 1123F ACP DISCUSS/DSCN MKR DOCD: CPT | Performed by: FAMILY MEDICINE

## 2023-12-05 PROCEDURE — 85025 COMPLETE CBC W/AUTO DIFF WBC: CPT

## 2023-12-05 RX ORDER — LOSARTAN POTASSIUM 50 MG/1
50 TABLET ORAL DAILY
Qty: 30 TABLET | Refills: 5 | Status: SHIPPED | OUTPATIENT
Start: 2023-12-05

## 2023-12-05 RX ORDER — POTASSIUM CHLORIDE 20 MEQ/1
20 TABLET, EXTENDED RELEASE ORAL DAILY
Qty: 30 TABLET | Refills: 0
Start: 2023-12-05

## 2023-12-05 RX ORDER — SPIRONOLACTONE 50 MG/1
50 TABLET, FILM COATED ORAL DAILY
Qty: 30 TABLET | Refills: 5 | Status: SHIPPED | OUTPATIENT
Start: 2023-12-05

## 2023-12-05 NOTE — PROGRESS NOTES
Name: Gail Bravo  : 1955         Chief Complaint:     Chief Complaint   Patient presents with    Leg Swelling    Congestive Heart Failure       History of Present Illness:      Gail Bravo is a 76 y.o.  male who presents with Leg Swelling and Congestive Heart Failure      HPI    Pt c/o ongoing ann lower ext edema, seeping areas. Having some SOB recently also. 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 Not elevating or wrapping legs. Comp stockings not fitting and doesn't have ace wrap. 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 Ongoing hip pain and would like to do aquatherapy but needs clearance d/t seeping skin of lower ext first.    Medical History:     Patient Active Problem List   Diagnosis    ASHD (arteriosclerotic heart disease)    History of coronary artery bypass surgery    Obstructive apnea    Hypertension    Chronic obstructive pulmonary disease (HCC)    Vitamin D deficiency disease    Post traumatic stress disorder    CHF (congestive heart failure), NYHA class II, chronic, diastolic (HCC)    S/P cervical spinal fusion    Spinal stenosis in cervical region    Hypercholesterolemia       Medications:       Prior to Admission medications    Medication Sig Start Date End Date Taking?  Authorizing Provider   spironolactone (ALDACTONE) 50 MG tablet Take 1 tablet by mouth daily 23  Yes Natalya BISHOP DO   losartan (COZAAR) 50 MG tablet Take 1 tablet by mouth daily 23  Yes Natalya BISHOP DO   potassium chloride (KLOR-CON M) 20 MEQ extended release tablet Take 1 tablet by mouth daily 23  Yes Natalya BISHOP DO   Compression Bandages MISC 4\" ace wrap, 2 rolls please, use to wrap ann legs daily while awake 23  Yes Natalya BISHOP DO   dicyclomine (BENTYL) 10 MG capsule take 1 capsule by mouth four times a day if

## 2023-12-05 NOTE — PATIENT INSTRUCTIONS
[employer] SURVEY:    You may be receiving a survey from [employer] regarding your visit today. You may get this in the mail, through your MyChart or in your email. Please complete the survey to enable us to provide the highest quality of care to you and your family. If you cannot score us as very good ( 5 Stars) on any question, please feel free to call the office to discuss how we could have made your experience exceptional.     Thank you.     Clinical Care Team:   Dr. Jonah Montoya, [address] [facility], [address]                                     Triage: Phyllis Zelaya, [address] [facility] Team:    [address] [facility]

## 2023-12-09 PROBLEM — E66.9 DIABETES MELLITUS TYPE 2 IN OBESE (HCC): Status: RESOLVED | Noted: 2021-09-27 | Resolved: 2023-12-09

## 2023-12-09 PROBLEM — I50.9 CONGESTIVE HEART FAILURE (HCC): Status: RESOLVED | Noted: 2023-02-15 | Resolved: 2023-12-09

## 2023-12-09 PROBLEM — E11.69 DIABETES MELLITUS TYPE 2 IN OBESE (HCC): Status: RESOLVED | Noted: 2021-09-27 | Resolved: 2023-12-09

## 2023-12-09 PROBLEM — I49.9 ARRHYTHMIA: Status: RESOLVED | Noted: 2021-09-27 | Resolved: 2023-12-09

## 2023-12-09 PROBLEM — F17.200 SMOKER: Status: RESOLVED | Noted: 2023-02-15 | Resolved: 2023-12-09

## 2023-12-09 PROBLEM — L98.429: Status: RESOLVED | Noted: 2023-02-03 | Resolved: 2023-12-09

## 2023-12-09 PROBLEM — G95.9 CERVICAL MYELOPATHY (HCC): Status: RESOLVED | Noted: 2021-06-21 | Resolved: 2023-12-09

## 2023-12-09 PROBLEM — I21.9 MYOCARDIAL INFARCTION (HCC): Status: RESOLVED | Noted: 2023-02-15 | Resolved: 2023-12-09

## 2023-12-09 PROBLEM — J30.2 SEASONAL ALLERGIC RHINITIS: Status: RESOLVED | Noted: 2023-02-15 | Resolved: 2023-12-09

## 2023-12-09 PROBLEM — R31.9 HEMATURIA: Status: RESOLVED | Noted: 2023-02-03 | Resolved: 2023-12-09

## 2024-01-04 ENCOUNTER — HOSPITAL ENCOUNTER (EMERGENCY)
Age: 69
Discharge: HOME | End: 2024-01-04
Attending: EMERGENCY MEDICINE
Payer: OTHER GOVERNMENT

## 2024-01-04 ENCOUNTER — APPOINTMENT (OUTPATIENT)
Dept: GENERAL RADIOLOGY | Age: 69
End: 2024-01-04
Payer: OTHER GOVERNMENT

## 2024-01-04 VITALS
SYSTOLIC BLOOD PRESSURE: 172 MMHG | HEART RATE: 73 BPM | RESPIRATION RATE: 11 BRPM | DIASTOLIC BLOOD PRESSURE: 67 MMHG | BODY MASS INDEX: 36.43 KG/M2 | OXYGEN SATURATION: 98 % | HEIGHT: 69 IN | TEMPERATURE: 98.6 F | WEIGHT: 246 LBS

## 2024-01-04 DIAGNOSIS — R06.00 DYSPNEA, UNSPECIFIED TYPE: Primary | ICD-10-CM

## 2024-01-04 DIAGNOSIS — R60.0 EDEMA OF BOTH LOWER LEGS: ICD-10-CM

## 2024-01-04 DIAGNOSIS — Z86.79 HISTORY OF CORONARY ARTERY DISEASE: ICD-10-CM

## 2024-01-04 LAB
ANION GAP SERPL CALCULATED.3IONS-SCNC: 8 MMOL/L (ref 9–17)
BASOPHILS # BLD: 0.01 K/UL (ref 0–0.2)
BASOPHILS NFR BLD: 0 % (ref 0–2)
BILIRUB UR QL STRIP: NEGATIVE
BNP SERPL-MCNC: 153 PG/ML
BUN SERPL-MCNC: 16 MG/DL (ref 8–23)
BUN/CREAT SERPL: 18 (ref 9–20)
CALCIUM SERPL-MCNC: 8.6 MG/DL (ref 8.6–10.4)
CHLORIDE SERPL-SCNC: 101 MMOL/L (ref 98–107)
CLARITY UR: CLEAR
CO2 SERPL-SCNC: 28 MMOL/L (ref 20–31)
COLOR UR: YELLOW
COMMENT: NORMAL
CREAT SERPL-MCNC: 0.9 MG/DL (ref 0.7–1.2)
EOSINOPHIL # BLD: 0.25 K/UL (ref 0–0.4)
EOSINOPHILS RELATIVE PERCENT: 6 % (ref 0–5)
ERYTHROCYTE [DISTWIDTH] IN BLOOD BY AUTOMATED COUNT: 15.4 % (ref 12.1–15.2)
GFR SERPL CREATININE-BSD FRML MDRD: >60 ML/MIN/1.73M2
GLUCOSE SERPL-MCNC: 99 MG/DL (ref 70–99)
GLUCOSE UR STRIP-MCNC: NEGATIVE MG/DL
HCT VFR BLD AUTO: 37.1 % (ref 41–53)
HGB BLD-MCNC: 12.3 G/DL (ref 13.5–17.5)
HGB UR QL STRIP.AUTO: NEGATIVE
IMM GRANULOCYTES # BLD AUTO: 0.01 K/UL (ref 0–0.3)
IMM GRANULOCYTES NFR BLD: 0 % (ref 0–5)
KETONES UR STRIP-MCNC: NEGATIVE MG/DL
LEUKOCYTE ESTERASE UR QL STRIP: NEGATIVE
LYMPHOCYTES NFR BLD: 0.89 K/UL (ref 1–4.8)
LYMPHOCYTES RELATIVE PERCENT: 23 % (ref 13–44)
MCH RBC QN AUTO: 30.3 PG (ref 26–34)
MCHC RBC AUTO-ENTMCNC: 33.2 G/DL (ref 31–37)
MCV RBC AUTO: 91.4 FL (ref 80–100)
MONOCYTES NFR BLD: 0.37 K/UL (ref 0–1)
MONOCYTES NFR BLD: 9 % (ref 5–9)
NEUTROPHILS NFR BLD: 62 % (ref 39–75)
NEUTS SEG NFR BLD: 2.41 K/UL (ref 2.1–6.5)
NITRITE UR QL STRIP: NEGATIVE
PH UR STRIP: 6.5 [PH] (ref 5–8)
PLATELET # BLD AUTO: 159 K/UL (ref 140–450)
PMV BLD AUTO: 9.2 FL (ref 6–12)
POTASSIUM SERPL-SCNC: 4.2 MMOL/L (ref 3.7–5.3)
PROT UR STRIP-MCNC: NEGATIVE MG/DL
RBC # BLD AUTO: 4.06 M/UL (ref 4.5–5.9)
SODIUM SERPL-SCNC: 137 MMOL/L (ref 135–144)
SP GR UR STRIP: 1.01 (ref 1–1.03)
TROPONIN I SERPL HS-MCNC: 18 NG/L (ref 0–22)
TROPONIN I SERPL HS-MCNC: 19 NG/L (ref 0–22)
UROBILINOGEN UR STRIP-ACNC: NORMAL EU/DL (ref 0–1)
WBC OTHER # BLD: 3.9 K/UL (ref 3.5–11)

## 2024-01-04 PROCEDURE — 85025 COMPLETE CBC W/AUTO DIFF WBC: CPT

## 2024-01-04 PROCEDURE — 80048 BASIC METABOLIC PNL TOTAL CA: CPT

## 2024-01-04 PROCEDURE — 83880 ASSAY OF NATRIURETIC PEPTIDE: CPT

## 2024-01-04 PROCEDURE — 81003 URINALYSIS AUTO W/O SCOPE: CPT

## 2024-01-04 PROCEDURE — 71045 X-RAY EXAM CHEST 1 VIEW: CPT

## 2024-01-04 PROCEDURE — 84484 ASSAY OF TROPONIN QUANT: CPT

## 2024-01-04 PROCEDURE — 99285 EMERGENCY DEPT VISIT HI MDM: CPT

## 2024-01-04 PROCEDURE — 36415 COLL VENOUS BLD VENIPUNCTURE: CPT

## 2024-01-04 PROCEDURE — 93005 ELECTROCARDIOGRAM TRACING: CPT | Performed by: EMERGENCY MEDICINE

## 2024-01-04 ASSESSMENT — PAIN DESCRIPTION - PAIN TYPE: TYPE: ACUTE PAIN

## 2024-01-04 ASSESSMENT — PAIN DESCRIPTION - FREQUENCY: FREQUENCY: CONTINUOUS

## 2024-01-04 ASSESSMENT — LIFESTYLE VARIABLES
HOW MANY STANDARD DRINKS CONTAINING ALCOHOL DO YOU HAVE ON A TYPICAL DAY: PATIENT DOES NOT DRINK
HOW OFTEN DO YOU HAVE A DRINK CONTAINING ALCOHOL: NEVER

## 2024-01-04 ASSESSMENT — PAIN DESCRIPTION - LOCATION: LOCATION: GENERALIZED

## 2024-01-04 ASSESSMENT — PAIN - FUNCTIONAL ASSESSMENT: PAIN_FUNCTIONAL_ASSESSMENT: 0-10

## 2024-01-04 ASSESSMENT — HEART SCORE: ECG: 1

## 2024-01-04 ASSESSMENT — PAIN DESCRIPTION - DESCRIPTORS: DESCRIPTORS: TIGHTNESS;BURNING

## 2024-01-04 ASSESSMENT — PAIN SCALES - GENERAL: PAINLEVEL_OUTOF10: 8

## 2024-01-04 NOTE — ED PROVIDER NOTES
(post-traumatic stress disorder)     S/P CABG (coronary artery bypass graft) 10/23/15    Sleep apnea     Ventricular fibrillation (HCC) 10/18/2015    x 2 DURING HEART ATTACK       SURGICAL HISTORY    Past Surgical History:   Procedure Laterality Date    CARDIAC SURGERY      10/21/2015 3 vessel CABG    CARPAL TUNNEL RELEASE      CARPAL TUNNEL RELEASE Right     [facility]COPY  [facility]    COLONOSCOPY  11/27/2017    CORONARY ARTERY BYPASS GRAFT  10/23/15    Dr. [facility]    FINGER AMPUTATION Left     TIP OF MIDDLE FINGER    FINGER SURGERY      left middle finger    FRACTURE SURGERY      left wrist    JOINT REPLACEMENT      right knee    PILONIDAL CYST EXCISION      1974    PILONIDAL CYST EXCISION      ME COLONOSCOPY W/BIOPSY SINGLE/MULTIPLE N/A 11/27/2017    COLONOSCOPY WITH BIOPSY/ polypectomy performed by Wally Ellsworth MD at [facility] OR    ME EGD TRANSORAL BIOPSY SINGLE/MULTIPLE N/A 11/27/2017    EGD BIOPSY performed by Wally Ellsworth MD at [facility] OR    TONSILLECTOMY      WRIST SURGERY      left wrist       CURRENT MEDICATIONS    Current Outpatient Rx   Medication Sig Dispense Refill    spironolactone (ALDACTONE) 50 MG tablet Take 1 tablet by mouth daily 30 tablet 5    losartan (COZAAR) 50 MG tablet Take 1 tablet by mouth daily 30 tablet 5    potassium chloride (KLOR-CON M) 20 MEQ extended release tablet Take 1 tablet by mouth daily 30 tablet 0    Compression Bandages MISC 4\" ace wrap, 2 rolls please, use to wrap ann legs daily while awake (Patient not taking: Reported on 12/15/2023) 2 each 2    dicyclomine (BENTYL) 10 MG capsule take 1 capsule by mouth four times a day if needed ABDOMINAL CRAMPING 60 capsule 2    SYMBICORT 160-4.5 MCG/ACT AERO Inhale 2 puffs into the lungs 2 times daily      triamcinolone (KENALOG) 0.1 % cream Apply to legs bid prn redness and itching of lower legs 80 g 0    bumetanide (BUMEX) 2 MG tablet 1 tablet      terazosin (HYTRIN) 10 MG capsule take 1 capsule by mouth nightly

## 2024-01-05 LAB
EKG ATRIAL RATE: 75 BPM
EKG P AXIS: 49 DEGREES
EKG P-R INTERVAL: 192 MS
EKG Q-T INTERVAL: 404 MS
EKG QRS DURATION: 90 MS
EKG QTC CALCULATION (BAZETT): 451 MS
EKG R AXIS: 5 DEGREES
EKG T AXIS: 21 DEGREES
EKG VENTRICULAR RATE: 75 BPM

## 2024-08-12 RX ORDER — CEPHALEXIN 500 MG/1
500 CAPSULE ORAL 3 TIMES DAILY
Qty: 42 CAPSULE | Refills: 0 | OUTPATIENT
Start: 2024-08-12

## 2024-08-13 NOTE — PROGRESS NOTES
----- Message from Dr. Dawna Allen, DO sent at 8/13/2024  9:45 AM EDT -----  Does not appear that a BMP was done. Can this be added to the lab from yesterday?   CC  Diarrhea     HPI      Mr Cipriano Jerez presents to schedule endoscopy. He is a 57 yo WM presenting for evaluation of diarrhea. This has been present for months. Worse with meals. No fevers nor chills. No recent weight changes. No travel. No sick contacts. Multitude of medical problems. Previous colonoscopy at [facility]. Adopted. He does not know his family history. He uses marijuana regularly. No tobacco.       Review of Systems   Constitutional: Positive for fatigue. Negative for activity change, appetite change, chills, fever and unexpected weight change. HENT: Negative for nosebleeds, sneezing, sore throat and trouble swallowing. Eyes: Negative for visual disturbance. Respiratory: Negative for cough, choking and shortness of breath. Cardiovascular: Negative for chest pain, palpitations and leg swelling. Gastrointestinal: Positive for abdominal distention, diarrhea and nausea. Negative for abdominal pain, blood in stool and vomiting. Genitourinary: Negative for dysuria, flank pain and hematuria. Musculoskeletal: Positive for arthralgias. Negative for back pain, gait problem and myalgias. Allergic/Immunologic: Negative for immunocompromised state. Neurological: Negative for dizziness, seizures, syncope, weakness and headaches. Hematological: Does not bruise/bleed easily.    Psychiatric/Behavioral: Negative for confusion and sleep disturbance.         Past Medical History        Past Medical History:   Diagnosis Date    Arthritis       WENDY KNEE    Bronchitis with chronic airway obstruction (HCC)      CAD (coronary artery disease)      CHF (congestive heart failure) (HCC)      CTS (carpal tunnel syndrome)       RIGHT    Diabetes mellitus ([facility]      Diabetic neuropathy associated with diabetes mellitus due to underlying condition ([facility]      GERD (gastroesophageal reflux disease)      H/O cardiovascular stress test 07/08/2016     Abnormal.  Moderate perfusion defect of mild to moderate intensity in the inferolateral,inferior and inferoapical regions during stress imaging. Ef 45%. with regional wall motion abnormalities.  H/O echocardiogram 2/17/16     EF >60%. LV wall thickness is mildly increased. Inferoseptal wall is abnormal in its motion not unusual status post open heart surgery. Normal aortic valve structure with ild aortic regurgitation.  Hip pain, left      Hx of cardiac cath 07/08/2016     LMCA: Normal 0% stenosis. LAD: Chronic occlusion 100%. Lesion on Mid LAD: 100% stenosis. LCx: single stenosis. Lesion on Mid CX: 80% stenosis. RCA: Lesion on Mid RCA: 70% stenosis.  Hyperlipidemia      Hypertension      MI ([facility]ocardial infarction) ([facility] 2015    Pneumonia      PTSD (post-traumatic stress disorder)      S/P CABG (coronary artery bypass graft) 10/23/15    Sleep apnea      Ventricular fibrillation ([facility] [address] 10/18/2015     x 2 DURING HEART ATTACK            Past Surgical History         Past Surgical History:   Procedure Laterality Date    CARDIAC SURGERY         10/21/2015 3 vessel CABG    CARPAL TUNNEL RELEASE        CARPAL TUNNEL RELEASE Right      COLONOSCOPY   2012     [facility]    CORONARY ARTERY BYPASS GRAFT   10/23/15     Dr. Zane Molina FINGER AMPUTATION Left       TIP OF MIDDLE FINGER    FINGER SURGERY         left middle finger    FRACTURE SURGERY         left wrist    JOINT REPLACEMENT         right knee    PILONIDAL CYST EXCISION         1974    PILONIDAL CYST EXCISION        TONSILLECTOMY        WRIST SURGERY         left wrist            Family History         Family History   Problem Relation Age of Onset    Adopted: Yes    Family history unknown:  Yes            Allergies:  See list     Current Facility-Administered Medications          Current Outpatient Prescriptions   Medication Sig Dispense Refill    finasteride (PROSCAR) 5 MG tablet Take 1 tablet by mouth daily 30 tablet 3    ranolazine (RANEXA) 500 MG extended release tablet Take 1 tablet by mouth 2 times daily 60 tablet 3    simvastatin (ZOCOR) 5 MG tablet Take 5 mg by mouth daily        acetaminophen (TYLENOL) 325 MG tablet Take 325 mg by mouth every 6 hours as needed        atenolol (TENORMIN) 25 MG tablet Take 0.5 tablets by mouth daily 30 tablet 3    mirtazapine (REMERON) 15 MG tablet Take 2 tablets by mouth nightly 14 tablet 3    ondansetron (ZOFRAN ODT) 4 MG disintegrating tablet Take 0.5 tablets by mouth every 4 hours as needed for Nausea or Vomiting 15 tablet 0    traZODone (DESYREL) 50 MG tablet Take 100 mg by mouth nightly         Cholecalciferol (VITAMIN D) 2000 UNITS CAPS capsule Take 1 capsule by mouth daily        furosemide (LASIX) 40 MG tablet Take 1 tablet by mouth daily 30 tablet 3    lansoprazole (PREVACID SOLUTAB) 30 MG disintegrating tablet Take 30 mg by mouth 4 times daily        aspirin 81 MG tablet Take 81 mg by mouth daily        fluticasone (FLONASE) 50 MCG/ACT nasal spray 2 sprays by Nasal route daily        Loratadine (CLARITIN) 10 MG CAPS Take 10 mg by mouth daily.        albuterol (PROVENTIL) (2.5 MG/3ML) 0.083% nebulizer solution inhale contents of 1 vial in nebulizer every 6 hours if needed for wheezing 150 vial 1      No current facility-administered medications for this visit.             Social History   Social History            Social History    Marital status:        Spouse name: N/A    Number of children: N/A    Years of education: N/A             Social History Main Topics    Smoking status: Former Smoker       Quit date: 8/23/2006    Smokeless tobacco: Former User       Quit date: 8/1/2000    Alcohol use No    Drug use: Yes       Special: Marijuana         Comment: Pt smokes marijuana therapeutically.  Sexual activity: Not Asked           Other Topics Concern    None      Social History Narrative            Objective:   Physical Exam   Constitutional: He is oriented to person, place, and time.  He appears 11.0 k/uL Final 07/05/2017 12:20 PM [facility]   RBC 4.61 4.5 - 5.9 m/uL Final 07/05/2017 12:20 PM [facility]   Hemoglobin 13.9 13.5 - 17.5 g/dL Final 07/05/2017 12:20 PM [facility]   Hematocrit 41.8 41 - 53 % Final 07/05/2017 12:20 PM [facility]   MCV 90.6 80 - 100 fL Final 07/05/2017 12:20 PM [facility]   MCH 30.2 26 - 34 pg Final 07/05/2017 12:20 PM [facility]   MCHC 33.3 31 - 37 g/dL Final 07/05/2017 12:20 PM [facility]   RDW 16.0 (H) 12.1 - 15.2 % Final 07/05/2017 12:20 PM [facility]   Platelets [id number] 585 k/uL Final 07/05/2017 12:20 PM [facility]                       Assessment:      1. Change in bowel habits                                 Plan: Will proceed with colonoscopy over the next few weeks.   Risks, benefits, alternatives thoroughly reviewed and accepted by Mr Giselle Costello, including remote risk of GI bleeding, perforation, missed lesions, etc.  Requested abstinence from [facility] use until endoscopy complete.

## 2025-04-29 NOTE — PROGRESS NOTES
Phone: Jayant Sen      Fax: [phone]                            Outpatient Physical Therapy                                                                            Daily Note    Date: 2021  Patient Name: Jeferson Galindo        MRN: [id number]   ACCT#:  [de-identified]  : 1955  (77 y.o.)    Referring Practitioner: Dr. Kendall Salgado    Referral Date : 05/10/21    Diagnosis: Cervical stensosis  Treatment Diagnosis: Cervical tightness    Onset Date: 05/10/21  PT Insurance Information: [employer]    Per Physician Order  Total # of Visits to Date: 2  No Show: 0  Canceled Appointment: 0  Plan of Care/Certification Expiration Date: 21    Pre-Treatment Pain:  1/10     Assessment  Assessment: Patient states \"stiffness\" vs actual pain. Good tolerance to  UBE and UE/anterior chest stretches. Progress with postural strengthening. Patient reports one day of pain reduction following initial session of manual therapy and soft tissue massage. Chart Reviewed: Yes    Plan  Plan: Continue with current plan    Exercises/Modalities/Manual:  See DocFlow Sheet    Education:           Goals  (Total # of Visits to Date: 2)   Short Term Goals - Time Frame for Short term goals: 5 visits  Short term goal 1: Educate on home program of postural and cervical stretches                Long Term Goals - Time Frame for Long term goals : 10 visits  Long term goal 1: Upgrade home program for postural strengthening  Long term goal 2: Decrease subjective complaints of cervical tightness to allow patient to complete daily tasks without restriction  Long term goal 3:  Increase right  strength to 70#          Post Treatment Pain:  1/10    Time In: 1425    Time Out : 1510        Timed Code Treatment Minutes: 20 Minutes  Total Treatment Time: Maryan Aguilera 38, PT     Date: 2021 29-Apr-2025 13:24

## (undated) DEVICE — FORCEP SPEC RETRV BX AD 2 MMX155 CM 5 MM GI OVL CUP W/ NDL

## (undated) DEVICE — ELECTRODE ES AD CRD L15FT DISP FOR PT BELOW 30LB REM

## (undated) DEVICE — BLOCK BITE AD OPN SZ 48FR MOUTHPC ENDOSCP STURDY W/ FOAM

## (undated) DEVICE — GOWN,AURORA,NONRNF,XL,30/CS: Brand: MEDLINE

## (undated) DEVICE — Device: Brand: DISPOSABLE ELECTROSURGICAL SNARE

## (undated) DEVICE — FORCEPS BX L240CM JAW DIA2.2MM RAD JAW 4 HOT DISP

## (undated) DEVICE — 1200CC SUCTION CANISTER WITH HYDROPHOBIC FILTER AND RED LID: Brand: BEMIS

## (undated) DEVICE — MEDI-VAC NON-CONDUCTIVE SUCTION TUBING 6MM X 6.1M (20 FT.) L: Brand: CARDINAL HEALTH

## (undated) DEVICE — TRAP SUCT POLYPECTOMY ST 4 CHMBR

## (undated) DEVICE — REAGENT TEST UREASE RAPD CLOTEST F/

## (undated) DEVICE — JELLY LUBRICATING 4OZ FLIP TOP TB E Z